# Patient Record
Sex: FEMALE | Race: WHITE | NOT HISPANIC OR LATINO | Employment: FULL TIME | URBAN - METROPOLITAN AREA
[De-identification: names, ages, dates, MRNs, and addresses within clinical notes are randomized per-mention and may not be internally consistent; named-entity substitution may affect disease eponyms.]

---

## 2017-01-25 ENCOUNTER — PATIENT MESSAGE (OUTPATIENT)
Dept: INTERNAL MEDICINE | Facility: CLINIC | Age: 47
End: 2017-01-25

## 2017-01-26 ENCOUNTER — PATIENT MESSAGE (OUTPATIENT)
Dept: INTERNAL MEDICINE | Facility: CLINIC | Age: 47
End: 2017-01-26

## 2017-02-01 ENCOUNTER — OFFICE VISIT (OUTPATIENT)
Dept: INTERNAL MEDICINE | Facility: CLINIC | Age: 47
End: 2017-02-01
Payer: COMMERCIAL

## 2017-02-01 VITALS
DIASTOLIC BLOOD PRESSURE: 70 MMHG | SYSTOLIC BLOOD PRESSURE: 128 MMHG | HEIGHT: 65 IN | WEIGHT: 139.31 LBS | HEART RATE: 72 BPM | BODY MASS INDEX: 23.21 KG/M2

## 2017-02-01 DIAGNOSIS — R22.32 MASS OF LEFT HAND: Primary | ICD-10-CM

## 2017-02-01 PROCEDURE — 99212 OFFICE O/P EST SF 10 MIN: CPT | Mod: S$GLB,,, | Performed by: INTERNAL MEDICINE

## 2017-02-01 PROCEDURE — 99999 PR PBB SHADOW E&M-EST. PATIENT-LVL III: CPT | Mod: PBBFAC,,, | Performed by: INTERNAL MEDICINE

## 2017-02-01 NOTE — PROGRESS NOTES
Subjective:       Patient ID: Brissa Mason is a 46 y.o. female.    Chief Complaint: Mass (left hand, tender to touch x 1 month)    HPI   Incidental note of mass on the left hand with some discomfort.    Review of Systems    No other symptoms  Objective:      Physical Exam    Mass on palm at middle finger of the left hand  Assessment:       1. Mass of left hand        Plan:       Brissa was seen today for mass.    Diagnoses and all orders for this visit:    Mass of left hand: hand specialist  -     Ambulatory consult to Orthopedics      Return if symptoms worsen or fail to improve.    New Prescriptions    No medications on file       Modified Medications    No medications on file       Orders Placed This Encounter   Procedures    Ambulatory consult to Orthopedics     Referral Priority:   Routine     Referral Type:   Consultation     Number of Visits Requested:   1       Labs, studies and consults associated with this visit were reviewed

## 2017-02-01 NOTE — MR AVS SNAPSHOT
[facility]  [address]  [facility] [address]  Phone: [phone]  Fax: [phone]                  Brissa Mason   2017 8:30 AM   Office Visit    Description:  Female : 1970   Provider:  Sandra Betts MD   Department:  [facility]           Reason for Visit     Mass           Diagnoses this Visit        Comments    Mass of left hand    -  Primary            To Do List           Goals (5 Years of Data)     None      Follow-Up and Disposition     Return if symptoms worsen or fail to improve.    Follow-up and Disposition History      [facility]     [facility] Nurse Care Line -  Assistance  Registered nurses in the [facility] provide clinical advisement, health education, appointment booking, and other advisory services.  Call for this free service at [phone].             Medications           Message regarding Medications     Verify the changes and/or additions to your medication regime listed below are the same as discussed with your clinician today.  If any of these changes or additions are incorrect, please notify your healthcare provider.             Verify that the below list of medications is an accurate representation of the medications you are currently taking.  If none reported, the list may be blank. If incorrect, please contact your healthcare provider. Carry this list with you in case of emergency.           Current Medications     alprazolam (XANAX) 0.5 MG tablet Take 1 tablet (0.5 mg total) by mouth nightly as needed for Insomnia or Anxiety.    desogestrel-ethinyl estradiol (VELIVET) 0.1/.125/.15-25 mg-mcg tablet Take 1 tablet by mouth once daily.    fexofenadine (ALLEGRA) 180 MG tablet Take 180 mg by mouth. 1 Tablet Oral Every day           Clinical Reference Information           Vital Signs - Last Recorded  Most recent update: 2017  8:33 AM by Jose Barragan MA    BP Pulse Ht Wt BMI     "128/70 72 5' 5" (1.651 m) 63.2 kg (139 lb 5.3 oz) 23.19 kg/m2      Blood Pressure          Most Recent Value    BP  128/70      Allergies as of 2/1/2017     Mobic [Meloxicam]    Erythromycin    Sulfa (Sulfonamide Antibiotics)      Immunizations Administered on Date of Encounter - 2/1/2017     None      Orders Placed During Today's Visit      Normal Orders This Visit    Ambulatory consult to Orthopedics       "

## 2017-02-16 ENCOUNTER — OFFICE VISIT (OUTPATIENT)
Dept: ORTHOPEDICS | Facility: CLINIC | Age: 47
End: 2017-02-16
Payer: COMMERCIAL

## 2017-02-16 VITALS — WEIGHT: 139 LBS | BODY MASS INDEX: 23.16 KG/M2 | HEIGHT: 65 IN

## 2017-02-16 DIAGNOSIS — M67.442 GANGLION CYST OF FLEXOR TENDON SHEATH OF FINGER OF LEFT HAND: ICD-10-CM

## 2017-02-16 DIAGNOSIS — R22.32 MASS OF LEFT HAND: ICD-10-CM

## 2017-02-16 PROCEDURE — 99999 PR PBB SHADOW E&M-EST. PATIENT-LVL II: CPT | Mod: PBBFAC,,, | Performed by: ORTHOPAEDIC SURGERY

## 2017-02-16 PROCEDURE — 20550 NJX 1 TENDON SHEATH/LIGAMENT: CPT | Mod: LT,S$GLB,, | Performed by: ORTHOPAEDIC SURGERY

## 2017-02-16 PROCEDURE — 99203 OFFICE O/P NEW LOW 30 MIN: CPT | Mod: 25,S$GLB,, | Performed by: ORTHOPAEDIC SURGERY

## 2017-02-16 RX ORDER — TRIAMCINOLONE ACETONIDE 40 MG/ML
40 INJECTION, SUSPENSION INTRA-ARTICULAR; INTRAMUSCULAR
Status: COMPLETED | OUTPATIENT
Start: 2017-02-16 | End: 2017-02-16

## 2017-02-16 RX ADMIN — TRIAMCINOLONE ACETONIDE 40 MG: 40 INJECTION, SUSPENSION INTRA-ARTICULAR; INTRAMUSCULAR at 08:02

## 2017-02-16 NOTE — Clinical Note
February 16, 2017      Sandra Betts MD  [address]  [facility] [address]           [facility]  [address] [place] [address] [address]  [facility] [address]  Phone: [phone]          Patient: Brissa Mason   MR Number: [id number]   YOB: 1970   Date of Visit: 2/16/2017       Dear Dr. Sandra Betts:    Thank you for referring Brissa Mason to me for evaluation. Attached you will find relevant portions of my assessment and plan of care.    If you have questions, please do not hesitate to call me. I look forward to following Brissa Mason along with you.    Sincerely,    Errol Dyson Jr., MD    Enclosure  CC:  No Recipients    If you would like to receive this communication electronically, please contact [email or web address] or [phone] to request more information on [employer] Link access.    For providers and/or their staff who would like to refer a patient to [facility], please contact us through our one-stop-shop provider referral line, [facility], at [phone].    If you feel you have received this communication in error or would no longer like to receive these types of communications, please e-mail [email or web address]

## 2017-02-16 NOTE — PROGRESS NOTES
INITIAL VISIT HISTORY:  A 46-year-old female presents for evaluation of a small   mass on her left hand.  She has noticed it for the past few months, seems to be   a little bit worse with use and she localizes it to the palm of the hand.  No   numbness or tingling is reported.    PAST MEDICAL HISTORY:  Significant for allergies and colitis.    PAST SURGICAL HISTORY:  None.    FAMILY HISTORY:  Positive for hyperlipidemia, hypertension, gout and breast   cancer.    SOCIAL HISTORY:  The patient does not smoke, drinks 1-2 drinks per week.    REVIEW OF SYSTEMS:  Negative fever, chills, rashes.    CURRENT MEDICATIONS:  Reviewed on chart.    ALLERGIES:  Mobic, erythromycin and sulfa.    PHYSICAL EXAMINATION:  GENERAL:  Well-developed, well-nourished female in no acute distress, alert and   oriented x3.  EXTREMITIES:  Examination of the upper extremities significant for the left   hand, demonstrating a small mass overlying the A1/A2 pulley of the left middle   finger, measuring about the size of a large pea, tender to touch, is not mobile.    There are no overlying skin changes.  No triggering noted.   strength   intact.  Sensation intact.  Tinel sign negative.  No x-rays.    IMPRESSION:  Volar retinacular cyst, left middle finger.    PLAN:  I explained the nature of the problem to the patient.  We did discuss   options for treatment including injection, aspiration versus surgical excision.    The patient would like to try an injection performed into the cyst directly and   the tendon sheath of the left middle finger with combination of Kenalog 10 mg,   0.5 mL Xylocaine, sterile technique.    I have also recommended anti-inflammatory medication by mouth, avoidance of   heavy lifting or gripping with the left hand and followup in one month if   symptoms persist.  If symptoms do persist, then I would recommend surgical   excision.      GEORGE  dd: 02/16/2017 08:36:14 (CST)  td: 02/16/2017 16:07:38 (CST)  Doc ID   #[id number]   Job ID #[id number]    CC:

## 2017-02-16 NOTE — LETTER
February 16, 2017        Sandra Betts MD  [address]  [facility] [address]             [facility]  [address] [place] [address] [address]  [facility] [address]  Phone: [phone]   Patient: Brissa Mason   MR Number: [id number]   YOB: 1970   Date of Visit: 2/16/2017       Dear Dr. Betts:    Thank you for referring Brissa Mason to me for evaluation. Below are the relevant portions of my assessment and plan of care.            If you have questions, please do not hesitate to call me. I look forward to following Brissa along with you.    Sincerely,      Errol Dyson Jr., MD           CC  No Recipients

## 2017-04-16 ENCOUNTER — PATIENT MESSAGE (OUTPATIENT)
Dept: INTERNAL MEDICINE | Facility: CLINIC | Age: 47
End: 2017-04-16

## 2017-04-16 DIAGNOSIS — M54.2 NECK PAIN: Primary | ICD-10-CM

## 2017-04-20 ENCOUNTER — TELEPHONE (OUTPATIENT)
Dept: SPINE | Facility: CLINIC | Age: 47
End: 2017-04-20

## 2017-04-20 DIAGNOSIS — M54.2 NECK PAIN: Primary | ICD-10-CM

## 2017-04-21 ENCOUNTER — OFFICE VISIT (OUTPATIENT)
Dept: SPINE | Facility: CLINIC | Age: 47
End: 2017-04-21
Payer: COMMERCIAL

## 2017-04-21 ENCOUNTER — HOSPITAL ENCOUNTER (OUTPATIENT)
Dept: RADIOLOGY | Facility: OTHER | Age: 47
Discharge: HOME | End: 2017-04-21
Attending: NEUROLOGICAL SURGERY
Payer: COMMERCIAL

## 2017-04-21 VITALS
HEART RATE: 71 BPM | DIASTOLIC BLOOD PRESSURE: 71 MMHG | SYSTOLIC BLOOD PRESSURE: 156 MMHG | BODY MASS INDEX: 23.16 KG/M2 | HEIGHT: 65 IN | WEIGHT: 139 LBS

## 2017-04-21 DIAGNOSIS — M79.18 MYOFASCIAL MUSCLE PAIN: Primary | ICD-10-CM

## 2017-04-21 DIAGNOSIS — M47.12 CERVICAL SPONDYLOSIS WITH MYELOPATHY: ICD-10-CM

## 2017-04-21 DIAGNOSIS — M54.2 NECK PAIN: ICD-10-CM

## 2017-04-21 PROCEDURE — 72050 X-RAY EXAM NECK SPINE 4/5VWS: CPT | Mod: 26,,, | Performed by: RADIOLOGY

## 2017-04-21 PROCEDURE — 1160F RVW MEDS BY RX/DR IN RCRD: CPT | Mod: S$GLB,,, | Performed by: PHYSICIAN ASSISTANT

## 2017-04-21 PROCEDURE — 99999 PR PBB SHADOW E&M-EST. PATIENT-LVL III: CPT | Mod: PBBFAC,,, | Performed by: PHYSICIAN ASSISTANT

## 2017-04-21 PROCEDURE — 99214 OFFICE O/P EST MOD 30 MIN: CPT | Mod: S$GLB,,, | Performed by: PHYSICIAN ASSISTANT

## 2017-04-21 PROCEDURE — 72050 X-RAY EXAM NECK SPINE 4/5VWS: CPT | Mod: TC

## 2017-04-21 NOTE — MR AVS SNAPSHOT
[facility]  [address], [address]  [facility] [address]  Phone: [phone]  Fax: [phone]                  Brissa Mason   2017 9:30 AM   Office Visit    Description:  Female : 1970   Provider:  Hailee Lambert PA-C   Department:  [facility]           Reason for Visit     Neck Pain           Diagnoses this Visit        Comments    Myofascial muscle pain    -  Primary     Cervical spondylosis with myelopathy         Neck pain                To Do List           Future Appointments        Provider Department Dept Phone    2017 9:30 AM Hailee Lambert PA-C [facility] [phone]      Goals (5 Years of Data)     None      Follow-Up and Disposition     Return if symptoms worsen or fail to improve.      [facility]     [facility] Nurse Care Line -  Assistance  Unless otherwise directed by your provider, please contact [facility], our nurse care line that is available for  assistance.     Registered nurses in the [facility] provide: appointment scheduling, clinical advisement, health education, and other advisory services.  Call: [phone] (toll free)               Medications           Message regarding Medications     Verify the changes and/or additions to your medication regime listed below are the same as discussed with your clinician today.  If any of these changes or additions are incorrect, please notify your healthcare provider.             Verify that the below list of medications is an accurate representation of the medications you are currently taking.  If none reported, the list may be blank. If incorrect, please contact your healthcare provider. Carry this list with you in case of emergency.           Current Medications     alprazolam (XANAX) 0.5 MG tablet Take 1 tablet (0.5 mg total) by mouth nightly as needed for Insomnia or Anxiety.    desogestrel-ethinyl estradiol (VELIVET)  "0.1/.125/.15-25 mg-mcg tablet Take 1 tablet by mouth once daily.    fexofenadine (ALLEGRA) 180 MG tablet Take 180 mg by mouth. 1 Tablet Oral Every day           Clinical Reference Information           Your Vitals Were     BP Pulse Height Weight BMI    156/71 71 5' 5" (1.651 m) 63 kg (139 lb) 23.13 kg/m2      Blood Pressure          Most Recent Value    BP  (!)  156/71      Allergies as of 4/21/2017     Mobic [Meloxicam]    Erythromycin    Sulfa (Sulfonamide Antibiotics)      Immunizations Administered on Date of Encounter - 4/21/2017     None      Orders Placed During Today's Visit      Normal Orders This Visit    Ambulatory referral to Physical Therapy - Cervical       Language Assistance Services     ATTENTION: Language assistance services are available, free of charge. Please call [phone].      ATENCIÓN: Si raf thomas, tiene a gilliam disposición servicios gratuitos de asistencia lingüística. Llame al [phone].     CHÚ Ý: N?u b?n nói Ti?ng Vi?t, có các d?ch v? h? tr? ngôn ng? mi?n phí dành cho b?n. G?i s? [phone].         [facility] complies with applicable Federal civil rights laws and does not discriminate on the basis of race, color, national origin, age, disability, or sex.        "

## 2017-04-21 NOTE — PROGRESS NOTES
Subjective:      Patient ID: Brissa Mason is a 47 y.o. female.    Chief Complaint: Neck Pain      HPI     Had a fall in shower about 18 months ago with initial neck pain that resolved, but has been intermittent since that time. She complains of worsening pain on Sunday and Monday that has since improved. Currently, she has intermittent right sided neck pain that radiates to her shoulder blade. No arm pain. No left sided neck or arm pain. No numbness, tingling, or weakness. She rates her pain as a 4 on a scale of 1-10. Pain is aching in nature. She was moving shelves in her closet this weekend and this aggravated her pain. No other known aggravating factors. Some relief with prn OTC aleve and laying flat.     She went to PT for her left shoulder in last year. No PT, injections, or surgery on neck. Improvement with OTC aleve. Allergy to mobic.     Patient denies fevers, chills, nausea, vomiting, and weight loss. She denies balance issues, changes in handwriting, problems with hand dexterity, and frequent dropping of items. She admits to night sweats.       Review of Systems   Constitution: Negative for fever, weakness, malaise/fatigue, night sweats, weight gain and weight loss.   HENT: Negative for headaches, hearing loss, nosebleeds and odynophagia.    Eyes: Negative for blurred vision and double vision.   Cardiovascular: Negative for chest pain, irregular heartbeat and palpitations.   Respiratory: Negative for cough, hemoptysis, shortness of breath and wheezing.    Endocrine: Negative for cold intolerance and polydipsia.   Hematologic/Lymphatic: Does not bruise/bleed easily.   Skin: Negative for dry skin, poor wound healing, rash and suspicious lesions.   Musculoskeletal: Positive for back pain and neck pain.        See HPI for pertinent positives.   Gastrointestinal: Negative for bloating, abdominal pain, constipation, diarrhea, hematochezia, melena, nausea and vomiting.   Genitourinary: Negative for  bladder incontinence, dysuria, hematuria, hesitancy and incomplete emptying.   Neurological: Negative for disturbances in coordination, dizziness, focal weakness, loss of balance, numbness, paresthesias and seizures.   Psychiatric/Behavioral: Negative for depression and hallucinations. The patient is not nervous/anxious.            Objective:        General: Brissa is well-developed, well-nourished, appears stated age, in no acute distress, alert and oriented to time, place and person.     General    Vitals reviewed.  Constitutional: She is oriented to person, place, and time. She appears well-developed and well-nourished.   Pulmonary/Chest: Effort normal.   Abdominal: She exhibits no distension.   Neurological: She is alert and oriented to person, place, and time.   Psychiatric: She has a normal mood and affect. Her behavior is normal. Judgment and thought content normal.           Gait: normal, Romberg is normal, tandem walking is normal and she is able to heel/toe stand.     On exam of the cervical spine, pt has no posterior cervical or bilateral trapezial tenderness.     Patient has mild pain with looking over right shoulder, otherwise with good ROM of cervical spine.     Skin in the cervical region is warm to the touch without visible rashes.     Strength Testing of Bilateral UEs shows  Right :  +5/5   Left :  +5/5  Right deltoid:  +5/5   Left deltoid:  +5/5  Right biceps:  +5/5   Left biceps:  +5/5  Right triceps:  +5/5   Left triceps:  +5/5  Right wrist extension:  +5/5  Left wrist extension:  +5/5  Right wrist flexion:  +5/5  Left wrist flexion:  +5/5  Right interosseus:  +5/5  Left interosseus:  +5/5    Sensation is grossly intact to light touch in C5, C6, C7, C8, T1 distribution.     Right shoulder has no pain with IR/ER. Good ROM of shoulder and no tenderness.   Left shoulder has no pain with IR/ER. Good ROM of shoulder and no tenderness.     negative clonus in bilateral LEs.    negative hoffmans  bilateral UEs.    DTRs:  Right biceps:  +2     Left biceps:  +2   Right brachioradialis:  +2  Left brachioradialis:  +2    On gross exam of bilateral LEs, patient has full painfree ROM with no signs of clubbing, laxity, cyanosis, edema, instability, and weakness.       XRAY INTERPRETATION:  X-rays of cervical spine (AP/lat/flex/ext) dated 4/21/17 are personally reviewed and show minimal spondylosis C5-C6.        Assessment:       1. Myofascial muscle pain    2. Cervical spondylosis with myelopathy    3. Neck pain           Plan:       Orders Placed This Encounter    Ambulatory referral to Physical Therapy - Cervical       Intermittent right sided neck pain that radiates to her shoulder blade that started after fall in shower 18 months ago. No arm pain. No left sided neck or arm pain. Known minimal spondylosis C5-C6. Pain likely due to flare of underlying degeneration with myofascial component. Treatment options reviewed with patient along with above cervical XRs. Following plan made:     - PT for cervical spine with good HEP. Internal orders sent to [facility].   - OTC aleve 1-2 bid with food x 2 weeks and then prn. Reviewed dosing and side effects. Allergy to mobic noted (rash). No problems with aleve.   - Expect improvement with above. If not, she will call for follow up.     Follow-up: Return if symptoms worsen or fail to improve. If there are any questions prior to this, the patient was instructed to contact the office.

## 2017-04-21 NOTE — LETTER
April 21, 2017      Sandra Betts MD  [address]  [facility] [address]           [facility]  [address], [address]  [facility] [address]  Phone: [phone]  Fax: [phone]          Patient: Brissa Mason   MR Number: [id number]   YOB: 1970   Date of Visit: 4/21/2017       Dear Dr. Sandra Betts:    Thank you for referring Brissa Mason to me for evaluation. Attached you will find relevant portions of my assessment and plan of care.    If you have questions, please do not hesitate to call me. I look forward to following Brissa Mason along with you.    Sincerely,    BOLA Calzadaosure  CC:  No Recipients    If you would like to receive this communication electronically, please contact [email or web address] or [phone] to request more information on [facility] Link access.    For providers and/or their staff who would like to refer a patient to [facility], please contact us through our one-stop-shop provider referral line, [facility], at [phone].    If you feel you have received this communication in error or would no longer like to receive these types of communications, please e-mail [email or web address]

## 2017-07-14 ENCOUNTER — PATIENT MESSAGE (OUTPATIENT)
Dept: ORTHOPEDICS | Facility: CLINIC | Age: 47
End: 2017-07-14

## 2017-07-31 DIAGNOSIS — Z30.9 UNSPECIFIED CONTRACEPTIVE MANAGEMENT: ICD-10-CM

## 2017-08-01 ENCOUNTER — PATIENT MESSAGE (OUTPATIENT)
Dept: OBSTETRICS AND GYNECOLOGY | Facility: CLINIC | Age: 47
End: 2017-08-01

## 2017-08-01 RX ORDER — DESOGESTREL AND ETHINYL ESTRADIOL 7 DAYS X 3
KIT ORAL
Qty: 84 TABLET | Refills: 0 | Status: SHIPPED | OUTPATIENT
Start: 2017-08-01 | End: 2017-08-03 | Stop reason: SDUPTHER

## 2017-08-03 ENCOUNTER — OFFICE VISIT (OUTPATIENT)
Dept: ORTHOPEDICS | Facility: CLINIC | Age: 47
End: 2017-08-03
Payer: COMMERCIAL

## 2017-08-03 ENCOUNTER — PATIENT MESSAGE (OUTPATIENT)
Dept: INTERNAL MEDICINE | Facility: CLINIC | Age: 47
End: 2017-08-03

## 2017-08-03 VITALS — WEIGHT: 139 LBS | HEIGHT: 65 IN | BODY MASS INDEX: 23.16 KG/M2

## 2017-08-03 DIAGNOSIS — M67.442 GANGLION CYST OF FLEXOR TENDON SHEATH OF FINGER OF LEFT HAND: Primary | ICD-10-CM

## 2017-08-03 DIAGNOSIS — Z30.9 UNSPECIFIED CONTRACEPTIVE MANAGEMENT: ICD-10-CM

## 2017-08-03 PROCEDURE — 99999 PR PBB SHADOW E&M-EST. PATIENT-LVL III: CPT | Mod: PBBFAC,,, | Performed by: ORTHOPAEDIC SURGERY

## 2017-08-03 PROCEDURE — 3008F BODY MASS INDEX DOCD: CPT | Mod: S$GLB,,, | Performed by: ORTHOPAEDIC SURGERY

## 2017-08-03 PROCEDURE — 99214 OFFICE O/P EST MOD 30 MIN: CPT | Mod: S$GLB,,, | Performed by: ORTHOPAEDIC SURGERY

## 2017-08-03 NOTE — PROGRESS NOTES
CHIEF COMPLAINT:  Cyst, left middle finger.    HISTORY OF PRESENT ILLNESS:  A 47-year-old female with ongoing symptoms in left   hand related to a small ganglion cyst in the left palm at the base of the middle   finger.  She has had at least one injection with only slight improvement, but   it has recurred causing pain and difficulty with gripping, seems to change size   from time to time and radiates pain into the left middle finger.  No triggering   is reported.  No numbness reported.    PAST MEDICAL HISTORY:  Significant for allergies and colitis.    PAST SURGICAL HISTORY:  None.    FAMILY HISTORY:  Positive for hyperlipidemia, hypertension and gout.    SOCIAL HISTORY:  The patient does not smoke.  Drinks 1-2 drinks per week.    REVIEW OF SYSTEMS:  Negative for fever, chills, rashes.    CURRENT MEDICATIONS:  Reviewed on chart.    ALLERGIES:  Mobic, erythromycin and sulfa.    PHYSICAL EXAMINATION:  GENERAL:  Well-developed, well-nourished female in no acute distress, alert and   oriented x3.  HEENT:  Unremarkable.  LUNGS:  Clear to auscultation.  HEART:  Regular rate and rhythm.  ABDOMEN:  Soft, nontender.  EXTREMITIES:  Significant for the left hand demonstrating tenderness at the base   of the left middle finger volarly where a small cyst is palpable adjacent to   the flexor tendon and the A1 pulley.  There is no triggering, but there is   tenderness and pain with flexion and extension of the finger.  Sensation intact.    Tinel sign negative.    IMPRESSION:  Volar retinacular cyst, left middle finger, symptomatic.    PLAN:  The patient would like to set up surgery for excisional biopsy of the   cyst.  The risks and benefits of surgery were explained today.  She understands.      GEORGE  dd: 08/03/2017 15:41:49 (CDT)  td: 08/04/2017 01:41:20 (CDT)  Doc ID   #[id number]  Job ID #[id number]    CC:

## 2017-08-28 ENCOUNTER — HOSPITAL ENCOUNTER (OUTPATIENT)
Dept: RADIOLOGY | Facility: HOSPITAL | Age: 47
Discharge: HOME | End: 2017-08-28
Attending: OBSTETRICS & GYNECOLOGY
Payer: COMMERCIAL

## 2017-08-28 VITALS — WEIGHT: 139 LBS | HEIGHT: 65 IN | BODY MASS INDEX: 23.16 KG/M2

## 2017-08-28 DIAGNOSIS — Z12.31 VISIT FOR SCREENING MAMMOGRAM: ICD-10-CM

## 2017-08-28 PROCEDURE — 77067 SCR MAMMO BI INCL CAD: CPT | Mod: TC

## 2017-08-28 PROCEDURE — 77063 BREAST TOMOSYNTHESIS BI: CPT | Mod: 26,,, | Performed by: INTERNAL MEDICINE

## 2017-08-28 PROCEDURE — 77067 SCR MAMMO BI INCL CAD: CPT | Mod: 26,,, | Performed by: INTERNAL MEDICINE

## 2017-09-01 ENCOUNTER — TELEPHONE (OUTPATIENT)
Dept: ORTHOPEDICS | Facility: CLINIC | Age: 47
End: 2017-09-01

## 2017-09-01 NOTE — TELEPHONE ENCOUNTER
----- Message from Sandra Zamora sent at 9/1/2017 11:28 AM CDT -----  Contact: [phone]/ self   Patient called in returning your call. Please advise.

## 2017-09-01 NOTE — TELEPHONE ENCOUNTER
----- Message from Tiffany Malloy sent at 9/1/2017 11:06 AM CDT -----  Contact: self, [phone]  Patient called in returning your call. Please advise.

## 2017-09-05 ENCOUNTER — OFFICE VISIT (OUTPATIENT)
Dept: OBSTETRICS AND GYNECOLOGY | Facility: CLINIC | Age: 47
End: 2017-09-05
Payer: COMMERCIAL

## 2017-09-05 VITALS
DIASTOLIC BLOOD PRESSURE: 62 MMHG | BODY MASS INDEX: 23.52 KG/M2 | WEIGHT: 141.13 LBS | SYSTOLIC BLOOD PRESSURE: 110 MMHG | HEIGHT: 65 IN

## 2017-09-05 DIAGNOSIS — Z30.9 UNSPECIFIED CONTRACEPTIVE MANAGEMENT: ICD-10-CM

## 2017-09-05 DIAGNOSIS — Z12.31 VISIT FOR SCREENING MAMMOGRAM: ICD-10-CM

## 2017-09-05 DIAGNOSIS — G43.821 MENSTRUAL MIGRAINE WITH STATUS MIGRAINOSUS, NOT INTRACTABLE: ICD-10-CM

## 2017-09-05 DIAGNOSIS — Z01.419 GYNECOLOGIC EXAM NORMAL: Primary | ICD-10-CM

## 2017-09-05 PROBLEM — G43.829 MENSTRUAL MIGRAINE WITHOUT STATUS MIGRAINOSUS, NOT INTRACTABLE: Status: ACTIVE | Noted: 2017-09-05

## 2017-09-05 PROCEDURE — 99999 PR PBB SHADOW E&M-EST. PATIENT-LVL III: CPT | Mod: PBBFAC,,, | Performed by: OBSTETRICS & GYNECOLOGY

## 2017-09-05 PROCEDURE — 99396 PREV VISIT EST AGE 40-64: CPT | Mod: S$GLB,,, | Performed by: OBSTETRICS & GYNECOLOGY

## 2017-09-05 RX ORDER — ESTRADIOL 0.1 MG/D
FILM, EXTENDED RELEASE TRANSDERMAL
Qty: 8 PATCH | Refills: 0 | Status: SHIPPED | OUTPATIENT
Start: 2017-09-05 | End: 2018-01-05 | Stop reason: SDUPTHER

## 2017-09-05 NOTE — PROGRESS NOTES
CC: Well woman exam    Brissa Mason is a 47 y.o. female  presents for well woman exam.  LMP: Patient's last menstrual period was 2017.  Patient reports mild headaches/migraines on off week - every month.  Patient switched to triphasic pill ~ 2 years ago by NP (MAGO Etienne) due to headaches on Kariva.  No other Gyn complaints today.    Past Medical History:   Diagnosis Date    Allergy     Colitis     resolved; rectum ; neg flex sig 2006     History reviewed. No pertinent surgical history.  Social History     Social History    Marital status:      Spouse name: N/A    Number of children: N/A    Years of education: N/A     Occupational History    Not on file.     Social History Main Topics    Smoking status: Never Smoker    Smokeless tobacco: Never Used    Alcohol use Yes      Comment: 1-2 glasses of wine a week     Drug use: No    Sexual activity: Yes     Partners: Male     Birth control/ protection: OCP      Comment: , menarche 14, no hx of abnormal      Other Topics Concern    Not on file     Social History Narrative         - [employer]    Parents - Mata and Robina Del Angel     Family History   Problem Relation Age of Onset    Colon polyps Mother      noncancerous colon tumor; polyps    Hyperlipidemia Mother     Osteoporosis Mother     Colon cancer Mother     Gout Father     Hypertension Father     Hypertension Sister     Glaucoma Sister     Hyperlipidemia Brother     Cancer Maternal Uncle      colon    Ovarian cancer Paternal Aunt 50    Hypertension Brother     Breast cancer Paternal Aunt 60    Miscarriages / Stillbirths Neg Hx     Amblyopia Neg Hx     Blindness Neg Hx     Cataracts Neg Hx     Diabetes Neg Hx     Macular degeneration Neg Hx     Retinal detachment Neg Hx     Strabismus Neg Hx     Stroke Neg Hx     Thyroid disease Neg Hx      OB History      Para Term  AB Living    0   0          SAB TAB  "Ectopic Multiple Live Births                       /62   Ht 5' 5" (1.651 m)   Wt 64 kg (141 lb 1.5 oz)   LMP 08/31/2017   BMI 23.48 kg/m²       ROS:  GENERAL: Denies weight gain or weight loss. Feeling well overall.   SKIN: Denies rash or lesions.   HEAD: Denies head injury or headache.   NODES: Denies enlarged lymph nodes.   CHEST: Denies chest pain or shortness of breath.   CARDIOVASCULAR: Denies palpitations or left sided chest pain.   ABDOMEN: No abdominal pain, constipation, diarrhea, nausea, vomiting or rectal bleeding.   URINARY: No frequency, dysuria, hematuria, or burning on urination.  REPRODUCTIVE: See HPI.   BREASTS: The patient performs breast self-examination and denies pain, lumps, or nipple discharge.   HEMATOLOGIC: No easy bruisability or excessive bleeding.   MUSCULOSKELETAL: Denies joint pain or swelling.   NEUROLOGIC: Denies syncope or weakness.   PSYCHIATRIC: Denies depression, anxiety or mood swings.    PHYSICAL EXAM:  APPEARANCE: Well nourished, well developed, in no acute distress.  AFFECT: WNL, alert and oriented x 3  SKIN: No acne or hirsutism  NECK: Neck symmetric without masses or thyromegaly  NODES: No inguinal, cervical, axillary, or femoral lymph node enlargement  CHEST: Good respiratory effect  ABDOMEN: Soft.  No tenderness or masses.  No hepatosplenomegaly.  No hernias.  BREASTS: Symmetrical, no skin changes or visible lesions.  No palpable masses, nipple discharge bilaterally.  PELVIC: Normal external genitalia without lesions.  Normal hair distribution.  Adequate perineal body, normal urethral meatus.  Vagina moist and well rugated without lesions or discharge.  Cervix pink, without lesions, discharge or tenderness.  No significant cystocele or rectocele.  Bimanual exam shows uterus to be normal size, regular, mobile and nontender.  Adnexa without masses or tenderness.    EXTREMITIES: No edema.    Gynecologic exam normal    Unspecified contraceptive management  -     " Discontinue: desogestrel-ethinyl estradiol (VELIVET TRIPHASIC REGIMEN, 28,) 0.1/.125/.15-25 mg-mcg tablet; Take 1 tablet by mouth once daily.  Dispense: 84 tablet; Refill: 3  -     desogestrel-ethinyl estradiol (VELIVET TRIPHASIC REGIMEN, 28,) 0.1/.125/.15-25 mg-mcg tablet; Take 1 tablet by mouth once daily.  Dispense: 84 tablet; Refill: 3    Menstrual migraine with status migrainosus, not intractable  -     estradiol (VIVELLE-DOT) 0.1 mg/24 hr PTSW; Last week of Velivet pack, place on Sunday then replace on Wednesday.  Dispense: 8 patch; Refill: 0    Visit for screening mammogram  -     Mammo Digital Screening Bilat with Tomosynthesis CAD; Future; Expected date: 09/05/2017      Age specific counseling    Orders as above    Patient instructed to apply HRT patch twice weekly on off weeks (Sunday/Wednesday) and monitor headaches.    Patient was counseled today on A.C.S. Pap guidelines and recommendations for yearly pelvic exams, mammograms and monthly self breast exams; to see her PCP for other health maintenance.     Return in about 1 year (around 9/5/2018) for Annual exam.    Bran Oden IV, MD

## 2017-09-12 ENCOUNTER — ANESTHESIA EVENT (OUTPATIENT)
Dept: SURGERY | Facility: HOSPITAL | Age: 47
End: 2017-09-12
Payer: COMMERCIAL

## 2017-09-13 ENCOUNTER — HOSPITAL ENCOUNTER (OUTPATIENT)
Dept: PREADMISSION TESTING | Facility: HOSPITAL | Age: 47
Discharge: HOME | End: 2017-09-13
Attending: ORTHOPAEDIC SURGERY
Payer: COMMERCIAL

## 2017-09-13 VITALS
DIASTOLIC BLOOD PRESSURE: 69 MMHG | RESPIRATION RATE: 16 BRPM | BODY MASS INDEX: 23.74 KG/M2 | HEIGHT: 65 IN | HEART RATE: 75 BPM | WEIGHT: 142.5 LBS | OXYGEN SATURATION: 99 % | SYSTOLIC BLOOD PRESSURE: 120 MMHG

## 2017-09-13 RX ORDER — SODIUM CHLORIDE, SODIUM LACTATE, POTASSIUM CHLORIDE, CALCIUM CHLORIDE 600; 310; 30; 20 MG/100ML; MG/100ML; MG/100ML; MG/100ML
INJECTION, SOLUTION INTRAVENOUS CONTINUOUS
Status: CANCELLED | OUTPATIENT
Start: 2017-09-13

## 2017-09-13 RX ORDER — LIDOCAINE HYDROCHLORIDE 10 MG/ML
1 INJECTION, SOLUTION EPIDURAL; INFILTRATION; INTRACAUDAL; PERINEURAL ONCE
Status: CANCELLED | OUTPATIENT
Start: 2017-09-13 | End: 2017-09-13

## 2017-09-13 NOTE — ANESTHESIA PREPROCEDURE EVALUATION
09/13/2017  Brissa Mason is a 47 y.o., female is scheduled for left middle finger ganglionectomy under MAC/gen on 9/15/2017.      Anesthesia Evaluation    I have reviewed the Patient Summary Reports.    I have reviewed the Nursing Notes.   I have reviewed the Medications.     Review of Systems  Anesthesia Hx:  No problems with previous Anesthesia  History of prior surgery of interest to airway management or planning: Previous anesthesia: MAC  colonoscopy with MAC.  Procedure performed at an [facility].  Denies Personal Hx of Anesthesia complications.   Social:  Non-Smoker, Social Alcohol Use    Hematology/Oncology:  Hematology Normal        EENT/Dental:EENT/Dental Normal   Cardiovascular:   Exercise tolerance: good Denies Hypertension.  Denies Dysrhythmias.   Denies Angina.        Pulmonary:   Denies Shortness of breath.    Renal/:  Renal/ Normal     Hepatic/GI:  Hepatic/GI Normal    Musculoskeletal:   Ganglion cyst left hand at base of middle finger; intermittent pain and swelling that is worse in morning; denies today   Neurological:  Neurology Normal    Endocrine:  Endocrine Normal    Psych:   anxiety          Physical Exam  General:  Well nourished    Airway/Jaw/Neck:  Airway Findings: Mouth Opening: Normal Tongue: Normal  General Airway Assessment: Adult  Mallampati: II  TM Distance: Normal, at least 6 cm        Eyes/Ears/Nose:  EYES/EARS/NOSE FINDINGS: Normal   Dental:  DENTAL FINDINGS: Normal   Chest/Lungs:  Chest/Lungs Clear    Heart/Vascular:  Heart Findings: Normal Heart murmur: negative    Abdomen:  Abdomen Findings: Normal    Musculoskeletal:  Musculoskeletal Findings: Tender Joint Ganglion cyst left hand, non-tender at base of left middle finger     Mental Status:  Mental Status Findings: Normal        Anesthesia Plan  Type of Anesthesia, risks & benefits  discussed:  Anesthesia Type:  general, MAC  Patient's Preference:   Intra-op Monitoring Plan:   Intra-op Monitoring Plan Comments:   Post Op Pain Control Plan:   Post Op Pain Control Plan Comments:   Induction:   IV  Beta Blocker:  Patient is not currently on a Beta-Blocker (No further documentation required).       Informed Consent: Patient understands risks and agrees with Anesthesia plan.  Questions answered.   ASA Score: 1     Day of Surgery Review of History & Physical:        Anesthesia Plan Notes: Anesthesia consent will be obtained prior to procedure on 9/15/2017.        Ready For Surgery From Anesthesia Perspective.

## 2017-09-13 NOTE — DISCHARGE INSTRUCTIONS
Your surgery is scheduled for 9/15/17.    Please report to Outpatient Surgery Intake Office on the 2nd FLOOR at 5:30a.m.          INSTRUCTIONS IMPORTANT!!!  ¨ Do not eat or drink after 12 midnight-including water. OK to brush teeth, no   gum, candy or mints!        ____  Do not wear makeup, including mascara.  ____  No powder, lotions or creams to surgical area.  ____  Please remove all jewelry, including piercings and leave at home.  ____  No money or valuables needed. Please leave at home.  ____  Please bring any documents given by your doctor.  ____  If going home the same day, arrange for a ride home. You will not be able to             drive if Anesthesia was used.  ____  Wear loose fitting clothing. Allow for dressings, bandages.  ____  Stop Aspirin, Ibuprofen, Motrin and Aleve at least 3-5 days before surgery, unless otherwise instructed by your doctor, or the nurse.   You MAY use Tylenol/acetaminophen until day of surgery.  ____  Wash the surgical area with Hibiclens the night before surgery, and again the             morning of surgery.  Be sure to rinse hibiclens off completely (if instructed by nurse).  ____  If you take diabetic medication, do not take am of surgery unless instructed by Doctor.  ____  Call MD for temperature above 101 degrees.  ____ Stop taking any Fish Oil supplement or any Vitamins that contain Vitamin E at least 5 days prior to surgery.  ____ Do Not wear your contact lenses the day of your procedure.  You may wear your glasses.        I have read or had read and explained to me, and understand the above information.  Additional comments or instructions:  For additional questions call [phone]     Take a Hibiclens shower twice a day for 3 days prior to surgery, including the morning of surgery.   Gargle with Listerine twice a day for 3 days prior to surgery, including the morning of surgery.      Pre-Op Bathing Instructions    Before surgery, you can play an important role in your  own health.    Because skin is not sterile, we need to be sure that your skin is as free of germs as possible. By following the instructions below, you can reduce the number of germs on your skin before surgery.    IMPORTANT: You will need to shower with a special soap called Hibiclens*, available at any pharmacy.  If you are allergic to Chlorhexidine (the antiseptic in Hibiclens), use an antibacterial soap such as Dial Soap for your preoperative shower.  You will shower with Hibiclens both the night before your surgery and the morning of your surgery.  Do not use Hibiclens on the head, face or genitals to avoid injury to those areas.    STEP #1: THE NIGHT BEFORE YOUR SURGERY     1. Do not shave the area of your body where your surgery will be performed.  2. Shower and wash your hair and body as usual with your normal soap and shampoo.  3. Rinse your hair and body thoroughly after you shower to remove all soap residue.  4. With your hand, apply one packet of Hibiclens soap to the surgical site.   5. Wash the site gently for five (5) minutes. Do not scrub your skin too hard.   6. Do not wash with your regular soap after Hibiclens is used.  7. Rinse your body thoroughly.  8. Pat yourself dry with a clean, soft towel.  9. Do not use lotion, cream, or powder.  10. Wear clean clothes.    STEP #2: THE MORNING OF YOUR SURGERY     1. Repeat Step #1.    * Not to be used by people allergic to Chlorhexidine.          Anesthesia: Monitored Anesthesia Care (MAC)    Youre due to have surgery. During surgery, youll be given medicine called anesthesia. This will keep you comfortable and pain-free. Your surgeon will use monitored anesthesia care (MAC). This sheet tells you more about this type of anesthesia.  What is monitored anesthesia care?  MAC keeps you very drowsy during surgery. You may be awake, but you will likely not remember much. And you wont feel pain. With MAC, medicines are given through an IV line into a vein in  your arm or hand. A local anesthetic will usually be injected into the skin and muscle around the surgical site to numb it. The anesthesia provider monitors you during the procedure. He or she checks your heart rate and rhythm, blood pressure, and blood oxygen level.  Anesthesia tools and medicines that may be near you during your procedure  You will likely have:  · A pulse oximeter on the end of your finger. This measures your blood oxygen level.  · Electrocardiography leads (electrodes) on your chest. These record your heart rate and rhythm.  · Medicines given through an IV. These relax you and prevent pain. You may be awake or sleep lightly. If you have local anesthetic, it is injected directly into your skin.  · A facemask to give you oxygen, if needed.  Risks and possible complications  MAC has some risks. These include:  · Breathing problems  · Nausea and vomiting  · Allergic reaction to the anesthetic    Anesthesia safety  Tips for anesthesia safety include the following:   · Follow all instructions you are given for how long not to eat or drink before your procedure.  · Be sure your healthcare provider knows what medicines you take, especially any anti-inflammatory medicine or blood thinners. This includes aspirin and any other over-the-counter medicines, herbs, and supplements.  · Have an adult family member or friend drive you home after the procedure.  · For the first 24 hours after your surgery:  ¨ Do not drive or use heavy equipment.  ¨ Do not make important decisions or sign documents.  ¨ Avoid alcohol.  ¨ Have someone stay with you, if possible. They can watch for problems and help keep you safe.  Date Last Reviewed: 12/1/2016  © 4172-4377 [employer]. [address] [place] [address], [place] [address]. All rights reserved. This information is not intended as a substitute for professional medical care. Always follow your healthcare professional's instructions.        Ganglion Cyst: Hand    A  ganglion cyst is a firm, fluid-filled lump that can suddenly appear on the front or back of the wrist or at the base of a finger. These cysts grow from normal tissue in the wrist and fingers, and range in size from a pea to a peach pit. Although ganglion cysts are common, they dont spread, and they dont become cancerous. They can occur after an injury, but many times it isnt known why they grow. Ganglion cysts can change in size, and may go away on their own.  Symptoms  A ganglion cyst is sometimes painful, especially when it first occurs. Constantly using your hand or wrist can make the cyst enlarge and hurt more. Some hand and wrist movements, such as grasping things, may also be difficult.  How a ganglion cyst develops  Your wrist and hand are made up of many small bones that meet at joints. Tendons attach muscles to the bones at the joints. The tendons allow the joints to bend and straighten. Both tendons and joints are lined with tissue called synovium. This tissue makes a thick fluid that keeps the joints and tendons moving easily. Sometimes the tissue balloons out from the joint or tendons and forms a cyst. As the cyst fills with fluid and grows, it appears as a lump you can feel.  Where ganglion cysts occur  A ganglion cyst can occur anywhere on the hand near a joint. Cysts most commonly appear on the back or palm side of the wrist, or on the palm at the base of a finger. Your doctor can usually diagnose a cyst by examining the lump. He or she may draw off a little fluid or order an X-ray to rule out other problems.  Treating a ganglion cyst  Your healthcare provider may just watch your ganglion cyst. Many shrink and become painless without treatment. Some disappear altogether. If the cyst is unsightly or painful, or makes it hard for you to use your hand, your healthcare provider can treat it or, if needed, remove it surgically.  Nonsurgical treatment  To shrink the cyst, your provider may remove  (aspirate) the fluid with a needle. If the cyst hurts, your provider may also give you an injection of an anti-inflammatory, such as cortisone, to relieve the irritation. Your hand may then be wrapped to help keep the cyst from recurring.  Surgery  If the cyst reappears after treatment, your healthcare provider may remove it surgically. A section of the tissue that lines the joint or tendon is removed along with the cyst. This helps prevent another cyst from forming, although recurrence of the cyst is still possible after surgery. Usually, only your hand or arm is numbed, and you can go home a few hours after surgery. Your hand may be in a splint for several days.  Date Last Reviewed: 9/10/2015  © 8984-8197 [employer]. [address] [place] [address], [place] [address]. All rights reserved. This information is not intended as a substitute for professional medical care. Always follow your healthcare professional's instructions.

## 2017-09-15 ENCOUNTER — ANESTHESIA (OUTPATIENT)
Dept: SURGERY | Facility: HOSPITAL | Age: 47
End: 2017-09-15
Payer: COMMERCIAL

## 2017-09-15 ENCOUNTER — HOSPITAL ENCOUNTER (OUTPATIENT)
Facility: HOSPITAL | Age: 47
Discharge: HOME | End: 2017-09-15
Attending: ORTHOPAEDIC SURGERY | Admitting: ORTHOPAEDIC SURGERY
Payer: COMMERCIAL

## 2017-09-15 ENCOUNTER — SURGERY (OUTPATIENT)
Age: 47
End: 2017-09-15

## 2017-09-15 VITALS
SYSTOLIC BLOOD PRESSURE: 140 MMHG | BODY MASS INDEX: 23.32 KG/M2 | HEART RATE: 60 BPM | HEIGHT: 65 IN | WEIGHT: 140 LBS | OXYGEN SATURATION: 98 % | TEMPERATURE: 98 F | DIASTOLIC BLOOD PRESSURE: 68 MMHG | RESPIRATION RATE: 16 BRPM

## 2017-09-15 DIAGNOSIS — M67.442 GANGLION CYST OF FLEXOR TENDON SHEATH OF FINGER OF LEFT HAND: ICD-10-CM

## 2017-09-15 LAB
B-HCG UR QL: NEGATIVE
CTP QC/QA: YES

## 2017-09-15 PROCEDURE — 26160 REMOVE TENDON SHEATH LESION: CPT | Mod: F2,,, | Performed by: ORTHOPAEDIC SURGERY

## 2017-09-15 PROCEDURE — 36000706: Performed by: ORTHOPAEDIC SURGERY

## 2017-09-15 PROCEDURE — 37000009 HC ANESTHESIA EA ADD 15 MINS: Performed by: ORTHOPAEDIC SURGERY

## 2017-09-15 PROCEDURE — S0020 INJECTION, BUPIVICAINE HYDRO: HCPCS | Performed by: ORTHOPAEDIC SURGERY

## 2017-09-15 PROCEDURE — 25000003 PHARM REV CODE 250: Performed by: ORTHOPAEDIC SURGERY

## 2017-09-15 PROCEDURE — 63600175 PHARM REV CODE 636 W HCPCS: Performed by: NURSE ANESTHETIST, CERTIFIED REGISTERED

## 2017-09-15 PROCEDURE — 81025 URINE PREGNANCY TEST: CPT | Performed by: ORTHOPAEDIC SURGERY

## 2017-09-15 PROCEDURE — 63600175 PHARM REV CODE 636 W HCPCS: Performed by: ORTHOPAEDIC SURGERY

## 2017-09-15 PROCEDURE — 25000003 PHARM REV CODE 250: Performed by: NURSE PRACTITIONER

## 2017-09-15 PROCEDURE — 37000008 HC ANESTHESIA 1ST 15 MINUTES: Performed by: ORTHOPAEDIC SURGERY

## 2017-09-15 PROCEDURE — 71000015 HC POSTOP RECOV 1ST HR: Performed by: ORTHOPAEDIC SURGERY

## 2017-09-15 PROCEDURE — 36000707: Performed by: ORTHOPAEDIC SURGERY

## 2017-09-15 RX ORDER — SODIUM CHLORIDE, SODIUM LACTATE, POTASSIUM CHLORIDE, CALCIUM CHLORIDE 600; 310; 30; 20 MG/100ML; MG/100ML; MG/100ML; MG/100ML
INJECTION, SOLUTION INTRAVENOUS CONTINUOUS
Status: DISCONTINUED | OUTPATIENT
Start: 2017-09-15 | End: 2017-09-15 | Stop reason: HOSPADM

## 2017-09-15 RX ORDER — BACITRACIN 50000 [IU]/1
INJECTION, POWDER, FOR SOLUTION INTRAMUSCULAR
Status: DISCONTINUED | OUTPATIENT
Start: 2017-09-15 | End: 2017-09-15 | Stop reason: HOSPADM

## 2017-09-15 RX ORDER — CEFAZOLIN SODIUM 2 G/50ML
2 SOLUTION INTRAVENOUS
Status: DISCONTINUED | OUTPATIENT
Start: 2017-09-15 | End: 2017-09-15 | Stop reason: HOSPADM

## 2017-09-15 RX ORDER — OXYCODONE HYDROCHLORIDE 5 MG/1
10 TABLET ORAL EVERY 4 HOURS PRN
Status: DISCONTINUED | OUTPATIENT
Start: 2017-09-15 | End: 2017-09-15 | Stop reason: HOSPADM

## 2017-09-15 RX ORDER — LIDOCAINE HYDROCHLORIDE 10 MG/ML
1 INJECTION, SOLUTION EPIDURAL; INFILTRATION; INTRACAUDAL; PERINEURAL ONCE
Status: DISCONTINUED | OUTPATIENT
Start: 2017-09-15 | End: 2017-09-15 | Stop reason: HOSPADM

## 2017-09-15 RX ORDER — HYDROCODONE BITARTRATE AND ACETAMINOPHEN 5; 325 MG/1; MG/1
1 TABLET ORAL EVERY 4 HOURS PRN
Status: DISCONTINUED | OUTPATIENT
Start: 2017-09-15 | End: 2017-09-15 | Stop reason: HOSPADM

## 2017-09-15 RX ORDER — HYDROCODONE BITARTRATE AND ACETAMINOPHEN 5; 325 MG/1; MG/1
1 TABLET ORAL EVERY 6 HOURS PRN
Qty: 12 TABLET | Refills: 0 | Status: SHIPPED | OUTPATIENT
Start: 2017-09-15 | End: 2017-09-28

## 2017-09-15 RX ORDER — BUPIVACAINE HYDROCHLORIDE 5 MG/ML
INJECTION, SOLUTION EPIDURAL; INTRACAUDAL
Status: DISCONTINUED | OUTPATIENT
Start: 2017-09-15 | End: 2017-09-15 | Stop reason: HOSPADM

## 2017-09-15 RX ORDER — LIDOCAINE HCL/PF 100 MG/5ML
SYRINGE (ML) INTRAVENOUS
Status: DISCONTINUED | OUTPATIENT
Start: 2017-09-15 | End: 2017-09-15

## 2017-09-15 RX ORDER — ONDANSETRON 8 MG/1
8 TABLET, ORALLY DISINTEGRATING ORAL EVERY 8 HOURS PRN
Status: DISCONTINUED | OUTPATIENT
Start: 2017-09-15 | End: 2017-09-15 | Stop reason: HOSPADM

## 2017-09-15 RX ORDER — FENTANYL CITRATE 50 UG/ML
INJECTION, SOLUTION INTRAMUSCULAR; INTRAVENOUS
Status: DISCONTINUED | OUTPATIENT
Start: 2017-09-15 | End: 2017-09-15

## 2017-09-15 RX ORDER — LIDOCAINE HYDROCHLORIDE 10 MG/ML
INJECTION, SOLUTION EPIDURAL; INFILTRATION; INTRACAUDAL; PERINEURAL
Status: DISCONTINUED | OUTPATIENT
Start: 2017-09-15 | End: 2017-09-15 | Stop reason: HOSPADM

## 2017-09-15 RX ORDER — MIDAZOLAM HYDROCHLORIDE 1 MG/ML
INJECTION INTRAMUSCULAR; INTRAVENOUS
Status: DISCONTINUED | OUTPATIENT
Start: 2017-09-15 | End: 2017-09-15

## 2017-09-15 RX ORDER — PROPOFOL 10 MG/ML
VIAL (ML) INTRAVENOUS CONTINUOUS PRN
Status: DISCONTINUED | OUTPATIENT
Start: 2017-09-15 | End: 2017-09-15

## 2017-09-15 RX ORDER — ACETAMINOPHEN 325 MG/1
650 TABLET ORAL EVERY 4 HOURS PRN
Status: DISCONTINUED | OUTPATIENT
Start: 2017-09-15 | End: 2017-09-15 | Stop reason: HOSPADM

## 2017-09-15 RX ORDER — PROPOFOL 10 MG/ML
VIAL (ML) INTRAVENOUS
Status: DISCONTINUED | OUTPATIENT
Start: 2017-09-15 | End: 2017-09-15

## 2017-09-15 RX ADMIN — FENTANYL CITRATE 50 MCG: 50 INJECTION, SOLUTION INTRAMUSCULAR; INTRAVENOUS at 07:09

## 2017-09-15 RX ADMIN — PROPOFOL 150 MCG/KG/MIN: 10 INJECTION, EMULSION INTRAVENOUS at 07:09

## 2017-09-15 RX ADMIN — BACITRACIN 50000 UNITS: 5000 INJECTION, POWDER, FOR SOLUTION INTRAMUSCULAR at 07:09

## 2017-09-15 RX ADMIN — CEFAZOLIN SODIUM 2 G: 2 SOLUTION INTRAVENOUS at 07:09

## 2017-09-15 RX ADMIN — MIDAZOLAM HYDROCHLORIDE 2 MG: 1 INJECTION, SOLUTION INTRAMUSCULAR; INTRAVENOUS at 07:09

## 2017-09-15 RX ADMIN — LIDOCAINE HYDROCHLORIDE 50 MG: 20 INJECTION, SOLUTION INTRAVENOUS at 07:09

## 2017-09-15 RX ADMIN — LIDOCAINE HYDROCHLORIDE 5 ML: 10 INJECTION, SOLUTION EPIDURAL; INFILTRATION; INTRACAUDAL; PERINEURAL at 07:09

## 2017-09-15 RX ADMIN — SODIUM CHLORIDE, SODIUM LACTATE, POTASSIUM CHLORIDE, AND CALCIUM CHLORIDE: .6; .31; .03; .02 INJECTION, SOLUTION INTRAVENOUS at 06:09

## 2017-09-15 RX ADMIN — BUPIVACAINE HYDROCHLORIDE 5 ML: 5 INJECTION, SOLUTION EPIDURAL; INTRACAUDAL; PERINEURAL at 07:09

## 2017-09-15 RX ADMIN — PROPOFOL 50 MG: 10 INJECTION, EMULSION INTRAVENOUS at 07:09

## 2017-09-15 NOTE — PLAN OF CARE
POC board updated and reviewed with pt and pt's family. Pt and pt's family verbalize understanding. Safety precautions maintained. Call bell in reach. Non skid socks on. Bed locked and in lowest position. Instructed pt to call for assistance. Pt verbalizes understanding.

## 2017-09-15 NOTE — DISCHARGE INSTRUCTIONS
Acetaminophen; Hydrocodone tablets or capsules  What is this medicine?  ACETAMINOPHEN; HYDROCODONE (a set a DESTINEE arnol fen; pam droe KOE done) is a pain reliever. It is used to treat moderate to severe pain.  How should I use this medicine?  Take this medicine by mouth with a glass of water. Follow the directions on the prescription label. You can take it with or without food. If it upsets your stomach, take it with food. Do not take your medicine more often than directed.  A special MedGuide will be given to you by the pharmacist with each prescription and refill. Be sure to read this information carefully each time.  Talk to your pediatrician regarding the use of this medicine in children. Special care may be needed.  What side effects may I notice from receiving this medicine?  Side effects that you should report to your doctor or health care professional as soon as possible:  · allergic reactions like skin rash, itching or hives, swelling of the face, lips, or tongue  · breathing problems  · confusion  · redness, blistering, peeling or loosening of the skin, including inside the mouth  · signs and symptoms of low blood pressure like dizziness; feeling faint or lightheaded, falls; unusually weak or tired  · trouble passing urine or change in the amount of urine  · yellowing of the eyes or skin  Side effects that usually do not require medical attention (report to your doctor or health care professional if they continue or are bothersome):  · constipation  · dry mouth  · nausea, vomiting  · tiredness  What may interact with this medicine?  This medicine may interact with the following medications:  · alcohol  · antiviral medicines for HIV or AIDS  · atropine  · antihistamines for allergy, cough and cold  · certain antibiotics like erythromycin, clarithromycin  · certain medicines for anxiety or sleep  · certain medicines for bladder problems like oxybutynin, tolterodine  · certain medicines for depression like  amitriptyline, fluoxetine, sertraline  · certain medicines for fungal infections like ketoconazole and itraconazole  · certain medicines for Parkinson's disease like benztropine, trihexyphenidyl  · certain medicines for seizures like carbamazepine, phenobarbital, phenytoin, primidone  · certain medicines for stomach problems like dicyclomine, hyoscyamine  · certain medicines for travel sickness like scopolamine  · general anesthetics like halothane, isoflurane, methoxyflurane, propofol  · ipratropium  · local anesthetics like lidocaine, pramoxine, tetracaine  · MAOIs like Carbex, Eldepryl, Marplan, Nardil, and Parnate  · medicines that relax muscles for surgery  · other medicines with acetaminophen  · other narcotic medicines for pain or cough  · phenothiazines like chlorpromazine, mesoridazine, prochlorperazine, thioridazine  · rifampin  What if I miss a dose?  If you miss a dose, take it as soon as you can. If it is almost time for your next dose, take only that dose. Do not take double or extra doses.  Where should I keep my medicine?  Keep out of the reach of children. This medicine can be abused. Keep your medicine in a safe place to protect it from theft. Do not share this medicine with anyone. Selling or giving away this medicine is dangerous and against the law.  This medicine may cause accidental overdose and death if it taken by other adults, children, or pets. Mix any unused medicine with a substance like cat litter or coffee grounds. Then throw the medicine away in a sealed container like a sealed bag or a coffee can with a lid. Do not use the medicine after the expiration date.  Store at room temperature between 15 and 30 degrees C (59 and 86 degrees F).  What should I tell my health care provider before I take this medicine?  They need to know if you have any of these conditions:  · brain tumor  · Crohn's disease, inflammatory bowel disease, or ulcerative colitis  · drug abuse or addiction  · head  injury  · heart or circulation problems  · if you often drink alcohol  · kidney disease or problems going to the bathroom  · liver disease  · lung disease, asthma, or breathing problems  · an unusual or allergic reaction to acetaminophen, hydrocodone, other opioid analgesics, other medicines, foods, dyes, or preservatives  · pregnant or trying to get pregnant  · breast-feeding  What should I watch for while using this medicine?  Tell your doctor or health care professional if your pain does not go away, if it gets worse, or if you have new or a different type of pain. You may develop tolerance to the medicine. Tolerance means that you will need a higher dose of the medicine for pain relief. Tolerance is normal and is expected if you take the medicine for a long time.  Do not suddenly stop taking your medicine because you may develop a severe reaction. Your body becomes used to the medicine. This does NOT mean you are addicted. Addiction is a behavior related to getting and using a drug for a non-medical reason. If you have pain, you have a medical reason to take pain medicine. Your doctor will tell you how much medicine to take. If your doctor wants you to stop the medicine, the dose will be slowly lowered over time to avoid any side effects.  There are different types of narcotic medicines (opiates). If you take more than one type at the same time or if you are taking another medicine that also causes drowsiness, you may have more side effects. Give your health care provider a list of all medicines you use. Your doctor will tell you how much medicine to take. Do not take more medicine than directed. Call emergency for help if you have problems breathing or unusual sleepiness.  Do not take other medicines that contain acetaminophen with this medicine. Always read labels carefully. If you have questions, ask your doctor or pharmacist.  If you take too much acetaminophen get medical help right away. Too much  acetaminophen can be very dangerous and cause liver damage. Even if you do not have symptoms, it is important to get help right away.  You may get drowsy or dizzy. Do not drive, use machinery, or do anything that needs mental alertness until you know how this medicine affects you. Do not stand or sit up quickly, especially if you are an older patient. This reduces the risk of dizzy or fainting spells. Alcohol may interfere with the effect of this medicine. Avoid alcoholic drinks.  The medicine will cause constipation. Try to have a bowel movement at least every 2 to 3 days. If you do not have a bowel movement for 3 days, call your doctor or health care professional.  Your mouth may get dry. Chewing sugarless gum or sucking hard candy, and drinking plenty of water may help. Contact your doctor if the problem does not go away or is severe.  NOTE:This sheet is a summary. It may not cover all possible information. If you have questions about this medicine, talk to your doctor, pharmacist, or health care provider. Copyright© 2017 [employer]

## 2017-09-15 NOTE — H&P
CHIEF COMPLAINT:  Cyst, left middle finger.     HISTORY OF PRESENT ILLNESS:  A 47-year-old female with ongoing symptoms in left   hand related to a small ganglion cyst in the left palm at the base of the middle   finger.  She has had at least one injection with only slight improvement, but   it has recurred causing pain and difficulty with gripping, seems to change size   from time to time and radiates pain into the left middle finger.  No triggering   is reported.  No numbness reported.     PAST MEDICAL HISTORY:  Significant for allergies and colitis.     PAST SURGICAL HISTORY:  None.     FAMILY HISTORY:  Positive for hyperlipidemia, hypertension and gout.     SOCIAL HISTORY:  The patient does not smoke.  Drinks 1-2 drinks per week.     REVIEW OF SYSTEMS:  Negative for fever, chills, rashes.     CURRENT MEDICATIONS:  Reviewed on chart.     ALLERGIES:  Mobic, erythromycin and sulfa.     PHYSICAL EXAMINATION:  GENERAL:  Well-developed, well-nourished female in no acute distress, alert and   oriented x3.  HEENT:  Unremarkable.  LUNGS:  Clear to auscultation.  HEART:  Regular rate and rhythm.  ABDOMEN:  Soft, nontender.  EXTREMITIES:  Significant for the left hand demonstrating tenderness at the base   of the left middle finger volarly where a small cyst is palpable adjacent to   the flexor tendon and the A1 pulley.  There is no triggering, but there is   tenderness and pain with flexion and extension of the finger.  Sensation intact.    Tinel sign negative.     IMPRESSION:  Volar retinacular cyst, left middle finger, symptomatic.     PLAN:  The patient would like to set up surgery for excisional biopsy of the   cyst.  The risks and benefits of surgery were explained today.  She understands.             CC:

## 2017-09-15 NOTE — ANESTHESIA POSTPROCEDURE EVALUATION
"Anesthesia Post Evaluation    Patient: Brissa Mason    Procedure(s) Performed: Procedure(s) (LRB):  GANGLIONECTOMY-HAND (Left)    Final Anesthesia Type: MAC  Patient location during evaluation: [facility]  Patient participation: Yes- Able to Participate  Level of consciousness: awake and alert  Post-procedure vital signs: reviewed and stable  Pain management: adequate  Airway patency: patent  PONV status at discharge: No PONV  Anesthetic complications: no      Cardiovascular status: hemodynamically stable  Respiratory status: unassisted, spontaneous ventilation and room air  Hydration status: euvolemic  Follow-up not needed.        Visit Vitals  BP (!) 141/76 (BP Location: Right arm, Patient Position: Lying)   Pulse 63   Temp 36.4 °C (97.5 °F) (Temporal)   Resp 16   Ht 5' 5" (1.651 m)   Wt 63.5 kg (140 lb)   LMP 08/31/2017   SpO2 98%   Breastfeeding? No   BMI 23.30 kg/m²       Pain/Naman Score: Pain Assessment Performed: Yes (9/15/2017  6:35 AM)  Presence of Pain: denies (9/15/2017  6:35 AM)      "

## 2017-09-15 NOTE — OP NOTE
DATE OF PROCEDURE:  09/15/2017    PREOPERATIVE DIAGNOSIS:  Volar retinacular cyst, left middle finger.    POSTOPERATIVE DIAGNOSIS:  Volar retinacular cyst, left middle finger.    OPERATIVE PROCEDURE:  Excisional biopsy cyst, left middle finger.    SURGEON:  Errol Dyson Jr., M.D.    ANESTHESIA:  MAC.    ESTIMATED BLOOD LOSS:  Minimal.    COMPLICATIONS:  None.    SPECIMENS:  None.    BRIEF INDICATIONS:  A 47-year-old female with persistent cyst in the left middle   finger at the base of the middle finger causing pain with motion.  Recently,   the cyst had gotten smaller, but it is still causing some pain.    PROCEDURE IN DETAIL:  After operative consent was obtained, the patient brought   to the Operating Room, placed supine on the operating room table.  Anesthesia by   IV sedation followed by injection of Xylocaine, Marcaine combination into the   base of the left middle finger with Marcaine and Xylocaine.  Tourniquet applied   to left arm.  Left upper extremity prepped and draped out in the normal sterile   fashion.  The Esmarch used to exsanguinate the limb and the tourniquet inflated   to 225 mmHg.    Following this, an oblique incision was made at the base of the left middle   finger with a #15 blade.  Careful dissection used to divide the subcutaneous   tissue.  Hemostasis achieved with Bovie.  Deep dissection showed some thickening   of the A1 and A2 pulley.  There was no discrete cyst present, but there   appeared to be a small root of the cyst arising from the A1 and A2 pulley,   therefore, this portion of the pulley was carefully excised with the [place]   blade and in doing so, I made a window in the tendon sheath.  The A2 pulley was   left intact.  The A1 was mostly excised.  Range of motion of the finger checked   and noted to be full without impingement or triggering.  No other cysts were   encountered.  No soft tissue masses, other than this.  The wound irrigated with   antibiotic saline solution and  the skin closed with a running and interrupted   5-0 nylon horizontal mattress suture.  Sterile dressing applied followed by   light wrap.  Tourniquet deflated.  The patient was brought to the Recovery Room   in stable condition.  All sponge and needle counts reported as correct.  No   complications.      DUSTIN/CHATO  dd: 09/15/2017 08:09:25 (CDT)  td: 09/15/2017 10:58:48 (CDT)  Doc ID   #[id number]  Job ID #[id number]    CC:

## 2017-09-15 NOTE — TRANSFER OF CARE
"Anesthesia Transfer of Care Note    Patient: Brissa Mason    Procedure(s) Performed: Procedure(s) (LRB):  GANGLIONECTOMY-HAND (Left)    Patient location: [facility]    Anesthesia Type: MAC    Transport from OR: Transported from OR on room air with adequate spontaneous ventilation    Post pain: adequate analgesia    Post assessment: no apparent anesthetic complications    Post vital signs: stable    Level of consciousness: awake and alert    Nausea/Vomiting: no nausea/vomiting    Complications: none    Transfer of care protocol was followed      Last vitals:   Visit Vitals  BP (!) 141/76 (BP Location: Right arm, Patient Position: Lying)   Pulse 63   Temp 36.4 °C (97.5 °F) (Temporal)   Resp 16   Ht 5' 5" (1.651 m)   Wt 63.5 kg (140 lb)   LMP 08/31/2017   SpO2 98%   Breastfeeding? No   BMI 23.30 kg/m²     "

## 2017-09-15 NOTE — BRIEF OP NOTE
[facility]  Brief Operative Note     SUMMARY     Surgery Date: 9/15/2017     Surgeon(s) and Role:     * Errol Dyson Jr., MD - Primary    Assisting Surgeon: None    Pre-op Diagnosis:  Ganglion cyst of flexor tendon sheath of finger of left hand [M67.442]    Post-op Diagnosis:  Post-Op Diagnosis Codes:     * Ganglion cyst of flexor tendon sheath of finger of left hand [M67.442]    Procedure(s) (LRB):  GANGLIONECTOMY-HAND (Left)    Anesthesia: Local MAC    Description of the findings of the procedure: cyst left hand    Findings/Key Components: exc cyst left hand    Estimated Blood Loss: * No values recorded between 9/15/2017  7:42 AM and 9/15/2017  8:03 AM *         Specimens:   Specimen (12h ago through future)    None          Discharge Note    SUMMARY     Admit Date: 9/15/2017    Discharge Date and Time:  09/15/2017 8:06 AM    Hospital Course (synopsis of major diagnoses, care, treatment, and services provided during the course of the hospital stay): see op note     Final Diagnosis: Post-Op Diagnosis Codes:     * Ganglion cyst of flexor tendon sheath of finger of left hand [M67.442]    Disposition: Home or Self Care    Follow Up/Patient Instructions:     Medications:  Reconciled Home Medications:   Current Discharge Medication List      START taking these medications    Details   hydrocodone-acetaminophen 5-325mg (NORCO) 5-325 mg per tablet Take 1 tablet by mouth every 6 (six) hours as needed for Pain.  Qty: 12 tablet, Refills: 0         CONTINUE these medications which have NOT CHANGED    Details   alprazolam (XANAX) 0.5 MG tablet Take 1 tablet (0.5 mg total) by mouth nightly as needed for Insomnia or Anxiety.  Qty: 30 tablet, Refills: 0    Associated Diagnoses: Sleep disorder      desogestrel-ethinyl estradiol (VELIVET TRIPHASIC REGIMEN, 28,) 0.1/.125/.15-25 mg-mcg tablet Take 1 tablet by mouth once daily.  Qty: 84 tablet, Refills: 3    Associated Diagnoses: Unspecified contraceptive  management      estradiol (VIVELLE-DOT) 0.1 mg/24 hr PTSW Last week of Velivet pack, place on Sunday then replace on Wednesday.  Qty: 8 patch, Refills: 0    Associated Diagnoses: Menstrual migraine with status migrainosus, not intractable      fexofenadine (ALLEGRA) 180 MG tablet Take 180 mg by mouth. 1 Tablet Oral Every day             Discharge Procedure Orders  Diet general     Shower on day dressing removed (No bath)     Call MD for:  temperature >100.4     Call MD for:  persistent nausea and vomiting     Call MD for:  severe uncontrolled pain     Keep surgical extremity elevated     Remove dressing in 48 hours       Follow-up Information     Errol Dyson Jr, MD. Schedule an appointment as soon as possible for a visit in 10 days.    Specialties:  Hand Surgery, Orthopedic Surgery  Why:  For suture removal  Contact information:  [address]  [address] [place] [id number] [phone]

## 2017-09-15 NOTE — INTERVAL H&P NOTE
The patient has been examined and the H&P has been reviewed:    I concur with the findings and no changes have occurred since H&P was written.    Anesthesia/Surgery risks, benefits and alternative options discussed and understood by patient/family.          Active Hospital Problems    Diagnosis  POA    Ganglion cyst of flexor tendon sheath of finger of left hand [M67.442]  Yes      Resolved Hospital Problems    Diagnosis Date Resolved POA   No resolved problems to display.

## 2017-09-18 ENCOUNTER — TELEPHONE (OUTPATIENT)
Dept: ORTHOPEDICS | Facility: CLINIC | Age: 47
End: 2017-09-18

## 2017-09-18 NOTE — TELEPHONE ENCOUNTER
----- Message from Yuko Nascimento sent at 9/18/2017  8:52 AM CDT -----  Contact: [phone]/self  Patient would like to speak with you about a return work letter. Patient would like to be seen soon for 10 day post op. Please advise.

## 2017-09-18 NOTE — TELEPHONE ENCOUNTER
I spoke with the patient and made her a post op appointment. She was made aware of date, time and location. I also faxed over a return to work note to [phone].

## 2017-09-18 NOTE — TELEPHONE ENCOUNTER
----- Message from Yuko Nascimento sent at 9/18/2017  9:24 AM CDT -----  Contact: [phone] or [phone] self  Patient called in returning your call. Please advise.

## 2017-09-22 ENCOUNTER — LAB VISIT (OUTPATIENT)
Dept: LAB | Facility: HOSPITAL | Age: 47
End: 2017-09-22
Attending: INTERNAL MEDICINE
Payer: COMMERCIAL

## 2017-09-22 DIAGNOSIS — Z13.9 ENCOUNTER FOR SCREENING, UNSPECIFIED: ICD-10-CM

## 2017-09-22 LAB
25(OH)D3+25(OH)D2 SERPL-MCNC: 49 NG/ML
ALBUMIN SERPL BCP-MCNC: 3.9 G/DL
ALP SERPL-CCNC: 37 U/L
ALT SERPL W/O P-5'-P-CCNC: 11 U/L
ANION GAP SERPL CALC-SCNC: 10 MMOL/L
AST SERPL-CCNC: 13 U/L
BASOPHILS # BLD AUTO: 0.03 K/UL
BASOPHILS NFR BLD: 0.5 %
BILIRUB SERPL-MCNC: 0.5 MG/DL
BUN SERPL-MCNC: 11 MG/DL
CALCIUM SERPL-MCNC: 9.2 MG/DL
CHLORIDE SERPL-SCNC: 108 MMOL/L
CHOLEST SERPL-MCNC: 220 MG/DL
CHOLEST/HDLC SERPL: 2.7 {RATIO}
CO2 SERPL-SCNC: 21 MMOL/L
CREAT SERPL-MCNC: 0.8 MG/DL
DIFFERENTIAL METHOD: ABNORMAL
EOSINOPHIL # BLD AUTO: 0.1 K/UL
EOSINOPHIL NFR BLD: 1.2 %
ERYTHROCYTE [DISTWIDTH] IN BLOOD BY AUTOMATED COUNT: 12.2 %
EST. GFR  (AFRICAN AMERICAN): >60 ML/MIN/1.73 M^2
EST. GFR  (NON AFRICAN AMERICAN): >60 ML/MIN/1.73 M^2
GLUCOSE SERPL-MCNC: 87 MG/DL
HCT VFR BLD AUTO: 35.4 %
HDLC SERPL-MCNC: 83 MG/DL
HDLC SERPL: 37.7 %
HGB BLD-MCNC: 12.1 G/DL
LDLC SERPL CALC-MCNC: 115.6 MG/DL
LYMPHOCYTES # BLD AUTO: 1.5 K/UL
LYMPHOCYTES NFR BLD: 23.8 %
MCH RBC QN AUTO: 30.7 PG
MCHC RBC AUTO-ENTMCNC: 34.2 G/DL
MCV RBC AUTO: 90 FL
MONOCYTES # BLD AUTO: 0.4 K/UL
MONOCYTES NFR BLD: 6.2 %
NEUTROPHILS # BLD AUTO: 4.4 K/UL
NEUTROPHILS NFR BLD: 68.1 %
NONHDLC SERPL-MCNC: 137 MG/DL
PLATELET # BLD AUTO: 303 K/UL
PMV BLD AUTO: 10.5 FL
POTASSIUM SERPL-SCNC: 4.1 MMOL/L
PROT SERPL-MCNC: 7.2 G/DL
RBC # BLD AUTO: 3.94 M/UL
SODIUM SERPL-SCNC: 139 MMOL/L
TRIGL SERPL-MCNC: 107 MG/DL
WBC # BLD AUTO: 6.47 K/UL

## 2017-09-22 PROCEDURE — 36415 COLL VENOUS BLD VENIPUNCTURE: CPT

## 2017-09-22 PROCEDURE — 80061 LIPID PANEL: CPT

## 2017-09-22 PROCEDURE — 80053 COMPREHEN METABOLIC PANEL: CPT

## 2017-09-22 PROCEDURE — 85025 COMPLETE CBC W/AUTO DIFF WBC: CPT

## 2017-09-22 PROCEDURE — 82306 VITAMIN D 25 HYDROXY: CPT

## 2017-09-25 ENCOUNTER — OFFICE VISIT (OUTPATIENT)
Dept: ORTHOPEDICS | Facility: CLINIC | Age: 47
End: 2017-09-25
Payer: COMMERCIAL

## 2017-09-25 VITALS — BODY MASS INDEX: 23.32 KG/M2 | WEIGHT: 140 LBS | HEIGHT: 65 IN

## 2017-09-25 DIAGNOSIS — M67.442 GANGLION CYST OF FLEXOR TENDON SHEATH OF FINGER OF LEFT HAND: Primary | ICD-10-CM

## 2017-09-25 PROCEDURE — 99024 POSTOP FOLLOW-UP VISIT: CPT | Mod: S$GLB,,, | Performed by: ORTHOPAEDIC SURGERY

## 2017-09-25 PROCEDURE — 99999 PR PBB SHADOW E&M-EST. PATIENT-LVL II: CPT | Mod: PBBFAC,,, | Performed by: ORTHOPAEDIC SURGERY

## 2017-09-25 NOTE — PROGRESS NOTES
HISTORY OF PRESENT ILLNESS:  Ms. Mason is about 11 days' postop excision cyst   from the palm of the left hand.  She is doing well.  No problems reported.  A   little bit of swelling noted.    PHYSICAL EXAMINATION:  LEFT HAND:  Slight swelling.  Slight bruising.  Sutures are in place.  Range of   motion of finger is good.  No triggering.  No evidence of infection.    PLAN:  Sutures removed.  Instructed in appropriate wound care.  Start gentle   range of motion.  No heavy lifting.  Follow up in three weeks.      GEORGE  dd: 09/25/2017 08:25:03 (CDT)  td: 09/25/2017 10:26:58 (CDT)  Doc ID   #[id number]  Job ID #[id number]    CC:

## 2017-09-28 ENCOUNTER — OFFICE VISIT (OUTPATIENT)
Dept: INTERNAL MEDICINE | Facility: CLINIC | Age: 47
End: 2017-09-28
Payer: COMMERCIAL

## 2017-09-28 VITALS
WEIGHT: 144.63 LBS | SYSTOLIC BLOOD PRESSURE: 124 MMHG | HEART RATE: 67 BPM | BODY MASS INDEX: 24.1 KG/M2 | DIASTOLIC BLOOD PRESSURE: 78 MMHG | HEIGHT: 65 IN | OXYGEN SATURATION: 98 %

## 2017-09-28 DIAGNOSIS — Z12.31 ENCOUNTER FOR SCREENING MAMMOGRAM FOR BREAST CANCER: ICD-10-CM

## 2017-09-28 DIAGNOSIS — Z01.00 ENCOUNTER FOR VISION SCREENING: ICD-10-CM

## 2017-09-28 DIAGNOSIS — Z00.00 WELLNESS EXAMINATION: ICD-10-CM

## 2017-09-28 DIAGNOSIS — G47.9 SLEEP DISORDER: ICD-10-CM

## 2017-09-28 DIAGNOSIS — E55.9 MILD VITAMIN D DEFICIENCY: ICD-10-CM

## 2017-09-28 DIAGNOSIS — Z00.00 PHYSICAL EXAM: Primary | ICD-10-CM

## 2017-09-28 PROBLEM — M67.442 GANGLION CYST OF FLEXOR TENDON SHEATH OF FINGER OF LEFT HAND: Status: RESOLVED | Noted: 2017-02-16 | Resolved: 2017-09-28

## 2017-09-28 PROCEDURE — 99999 PR PBB SHADOW E&M-EST. PATIENT-LVL III: CPT | Mod: PBBFAC,,, | Performed by: INTERNAL MEDICINE

## 2017-09-28 PROCEDURE — 99396 PREV VISIT EST AGE 40-64: CPT | Mod: S$GLB,,, | Performed by: INTERNAL MEDICINE

## 2017-09-28 RX ORDER — ALPRAZOLAM 0.5 MG/1
0.5 TABLET ORAL NIGHTLY PRN
Qty: 30 TABLET | Refills: 0 | Status: SHIPPED | OUTPATIENT
Start: 2017-09-28 | End: 2019-09-26

## 2017-09-28 NOTE — PROGRESS NOTES
Subjective:      Patient ID: Brissa Mason is a 47 y.o. female.    Chief Complaint: Annual Exam    HPI:  HPI   Annual exam: 9/29/2017  Colonoscopy: 7/9/2013 due at age 50  Mammogram: 8/28/2017  Gyn: Dr. Oden 2017  Optho : 2015 patient is due  Flu:will get at work  Tetanus: 9/21/2015  Tdap 2017  Shingles: not due  Pneumovax: not due     Labs reviewed:  Vit D 1000 a day  HGB 12.1: last blood donation 2 weeks before lab    HDL83  CMP normal  Patient Active Problem List   Diagnosis    Colitis: 2005 rectal area    Encounter for screening colonoscopy: 2013     Menstrual migraine without status migrainosus, not intractable    Mild vitamin D deficiency    Encounter for vision screening    Sleep disorder     Past Medical History:   Diagnosis Date    Allergy     Colitis     resolved; rectum 2005; neg flex sig 2006     No past surgical history on file.  Family History   Problem Relation Age of Onset    Colon polyps Mother      noncancerous colon tumor; polyps    Hyperlipidemia Mother     Osteoporosis Mother     Colon cancer Mother     Gout Father     Hypertension Father     Hypertension Sister     Glaucoma Sister     Hyperlipidemia Brother     Cancer Maternal Uncle      colon    Ovarian cancer Paternal Aunt 50    Hypertension Brother     Breast cancer Paternal Aunt 60    Miscarriages / Stillbirths Neg Hx     Amblyopia Neg Hx     Blindness Neg Hx     Cataracts Neg Hx     Diabetes Neg Hx     Macular degeneration Neg Hx     Retinal detachment Neg Hx     Strabismus Neg Hx     Stroke Neg Hx     Thyroid disease Neg Hx      Review of Systems   Constitutional: Negative for activity change and unexpected weight change.   HENT: Negative for hearing loss, rhinorrhea and trouble swallowing.    Eyes: Positive for visual disturbance. Negative for discharge.   Respiratory: Negative for chest tightness and wheezing.    Cardiovascular: Negative for chest pain and palpitations.  "  Gastrointestinal: Negative for blood in stool, constipation, diarrhea and vomiting.   Endocrine: Negative for polydipsia and polyuria.   Genitourinary: Negative for difficulty urinating, dysuria, hematuria and menstrual problem.   Musculoskeletal: Positive for neck pain. Negative for arthralgias and joint swelling.   Neurological: Negative for weakness and headaches.   Psychiatric/Behavioral: Negative for confusion and dysphoric mood.     Objective:     Vitals:    09/28/17 0919   BP: 124/78   Pulse: 67   SpO2: 98%   Weight: 65.6 kg (144 lb 10 oz)   Height: 5' 5" (1.651 m)   PainSc: 0-No pain     Body mass index is 24.07 kg/m².  Physical Exam   Constitutional: She is oriented to person, place, and time. She appears well-developed and well-nourished. No distress.   HENT:   Right Ear: Tympanic membrane and ear canal normal.   Left Ear: Tympanic membrane and ear canal normal.   Nose: No sinus tenderness or nasal deformity.   Mouth/Throat: No oropharyngeal exudate or posterior oropharyngeal erythema.   Eyes: Conjunctivae, EOM and lids are normal. Pupils are equal, round, and reactive to light.   Neck: Carotid bruit is not present. No thyromegaly present.   Cardiovascular: Normal rate, regular rhythm and intact distal pulses.    Pulmonary/Chest: Effort normal and breath sounds normal. No respiratory distress.   Abdominal: Soft. Bowel sounds are normal. There is no tenderness.   Musculoskeletal: She exhibits no edema or tenderness.   Lymphadenopathy:        Head (right side): No submandibular adenopathy present.        Head (left side): No submandibular adenopathy present.     She has no cervical adenopathy.     She has no axillary adenopathy.        Right: No inguinal adenopathy present.        Left: No inguinal adenopathy present.   Neurological: She is alert and oriented to person, place, and time. She has normal strength.   Skin: Skin is intact. No lesion noted.   Psychiatric: She has a normal mood and affect. Her " speech is normal and behavior is normal.     Assessment:     1. Physical exam    2. Wellness examination    3. Encounter for screening mammogram for breast cancer    4. Encounter for vision screening    5. Sleep disorder    6. Mild vitamin D deficiency      Plan:   Brissa was seen today for annual exam.    Diagnoses and all orders for this visit:    Physical exam    Wellness examination  -     CBC auto differential; Future  -     Comprehensive metabolic panel; Future  -     Lipid panel; Future  -     Vitamin D; Future    Encounter for screening mammogram for breast cancer    Encounter for vision screening  -     Ambulatory referral to Optometry    Sleep disorder  -     alprazolam (XANAX) 0.5 MG tablet; Take 1 tablet (0.5 mg total) by mouth nightly as needed for Insomnia or Anxiety.    Mild vitamin D deficiency      Return in about 1 year (around 9/28/2018) for Follow up with cbc, cmp, and lipid vit D.     Medication List          Accurate as of 9/28/17 10:13 AM. If you have any questions, ask your nurse or doctor.               CONTINUE taking these medications    ALLEGRA 180 MG tablet  Generic drug:  fexofenadine     alprazolam 0.5 MG tablet  Commonly known as:  XANAX  Take 1 tablet (0.5 mg total) by mouth nightly as needed for Insomnia or Anxiety.     desogestrel-ethinyl estradiol 0.1/.125/.15-25 mg-mcg tablet  Commonly known as:  VELIVET TRIPHASIC REGIMEN (28)  Take 1 tablet by mouth once daily.     estradiol 0.1 mg/24 hr Ptsw  Commonly known as:  VIVELLE-DOT  Last week of Velivet pack, place on Sunday then replace on Wednesday.        STOP taking these medications    hydrocodone-acetaminophen 5-325mg 5-325 mg per tablet  Commonly known as:  NORCO  Stopped by:  Sandra Betts MD           Where to Get Your Medications      These medications were sent to [id number] - [place] - [address]  [address] [id number]    Phone:  [phone]   · alprazolam 0.5 MG  tablet

## 2017-10-17 ENCOUNTER — OFFICE VISIT (OUTPATIENT)
Dept: ORTHOPEDICS | Facility: CLINIC | Age: 47
End: 2017-10-17
Payer: COMMERCIAL

## 2017-10-17 VITALS — HEIGHT: 65 IN | WEIGHT: 144 LBS | BODY MASS INDEX: 23.99 KG/M2

## 2017-10-17 DIAGNOSIS — M67.40 GANGLION: ICD-10-CM

## 2017-10-17 DIAGNOSIS — M67.40 GANGLION CYST: Primary | ICD-10-CM

## 2017-10-17 PROCEDURE — 99999 PR PBB SHADOW E&M-EST. PATIENT-LVL II: CPT | Mod: PBBFAC,,, | Performed by: ORTHOPAEDIC SURGERY

## 2017-10-17 PROCEDURE — 99024 POSTOP FOLLOW-UP VISIT: CPT | Mod: S$GLB,,, | Performed by: ORTHOPAEDIC SURGERY

## 2017-10-17 NOTE — PROGRESS NOTES
HISTORY OF PRESENT ILLNESS:  Ms. Mason is about a month out from excision of   ganglion from the left hand, at the base of the middle finger.  She is doing   well in terms of pain, but she is still having some swelling and thickening of   the scar at the base of the middle finger.    PHYSICAL EXAMINATION:  The scar is thickened, little bit raised, tender, no   evidence of infection, however.  Range of motion slightly decreased.    PLAN:  I think she would benefit from a little OT.  I have ordered some OT, left   hand, for range of motion, stretching and strengthening and scar massage.  We   will also get her started on Mederma which should help, Advil or Motrin by   mouth.  Follow up in one month.      GEORGE  dd: 10/17/2017 08:52:15 (CDT)  td: 10/17/2017 22:10:21 (CDT)  Doc ID   #[id number]  Job ID #[id number]    CC:

## 2017-10-23 DIAGNOSIS — Z30.9 CONTRACEPTIVE MANAGEMENT: ICD-10-CM

## 2017-10-26 RX ORDER — DESOGESTREL AND ETHINYL ESTRADIOL 7 DAYS X 3
KIT ORAL
Qty: 84 TABLET | Refills: 0 | Status: SHIPPED | OUTPATIENT
Start: 2017-10-26 | End: 2018-02-16 | Stop reason: SDUPTHER

## 2017-11-02 ENCOUNTER — CLINICAL SUPPORT (OUTPATIENT)
Dept: REHABILITATION | Facility: HOSPITAL | Age: 47
End: 2017-11-02
Attending: ORTHOPAEDIC SURGERY
Payer: COMMERCIAL

## 2017-11-02 DIAGNOSIS — M79.645 PAIN OF LEFT MIDDLE FINGER: Primary | ICD-10-CM

## 2017-11-02 PROCEDURE — 97110 THERAPEUTIC EXERCISES: CPT

## 2017-11-02 PROCEDURE — 97165 OT EVAL LOW COMPLEX 30 MIN: CPT

## 2017-11-02 PROCEDURE — 97022 WHIRLPOOL THERAPY: CPT

## 2017-11-02 PROCEDURE — G8988 SELF CARE GOAL STATUS: HCPCS | Mod: CJ

## 2017-11-02 PROCEDURE — G8987 SELF CARE CURRENT STATUS: HCPCS | Mod: CK

## 2017-11-02 NOTE — PROGRESS NOTES
OCCUPATIONAL THERAPY INITIAL EVALUATION       DATE : 11/02/2017    Name: Brissa Mason  Referring Physician: Errol Dyson Jr., *   Allergies:   Review of patient's allergies indicates:   Allergen Reactions    Mobic [meloxicam] Rash    Erythromycin Other (See Comments)    Sulfa (sulfonamide antibiotics)      Other reaction(s): Rash     Medical history:   Past Medical History:   Diagnosis Date    Allergy     Colitis     resolved; rectum 2005; neg flex sig 2006     Medication:    Current Outpatient Prescriptions on File Prior to Visit   Medication Sig Dispense Refill    alprazolam (XANAX) 0.5 MG tablet Take 1 tablet (0.5 mg total) by mouth nightly as needed for Insomnia or Anxiety. 30 tablet 0    estradiol (VIVELLE-DOT) 0.1 mg/24 hr PTSW Last week of Velivet pack, place on Sunday then replace on Wednesday. 8 patch 0    fexofenadine (ALLEGRA) 180 MG tablet Take 180 mg by mouth. 1 Tablet Oral Every day      VELIVET TRIPHASIC REGIMEN, 28, 0.1/.125/.15-25 mg-mcg tablet TAKE 1 TABLET DAILY 84 tablet 0     No current facility-administered medications on file prior to visit.      Diagnosis:  Encounter Diagnosis   Name Primary?    Pain of left middle finger Yes     Occupational Profile level: low     Orders: Occupational Therapy evaluate and treat    Precautions stated on order: none      Evaluation Date: 11/2/17   Visit #: 1     Plan of care Expiration:  12/30/17     SUBJECTIVE   Patient states:  Pt reports that she feels that finger is tight , she has been putting mederma on it .   Pts goals for therapy:  Increased range and strength   Pain Scale: Brissa rates pain on a scale of 0-10 to be 3 at worst; 3 currently; 3 at best .  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   Patients  structured exercise routine: none   Exercise routine prior to onset: none   ADLS: Pt has a decrease ability to perform ADL's such as trouble gripping grocery bags or  Trash bags       Imaging:   MRI: none          Xray: normal         Hand Dominance: right                                   OBJECTIVE     Cognitive status : : no deficits     Posture Alignment :slouched posture  Joint integrity: Soft end feel  Skin integrity: Dry scar, healed "rope feel "  Edema: slight     Upper Extremity Range of Motion   Joint Evaluation AROM PROM    Left / Right  Left  / Right    Shoulder flex 0-180     Shoulder Abd 0-180     Shoulder ER 0-90     Shoulder IR 0-90     Shoulder Extension 0-80     Shoulder Horizontal adduction 0-90     Elbow flex/ext 0-150 WNL WNL   Wrist flex 0-80 WNL WNL   Wrist ext 0-70 WNL WNL   Supination 0-80 WNL WNL   Pronation 0-80 WNL WNL   UD WNL WNl   RD WNL WNL          left   AROM  Index  Middle  Ring  Small   MP  0/85     PIp  0/90     DIP   0/40            SHIPLEY              SCAR: dry , hypersensitive to touch       Upper Extremity Strength                                                    right    left    Wrist flexion: 5/5 Wrist flexion: 5/5   Wrist extension: 5/5 Wrist extension: 5/5    Strength - second setting 60#,60#,65# : 45#,30,30#                               Treatment    Treatment time:  60   OT Evaluation Completed? Yes  Discussed Plan of Care with patient: Yes    Brissa received  30  minutes of therapeutic exercises.  Brissa received 15  minutes of fluido .    Brissa received 1 eval    Written Home Exercises Provided:   Brissa william good understanding of " the education provided. Patient demo good return demo of skill of exercises.     Treatment Received :        1. Pt performed and was provided with a written copy of exercises to perform as tolerated, including: joint blocking middle finger Pip and DIP , tGE and scar massage , ultrasound .7w/cm2 X 6 minutes over scar .             FOTO - Outcomes  And G Codes     FOTO survey     FOTO:   Self Care DATE SCORE GCODE MODIFIER   INITIAL 11/2/17 CK  /100  CK = at least 40% but < 60% impaired, limited or restricted   5TH /100 10TH /100     Discharge           FOTO Goal : CJ modifier 8994    Interpretation of FOTO Modifiers    TEST SCORE  Modifier  Impairment Limitation Restriction    0%  CH  0 % impaired, limited or restricted    1-19%  CI  @ least 1% but less than 20% impaired, limited or restricted    20-39%  CJ  @ least 20%<40% impaired, limited or restricted    40-59%  CK  @ least 40%<60% impaired, limited or restricted    60-79%  CL  @ least 60% <80% impaired, limited or restricted    80-99%  CM  @ least 80%<100% impaired limited or restricted    100%  CN  100% impaired, limited or restricted       ASSESSMENT    Pt present to occupational therapy with an OT diagnosis of left middle finger ganglion cyst removal      Encounter Diagnosis   Name Primary?    Pain of left middle finger Yes       CLINICAL DECISION MAKING:    Analysis of data from eval:      Problem focused assessment, 1-3 performance deficits noted  trouble making a full fist , hypersensitive over scar         History Examination Decision Making Complexity Score   Occupational Profile: 47  year old , lives at home , pt has family that can assist pt was most task                             Medical and Therapy History:   Expanded                  Performance Deficits     Physical:  Decreased ability to  full fist         Cognitive:   WNL        Psychosocial:    WNl        Modification of task during  evaluation      *Modification/task , no assistance required        Level of complexity is low ,     based on PMHX, co morbidities , data from assessments and functional level of assistance required with task and clinical presentation directly impacting  His/ her plan of care                Pt presents with the impairments as stated below in the impairments/problems list. Pt prognosis is Good..     Brissa will benefit from skilled outpatient occupational therapy to address the above stated deficits, provide pt/family education and to maximize pt's level of independence in the home and community environment.     Discussed Plan of Care with patient: Yes    Medical necessity is demonstrated by the following IMPAIRMENTS/PROBLEMS:  1. Poor posture  2. pain in  joint / limb  Right / left   3. Decreased flexibility  4. Decreased ROM  5. Decreased muscle strength  6. Inability to work       Pt's spiritual, cultural and educational needs considered and pt agreeable to plan of care and goals as stated below:     Anticipated Barriers for Occupational  therapy: anticipated none    GOALS: 6 weeks. Pt agrees with goals set.  1. Independent with HEP.  2. Report decreased to pain  with adls such as making a full fist  Pull garbage back out of container .   3. Decreased pain to  0/10  With gripping toothbrush or water bottle   4. Increased MMT / flexibility for  Left  increase to 40# to assist with bilateral task   And 3pt pinch increase to 10#   5. Increased arom  for  Left middle finger pip ext/ flex 0/100      PLAN   Outpatient occupational therapy 1-  2 times weekly to include:   pt ed, hep, therapeutic exercises, neuromuscular re-education, joint mobilizations,  Iontophoresis , ultrasound and other modalities prn  Treatment Certification Period:   11/2/17     To 12/30/17               .   Cont OT for  6 weeks.     I certify the need for these services furnished under this plan of treatment and while under my Care.     _________________________________,          _________________________  Physician        Date

## 2017-11-16 ENCOUNTER — OFFICE VISIT (OUTPATIENT)
Dept: ORTHOPEDICS | Facility: CLINIC | Age: 47
End: 2017-11-16
Payer: COMMERCIAL

## 2017-11-16 DIAGNOSIS — M67.40 GANGLION: ICD-10-CM

## 2017-11-16 DIAGNOSIS — M79.645 PAIN OF LEFT MIDDLE FINGER: Primary | ICD-10-CM

## 2017-11-16 PROCEDURE — 99024 POSTOP FOLLOW-UP VISIT: CPT | Mod: S$GLB,,, | Performed by: ORTHOPAEDIC SURGERY

## 2017-11-16 PROCEDURE — 99999 PR PBB SHADOW E&M-EST. PATIENT-LVL II: CPT | Mod: PBBFAC,,, | Performed by: ORTHOPAEDIC SURGERY

## 2017-11-16 NOTE — PROGRESS NOTES
HISTORY OF PRESENT ILLNESS:  Ms. Mason in followup of excision of retinacular   cyst from the left middle finger.  She is about two months' out, still having a   little bit of thickening of the scar, but no pain.  She had been to one visit   for therapy.    PHYSICAL EXAMINATION:  LEFT HAND:  The incision is well healed, but there is some thickening of the   scar in the palm area along the tendon sheath.  Range of motion of finger full.    No triggering.    PLAN:  I think we should try her on some anti-inflammatory cream.  I am worried   that she is getting a little bit of an inflammatory flare.  I would like her to   continue therapy as well, range of motion exercises.  Follow up in one month.      GEORGE  dd: 11/16/2017 08:46:56 (CST)  td: 11/17/2017 07:37:08 (CST)  Doc ID   #[id number]  Job ID #[id number]    CC:

## 2017-11-28 ENCOUNTER — CLINICAL SUPPORT (OUTPATIENT)
Dept: REHABILITATION | Facility: HOSPITAL | Age: 47
End: 2017-11-28
Attending: ORTHOPAEDIC SURGERY
Payer: COMMERCIAL

## 2017-11-28 DIAGNOSIS — M79.645 PAIN OF LEFT MIDDLE FINGER: Primary | ICD-10-CM

## 2017-11-28 PROCEDURE — 97035 APP MDLTY 1+ULTRASOUND EA 15: CPT

## 2017-11-28 PROCEDURE — 97022 WHIRLPOOL THERAPY: CPT

## 2017-11-28 PROCEDURE — 97110 THERAPEUTIC EXERCISES: CPT

## 2017-11-29 NOTE — PROGRESS NOTES
OCCUPATIONAL THERAPY  Progress note        DATE : 11/28/2017    Name: Brissa Mason  Referring Physician: Errol Dyson Jr., *   Allergies:   Review of patient's allergies indicates:   Allergen Reactions    Mobic [meloxicam] Rash    Erythromycin Other (See Comments)    Sulfa (sulfonamide antibiotics)      Other reaction(s): Rash     Medical history:   Past Medical History:   Diagnosis Date    Allergy     Colitis     resolved; rectum 2005; neg flex sig 2006     Medication:    Current Outpatient Prescriptions on File Prior to Visit   Medication Sig Dispense Refill    alprazolam (XANAX) 0.5 MG tablet Take 1 tablet (0.5 mg total) by mouth nightly as needed for Insomnia or Anxiety. 30 tablet 0    estradiol (VIVELLE-DOT) 0.1 mg/24 hr PTSW Last week of Velivet pack, place on Sunday then replace on Wednesday. 8 patch 0    fexofenadine (ALLEGRA) 180 MG tablet Take 180 mg by mouth. 1 Tablet Oral Every day      VELIVET TRIPHASIC REGIMEN, 28, 0.1/.125/.15-25 mg-mcg tablet TAKE 1 TABLET DAILY 84 tablet 0     No current facility-administered medications on file prior to visit.      Diagnosis:  No diagnosis found.  Occupational Profile level: low     Orders: Occupational Therapy evaluate and treat    Precautions stated on order: none      Evaluation Date: 11/2/17   Visit #: 2    Plan of care Expiration:  12/30/17     SUBJECTIVE   Patient states:  Pt reports that she  Saw Dr dyson last week and that he gave her an anti infl cream and to massage to work on internal scarring.      Pts goals for therapy:  Increased range and strength   Pain Scale: Brissa rates pain on a scale of 0-10 to be 3 at worst; 3 currently; 3 at best .  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   Patients  structured exercise routine: none   Exercise routine prior to onset: none   ADLS: Pt has a decrease ability to perform ADL's such as trouble gripping grocery bags or  Trash bags       Imaging:   MRI: none          Xray: normal         Hand Dominance: right                                   OBJECTIVE     Cognitive status : : no deficits     Posture Alignment :slouched posture  Joint integrity: Soft end feel  Skin integrity: Dry scar, healed "rope feel "  Edema: slight     Upper Extremity Range of Motion   Joint Evaluation AROM PROM    Left / Right  Left  / Right    Shoulder flex 0-180     Shoulder Abd 0-180     Shoulder ER 0-90     Shoulder IR 0-90     Shoulder Extension 0-80     Shoulder Horizontal adduction 0-90     Elbow flex/ext 0-150 WNL WNL   Wrist flex 0-80 WNL WNL   Wrist ext 0-70 WNL WNL   Supination 0-80 WNL WNL   Pronation 0-80 WNL WNL   UD WNL WNl   RD WNL WNL          left   AROM  Index  Middle  Ring  Small   MP  0/85     PIp  0/90     DIP   0/40            SHIPLEY              SCAR: dry , hypersensitive to touch       Upper Extremity Strength                                                    right    left    Wrist flexion: 5/5 Wrist flexion: 5/5   Wrist extension: 5/5 Wrist extension: 5/5    Strength - second setting 60#,60#,65# : 45#,30,30#                               Treatment    Treatment time:  60   Treatment Received :        1.  fluido X 10minutes. Joint blocking pip and dip , TGE ,  scar massage with vibration X 5 minutes , ultrasound .7w/cm2 X 6 minutes over scar . ,  Orange putty gross   2 minutes and and pinching 2 minutes.             FOTO - Outcomes  And G " Codes     FOTO survey     FOTO:   Self Care DATE SCORE GCODE MODIFIER   INITIAL 11/2/17 CK  /100  CK = at least 40% but < 60% impaired, limited or restricted   5TH /100 10TH /100     Discharge           FOTO Goal : CJ modifier 8994    Interpretation of FOTO Modifiers    TEST SCORE  Modifier  Impairment Limitation Restriction    0%  CH  0 % impaired, limited or restricted    1-19%  CI  @ least 1% but less than 20% impaired, limited or restricted    20-39%  CJ  @ least 20%<40% impaired, limited or restricted    40-59%  CK  @ least 40%<60% impaired, limited or restricted    60-79%  CL  @ least 60% <80% impaired, limited or restricted    80-99%  CM  @ least 80%<100% impaired limited or restricted    100%  CN  100% impaired, limited or restricted       ASSESSMENT    Pt present to occupational therapy with an OT diagnosis of left middle finger ganglion cyst removal      No diagnosis found.    CLINICAL DECISION MAKING:    Analysis of data from eval:      Problem focused assessment, 1-3 performance deficits noted  trouble making a full fist , hypersensitive over scar         History Examination Decision Making Complexity Score   Occupational Profile: 47  year old , lives at home , pt has family that can assist pt was most task                             Medical and Therapy History:   Expanded                  Performance Deficits     Physical:  Decreased ability to  full fist         Cognitive:   WNL        Psychosocial:    WNl        Modification of task during  evaluation     *Modification/task , no assistance required        Level of complexity is low ,     based on PMHX, co morbidities , data from assessments and functional level of assistance required with task and clinical presentation directly impacting  His/ her plan of care                Pt presents with the impairments as stated below in the impairments/problems list. Pt prognosis is Good..     Brissa will benefit from  skilled outpatient occupational therapy to address the above stated deficits, provide pt/family education and to maximize pt's level of independence in the home and community environment.     Discussed Plan of Care with patient: Yes    Medical necessity is demonstrated by the following IMPAIRMENTS/PROBLEMS:  1. Poor posture  2. pain in  joint / limb  Right / left   3. Decreased flexibility  4. Decreased ROM  5. Decreased muscle strength  6. Inability to work       Pt's spiritual, cultural and educational needs considered and pt agreeable to plan of care and goals as stated below:     Anticipated Barriers for Occupational  therapy: anticipated none    GOALS: 6 weeks. Pt agrees with goals set.  1. Independent with HEP.  2. Report decreased to pain  with adls such as making a full fist  Pull garbage back out of container .   3. Decreased pain to  0/10  With gripping toothbrush or water bottle   4. Increased MMT / flexibility for  Left  increase to 40# to assist with bilateral task   And 3pt pinch increase to 10#   5. Increased arom  for  Left middle finger pip ext/ flex 0/100      PLAN   Outpatient occupational therapy 1-  2 times weekly to include:   pt ed, hep, therapeutic exercises, neuromuscular re-education, joint mobilizations,  Iontophoresis , ultrasound and other modalities prn  Treatment Certification Period:   11/2/17     To 12/30/17               .   Cont OT for  6 weeks.     I certify the need for these services furnished under this plan of treatment and while under my Care.    _________________________________,          _________________________  Physician        Date

## 2017-12-05 ENCOUNTER — CLINICAL SUPPORT (OUTPATIENT)
Dept: REHABILITATION | Facility: HOSPITAL | Age: 47
End: 2017-12-05
Attending: ORTHOPAEDIC SURGERY
Payer: COMMERCIAL

## 2017-12-05 DIAGNOSIS — M79.645 PAIN OF LEFT MIDDLE FINGER: Primary | ICD-10-CM

## 2017-12-05 PROCEDURE — 97110 THERAPEUTIC EXERCISES: CPT

## 2017-12-05 PROCEDURE — 97035 APP MDLTY 1+ULTRASOUND EA 15: CPT

## 2017-12-05 PROCEDURE — 97022 WHIRLPOOL THERAPY: CPT

## 2017-12-06 NOTE — PROGRESS NOTES
OCCUPATIONAL THERAPY  Progress note        DATE : 12/05/2017    Name: Brissa Mason  Referring Physician: Errol Dyson Jr., *   Allergies:   Review of patient's allergies indicates:   Allergen Reactions    Mobic [meloxicam] Rash    Erythromycin Other (See Comments)    Sulfa (sulfonamide antibiotics)      Other reaction(s): Rash     Medical history:   Past Medical History:   Diagnosis Date    Allergy     Colitis     resolved; rectum 2005; neg flex sig 2006     Medication:    Current Outpatient Prescriptions on File Prior to Visit   Medication Sig Dispense Refill    alprazolam (XANAX) 0.5 MG tablet Take 1 tablet (0.5 mg total) by mouth nightly as needed for Insomnia or Anxiety. 30 tablet 0    estradiol (VIVELLE-DOT) 0.1 mg/24 hr PTSW Last week of Velivet pack, place on Sunday then replace on Wednesday. 8 patch 0    fexofenadine (ALLEGRA) 180 MG tablet Take 180 mg by mouth. 1 Tablet Oral Every day      VELIVET TRIPHASIC REGIMEN, 28, 0.1/.125/.15-25 mg-mcg tablet TAKE 1 TABLET DAILY 84 tablet 0     No current facility-administered medications on file prior to visit.      Diagnosis:  No diagnosis found.  Occupational Profile level: low     Orders: Occupational Therapy evaluate and treat    Precautions stated on order: none      Evaluation Date: 11/2/17   Visit #: 2    Plan of care Expiration:  12/30/17     SUBJECTIVE   Patient states:  Pt reports that she  Saw Dr dyson last week and that he gave her an anti infl cream and to massage to work on internal scarring.      Pts goals for therapy:  Increased range and strength   Pain Scale: Brissa rates pain on a scale of 0-10 to be 3 at worst; 3 currently; 3 at best .  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   Patients  structured exercise routine: none   Exercise routine prior to onset: none   ADLS: Pt has a decrease ability to perform ADL's such as trouble gripping grocery bags or  Trash bags       Imaging:   MRI: none          Xray: normal         Hand Dominance: right                                   OBJECTIVE     Cognitive status : : no deficits     Posture Alignment :slouched posture  Joint integrity: Soft end feel  Skin integrity: Dry scar, healed "rope feel "  Edema: slight        left   AROM  Index  Middle  Ring  Small   MP  0/85     PIp  0/90     DIP   0/40            SHIPLEY              SCAR: dry , hypersensitive to touch       Upper Extremity Strength                                                    right    left    Wrist flexion: 5/5 Wrist flexion: 5/5   Wrist extension: 5/5 Wrist extension: 5/5    Strength - second setting 60#,60#,65# : 45#,30,30#                               Treatment    Treatment time:  60   Treatment Received :        1.  fluido X 10minutes. Joint blocking pip and dip , TGE ,  scar massage with vibration X 5 minutes , ultrasound .7w/cm2 X 6 minutes over scar . ,  Orange putty gross   2 minutes and and pinching 2 minutes.             FOTO - Outcomes  And G Codes     FOTO survey     FOTO:   Self Care DATE SCORE GCODE MODIFIER   INITIAL 11/2/17       CK  /100  CK = at least 40% but < 60% impaired, limited or restricted   5TH /100     10TH         /100     Discharge           FOTO Goal : CJ modifier 8994    Interpretation of FOTO Modifiers    TEST SCORE  Modifier  Impairment Limitation Restriction    0%  CH  0 % impaired, limited or restricted    1-19% "  CI  @ least 1% but less than 20% impaired, limited or restricted    20-39%  CJ  @ least 20%<40% impaired, limited or restricted    40-59%  CK  @ least 40%<60% impaired, limited or restricted    60-79%  CL  @ least 60% <80% impaired, limited or restricted    80-99%  CM  @ least 80%<100% impaired limited or restricted    100%  CN  100% impaired, limited or restricted       ASSESSMENT    Pt present to occupational therapy with an OT diagnosis of left middle finger ganglion cyst removal , scar is more supple and soft on this visit.      No diagnosis found.    CLINICAL DECISION MAKING:    Analysis of data from eval:      Problem focused assessment, 1-3 performance deficits noted  trouble making a full fist , hypersensitive over scar                  Pt presents with the impairments as stated below in the impairments/problems list. Pt prognosis is Good..     Brissa will benefit from skilled outpatient occupational therapy to address the above stated deficits, provide pt/family education and to maximize pt's level of independence in the home and community environment.     Discussed Plan of Care with patient: Yes    Medical necessity is demonstrated by the following IMPAIRMENTS/PROBLEMS:  1. Poor posture  2. pain in  joint / limb  Right / left   3. Decreased flexibility  4. Decreased ROM  5. Decreased muscle strength  6. Inability to work       Pt's spiritual, cultural and educational needs considered and pt agreeable to plan of care and goals as stated below:     Anticipated Barriers for Occupational  therapy: anticipated none    GOALS: 6 weeks. Pt agrees with goals set.  1. Independent with HEP.  2. Report decreased to pain  with adls such as making a full fist  Pull garbage back out of container .   3. Decreased pain to  0/10  With gripping toothbrush or water bottle   4. Increased MMT / flexibility for  Left  increase to 40# to assist with bilateral task   And 3pt pinch increase to 10#   5. Increased arom  for   Left middle finger pip ext/ flex 0/100      PLAN   Outpatient occupational therapy 1-  2 times weekly to include:   pt ed, hep, therapeutic exercises, neuromuscular re-education, joint mobilizations,  Iontophoresis , ultrasound and other modalities prn  Treatment Certification Period:   11/2/17     To 12/30/17               .   Cont OT for  6 weeks.     I certify the need for these services furnished under this plan of treatment and while under my Care.    _________________________________,          _________________________  Physician        Date

## 2017-12-12 ENCOUNTER — CLINICAL SUPPORT (OUTPATIENT)
Dept: REHABILITATION | Facility: HOSPITAL | Age: 47
End: 2017-12-12
Attending: ORTHOPAEDIC SURGERY
Payer: COMMERCIAL

## 2017-12-12 DIAGNOSIS — M79.645 PAIN OF LEFT MIDDLE FINGER: Primary | ICD-10-CM

## 2017-12-12 PROCEDURE — 97035 APP MDLTY 1+ULTRASOUND EA 15: CPT

## 2017-12-12 PROCEDURE — 97022 WHIRLPOOL THERAPY: CPT

## 2017-12-12 PROCEDURE — 97110 THERAPEUTIC EXERCISES: CPT

## 2017-12-12 NOTE — PROGRESS NOTES
OCCUPATIONAL THERAPY  Progress note        DATE : 12/12/2017    Name: Brissa Mason  Referring Physician: Errol Dyson Jr., *   Allergies:   Review of patient's allergies indicates:   Allergen Reactions    Mobic [meloxicam] Rash    Erythromycin Other (See Comments)    Sulfa (sulfonamide antibiotics)      Other reaction(s): Rash     Medical history:   Past Medical History:   Diagnosis Date    Allergy     Colitis     resolved; rectum 2005; neg flex sig 2006     Medication:    Current Outpatient Prescriptions on File Prior to Visit   Medication Sig Dispense Refill    alprazolam (XANAX) 0.5 MG tablet Take 1 tablet (0.5 mg total) by mouth nightly as needed for Insomnia or Anxiety. 30 tablet 0    estradiol (VIVELLE-DOT) 0.1 mg/24 hr PTSW Last week of Velivet pack, place on Sunday then replace on Wednesday. 8 patch 0    fexofenadine (ALLEGRA) 180 MG tablet Take 180 mg by mouth. 1 Tablet Oral Every day      VELIVET TRIPHASIC REGIMEN, 28, 0.1/.125/.15-25 mg-mcg tablet TAKE 1 TABLET DAILY 84 tablet 0     No current facility-administered medications on file prior to visit.      Diagnosis:  No diagnosis found.  Occupational Profile level: low     Orders: Occupational Therapy evaluate and treat    Precautions stated on order: none      Evaluation Date: 11/2/17   Visit #: 2    Plan of care Expiration:  12/30/17     SUBJECTIVE   Patient states:  Pt reports that she  Saw Dr dyson last week and that he gave her an anti infl cream and to massage to work on internal scarring.      Pts goals for therapy:  Increased range and strength   Pain Scale: Brissa rates pain on a scale of 0-10 to be 3 at worst; 3 currently; 3 at best .  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   Patients  structured exercise routine: none   Exercise routine prior to onset: none   ADLS: Pt has a decrease ability to perform ADL's such as trouble gripping grocery bags or  Trash bags       Imaging:   MRI: none          Xray: normal         Hand Dominance: right                                   OBJECTIVE     Cognitive status : : no deficits     Posture Alignment :slouched posture  Joint integrity: Soft end feel  Skin integrity: Dry scar, healed "rope feel "  Edema: slight        left  eval     AROM  Index  Middle  Ring  Small   MP  0/85     PIp  0/90     DIP   0/40            SHIPLEY          12/12/17    Middle    Mp  0/90 +5    Pip 0/ 100 +10  DIP  0/60 +20   Upper Extremity Strength                                                    right    left    Wrist flexion: 5/5 Wrist flexion: 5/5   Wrist extension: 5/5 Wrist extension: 5/5    Strength - second setting 60#,60#,65# : 45#,30,30#                               Treatment    Treatment time:  60   Treatment Received :        1.  fluido X 10minutes. Joint blocking pip and dip , TGE ,  scar massage with vibration X 5 minutes , ultrasound .7w/cm2 X 6 minutes over scar . ,  Orange putty gross   2 minutes and and pinching 2 minutes.             FOTO - Outcomes  And G Codes     FOTO survey     FOTO:   Self Care DATE SCORE GCODE MODIFIER   INITIAL 11/2/17       CK  /100  CK = at least 40% but < 60% impaired, limited or restricted   5TH         /100     10TH         /100     Discharge           FOTO Goal : CJ modifier 8994    Interpretation of FOTO Modifiers    TEST SCORE  Modifier  Impairment Limitation Restriction    0%  CH  0 % impaired, " limited or restricted    1-19%  CI  @ least 1% but less than 20% impaired, limited or restricted    20-39%  CJ  @ least 20%<40% impaired, limited or restricted    40-59%  CK  @ least 40%<60% impaired, limited or restricted    60-79%  CL  @ least 60% <80% impaired, limited or restricted    80-99%  CM  @ least 80%<100% impaired limited or restricted    100%  CN  100% impaired, limited or restricted       ASSESSMENT    Pt present to occupational therapy with an OT diagnosis of left middle finger ganglion cyst removal , scar is significantly more supple and soft on this visit , she reports that pain is none except in the morning she has some pain til she gets her finger moving. She plans to see dr gutierrez on 12/14/17 and see what he says as to whether she needs to come back .        CLINICAL DECISION MAKING:    Analysis of data from eval:      Problem focused assessment, 1-3 performance deficits noted  trouble making a full fist , hypersensitive over scar      Pt presents with the impairments as stated below in the impairments/problems list. Pt prognosis is Good..     Brissa will benefit from skilled outpatient occupational therapy to address the above stated deficits, provide pt/family education and to maximize pt's level of independence in the home and community environment.     Discussed Plan of Care with patient: Yes    Medical necessity is demonstrated by the following IMPAIRMENTS/PROBLEMS:  1. Poor posture  2. pain in  joint / limb  Right / left   3. Decreased flexibility  4. Decreased ROM  5. Decreased muscle strength  6. Inability to work       Pt's spiritual, cultural and educational needs considered and pt agreeable to plan of care and goals as stated below:     Anticipated Barriers for Occupational  therapy: anticipated none    GOALS: 6 weeks. Pt agrees with goals set.  1. Independent with HEP.  2. Report decreased to pain  with adls such as making a full fist  Pull garbage back out of container .  Goal met    3. Decreased pain to  0/10  With gripping toothbrush or water bottle   4. Increased MMT / flexibility for  Left  increase to 40# to assist with bilateral task   And 3pt pinch increase to 10# , met   5. Increased arom  for  Left middle finger pip ext/ flex 0/100  Goal met     PLAN   Pt plans to see Dr gutierrez later this week for follow up visit, she has one more therapy appt scheduled .     Treatment Certification Period:   11/2/17     To 12/30/17               .   Cont OT for  6 weeks.     I certify the need for these services furnished under this plan of treatment and while under my Care.    _________________________________,          _________________________  Physician        Date

## 2017-12-14 ENCOUNTER — OFFICE VISIT (OUTPATIENT)
Dept: ORTHOPEDICS | Facility: CLINIC | Age: 47
End: 2017-12-14
Payer: COMMERCIAL

## 2017-12-14 VITALS — BODY MASS INDEX: 23.99 KG/M2 | HEIGHT: 65 IN | WEIGHT: 144 LBS

## 2017-12-14 DIAGNOSIS — M79.645 PAIN OF LEFT MIDDLE FINGER: Primary | ICD-10-CM

## 2017-12-14 PROCEDURE — 20550 NJX 1 TENDON SHEATH/LIGAMENT: CPT | Mod: 58,LT,S$GLB, | Performed by: ORTHOPAEDIC SURGERY

## 2017-12-14 PROCEDURE — 99024 POSTOP FOLLOW-UP VISIT: CPT | Mod: S$GLB,,, | Performed by: ORTHOPAEDIC SURGERY

## 2017-12-14 PROCEDURE — 99999 PR PBB SHADOW E&M-EST. PATIENT-LVL II: CPT | Mod: PBBFAC,,, | Performed by: ORTHOPAEDIC SURGERY

## 2017-12-14 RX ORDER — TRIAMCINOLONE ACETONIDE 40 MG/ML
40 INJECTION, SUSPENSION INTRA-ARTICULAR; INTRAMUSCULAR
Status: COMPLETED | OUTPATIENT
Start: 2017-12-14 | End: 2017-12-14

## 2017-12-14 RX ADMIN — TRIAMCINOLONE ACETONIDE 40 MG: 40 INJECTION, SUSPENSION INTRA-ARTICULAR; INTRAMUSCULAR at 08:12

## 2017-12-14 NOTE — PROGRESS NOTES
Ms. Mason in followup of excision ganglion cyst from the left hand.  She is   about three months' postop.  She has had a little bit of hypertrophic scarring   in the palm.  She has been doing therapy and scar massage for this with some   slight improvement, but still causing some stiffness and thickening in the palm.    PHYSICAL EXAMINATION:  LEFT HAND:  The surgical incision is well healed, but there is some hypertrophic   scarring both proximally and distally to this area.  There may be even a little   small Dupuytren's nodule that is developing around it.    PLAN:  It might be worth trying a cortisone shot.  She is in agreement today.    After pause for time out, she identified the palm of the left hand, and I   injected 3 different areas with combination of Kenalog 20 mg, 0.5 mL Xylocaine,    sterile technique.  She tolerated the procedure well.  I have recommended ice   today and continue with scar massage and Pennsaid for topical use.  Follow up in   1-2 months.      GEORGE  dd: 12/14/2017 08:36:31 (CST)  td: 12/14/2017 21:04:26 (CST)  Doc ID   #[id number]  Job ID #[id number]    CC:

## 2018-01-04 ENCOUNTER — PATIENT MESSAGE (OUTPATIENT)
Dept: OBSTETRICS AND GYNECOLOGY | Facility: CLINIC | Age: 48
End: 2018-01-04

## 2018-01-04 DIAGNOSIS — G43.821 MENSTRUAL MIGRAINE WITH STATUS MIGRAINOSUS, NOT INTRACTABLE: ICD-10-CM

## 2018-01-09 RX ORDER — ESTRADIOL 0.1 MG/D
FILM, EXTENDED RELEASE TRANSDERMAL
Qty: 24 PATCH | Refills: 2 | Status: SHIPPED | OUTPATIENT
Start: 2018-01-09 | End: 2018-01-11 | Stop reason: SDUPTHER

## 2018-01-10 ENCOUNTER — PATIENT MESSAGE (OUTPATIENT)
Dept: OBSTETRICS AND GYNECOLOGY | Facility: CLINIC | Age: 48
End: 2018-01-10

## 2018-01-11 DIAGNOSIS — G43.821 MENSTRUAL MIGRAINE WITH STATUS MIGRAINOSUS, NOT INTRACTABLE: ICD-10-CM

## 2018-01-11 RX ORDER — ESTRADIOL 0.1 MG/D
FILM, EXTENDED RELEASE TRANSDERMAL
Qty: 24 PATCH | Refills: 0 | Status: SHIPPED | OUTPATIENT
Start: 2018-01-11 | End: 2018-07-10

## 2018-01-24 ENCOUNTER — PATIENT MESSAGE (OUTPATIENT)
Dept: OPTOMETRY | Facility: CLINIC | Age: 48
End: 2018-01-24

## 2018-02-08 ENCOUNTER — OFFICE VISIT (OUTPATIENT)
Dept: ORTHOPEDICS | Facility: CLINIC | Age: 48
End: 2018-02-08
Payer: COMMERCIAL

## 2018-02-08 DIAGNOSIS — M67.40 GANGLION: Primary | ICD-10-CM

## 2018-02-08 DIAGNOSIS — M79.645 PAIN OF LEFT MIDDLE FINGER: ICD-10-CM

## 2018-02-08 PROCEDURE — 99499 UNLISTED E&M SERVICE: CPT | Mod: S$GLB,,, | Performed by: ORTHOPAEDIC SURGERY

## 2018-02-08 PROCEDURE — 99999 PR PBB SHADOW E&M-EST. PATIENT-LVL II: CPT | Mod: PBBFAC,,, | Performed by: ORTHOPAEDIC SURGERY

## 2018-02-08 NOTE — PROGRESS NOTES
HISTORY OF PRESENT ILLNESS:  Ms. Mason in followup excision mass in the left   hand with some scarring.  She is doing better.  The last injection helped out   quite a bit.  Also the scar massage and pins that her helping.    PHYSICAL EXAMINATION:  LEFT HAND:  The incision is well healed.  The scarring is less.  Range of motion   full.   strength improved.    PLAN:  I would like her to continue scar massage for another month or so.  I   have given her squeeze ball for strengthening.  Follow up as needed only.      DUSTIN/IN  dd: 02/08/2018 08:26:20 (CST)  td: 02/09/2018 03:12:44 (CST)  Doc ID   #[id number]  Job ID #[id number]    CC:

## 2018-02-16 ENCOUNTER — PATIENT MESSAGE (OUTPATIENT)
Dept: OBSTETRICS AND GYNECOLOGY | Facility: CLINIC | Age: 48
End: 2018-02-16

## 2018-02-16 DIAGNOSIS — Z30.9 CONTRACEPTIVE MANAGEMENT: ICD-10-CM

## 2018-02-16 DIAGNOSIS — Z30.41 ENCOUNTER FOR SURVEILLANCE OF CONTRACEPTIVE PILLS: ICD-10-CM

## 2018-02-16 RX ORDER — DESOGESTREL AND ETHINYL ESTRADIOL 7 DAYS X 3
KIT ORAL
Qty: 84 TABLET | Refills: 0 | OUTPATIENT
Start: 2018-02-16

## 2018-02-16 NOTE — TELEPHONE ENCOUNTER
[address] [facility], [facility]     [phone]

## 2018-02-27 ENCOUNTER — DOCUMENTATION ONLY (OUTPATIENT)
Dept: REHABILITATION | Facility: HOSPITAL | Age: 48
End: 2018-02-27

## 2018-02-27 NOTE — PROGRESS NOTES
OCCUPATIONAL THERAPY  Progress note        DATE : 02/27/2018    Name: Brissa Mason  Referring Physician: No ref. provider found   Allergies:   Review of patient's allergies indicates:   Allergen Reactions    Mobic [meloxicam] Rash    Erythromycin Other (See Comments)    Sulfa (sulfonamide antibiotics)      Other reaction(s): Rash     Medical history:   Past Medical History:   Diagnosis Date    Allergy     Colitis     resolved; rectum 2005; neg flex sig 2006     Medication:    Current Outpatient Prescriptions on File Prior to Visit   Medication Sig Dispense Refill    alprazolam (XANAX) 0.5 MG tablet Take 1 tablet (0.5 mg total) by mouth nightly as needed for Insomnia or Anxiety. 30 tablet 0    desogestrel-ethinyl estradiol (VELIVET TRIPHASIC REGIMEN, 28,) 0.1/.125/.15-25 mg-mcg tablet Take 1 tablet by mouth once daily. 84 tablet 0    estradiol (VIVELLE-DOT) 0.1 mg/24 hr PTSW Last week of Velivet pack, place on Sunday then replace on Wednesday. 24 patch 0    fexofenadine (ALLEGRA) 180 MG tablet Take 180 mg by mouth. 1 Tablet Oral Every day       No current facility-administered medications on file prior to visit.      Diagnosis:  No diagnosis found.  Occupational Profile level: low     Orders: Occupational Therapy evaluate and treat    Precautions stated on order: none      Evaluation Date: 11/2/17    total visit  5    Plan of care Expiration:  12/30/17     SUBJECTIVE   Patient states:  Pt reports that she  Saw Dr gutierrez last week and that he gave her an anti infl cream and to massage to work on internal scarring.      Pts goals for therapy:  Increased range and strength   Pain Scale: Brissa rates pain on a scale of 0-10 to be 3 at worst; 3 currently; 3 at best .  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   Patients  structured exercise routine: none   Exercise routine prior to onset: none   ADLS: Pt has a decrease ability to perform ADL's such as trouble gripping grocery bags or  Trash bags       Imaging:   MRI: none          Xray: normal         Hand Dominance: right                                   OBJECTIVE     Cognitive status : : no deficits     Posture Alignment :slouched posture  Joint integrity: Soft end feel  Skin integrity: Dry scar, healed "rope feel "  Edema: slight        left  eval     AROM  Index  Middle  Ring  Small   MP  0/85     PIp  0/90     DIP   0/40            SHIPLEY          12/12/17    Middle    Mp  0/90 +5    Pip 0/ 100 +10  DIP  0/60 +20   Upper Extremity Strength                                                    right    left    Wrist flexion: 5/5 Wrist flexion: 5/5   Wrist extension: 5/5 Wrist extension: 5/5    Strength - second setting 60#,60#,65# : 45#,30,30#                               Treatment    Treatment time:  60   Treatment Received :        1.  fluido X 10minutes. Joint blocking pip and dip , TGE ,  scar massage with vibration X 5 minutes , ultrasound .7w/cm2 X 6 minutes over scar . ,  Orange putty gross   2 minutes and and pinching 2 minutes.             FOTO - Outcomes  And G Codes     FOTO survey     FOTO:   Self Care DATE SCORE GCODE MODIFIER   INITIAL 11/2/17       CK  /100  CK = at least 40% but < 60% impaired, limited or restricted   5TH /100 10TH /100     Discharge      CK       FOTO Goal : CJ modifier 8994    Interpretation of FOTO Modifiers    TEST SCORE  Modifier  " Impairment Limitation Restriction    0%  CH  0 % impaired, limited or restricted    1-19%  CI  @ least 1% but less than 20% impaired, limited or restricted    20-39%  CJ  @ least 20%<40% impaired, limited or restricted    40-59%  CK  @ least 40%<60% impaired, limited or restricted    60-79%  CL  @ least 60% <80% impaired, limited or restricted    80-99%  CM  @ least 80%<100% impaired limited or restricted    100%  CN  100% impaired, limited or restricted       ASSESSMENT    Pt present to occupational therapy with an OT diagnosis of left middle finger ganglion cyst removal , scar is significantly more supple and soft on this visit , she reports that pain is none except in the morning she has some pain til she gets her finger moving. She plans to see dr dyson on 12/14/17 and see what he says as to whether she needs to come back .   Pt called to say that she saw Dr Dyson and that he told her that she does not need to come back to therapy .        CLINICAL DECISION MAKING:    Analysis of data from eval:      Problem focused assessment, 1-3 performance deficits noted  trouble making a full fist , hypersensitive over scar      Pt presents with the impairments as stated below in the impairments/problems list. Pt prognosis is Good..     Brissa will benefit from skilled outpatient occupational therapy to address the above stated deficits, provide pt/family education and to maximize pt's level of independence in the home and community environment.     Discussed Plan of Care with patient: Yes    Medical necessity is demonstrated by the following IMPAIRMENTS/PROBLEMS:  1. Poor posture  2. pain in  joint / limb  Right / left   3. Decreased flexibility  4. Decreased ROM  5. Decreased muscle strength  6. Inability to work       Pt's spiritual, cultural and educational needs considered and pt agreeable to plan of care and goals as stated below:     Anticipated Barriers for Occupational  therapy: anticipated none    GOALS: 6  weeks. Pt agrees with goals set.  1. Independent with HEP.  2. Report decreased to pain  with adls such as making a full fist  Pull garbage back out of container .  Goal met   3. Decreased pain to  0/10  With gripping toothbrush or water bottle   4. Increased MMT / flexibility for  Left  increase to 40# to assist with bilateral task   And 3pt pinch increase to 10# , met   5. Increased arom  for  Left middle finger pip ext/ flex 0/100  Goal met     PLAN   Pt to  Be discharged from OT services .  .       Treatment Certification Period:   11/2/17   To 12/30/17               .   Cont OT for  6 weeks.

## 2018-02-28 ENCOUNTER — PATIENT MESSAGE (OUTPATIENT)
Dept: OBSTETRICS AND GYNECOLOGY | Facility: CLINIC | Age: 48
End: 2018-02-28

## 2018-02-28 DIAGNOSIS — G43.821 MENSTRUAL MIGRAINE WITH STATUS MIGRAINOSUS, NOT INTRACTABLE: ICD-10-CM

## 2018-02-28 DIAGNOSIS — Z30.41 ENCOUNTER FOR SURVEILLANCE OF CONTRACEPTIVE PILLS: ICD-10-CM

## 2018-03-02 ENCOUNTER — OFFICE VISIT (OUTPATIENT)
Dept: OPTOMETRY | Facility: CLINIC | Age: 48
End: 2018-03-02
Payer: COMMERCIAL

## 2018-03-02 DIAGNOSIS — H52.4 PRESBYOPIA: ICD-10-CM

## 2018-03-02 DIAGNOSIS — Z04.9 DISEASE RULED OUT AFTER EXAMINATION: Primary | ICD-10-CM

## 2018-03-02 DIAGNOSIS — H43.392 VITREOUS SYNERESIS, LEFT: ICD-10-CM

## 2018-03-02 PROCEDURE — 99999 PR PBB SHADOW E&M-EST. PATIENT-LVL II: CPT | Mod: PBBFAC,,, | Performed by: OPTOMETRIST

## 2018-03-02 PROCEDURE — 92014 COMPRE OPH EXAM EST PT 1/>: CPT | Mod: S$GLB,,, | Performed by: OPTOMETRIST

## 2018-03-02 RX ORDER — DICLOFENAC SODIUM 20 MG/G
SOLUTION TOPICAL
Refills: 3 | COMMUNITY
Start: 2017-11-24 | End: 2019-09-26

## 2018-03-02 NOTE — PROGRESS NOTES
HPI     Patient's age: 47 y.o.    Approximate date of last eye examination:  9/16/15  Name of last eye doctor seen: Dr. Dueñas    Pt states that she is here for a 2 year exam, not having any trouble with   eyes.    Wears glasses? Readers +1.00     Wears CLs?:  no            Headaches?  no  Eye pain/discomfort?  no                                                                                     Flashes?  no  Floaters?  no  Diplopia/Double vision?  no    Patient's Ocular History:         Any eye surgeries? no         Any eye injury?  no         Any treatment for eye disease?  no  Family history of eye disease?  Macula degeneration - Aunt and Sister    Significant patient medical history:         1. Diabetes?  no       If yes, IDDM or NIDDM? no   2. HBP?  no                 ! OTC eyedrops currently using:  none   ! Prescription eye meds currently using:  none           Last edited by Emy Wilks MA on 3/2/2018  8:00 AM. (History)            Assessment /Plan     For exam results, see Encounter Report.    Disease ruled out after examination    Vitreous syneresis, left    Presbyopia      Good overall ocular health, monitor 2 years, sooner PRN    OK to continue with readers   Discussed CL trial, pt will condsider

## 2018-05-07 ENCOUNTER — PATIENT MESSAGE (OUTPATIENT)
Dept: INTERNAL MEDICINE | Facility: CLINIC | Age: 48
End: 2018-05-07

## 2018-05-22 ENCOUNTER — PATIENT MESSAGE (OUTPATIENT)
Dept: OBSTETRICS AND GYNECOLOGY | Facility: CLINIC | Age: 48
End: 2018-05-22

## 2018-05-29 ENCOUNTER — OFFICE VISIT (OUTPATIENT)
Dept: DERMATOLOGY | Facility: CLINIC | Age: 48
End: 2018-05-29
Payer: COMMERCIAL

## 2018-05-29 DIAGNOSIS — L24.9 IRRITANT CONTACT DERMATITIS, UNSPECIFIED TRIGGER: ICD-10-CM

## 2018-05-29 DIAGNOSIS — D23.9 DERMATOFIBROMA: ICD-10-CM

## 2018-05-29 DIAGNOSIS — D22.9 MULTIPLE BENIGN NEVI: Primary | ICD-10-CM

## 2018-05-29 PROCEDURE — 99203 OFFICE O/P NEW LOW 30 MIN: CPT | Mod: S$GLB,,, | Performed by: DERMATOLOGY

## 2018-05-29 PROCEDURE — 99999 PR PBB SHADOW E&M-EST. PATIENT-LVL II: CPT | Mod: PBBFAC,,, | Performed by: DERMATOLOGY

## 2018-05-29 RX ORDER — ALCLOMETASONE DIPROPIONATE 0.5 MG/G
OINTMENT TOPICAL
Qty: 30 G | Refills: 3 | Status: SHIPPED | OUTPATIENT
Start: 2018-05-29 | End: 2021-10-25 | Stop reason: SDUPTHER

## 2018-05-29 NOTE — PROGRESS NOTES
"  Subjective:       Patient ID:  Brissa Mason is a 48 y.o. female who presents for   Chief Complaint   Patient presents with    Skin Check     TBSE    Rash     upper lip     History of Present Illness: The patient presents with chief complaint of "rash on lips"  Location: upper lip  Duration: Occurs intermittently in winter; lasts for 2-3 weeks  Signs/Symptoms: burning    Prior treatments: Aquaphor helps    Also would like TBSE. No personal hx of NMSC or MM.      Rash         Review of Systems   Skin: Positive for rash and activity-related sunscreen use. Negative for daily sunscreen use.   Hematologic/Lymphatic: Does not bruise/bleed easily.        Objective:    Physical Exam   Constitutional: She appears well-developed and well-nourished. No distress.   Neurological: She is alert and oriented to person, place, and time. She is not disoriented.   Psychiatric: She has a normal mood and affect.   Skin:   Areas Examined (abnormalities noted in diagram):   Scalp / Hair Palpated and Inspected  Head / Face Inspection Performed  Neck Inspection Performed  Chest / Axilla Inspection Performed  Abdomen Inspection Performed  Genitals / Buttocks / Groin Inspection Performed  Back Inspection Performed  RUE Inspected  LUE Inspection Performed  RLE Inspected  LLE Inspection Performed  Nails and Digits Inspection Performed                   Diagram Legend     Erythematous scaling macule/papule c/w actinic keratosis       Vascular papule c/w angioma      Pigmented verrucoid papule/plaque c/w seborrheic keratosis      Yellow umbilicated papule c/w sebaceous hyperplasia      Irregularly shaped tan macule c/w lentigo     1-2 mm smooth white papules consistent with Milia      Movable subcutaneous cyst with punctum c/w epidermal inclusion cyst      Subcutaneous movable cyst c/w pilar cyst      Firm pink to brown papule c/w dermatofibroma      Pedunculated fleshy papule(s) c/w skin tag(s)      Evenly pigmented macule c/w " junctional nevus     Mildly variegated pigmented, slightly irregular-bordered macule c/w mildly atypical nevus      Flesh colored to evenly pigmented papule c/w intradermal nevus       Pink pearly papule/plaque c/w basal cell carcinoma      Erythematous hyperkeratotic cursted plaque c/w SCC      Surgical scar with no sign of skin cancer recurrence      Open and closed comedones      Inflammatory papules and pustules      Verrucoid papule consistent consistent with wart     Erythematous eczematous patches and plaques     Dystrophic onycholytic nail with subungual debris c/w onychomycosis     Umbilicated papule    Erythematous-base heme-crusted tan verrucoid plaque consistent with inflamed seborrheic keratosis     Erythematous Silvery Scaling Plaque c/w Psoriasis     See annotation      Assessment / Plan:        Multiple benign nevi  total body skin examination performed today including at least 12 points as noted in physical examination. No lesions suspicious for malignancy noted.  Reassurance provided.  Instructed patient to observe lesion(s) for changes and follow up in clinic if changes are noted. Discussed ABCDE's of moles and brochure provided.      Dermatofibroma  This is a benign scar-like lesion secondary to minor trauma. No treatment required.       Irritant contact dermatitis, unspecified trigger - lip  -     alclomethasone (ACLOVATE) 0.05 % ointment; AAA lips bid  Dispense: 30 g; Refill: 3             Follow-up if symptoms worsen or fail to improve.

## 2018-06-04 ENCOUNTER — PATIENT MESSAGE (OUTPATIENT)
Dept: ORTHOPEDICS | Facility: CLINIC | Age: 48
End: 2018-06-04

## 2018-06-25 ENCOUNTER — PATIENT MESSAGE (OUTPATIENT)
Dept: ORTHOPEDICS | Facility: CLINIC | Age: 48
End: 2018-06-25

## 2018-07-10 ENCOUNTER — TELEPHONE (OUTPATIENT)
Dept: OBSTETRICS AND GYNECOLOGY | Facility: CLINIC | Age: 48
End: 2018-07-10

## 2018-07-10 ENCOUNTER — PATIENT MESSAGE (OUTPATIENT)
Dept: OBSTETRICS AND GYNECOLOGY | Facility: CLINIC | Age: 48
End: 2018-07-10

## 2018-07-10 DIAGNOSIS — G43.821 MENSTRUAL MIGRAINE WITH STATUS MIGRAINOSUS, NOT INTRACTABLE: ICD-10-CM

## 2018-07-10 RX ORDER — ESTRADIOL AND NORETHINDRONE ACETATE 1; .5 MG/1; MG/1
1 TABLET ORAL DAILY
Qty: 30 TABLET | Refills: 0 | Status: SHIPPED | OUTPATIENT
Start: 2018-07-10 | End: 2018-08-10

## 2018-07-10 RX ORDER — ESTRADIOL AND NORETHINDRONE ACETATE 1; .5 MG/1; MG/1
1 TABLET ORAL DAILY
Qty: 90 TABLET | Refills: 1 | Status: SHIPPED | OUTPATIENT
Start: 2018-07-10 | End: 2018-08-10

## 2018-07-10 NOTE — TELEPHONE ENCOUNTER
Patient reports headaches on Tuesday of last week, Tiffanie dot .1mg Saturday patch and Wednesday patch. Tylenol does not help with discomfort. Patient denies menses or bleeding this year. Patient reports last menses 8/31/2017. Will check with Dr. Oden and will call back with recommendations

## 2018-07-11 NOTE — TELEPHONE ENCOUNTER
Per Dr. Akshat smallwood Activella Sunday, monitor for bleeding and headaches. Patient to contact office either occur, patient verbalized understanding

## 2018-07-12 ENCOUNTER — OFFICE VISIT (OUTPATIENT)
Dept: ORTHOPEDICS | Facility: CLINIC | Age: 48
End: 2018-07-12
Payer: COMMERCIAL

## 2018-07-12 VITALS — HEIGHT: 65 IN | BODY MASS INDEX: 21.99 KG/M2 | WEIGHT: 132 LBS

## 2018-07-12 DIAGNOSIS — M79.645 PAIN OF LEFT MIDDLE FINGER: Primary | ICD-10-CM

## 2018-07-12 PROCEDURE — 3008F BODY MASS INDEX DOCD: CPT | Mod: CPTII,S$GLB,, | Performed by: ORTHOPAEDIC SURGERY

## 2018-07-12 PROCEDURE — 99999 PR PBB SHADOW E&M-EST. PATIENT-LVL II: CPT | Mod: PBBFAC,,, | Performed by: ORTHOPAEDIC SURGERY

## 2018-07-12 PROCEDURE — 99213 OFFICE O/P EST LOW 20 MIN: CPT | Mod: 25,S$GLB,, | Performed by: ORTHOPAEDIC SURGERY

## 2018-07-12 PROCEDURE — 20550 NJX 1 TENDON SHEATH/LIGAMENT: CPT | Mod: F2,S$GLB,, | Performed by: ORTHOPAEDIC SURGERY

## 2018-07-12 RX ORDER — TRIAMCINOLONE ACETONIDE 40 MG/ML
40 INJECTION, SUSPENSION INTRA-ARTICULAR; INTRAMUSCULAR
Status: COMPLETED | OUTPATIENT
Start: 2018-07-12 | End: 2018-07-12

## 2018-07-12 RX ADMIN — TRIAMCINOLONE ACETONIDE 40 MG: 40 INJECTION, SUSPENSION INTRA-ARTICULAR; INTRAMUSCULAR at 04:07

## 2018-07-12 NOTE — PROGRESS NOTES
HISTORY OF PRESENT ILLNESS:  Ms. Mason in followup of left hand symptoms.  She   had surgery on hand about nine months ago and has had a little bit of scarring   from time to time.  We tried an injection last fall with good results and she is   wondering if we could repeat that today.    PHYSICAL EXAMINATION:  LEFT HAND:  There is just a little bit of thickening of the palmar fascia in the   palm of the left hand at the base of the middle finger.  There is no   triggering, but when I flex and extend the middle finger, I can feel just a   little bit of adhesions on the tendon.    IMPRESSION:  Scarring, adhesions, left hand middle finger.    PLAN:  The patient would like to try another injection.  After pause for   timeout, she identified the left palm injected at the base of the left middle   finger with combination of Kenalog 20 mg, 0.5 mL Xylocaine, sterile technique.    She tolerated the procedure well without complication.    I have also recommended some scar massage for the next few days to help   distribute the steroid and maybe some anti-inflammatory cream as well.  Follow   up in one month or as needed.      GEORGE  dd: 07/12/2018 16:57:20 (CDT)  td: 07/13/2018 12:32:50 (CDT)  Doc ID   #[id number]  Job ID #[id number]    CC:

## 2018-07-30 ENCOUNTER — PATIENT MESSAGE (OUTPATIENT)
Dept: INTERNAL MEDICINE | Facility: CLINIC | Age: 48
End: 2018-07-30

## 2018-07-30 ENCOUNTER — PATIENT MESSAGE (OUTPATIENT)
Dept: OBSTETRICS AND GYNECOLOGY | Facility: CLINIC | Age: 48
End: 2018-07-30

## 2018-08-10 ENCOUNTER — TELEPHONE (OUTPATIENT)
Dept: OBSTETRICS AND GYNECOLOGY | Facility: CLINIC | Age: 48
End: 2018-08-10

## 2018-08-10 DIAGNOSIS — N94.9 MENSTRUAL SYMPTOM OR SIGN: Primary | ICD-10-CM

## 2018-08-10 RX ORDER — DESOGESTREL AND ETHINYL ESTRADIOL 21-5 (28)
1 KIT ORAL DAILY
Qty: 30 TABLET | Refills: 0 | Status: SHIPPED | OUTPATIENT
Start: 2018-08-10 | End: 2018-09-03 | Stop reason: SDUPTHER

## 2018-08-27 ENCOUNTER — OFFICE VISIT (OUTPATIENT)
Dept: URGENT CARE | Facility: CLINIC | Age: 48
End: 2018-08-27
Payer: COMMERCIAL

## 2018-08-27 VITALS
WEIGHT: 128 LBS | HEIGHT: 65 IN | HEART RATE: 84 BPM | BODY MASS INDEX: 21.33 KG/M2 | SYSTOLIC BLOOD PRESSURE: 134 MMHG | TEMPERATURE: 98 F | RESPIRATION RATE: 16 BRPM | DIASTOLIC BLOOD PRESSURE: 77 MMHG | OXYGEN SATURATION: 97 %

## 2018-08-27 DIAGNOSIS — J32.9 SINUSITIS, UNSPECIFIED CHRONICITY, UNSPECIFIED LOCATION: Primary | ICD-10-CM

## 2018-08-27 PROCEDURE — 96372 THER/PROPH/DIAG INJ SC/IM: CPT | Mod: S$GLB,,, | Performed by: FAMILY MEDICINE

## 2018-08-27 PROCEDURE — 99214 OFFICE O/P EST MOD 30 MIN: CPT | Mod: 25,S$GLB,, | Performed by: FAMILY MEDICINE

## 2018-08-27 RX ORDER — BETAMETHASONE SODIUM PHOSPHATE AND BETAMETHASONE ACETATE 3; 3 MG/ML; MG/ML
6 INJECTION, SUSPENSION INTRA-ARTICULAR; INTRALESIONAL; INTRAMUSCULAR; SOFT TISSUE
Status: COMPLETED | OUTPATIENT
Start: 2018-08-27 | End: 2018-08-27

## 2018-08-27 RX ORDER — AZITHROMYCIN 250 MG/1
TABLET, FILM COATED ORAL
Qty: 6 TABLET | Refills: 0 | Status: SHIPPED | OUTPATIENT
Start: 2018-08-27 | End: 2018-09-11

## 2018-08-27 RX ORDER — BENZONATATE 100 MG/1
200 CAPSULE ORAL 3 TIMES DAILY PRN
Qty: 20 CAPSULE | Refills: 0 | Status: SHIPPED | OUTPATIENT
Start: 2018-08-27 | End: 2018-09-11

## 2018-08-27 RX ADMIN — BETAMETHASONE SODIUM PHOSPHATE AND BETAMETHASONE ACETATE 6 MG: 3; 3 INJECTION, SUSPENSION INTRA-ARTICULAR; INTRALESIONAL; INTRAMUSCULAR; SOFT TISSUE at 07:08

## 2018-08-27 NOTE — PROGRESS NOTES
"Subjective:       Patient ID: Brissa Mason is a 48 y.o. female.    Vitals:  height is 5' 5" (1.651 m) and weight is 58.1 kg (128 lb). Her oral temperature is 98.4 °F (36.9 °C). Her blood pressure is 134/77 and her pulse is 84. Her respiration is 16 and oxygen saturation is 97%.     Chief Complaint: Sinus Problem    Patient is going to be driving to [place] in the near future.  She is having mild symptoms now but tends to turn into full blown sinus infections if she lets the symptoms linger.       Sinus Problem   This is a new problem. Episode onset: 4 days. The problem has been gradually worsening since onset. There has been no fever. She is experiencing no pain. Associated symptoms include congestion, coughing, ear pain, headaches, sinus pressure and sneezing. Pertinent negatives include no chills, hoarse voice, shortness of breath or sore throat. Treatments tried: tylenol, allegra. The treatment provided no relief.     Review of Systems   Constitution: Negative for chills, fever and malaise/fatigue.   HENT: Positive for congestion, ear pain, sinus pressure and sneezing. Negative for hoarse voice and sore throat.    Eyes: Negative for discharge and redness.   Cardiovascular: Negative for chest pain, dyspnea on exertion and leg swelling.   Respiratory: Positive for cough. Negative for shortness of breath, sputum production and wheezing.    Musculoskeletal: Negative for myalgias.   Gastrointestinal: Negative for abdominal pain and nausea.   Neurological: Positive for headaches.       Objective:      Physical Exam   Constitutional: She is oriented to person, place, and time. She appears well-developed and well-nourished.   HENT:   Head: Normocephalic and atraumatic. Head is without abrasion, without contusion and without laceration.   Right Ear: External ear normal.   Left Ear: External ear normal.   Nose: Right sinus exhibits maxillary sinus tenderness and frontal sinus tenderness. Left sinus exhibits " maxillary sinus tenderness and frontal sinus tenderness.   Mouth/Throat: Posterior oropharyngeal erythema present.   Eyes: Conjunctivae and lids are normal. Pupils are equal, round, and reactive to light.   Neck: Trachea normal, full passive range of motion without pain and phonation normal. Neck supple.   Cardiovascular: Normal rate, regular rhythm and normal heart sounds.   Pulmonary/Chest: Effort normal and breath sounds normal. No stridor. No respiratory distress.   Musculoskeletal: Normal range of motion.   Lymphadenopathy:        Head (right side): No submental and no submandibular adenopathy present.        Head (left side): No submental and no submandibular adenopathy present.     She has cervical adenopathy.   Neurological: She is alert and oriented to person, place, and time.   Skin: Skin is warm, dry and intact. Capillary refill takes less than 2 seconds. No abrasion, no bruising, no burn, no ecchymosis, no laceration, no lesion and no rash noted. No erythema.   Psychiatric: She has a normal mood and affect. Her speech is normal and behavior is normal. Judgment and thought content normal. Cognition and memory are normal.   Nursing note and vitals reviewed.      Assessment:       1. Sinusitis, unspecified chronicity, unspecified location        Plan:         Sinusitis, unspecified chronicity, unspecified location  -     betamethasone acetate-betamethasone sodium phosphate injection 6 mg; Inject 1 mL (6 mg total) into the muscle one time.  -     azithromycin (ZITHROMAX Z-TIRSO) 250 MG tablet; Take 2 tablets (500 mg) on  Day 1,  followed by 1 tablet (250 mg) once daily on Days 2 through 5.  Dispense: 6 tablet; Refill: 0  -     benzonatate (TESSALON PERLES) 100 MG capsule; Take 2 capsules (200 mg total) by mouth 3 (three) times daily as needed for Cough.  Dispense: 20 capsule; Refill: 0

## 2018-08-28 NOTE — PATIENT INSTRUCTIONS
Sinusitis (Antibiotic Treatment)    The sinuses are air-filled spaces within the bones of the face. They connect to the inside of the nose. Sinusitis is an inflammation of the tissue lining the sinus cavity. Sinus inflammation can occur during a cold. It can also be due to allergies to pollens and other particles in the air. Sinusitis can cause symptoms of sinus congestion and fullness. A sinus infection causes fever, headache and facial pain. There is often green or yellow drainage from the nose or into the back of the throat (post-nasal drip). You have been given antibiotics to treat this condition.  Home care:  · Take the full course of antibiotics as instructed. Do not stop taking them, even if you feel better.  · Drink plenty of water, hot tea, and other liquids. This may help thin mucus. It also may promote sinus drainage.  · Heat may help soothe painful areas of the face. Use a towel soaked in hot water. Or,  the shower and direct the hot spray onto your face. Using a vaporizer along with a menthol rub at night may also help.   · An expectorant containing guaifenesin may help thin the mucus and promote drainage from the sinuses.  · Over-the-counter decongestants may be used unless a similar medicine was prescribed. Nasal sprays work the fastest. Use one that contains phenylephrine or oxymetazoline. First blow the nose gently. Then use the spray. Do not use these medicines more often than directed on the label or symptoms may get worse. You may also use tablets containing pseudoephedrine. Avoid products that combine ingredients, because side effects may be increased. Read labels. You can also ask the pharmacist for help. (NOTE: Persons with high blood pressure should not use decongestants. They can raise blood pressure.)  · Over-the-counter antihistamines may help if allergies contributed to your sinusitis.    · Do not use nasal rinses or irrigation during an acute sinus infection, unless told to by  your health care provider. Rinsing may spread the infection to other sinuses.  · Use acetaminophen or ibuprofen to control pain, unless another pain medicine was prescribed. (If you have chronic liver or kidney disease or ever had a stomach ulcer, talk with your doctor before using these medicines. Aspirin should never be used in anyone under 18 years of age who is ill with a fever. It may cause severe liver damage.)  · Don't smoke. This can worsen symptoms.  Follow-up care  Follow up with your healthcare provider or our staff if you are not improving within the next week.  When to seek medical advice  Call your healthcare provider if any of these occur:  · Facial pain or headache becoming more severe  · Stiff neck  · Unusual drowsiness or confusion  · Swelling of the forehead or eyelids  · Vision problems, including blurred or double vision  · Fever of 100.4ºF (38ºC) or higher, or as directed by your healthcare provider  · Seizure  · Breathing problems  · Symptoms not resolving within 10 days  Date Last Reviewed: 4/13/2015  © 0162-9748 [employer]. [address] [place] [address]. All rights reserved. This information is not intended as a substitute for professional medical care. Always follow your healthcare professional's instructions.                                                                    Sinusitis   If your condition worsens or fails to improve we recommend that you receive another evaluation at the ER immediately or contact your PCP to discuss your concerns or return here. You must understand that you've received an urgent care treatment only and that you may be released before all your medical problems are known or treated. You the patient will arrange for followup care as instructed.   If we discussed that I think your illness is viral it will not respond to antibiotics and it will last 10-14 days. However, if over the next few days the symptoms worsen start the  "antibiotics I have given you.   If we discussed that you require antibiotics start them now and take them to completion.   If you are female and on BCP and do take the antibiotics, use additional methods to prevent pregnancy while on the antibiotics and for one cycle after.   Flonase (fluticasone) is a nasal spray which is available over the counter and may help with your symptoms   Zyrtec D, Claritin D or allegra D can also help with symptoms of congestion and drainage.   If you have hypertension avoid using the "D" which is the decongestant   If you just have drainage you can take plain zyrtec, claritin or allegra   If you just have a congested feeling you can take pseudoephedrine (unless you have high blood pressure) which you have to sign for behind the counter. Do not buy the phenylephrine which is on the shelf as it is not effective   Rest and fluids are also important.   Tylenol or ibuprofen can also be used as directed for pain unless you have an allergy to them or medical condition such as stomach ulcers, kidney or liver disease or blood thinners etc for which you should not be taking these type of medications.   If you are flying in the next few days Afrin nose drops for the airplane flight upon take off and landing may help. Other than at those times refrain from using afrin.   If you were prescribed a narcotic do not drive or operate heavy machinery while taking these medications.       "

## 2018-08-29 ENCOUNTER — HOSPITAL ENCOUNTER (OUTPATIENT)
Dept: RADIOLOGY | Facility: HOSPITAL | Age: 48
Discharge: HOME | End: 2018-08-29
Attending: OBSTETRICS & GYNECOLOGY
Payer: COMMERCIAL

## 2018-08-29 DIAGNOSIS — Z12.31 VISIT FOR SCREENING MAMMOGRAM: ICD-10-CM

## 2018-08-29 PROCEDURE — 77067 SCR MAMMO BI INCL CAD: CPT | Mod: 26,,, | Performed by: RADIOLOGY

## 2018-08-29 PROCEDURE — 77067 SCR MAMMO BI INCL CAD: CPT | Mod: TC

## 2018-08-29 PROCEDURE — 77063 BREAST TOMOSYNTHESIS BI: CPT | Mod: 26,,, | Performed by: RADIOLOGY

## 2018-08-30 ENCOUNTER — TELEPHONE (OUTPATIENT)
Dept: RADIOLOGY | Facility: HOSPITAL | Age: 48
End: 2018-08-30

## 2018-08-30 ENCOUNTER — TELEPHONE (OUTPATIENT)
Dept: URGENT CARE | Facility: CLINIC | Age: 48
End: 2018-08-30

## 2018-08-30 NOTE — TELEPHONE ENCOUNTER
Spoke with patient and explained mammogram findings.Patient expressed understanding of results. Patient is scheduled for a abnormal mammogram follow up appointment at [facility] on 9/7/18

## 2018-09-03 ENCOUNTER — PATIENT MESSAGE (OUTPATIENT)
Dept: OBSTETRICS AND GYNECOLOGY | Facility: CLINIC | Age: 48
End: 2018-09-03

## 2018-09-03 DIAGNOSIS — N94.9 MENSTRUAL SYMPTOM OR SIGN: ICD-10-CM

## 2018-09-04 RX ORDER — DESOGESTREL AND ETHINYL ESTRADIOL AND ETHINYL ESTRADIOL 21-5 (28)
KIT ORAL
Qty: 28 TABLET | Refills: 1 | Status: SHIPPED | OUTPATIENT
Start: 2018-09-04 | End: 2018-09-11 | Stop reason: SDUPTHER

## 2018-09-07 ENCOUNTER — HOSPITAL ENCOUNTER (OUTPATIENT)
Dept: RADIOLOGY | Facility: HOSPITAL | Age: 48
Discharge: HOME | End: 2018-09-07
Attending: OBSTETRICS & GYNECOLOGY
Payer: COMMERCIAL

## 2018-09-07 DIAGNOSIS — R92.8 ABNORMAL MAMMOGRAM: ICD-10-CM

## 2018-09-07 PROCEDURE — 76642 ULTRASOUND BREAST LIMITED: CPT | Mod: TC,PO,RT

## 2018-09-07 PROCEDURE — 77065 DX MAMMO INCL CAD UNI: CPT | Mod: TC,PO

## 2018-09-07 PROCEDURE — 76642 ULTRASOUND BREAST LIMITED: CPT | Mod: 26,RT,, | Performed by: RADIOLOGY

## 2018-09-07 PROCEDURE — 77061 BREAST TOMOSYNTHESIS UNI: CPT | Mod: 26,,, | Performed by: RADIOLOGY

## 2018-09-07 PROCEDURE — 77065 DX MAMMO INCL CAD UNI: CPT | Mod: 26,,, | Performed by: RADIOLOGY

## 2018-09-07 PROCEDURE — 77061 BREAST TOMOSYNTHESIS UNI: CPT | Mod: TC,PO

## 2018-09-11 ENCOUNTER — LAB VISIT (OUTPATIENT)
Dept: LAB | Facility: OTHER | Age: 48
End: 2018-09-11
Payer: COMMERCIAL

## 2018-09-11 ENCOUNTER — TELEPHONE (OUTPATIENT)
Dept: INTERNAL MEDICINE | Facility: CLINIC | Age: 48
End: 2018-09-11

## 2018-09-11 ENCOUNTER — OFFICE VISIT (OUTPATIENT)
Dept: OBSTETRICS AND GYNECOLOGY | Facility: CLINIC | Age: 48
End: 2018-09-11
Payer: COMMERCIAL

## 2018-09-11 VITALS
DIASTOLIC BLOOD PRESSURE: 78 MMHG | WEIGHT: 130.75 LBS | BODY MASS INDEX: 21.79 KG/M2 | HEIGHT: 65 IN | SYSTOLIC BLOOD PRESSURE: 138 MMHG

## 2018-09-11 DIAGNOSIS — Z01.419 ENCOUNTER FOR GYNECOLOGICAL EXAMINATION WITHOUT ABNORMAL FINDING: Primary | ICD-10-CM

## 2018-09-11 DIAGNOSIS — Z12.4 CERVICAL CANCER SCREENING: ICD-10-CM

## 2018-09-11 DIAGNOSIS — Z00.00 WELLNESS EXAMINATION: ICD-10-CM

## 2018-09-11 DIAGNOSIS — N94.9 MENSTRUAL SYMPTOM OR SIGN: ICD-10-CM

## 2018-09-11 DIAGNOSIS — Z12.31 VISIT FOR SCREENING MAMMOGRAM: ICD-10-CM

## 2018-09-11 LAB
25(OH)D3+25(OH)D2 SERPL-MCNC: 44 NG/ML
ALBUMIN SERPL BCP-MCNC: 4 G/DL
ALP SERPL-CCNC: 42 U/L
ALT SERPL W/O P-5'-P-CCNC: 17 U/L
ANION GAP SERPL CALC-SCNC: 9 MMOL/L
AST SERPL-CCNC: 15 U/L
BASOPHILS # BLD AUTO: 0.02 K/UL
BASOPHILS NFR BLD: 0.3 %
BILIRUB SERPL-MCNC: 0.7 MG/DL
BUN SERPL-MCNC: 11 MG/DL
CALCIUM SERPL-MCNC: 9.3 MG/DL
CHLORIDE SERPL-SCNC: 107 MMOL/L
CHOLEST SERPL-MCNC: 224 MG/DL
CHOLEST/HDLC SERPL: 2.5 {RATIO}
CO2 SERPL-SCNC: 25 MMOL/L
CREAT SERPL-MCNC: 0.9 MG/DL
DIFFERENTIAL METHOD: NORMAL
EOSINOPHIL # BLD AUTO: 0.1 K/UL
EOSINOPHIL NFR BLD: 0.8 %
ERYTHROCYTE [DISTWIDTH] IN BLOOD BY AUTOMATED COUNT: 13.8 %
EST. GFR  (AFRICAN AMERICAN): >60 ML/MIN/1.73 M^2
EST. GFR  (NON AFRICAN AMERICAN): >60 ML/MIN/1.73 M^2
GLUCOSE SERPL-MCNC: 86 MG/DL
HCT VFR BLD AUTO: 39.4 %
HDLC SERPL-MCNC: 88 MG/DL
HDLC SERPL: 39.3 %
HGB BLD-MCNC: 12.7 G/DL
LDLC SERPL CALC-MCNC: 112 MG/DL
LYMPHOCYTES # BLD AUTO: 1.8 K/UL
LYMPHOCYTES NFR BLD: 28.9 %
MCH RBC QN AUTO: 28.8 PG
MCHC RBC AUTO-ENTMCNC: 32.2 G/DL
MCV RBC AUTO: 89 FL
MONOCYTES # BLD AUTO: 0.3 K/UL
MONOCYTES NFR BLD: 4.8 %
NEUTROPHILS # BLD AUTO: 3.9 K/UL
NEUTROPHILS NFR BLD: 65 %
NONHDLC SERPL-MCNC: 136 MG/DL
PLATELET # BLD AUTO: 271 K/UL
PMV BLD AUTO: 10.7 FL
POTASSIUM SERPL-SCNC: 4.1 MMOL/L
PROT SERPL-MCNC: 7.4 G/DL
RBC # BLD AUTO: 4.41 M/UL
SODIUM SERPL-SCNC: 141 MMOL/L
TRIGL SERPL-MCNC: 120 MG/DL
WBC # BLD AUTO: 6.05 K/UL

## 2018-09-11 PROCEDURE — 80061 LIPID PANEL: CPT

## 2018-09-11 PROCEDURE — 87624 HPV HI-RISK TYP POOLED RSLT: CPT

## 2018-09-11 PROCEDURE — 80053 COMPREHEN METABOLIC PANEL: CPT

## 2018-09-11 PROCEDURE — 36415 COLL VENOUS BLD VENIPUNCTURE: CPT

## 2018-09-11 PROCEDURE — 82306 VITAMIN D 25 HYDROXY: CPT

## 2018-09-11 PROCEDURE — 85025 COMPLETE CBC W/AUTO DIFF WBC: CPT

## 2018-09-11 PROCEDURE — 99396 PREV VISIT EST AGE 40-64: CPT | Mod: S$GLB,,, | Performed by: OBSTETRICS & GYNECOLOGY

## 2018-09-11 PROCEDURE — 88175 CYTOPATH C/V AUTO FLUID REDO: CPT

## 2018-09-11 PROCEDURE — 99999 PR PBB SHADOW E&M-EST. PATIENT-LVL III: CPT | Mod: PBBFAC,,, | Performed by: OBSTETRICS & GYNECOLOGY

## 2018-09-11 RX ORDER — DESOGESTREL AND ETHINYL ESTRADIOL 21-5 (28)
1 KIT ORAL DAILY
Qty: 90 TABLET | Refills: 3 | Status: SHIPPED | OUTPATIENT
Start: 2018-09-11 | End: 2019-09-11

## 2018-09-11 NOTE — PROGRESS NOTES
CC: Well woman exam    Brissa Mason is a 48 y.o. female  presents for well woman exam.  LMP: Patient's last menstrual period was 2018..  No GYN issues, problems, or complaints.  Patient is doing well on Viorelle ocps.  No headaches reported.    Past Medical History:   Diagnosis Date    Allergy     Colitis     resolved; rectum ; neg flex sig      Past Surgical History:   Procedure Laterality Date    COLONOSCOPY N/A 2013    Performed by Vern Harkins MD at [facility] (4TH F[facility])    GANGLION CYST EXCISION      GANGLIONECTOMY-HAND Left 9/15/2017    Performed by Errol Dyson Jr., MD at [facility]     Social History     Socioeconomic History    Marital status:      Spouse name: Not on file    Number of children: Not on file    Years of education: Not on file    Highest education level: Not on file   Social Needs    Financial resource strain: Not on file    Food insecurity - worry: Not on file    Food insecurity - inability: Not on file    Transportation needs - medical: Not on file    Transportation needs - non-medical: Not on file   Occupational History    Not on file   Tobacco Use    Smoking status: Never Smoker    Smokeless tobacco: Never Used   Substance and Sexual Activity    Alcohol use: Yes     Comment: 1-2 glasses of wine a week     Drug use: No    Sexual activity: Yes     Partners: Male     Birth control/protection: OCP     Comment: , menarche 14, no hx of abnormal    Other Topics Concern    Are you pregnant or think you may be? Not Asked    Breast-feeding Not Asked   Social History Narrative         - [employer]    Parents - Mata and Robina Del Angel     Family History   Problem Relation Age of Onset    Colon polyps Mother         noncancerous colon tumor; polyps    Hyperlipidemia Mother     Osteoporosis Mother     Colon cancer Mother         pre cacer    Gout Father     Hypertension Father     Hypertension  "Sister     Glaucoma Sister     Hyperlipidemia Brother     Cancer Maternal Uncle         colon    Colon cancer Maternal Uncle     Hypertension Brother     Breast cancer Paternal Aunt 60    Breast cancer Paternal Aunt     Miscarriages / Stillbirths Neg Hx     Amblyopia Neg Hx     Blindness Neg Hx     Cataracts Neg Hx     Diabetes Neg Hx     Macular degeneration Neg Hx     Retinal detachment Neg Hx     Strabismus Neg Hx     Stroke Neg Hx     Thyroid disease Neg Hx     Melanoma Neg Hx      OB History      Para Term  AB Living    0   0          SAB TAB Ectopic Multiple Live Births                       /78   Ht 5' 5" (1.651 m)   Wt 59.3 kg (130 lb 11.7 oz)   LMP 2018   BMI 21.76 kg/m²       ROS:  GENERAL: Denies weight gain or weight loss. Feeling well overall.   SKIN: Denies rash or lesions.   HEAD: Denies head injury or headache.   NODES: Denies enlarged lymph nodes.   CHEST: Denies chest pain or shortness of breath.   CARDIOVASCULAR: Denies palpitations or left sided chest pain.   ABDOMEN: No abdominal pain, constipation, diarrhea, nausea, vomiting or rectal bleeding.   URINARY: No frequency, dysuria, hematuria, or burning on urination.  REPRODUCTIVE: See HPI.   BREASTS: The patient performs breast self-examination and denies pain, lumps, or nipple discharge.   HEMATOLOGIC: No easy bruisability or excessive bleeding.   MUSCULOSKELETAL: Denies joint pain or swelling.   NEUROLOGIC: Denies syncope or weakness.   PSYCHIATRIC: Denies depression, anxiety or mood swings.    PHYSICAL EXAM:  APPEARANCE: Well nourished, well developed, in no acute distress.  AFFECT: WNL, alert and oriented x 3  SKIN: No acne or hirsutism  NECK: Neck symmetric without masses or thyromegaly  NODES: No inguinal, cervical, axillary, or femoral lymph node enlargement  CHEST: Good respiratory effect  ABDOMEN: Soft.  No tenderness or masses.  No hepatosplenomegaly.  No hernias.  BREASTS: Symmetrical, no " skin changes or visible lesions.  No palpable masses, nipple discharge bilaterally.  PELVIC: Normal external genitalia without lesions.  Normal hair distribution.  Adequate perineal body, normal urethral meatus.  Vagina moist and well rugated without lesions or discharge.  Cervix pink, without lesions, discharge or tenderness.  No significant cystocele or rectocele.  Bimanual exam shows uterus to be normal size, regular, mobile and nontender.  Adnexa without masses or tenderness.    EXTREMITIES: No edema.    Encounter for gynecological examination without abnormal finding    Cervical cancer screening  -     Liquid-based pap smear, screening  -     HPV High Risk Genotypes, PCR    Visit for screening mammogram  -     Mammo Digital Screening Bilat with Tomosynthesis CAD; Future; Expected date: 09/11/2018    Menstrual symptom or sign  -     desog-e.estradiol/e.estradiol (VIORELE, 28,) 0.15-0.02 mgx21 /0.01 mg x 5 per tablet; Take 1 tablet by mouth once daily.  Dispense: 90 tablet; Refill: 3         Age specific counseling     Orders as above    Patient instructed to keep a menstrual/headache calendar.  Patient to continue current ocp regimen.    Patient was counseled today on A.C.S. Pap guidelines and recommendations for yearly pelvic exams, mammograms and monthly self breast exams; to see her PCP for other health maintenance.     Follow-up in about 1 year (around 9/11/2019) for Annual exam.    Bran Oden IV, MD

## 2018-09-14 LAB
HPV HR 12 DNA CVX QL NAA+PROBE: NEGATIVE
HPV16 AG SPEC QL: NEGATIVE
HPV18 DNA SPEC QL NAA+PROBE: NEGATIVE

## 2018-09-24 NOTE — PROGRESS NOTES
Subjective:      Patient ID: Brissa Mason is a 48 y.o. female.    Chief Complaint: Annual Exam    HPI:  HPI  New Problem: none  Chronic problem or continued work up:  patient purposely lost to 21 BMI    Annual exam: 9/25/2018  Colonoscopy: 7/9/2013 due at age 50  Mammogram: 8/2018  Gyn: Dr. Oden 2018  Optho : 2018  Flu:will get at work  Tetanus: 9/21/2015  Tdap 2017  Shingles: not due  Pneumovax: not due     Labs reviewed:  Vit D good   HGB donates twice a year    HDL83   HDL 88  CMP normal    Patient Active Problem List   Diagnosis    Colitis: 2005 rectal area    Encounter for screening colonoscopy: 2013     Menstrual migraine without status migrainosus, not intractable    Encounter for vision screening    Ganglion    Pain of left middle finger     Past Medical History:   Diagnosis Date    Allergy     Colitis     resolved; rectum 2005; neg flex sig 2006     Past Surgical History:   Procedure Laterality Date    COLONOSCOPY N/A 7/9/2013    Performed [facility] ([facility])    GANGLION CYST EXCISION  2017    GANGLIONECTOMY-HAND Left 9/15/2017    Per[facility]rol [facility] Jr[facility] at [facility]     Family History   Problem Relation Age of Onset    Colon polyps Mother         noncancerous colon tumor; polyps    Hyperlipidemia Mother     Osteoporosis Mother     Colon cancer Mother         pre cacer    Gout Father     Hypertension Father     Hypertension Sister     Glaucoma Sister     Hyperlipidemia Brother     Cancer Maternal Uncle         colon    Colon cancer Maternal Uncle     Hypertension Brother     Breast cancer Paternal Aunt 60    Breast cancer Paternal Aunt     Miscarriages / Stillbirths Neg Hx     Amblyopia Neg Hx     Blindness Neg Hx     Cataracts Neg Hx     Diabetes Neg Hx     Macular degeneration Neg Hx     Retinal detachment Neg Hx     Strabismus Neg Hx     Stroke Neg Hx     Thyroid disease Neg Hx     Melanoma Neg Hx   "    Review of Systems   Constitutional: Negative for chills, fever and unexpected weight change.   HENT: Negative for ear pain, nosebleeds, sore throat, trouble swallowing and voice change.    Eyes: Negative for photophobia and visual disturbance.   Respiratory: Negative for cough, shortness of breath and wheezing.    Cardiovascular: Negative for chest pain, palpitations and leg swelling.   Gastrointestinal: Negative for abdominal distention, abdominal pain and blood in stool.   Genitourinary: Negative for difficulty urinating, dysuria, flank pain and hematuria.   Musculoskeletal: Negative for back pain, gait problem and joint swelling.   Skin: Negative for color change and rash.   Neurological: Negative for seizures and headaches.   Hematological: Negative for adenopathy. Does not bruise/bleed easily.   Psychiatric/Behavioral: Negative for dysphoric mood and suicidal ideas.     Objective:     Vitals:    09/25/18 0658   BP: 118/74   Pulse: 79   SpO2: 99%   Weight: 59 kg (130 lb 1.1 oz)   Height: 5' 5" (1.651 m)   PainSc: 0-No pain     Body mass index is 21.64 kg/m².  Physical Exam   Constitutional: She is oriented to person, place, and time. She appears well-developed and well-nourished. No distress.   HENT:   Right Ear: Tympanic membrane and ear canal normal.   Left Ear: Tympanic membrane and ear canal normal.   Nose: No sinus tenderness or nasal deformity.   Mouth/Throat: No oropharyngeal exudate or posterior oropharyngeal erythema.   Eyes: Conjunctivae, EOM and lids are normal. Pupils are equal, round, and reactive to light.   Neck: Carotid bruit is not present. No thyromegaly present.   Cardiovascular: Normal rate, regular rhythm and intact distal pulses.   Pulmonary/Chest: Effort normal and breath sounds normal. No respiratory distress.   Abdominal: Soft. Bowel sounds are normal. There is no tenderness.   Musculoskeletal: She exhibits no edema or tenderness.   Lymphadenopathy:        Head (right side): No " submandibular adenopathy present.        Head (left side): No submandibular adenopathy present.     She has no cervical adenopathy.     She has no axillary adenopathy.        Right: No inguinal adenopathy present.        Left: No inguinal adenopathy present.   Neurological: She is alert and oriented to person, place, and time. She has normal strength.   Skin: Skin is intact. No lesion noted.   Psychiatric: She has a normal mood and affect. Her speech is normal and behavior is normal.     Assessment:     1. Physical exam    2. Wellness examination      Plan:     Problem List Items Addressed This Visit     None      Visit Diagnoses     Physical exam    -  Primary    Wellness examination        Relevant Orders    CBC auto differential    Comprehensive metabolic panel    Lipid panel    Vitamin D    Iron and TIBC        Follow-up in about 1 year (around 9/25/2019) for Annual exam: cbc, cmp, lipid , vit D.     Medication List           Accurate as of 9/25/18  7:21 AM. If you have any questions, ask your nurse or doctor.               CONTINUE taking these medications    alclomethasone 0.05 % ointment  Commonly known as:  ACLOVATE  AAA lips bid     ALLEGRA 180 MG tablet  Generic drug:  fexofenadine     ALPRAZolam 0.5 MG tablet  Commonly known as:  XANAX  Take 1 tablet (0.5 mg total) by mouth nightly as needed for Insomnia or Anxiety.     desog-e.estradiol/e.estradiol 0.15-0.02 mgx21 /0.01 mg x 5 per tablet  Commonly known as:  VIORELE (28)  Take 1 tablet by mouth once daily.     PENNSAID 20 mg/gram /actuation(2 %) Sopm  Generic drug:  diclofenac sodium

## 2018-09-25 ENCOUNTER — OFFICE VISIT (OUTPATIENT)
Dept: INTERNAL MEDICINE | Facility: CLINIC | Age: 48
End: 2018-09-25
Payer: COMMERCIAL

## 2018-09-25 VITALS
BODY MASS INDEX: 21.67 KG/M2 | WEIGHT: 130.06 LBS | SYSTOLIC BLOOD PRESSURE: 118 MMHG | DIASTOLIC BLOOD PRESSURE: 74 MMHG | OXYGEN SATURATION: 99 % | HEART RATE: 79 BPM | HEIGHT: 65 IN

## 2018-09-25 DIAGNOSIS — Z00.00 PHYSICAL EXAM: Primary | ICD-10-CM

## 2018-09-25 DIAGNOSIS — Z00.00 WELLNESS EXAMINATION: ICD-10-CM

## 2018-09-25 PROBLEM — E55.9 MILD VITAMIN D DEFICIENCY: Status: RESOLVED | Noted: 2017-09-28 | Resolved: 2018-09-25

## 2018-09-25 PROBLEM — G47.9 SLEEP DISORDER: Status: RESOLVED | Noted: 2017-09-28 | Resolved: 2018-09-25

## 2018-09-25 PROBLEM — J32.9 SINUSITIS: Status: RESOLVED | Noted: 2018-08-27 | Resolved: 2018-09-25

## 2018-09-25 PROCEDURE — 99999 PR PBB SHADOW E&M-EST. PATIENT-LVL III: CPT | Mod: PBBFAC,,, | Performed by: INTERNAL MEDICINE

## 2018-09-25 PROCEDURE — 99396 PREV VISIT EST AGE 40-64: CPT | Mod: S$GLB,,, | Performed by: INTERNAL MEDICINE

## 2018-10-19 ENCOUNTER — PATIENT MESSAGE (OUTPATIENT)
Dept: OBSTETRICS AND GYNECOLOGY | Facility: CLINIC | Age: 48
End: 2018-10-19

## 2019-03-27 ENCOUNTER — PATIENT MESSAGE (OUTPATIENT)
Dept: OBSTETRICS AND GYNECOLOGY | Facility: CLINIC | Age: 49
End: 2019-03-27

## 2019-03-28 ENCOUNTER — PATIENT MESSAGE (OUTPATIENT)
Dept: OBSTETRICS AND GYNECOLOGY | Facility: CLINIC | Age: 49
End: 2019-03-28

## 2019-04-17 ENCOUNTER — OFFICE VISIT (OUTPATIENT)
Dept: URGENT CARE | Facility: CLINIC | Age: 49
End: 2019-04-17
Payer: COMMERCIAL

## 2019-04-17 VITALS
HEART RATE: 84 BPM | BODY MASS INDEX: 21.66 KG/M2 | RESPIRATION RATE: 18 BRPM | HEIGHT: 65 IN | WEIGHT: 130 LBS | SYSTOLIC BLOOD PRESSURE: 134 MMHG | OXYGEN SATURATION: 98 % | DIASTOLIC BLOOD PRESSURE: 76 MMHG | TEMPERATURE: 98 F

## 2019-04-17 DIAGNOSIS — B96.89 ACUTE BACTERIAL SINUSITIS: Primary | ICD-10-CM

## 2019-04-17 DIAGNOSIS — J01.90 ACUTE BACTERIAL SINUSITIS: Primary | ICD-10-CM

## 2019-04-17 PROCEDURE — 99214 PR OFFICE/OUTPT VISIT, EST, LEVL IV, 30-39 MIN: ICD-10-PCS | Mod: S$GLB,,, | Performed by: PHYSICIAN ASSISTANT

## 2019-04-17 PROCEDURE — 99214 OFFICE O/P EST MOD 30 MIN: CPT | Mod: S$GLB,,, | Performed by: PHYSICIAN ASSISTANT

## 2019-04-17 RX ORDER — AMOXICILLIN AND CLAVULANATE POTASSIUM 875; 125 MG/1; MG/1
1 TABLET, FILM COATED ORAL 2 TIMES DAILY
Qty: 20 TABLET | Refills: 0 | Status: SHIPPED | OUTPATIENT
Start: 2019-04-17 | End: 2019-04-27

## 2019-04-17 RX ORDER — PREDNISONE 10 MG/1
20 TABLET ORAL DAILY
Qty: 6 TABLET | Refills: 0 | Status: SHIPPED | OUTPATIENT
Start: 2019-04-17 | End: 2019-04-20

## 2019-04-17 NOTE — PATIENT INSTRUCTIONS
If you were prescribed a narcotic or controlled medication, do not drive or operate heavy equipment or machinery while taking these medications.  You must understand that you've received an Urgent Care treatment only and that you may be released before all your medical problems are known or treated. You, the patient, will arrange for follow up care as instructed.  Follow up with your PCP or specialty clinic as directed if not improved or as needed. You can call [phone] to schedule an appointment with the appropriate provider.  If your condition worsens we recommend that you receive another evaluation at the Emergency Department for any concerns or worsening of condition.  Patient aware and verbalized understanding.    Increase fluids and rest is important.  Avoid contact with sick individuals.  Humidifier use at home.  Augmentin RX as prescribed for sinusitis.  Prednisone RX as prescribed to help reduce inflammation.  OTC Claritin or Zyrtec daily as directed.  Flonase Nasal [place] as directed for nasal congestion.  OTC Tylenol or Motrin every 4 - 6 hours as needed for fever or pain.  Follow-up with your PCP in the next 48hrs or sooner for re-evaluation especially if no improvement in symptoms.  Follow-up in the ER for any worsening of symptoms such as new fever, increasing ear pain, neck stiffness, shortness of breath, etc.  ER precautions given to patient.  Patient aware and verbalized understanding.    Acute Bacterial Rhinosinusitis (ABRS)    Acute bacterial rhinosinusitis (ABRS) is an infection of your nasal cavity and sinuses. Its caused by bacteria. Acute means that youve had symptoms for less than 12 weeks.  Understanding your sinuses  The nasal cavity is the large air-filled space behind your nose. The sinuses are a group of spaces formed by the bones of your face. They connect with your nasal cavity. ABRS causes the tissue lining these spaces to become inflamed. Mucus may not drain normally. This  leads to facial pain and other symptoms.  What causes ABRS?  ABRS most often follows an upper respiratory infection caused by a virus. Bacteria then infect the lining of your nasal cavity and sinuses. But you can also get ABRS if you have:  · Nasal allergies  · Long-term nasal swelling and congestion not caused by allergies  · Blockage in the nose  Symptoms of ABRS  The symptoms of ABRS may be different for each person, and can include:  · Nasal congestion  · Runny nose  · Fluid draining from the nose down the throat (postnasal drip)  · Headache  · Cough  · Pain in the sinuses  · Thick, colored fluid from the nose (mucus)  · Fever  Diagnosing ABRS  ABRS may be diagnosed if youve had an upper respiratory infection like a cold and cough for longer than 10 to 14 days. Your health care provider will ask about your symptoms and your medical history. The provider will check your vital signs, including your temperature. Youll have a physical exam. The health care provider will check your ears, nose, and throat. You likely wont need any tests. If ABRS comes back, you may have a culture or other tests.  Treatment for ABRS  Treatment may include:  · Antibiotic medicine. This is for symptoms that last for at least 10 to 14 days.  · Nasal corticosteroid medicine. Drops or spray used in the nose can lessen swelling and congestion.  · Over-the-counter pain medicine. This is to lessen sinus pain and pressure.  · Nasal decongestant medicine. Spray or drops may help to lessen congestion. Do not use them for more than a few days.  · Salt wash (saline irrigation). This can help to loosen mucus.  Possible complications of ABRS  ABRS may come back or become long-term (chronic).  In rare cases, ABRS may cause complications such as:   · Inflamed tissue around the brain and spinal cord (meningitis)  · Inflamed tissue around the eyes (orbital cellulitis)  · Inflamed bones around the sinuses (osteitis)  These problems may need to be  treated in a hospital with intravenous (IV) antibiotic medicine or surgery.  When to call the health care provider  Call your health care provider if you have any of the following:  · Symptoms that dont get better, or get worse  · Symptoms that dont get better after 3 to 5 days on antibiotics  · Trouble seeing  · Swelling around your eyes  · Confusion or trouble staying awake   Date Last Reviewed: 3/3/2015  © 2747-7648 [employer]. [address] [place] [address], [place] [address]. All rights reserved. This information is not intended as a substitute for professional medical care. Always follow your healthcare professional's instructions.

## 2019-04-17 NOTE — PROGRESS NOTES
"Subjective:       Patient ID: Brissa Mason is a 49 y.o. female.    Vitals:  height is 5' 5" (1.651 m) and weight is 59 kg (130 lb). Her temperature is 98 °F (36.7 °C). Her blood pressure is 134/76 and her pulse is 84. Her respiration is 18 and oxygen saturation is 98%.     Chief Complaint: Sinus Problem    Patient presents to urgent care for sinus congestion/pressure x 1-2 weeks. Patient reports that she has been trying OTC without relief. Patient currently denies fever, chills, body aches, CP, SOB, wheezing, abdominal pain, N/V/D/C, blurry vision, dizziness or syncope.    Sinus Problem   This is a new problem. The current episode started 1 to 4 weeks ago. The problem has been gradually worsening since onset. There has been no fever. Her pain is at a severity of 2/10. The pain is mild. Associated symptoms include congestion, ear pain, headaches and sinus pressure. Pertinent negatives include no chills, coughing, diaphoresis, neck pain, shortness of breath or sore throat. Past treatments include oral decongestants. The treatment provided no relief.       Constitution: Negative for chills, sweating, fatigue and fever.   HENT: Positive for ear pain, congestion, postnasal drip, sinus pain and sinus pressure. Negative for ear discharge, foreign body in ear, tinnitus, hearing loss, nosebleeds, foreign body in nose, sore throat, trouble swallowing and voice change.    Neck: Negative for neck pain, neck stiffness, painful lymph nodes and neck swelling.   Cardiovascular: Negative for chest pain, leg swelling, palpitations, sob on exertion and passing out.   Eyes: Negative for foreign body in eye, eye discharge, eye itching, eye pain, eye redness, photophobia, vision loss, double vision, blurred vision and eyelid swelling.   Respiratory: Negative for chest tightness, cough, sputum production, bloody sputum, COPD, shortness of breath, stridor, wheezing and asthma.    Gastrointestinal: Negative for abdominal pain, " abdominal bloating, nausea, vomiting, constipation, diarrhea and heartburn.   Genitourinary: Negative for dysuria, frequency, urgency and hematuria.   Musculoskeletal: Negative for joint pain, joint swelling, muscle cramps and muscle ache.   Skin: Negative for rash.   Allergic/Immunologic: Negative for seasonal allergies and asthma.   Neurological: Positive for headaches. Negative for dizziness, history of vertigo, light-headedness, passing out, facial drooping, speech difficulty, coordination disturbances, loss of balance, history of migraines, disorientation, altered mental status, loss of consciousness, numbness, tingling, seizures and tremors.   Hematologic/Lymphatic: Negative for swollen lymph nodes.   Psychiatric/Behavioral: Negative for altered mental status, disorientation and nervous/anxious. The patient is not nervous/anxious.        Objective:      Physical Exam   Constitutional: She is oriented to person, place, and time. She appears well-developed and well-nourished. She is cooperative.  Non-toxic appearance. She does not appear ill. No distress.   HENT:   Head: Normocephalic and atraumatic.   Right Ear: Hearing, tympanic membrane, external ear and ear canal normal.   Left Ear: Hearing, tympanic membrane, external ear and ear canal normal.   Nose: Mucosal edema and rhinorrhea present. No nasal deformity. No epistaxis. Right sinus exhibits maxillary sinus tenderness and frontal sinus tenderness. Left sinus exhibits maxillary sinus tenderness and frontal sinus tenderness.   Mouth/Throat: Uvula is midline and mucous membranes are normal. No trismus in the jaw. Normal dentition. No uvula swelling. Posterior oropharyngeal erythema present. No oropharyngeal exudate or posterior oropharyngeal edema.   Eyes: Pupils are equal, round, and reactive to light. Conjunctivae, EOM and lids are normal. No scleral icterus.   Sclera clear bilat   Neck: Trachea normal, normal range of motion, full passive range of motion  without pain and phonation normal. Neck supple.   Cardiovascular: Normal rate, regular rhythm, normal heart sounds, intact distal pulses and normal pulses.   Pulmonary/Chest: Effort normal and breath sounds normal. No accessory muscle usage or stridor. No respiratory distress. She has no decreased breath sounds. She has no wheezes. She has no rhonchi. She has no rales.   Abdominal: Soft. Normal appearance and bowel sounds are normal. She exhibits no distension. There is no tenderness. There is no rigidity, no rebound, no guarding, no CVA tenderness, no tenderness at McBurney's point and negative Boyle's sign. No hernia.   Musculoskeletal: Normal range of motion. She exhibits no edema or deformity.   Lymphadenopathy:     She has cervical adenopathy.        Right cervical: Superficial cervical adenopathy present.        Left cervical: Superficial cervical adenopathy present.   Neurological: She is alert and oriented to person, place, and time. She has normal strength and normal reflexes. No cranial nerve deficit or sensory deficit. She exhibits normal muscle tone. She displays a negative Romberg sign. Coordination normal.   Skin: Skin is warm, dry and intact. Capillary refill takes less than 2 seconds. No rash noted. She is not diaphoretic. No pallor.   Psychiatric: She has a normal mood and affect. Her speech is normal and behavior is normal. Judgment and thought content normal. Cognition and memory are normal.   Nursing note and vitals reviewed.      Assessment:       1. Acute bacterial sinusitis        Plan:         Acute bacterial sinusitis  -     amoxicillin-clavulanate 875-125mg (AUGMENTIN) 875-125 mg per tablet; Take 1 tablet by mouth 2 (two) times daily. for 10 days  Dispense: 20 tablet; Refill: 0  -     predniSONE (DELTASONE) 10 MG tablet; Take 2 tablets (20 mg total) by mouth once daily. for 3 days  Dispense: 6 tablet; Refill: 0      Patient Instructions     If you were prescribed a narcotic or controlled  medication, do not drive or operate heavy equipment or machinery while taking these medications.  You must understand that you've received an Urgent Care treatment only and that you may be released before all your medical problems are known or treated. You, the patient, will arrange for follow up care as instructed.  Follow up with your PCP or specialty clinic as directed if not improved or as needed. You can call [phone] to schedule an appointment with the appropriate provider.  If your condition worsens we recommend that you receive another evaluation at the Emergency Department for any concerns or worsening of condition.  Patient aware and verbalized understanding.    Increase fluids and rest is important.  Avoid contact with sick individuals.  Humidifier use at home.  Augmentin RX as prescribed for sinusitis.  Prednisone RX as prescribed to help reduce inflammation.  OTC Claritin or Zyrtec daily as directed.  Flonase Nasal [place] as directed for nasal congestion.  OTC Tylenol or Motrin every 4 - 6 hours as needed for fever or pain.  Follow-up with your PCP in the next 48hrs or sooner for re-evaluation especially if no improvement in symptoms.  Follow-up in the ER for any worsening of symptoms such as new fever, increasing ear pain, neck stiffness, shortness of breath, etc.  ER precautions given to patient.  Patient aware and verbalized understanding.    Acute Bacterial Rhinosinusitis (ABRS)    Acute bacterial rhinosinusitis (ABRS) is an infection of your nasal cavity and sinuses. Its caused by bacteria. Acute means that youve had symptoms for less than 12 weeks.  Understanding your sinuses  The nasal cavity is the large air-filled space behind your nose. The sinuses are a group of spaces formed by the bones of your face. They connect with your nasal cavity. ABRS causes the tissue lining these spaces to become inflamed. Mucus may not drain normally. This leads to facial pain and other symptoms.  What  causes ABRS?  ABRS most often follows an upper respiratory infection caused by a virus. Bacteria then infect the lining of your nasal cavity and sinuses. But you can also get ABRS if you have:  · Nasal allergies  · Long-term nasal swelling and congestion not caused by allergies  · Blockage in the nose  Symptoms of ABRS  The symptoms of ABRS may be different for each person, and can include:  · Nasal congestion  · Runny nose  · Fluid draining from the nose down the throat (postnasal drip)  · Headache  · Cough  · Pain in the sinuses  · Thick, colored fluid from the nose (mucus)  · Fever  Diagnosing ABRS  ABRS may be diagnosed if youve had an upper respiratory infection like a cold and cough for longer than 10 to 14 days. Your health care provider will ask about your symptoms and your medical history. The provider will check your vital signs, including your temperature. Youll have a physical exam. The health care provider will check your ears, nose, and throat. You likely wont need any tests. If ABRS comes back, you may have a culture or other tests.  Treatment for ABRS  Treatment may include:  · Antibiotic medicine. This is for symptoms that last for at least 10 to 14 days.  · Nasal corticosteroid medicine. Drops or spray used in the nose can lessen swelling and congestion.  · Over-the-counter pain medicine. This is to lessen sinus pain and pressure.  · Nasal decongestant medicine. Spray or drops may help to lessen congestion. Do not use them for more than a few days.  · Salt wash (saline irrigation). This can help to loosen mucus.  Possible complications of ABRS  ABRS may come back or become long-term (chronic).  In rare cases, ABRS may cause complications such as:   · Inflamed tissue around the brain and spinal cord (meningitis)  · Inflamed tissue around the eyes (orbital cellulitis)  · Inflamed bones around the sinuses (osteitis)  These problems may need to be treated in a hospital with intravenous (IV)  antibiotic medicine or surgery.  When to call the health care provider  Call your health care provider if you have any of the following:  · Symptoms that dont get better, or get worse  · Symptoms that dont get better after 3 to 5 days on antibiotics  · Trouble seeing  · Swelling around your eyes  · Confusion or trouble staying awake   Date Last Reviewed: 3/3/2015  © 6675-8602 [employer]. [address] [place] [address], [place] [address]. All rights reserved. This information is not intended as a substitute for professional medical care. Always follow your healthcare professional's instructions.

## 2019-04-23 ENCOUNTER — HOSPITAL ENCOUNTER (OUTPATIENT)
Dept: RADIOLOGY | Facility: HOSPITAL | Age: 49
Discharge: HOME | End: 2019-04-23
Attending: NURSE PRACTITIONER
Payer: COMMERCIAL

## 2019-04-23 ENCOUNTER — TELEPHONE (OUTPATIENT)
Dept: INTERNAL MEDICINE | Facility: CLINIC | Age: 49
End: 2019-04-23

## 2019-04-23 ENCOUNTER — OFFICE VISIT (OUTPATIENT)
Dept: INTERNAL MEDICINE | Facility: CLINIC | Age: 49
End: 2019-04-23
Payer: COMMERCIAL

## 2019-04-23 VITALS
RESPIRATION RATE: 18 BRPM | HEART RATE: 78 BPM | DIASTOLIC BLOOD PRESSURE: 78 MMHG | TEMPERATURE: 99 F | SYSTOLIC BLOOD PRESSURE: 120 MMHG

## 2019-04-23 DIAGNOSIS — R06.2 EXPIRATORY WHEEZING: ICD-10-CM

## 2019-04-23 DIAGNOSIS — R06.2 EXPIRATORY WHEEZING: Primary | ICD-10-CM

## 2019-04-23 DIAGNOSIS — J32.9 SINUSITIS, UNSPECIFIED CHRONICITY, UNSPECIFIED LOCATION: ICD-10-CM

## 2019-04-23 DIAGNOSIS — Z09 FOLLOW UP: ICD-10-CM

## 2019-04-23 DIAGNOSIS — R05.9 COUGH: ICD-10-CM

## 2019-04-23 PROBLEM — M79.645 PAIN OF LEFT MIDDLE FINGER: Status: RESOLVED | Noted: 2017-11-02 | Resolved: 2019-04-23

## 2019-04-23 PROBLEM — G43.829 MENSTRUAL MIGRAINE WITHOUT STATUS MIGRAINOSUS, NOT INTRACTABLE: Status: RESOLVED | Noted: 2017-09-05 | Resolved: 2019-04-23

## 2019-04-23 PROBLEM — M67.40 GANGLION: Status: RESOLVED | Noted: 2017-10-17 | Resolved: 2019-04-23

## 2019-04-23 PROBLEM — Z01.00 ENCOUNTER FOR VISION SCREENING: Status: RESOLVED | Noted: 2017-09-28 | Resolved: 2019-04-23

## 2019-04-23 PROCEDURE — 71046 XR CHEST PA AND LATERAL: ICD-10-PCS | Mod: 26,,, | Performed by: RADIOLOGY

## 2019-04-23 PROCEDURE — 71046 X-RAY EXAM CHEST 2 VIEWS: CPT | Mod: 26,,, | Performed by: RADIOLOGY

## 2019-04-23 PROCEDURE — 99214 OFFICE O/P EST MOD 30 MIN: CPT | Mod: S$GLB,,, | Performed by: NURSE PRACTITIONER

## 2019-04-23 PROCEDURE — 99999 PR PBB SHADOW E&M-EST. PATIENT-LVL III: ICD-10-PCS | Mod: PBBFAC,,, | Performed by: NURSE PRACTITIONER

## 2019-04-23 PROCEDURE — 99999 PR PBB SHADOW E&M-EST. PATIENT-LVL III: CPT | Mod: PBBFAC,,, | Performed by: NURSE PRACTITIONER

## 2019-04-23 PROCEDURE — 71046 X-RAY EXAM CHEST 2 VIEWS: CPT | Mod: TC

## 2019-04-23 PROCEDURE — 99214 PR OFFICE/OUTPT VISIT, EST, LEVL IV, 30-39 MIN: ICD-10-PCS | Mod: S$GLB,,, | Performed by: NURSE PRACTITIONER

## 2019-04-23 RX ORDER — DEXAMETHASONE 4 MG/1
4 TABLET ORAL EVERY 12 HOURS
Qty: 10 TABLET | Refills: 0 | Status: SHIPPED | OUTPATIENT
Start: 2019-04-23 | End: 2019-05-03

## 2019-04-23 RX ORDER — CODEINE PHOSPHATE AND GUAIFENESIN 10; 100 MG/5ML; MG/5ML
10 SOLUTION ORAL EVERY 8 HOURS PRN
Qty: 210 ML | Refills: 0 | Status: SHIPPED | OUTPATIENT
Start: 2019-04-23 | End: 2019-04-30

## 2019-04-23 NOTE — TELEPHONE ENCOUNTER
----- Message from Dom Alanis sent at 4/23/2019  8:58 AM CDT -----  Contact: [facility] [place] [phone]  Pharmacy is calling to clarify an RX.  RX name:  dexamethasone (DECADRON) 4 MG Ta  What do they need to clarify:  Is needing correct direction and the quantity amount confirmed, the directions does not match the quantity.    Please call an advise  Thank you

## 2019-04-23 NOTE — PROGRESS NOTES
"INTERNAL MEDICINE CLINIC - SAME DAY APPOINTMENT  Progress Note    PRESENTING HISTORY     PCP: Sandra Betts MD  Chief Complaint/Reason for Visit:   No chief complaint on file.      History of Present Illness & ROS : Ms. Brissa Mason is a 49 y.o. female.    Here for an UC follow up visit.   Seen in UC in [place] 5 days, diagnosed with Bacterial Sinusitis and prescribed 3 days of Pred and Augmentin x 10 days,   Presents today with, persistence of cough, no production, no documented fevers; reports "better today", but still with some coughing. No headaches; the cough seems to be "most bothersome".   Traveled to [facility] and returned on 4/16/2019. Reportedly "got sick the Thursday before returning to the Hospitals in [place]".   Has had the flu vaccine this year.       Review of Systems:  Eyes: denies visual changes at this time denies floaters   Cardiovascular: no chest pain or palpitations  Gastrointestinal: no nausea or vomiting, no abdominal pain or change in bowel habits  Genitourinary: no hematuria or dysuria; denies frequency  Hematologic/Lymphatic: no easy bruising or lymphadenopathy  Musculoskeletal: no arthralgias or myalgias  Neurological: no seizures or tremors  Endocrine: no heat or cold intolerance      PAST HISTORY:     Past Medical History:   Diagnosis Date    Allergy     Colitis     resolved; rectum 2005; neg flex sig 2006       Past Surgical History:   Procedure Laterality Date    COLONOSCOPY N/A 7/9/2013    Performed by Vern [facility]ins MD at [facility] ([facility])    GANGLION CYST EXCISION  2017    GANGLIONECTOMY-HAND Left 9/15/2017    Performed by Errol Dyson Jr., MD at [facility] OR       Family History   Problem Relation Age of Onset    Colon polyps Mother         noncancerous colon tumor; polyps    Hyperlipidemia Mother     Osteoporosis Mother     Colon cancer Mother         pre cacer    Gout Father     Hypertension Father     Hypertension Sister     Glaucoma Sister     Hyperlipidemia " Brother     Cancer Maternal Uncle         colon    Colon cancer Maternal Uncle     Hypertension Brother     Breast cancer Paternal Aunt 60    Breast cancer Paternal Aunt     Miscarriages / Stillbirths Neg Hx     Amblyopia Neg Hx     Blindness Neg Hx     Cataracts Neg Hx     Diabetes Neg Hx     Macular degeneration Neg Hx     Retinal detachment Neg Hx     Strabismus Neg Hx     Stroke Neg Hx     Thyroid disease Neg Hx     Melanoma Neg Hx        Social History     Socioeconomic History    Marital status:      Spouse name: Not on file    Number of children: Not on file    Years of education: Not on file    Highest education level: Not on file   Occupational History    Not on file   Social Needs    Financial resource strain: Not on file    Food insecurity:     Worry: Not on file     Inability: Not on file    Transportation needs:     Medical: Not on file     Non-medical: Not on file   Tobacco Use    Smoking status: Never Smoker    Smokeless tobacco: Never Used   Substance and Sexual Activity    Alcohol use: Yes     Comment: 1-2 glasses of wine a week     Drug use: No    Sexual activity: Yes     Partners: Male     Birth control/protection: OCP     Comment: , menarche 14, no hx of abnormal    Lifestyle    Physical activity:     Days per week: Not on file     Minutes per session: Not on file    Stress: Not on file   Relationships    Social connections:     Talks on phone: Not on file     Gets together: Not on file     Attends Druze service: Not on file     Active member of club or organization: Not on file     Attends meetings of clubs or organizations: Not on file     Relationship status: Not on file   Other Topics Concern    Are you pregnant or think you may be? Not Asked    Breast-feeding Not Asked   Social History Narrative         - Baptism    Parents - Mata and Robina Del Angel       MEDICATIONS & ALLERGIES:     Current Outpatient Medications  "on File Prior to Visit   Medication Sig Dispense Refill    alclomethasone (ACLOVATE) 0.05 % ointment AAA lips bid 30 g 3    alprazolam (XANAX) 0.5 MG tablet Take 1 tablet (0.5 mg total) by mouth nightly as needed for Insomnia or Anxiety. 30 tablet 0    amoxicillin-clavulanate 875-125mg (AUGMENTIN) 875-125 mg per tablet Take 1 tablet by mouth 2 (two) times daily. for 10 days 20 tablet 0    desog-e.estradiol/e.estradiol (VIORELE, 28,) 0.15-0.02 mgx21 /0.01 mg x 5 per tablet Take 1 tablet by mouth once daily. 90 tablet 3    fexofenadine (ALLEGRA) 180 MG tablet Take 180 mg by mouth. 1 Tablet Oral Every day      PENNSAID 20 mg/gram /actuation(2 %) sopm APPLY TWO PUMPS TO AFFECTED AREA TWICE DAILY  3     No current facility-administered medications on file prior to visit.         Review of patient's allergies indicates:   Allergen Reactions    Mobic [meloxicam] Rash    Erythromycin Other (See Comments)    Sulfa (sulfonamide antibiotics)      Other reaction(s): Rash       Medications Reconciliation:   I have reconciled the patient's home medications with the patient/family. I have updated all changes.  Refer to After-Visit Medication List.    OBJECTIVE:     Vital Signs:  There were no vitals filed for this visit.  Wt Readings from Last 1 Encounters:   04/17/19 0934 59 kg (130 lb)     There is no height or weight on file to calculate BMI.     Wt Readings from Last 3 Encounters:   04/17/19 59 kg (130 lb)   09/25/18 59 kg (130 lb 1.1 oz)   09/11/18 59.3 kg (130 lb 11.7 oz)     Temp Readings from Last 3 Encounters:   04/17/19 98 °F (36.7 °C)   08/27/18 98.4 °F (36.9 °C) (Oral)   09/15/17 98.2 °F (36.8 °C)     BP Readings from Last 3 Encounters:   04/17/19 134/76   09/25/18 118/74   09/11/18 138/78     Pulse Readings from Last 3 Encounters:   04/17/19 84   09/25/18 79   08/27/18 84      61.7kg  5'5"    98.7  120/78  18    Physical Exam:  General: Well developed, well nourished. No distress.  HEENT: Head is " normocephalic, atraumatic;   Right ear with + fluid bulge and retraction   Eyes: Clear conjunctiva.  Neck: Supple, symmetrical neck; trachea midline.  Lungs:  Mild distant expiratory wheezing to LLL,otherwise CTA  with normal respiratory effort.  Cardiovascular: Heart with regular rate and rhythm. No murmurs, gallops or rubs  Extremities: No LE edema. Pulses 2+ and symmetric.   Abdomen: Abdomen is soft, non-tender non-distended with normal bowel sounds.  Skin: Skin color, texture, turgor normal. No rashes.  Musculoskeletal: Normal gait.   Lymph Nodes: No cervical or supraclavicular adenopathy.  Neurologic: Normal strength and tone. No focal numbness or weakness.   Psychiatric: Not depressed.        Laboratory  Lab Results   Component Value Date    WBC 6.05 09/11/2018    HGB 12.7 09/11/2018    HCT 39.4 09/11/2018     09/11/2018    CHOL 224 (H) 09/11/2018    TRIG 120 09/11/2018    HDL 88 (H) 09/11/2018    ALT 17 09/11/2018    AST 15 09/11/2018     09/11/2018    K 4.1 09/11/2018     09/11/2018    CREATININE 0.9 09/11/2018    BUN 11 09/11/2018    CO2 25 09/11/2018    TSH 1.974 09/12/2016       ASSESSMENT & PLAN:     Here for UC visit follow up.     Suspect Bacterial Sinusitis, with possible Mild onset of Bronchitis in the setting of chronic cough and Mild expiratory wheezing:     Follow up  -     X-Ray Chest PA And Lateral; Future; Expected date: 04/23/2019    Sinusitis, unspecified chronicity, unspecified location  -     X-Ray Chest PA And Lateral; Future; Expected date: 04/23/2019    Expiratory wheezing  -     X-Ray Chest PA And Lateral; Future; Expected date: 04/23/2019  *Ascertain no underlying interval development of infectious process within lung, based on clinic exam findings and symptomatology.   Low suspicion, but remains likely given her recent travel out of the country.       Other orders  -     guaifenesin-codeine 100-10 mg/5 ml (TUSSI-ORGANIDIN NR)  mg/5 mL syrup; Take 10 mLs by  mouth every 8 (eight) hours as needed for Cough.  Dispense: 210 mL; Refill: 0  -     dexamethasone (DECADRON) 4 MG Tab; Take 1 tablet (4 mg total) by mouth every 12 (twelve) hours. for 10 days  Dispense: 10 tablet; Refill: 0    ` Instructed to continue current abx therapy (5 days remaining).     *Advised to follow up with PCP in 2-3 weeks, sooner if indicated.       Future Appointments   Date Time Provider Department Center   4/23/2019  8:00 AM [facility] [id number] 485 LB LIMIT NOM XRAY IM Ricardo y PCW        Medication List           Accurate as of 4/23/19  7:24 AM. If you have any questions, ask your nurse or doctor.               START taking these medications    dexamethasone 4 MG Tab  Commonly known as:  DECADRON  Take 1 tablet (4 mg total) by mouth every 12 (twelve) hours. for 10 days  Started by:  YOLANDA Lozano     guaifenesin-codeine 100-10 mg/5 ml  mg/5 mL syrup  Commonly known as:  TUSSI-ORGANIDIN NR  Take 10 mLs by mouth every 8 (eight) hours as needed for Cough.  Started by:  YOLANDA Lozano        CONTINUE taking these medications    alclomethasone 0.05 % ointment  Commonly known as:  ACLOVATE  AAA lips bid     ALLEGRA 180 MG tablet  Generic drug:  fexofenadine     ALPRAZolam 0.5 MG tablet  Commonly known as:  XANAX  Take 1 tablet (0.5 mg total) by mouth nightly as needed for Insomnia or Anxiety.     amoxicillin-clavulanate 875-125mg 875-125 mg per tablet  Commonly known as:  AUGMENTIN  Take 1 tablet by mouth 2 (two) times daily. for 10 days     desog-e.estradiol/e.estradiol 0.15-0.02 mgx21 /0.01 mg x 5 per tablet  Commonly known as:  VIORELE (28)  Take 1 tablet by mouth once daily.     PENNSAID 20 mg/gram /actuation(2 %) Sopm  Generic drug:  diclofenac sodium           Where to Get Your Medications      These medications were sent to [facility]/pharmacy #[id number] - [place] - [address]  [address] [address]    Phone:  [phone]   · dexamethasone 4 MG  Tab  · guaifenesin-codeine 100-10 mg/5 ml  mg/5 mL syrup         Signing Physician:  YOLANDA Lozano

## 2019-05-29 ENCOUNTER — PATIENT MESSAGE (OUTPATIENT)
Dept: OBSTETRICS AND GYNECOLOGY | Facility: CLINIC | Age: 49
End: 2019-05-29

## 2019-06-03 ENCOUNTER — PATIENT MESSAGE (OUTPATIENT)
Dept: OBSTETRICS AND GYNECOLOGY | Facility: CLINIC | Age: 49
End: 2019-06-03

## 2019-06-13 ENCOUNTER — PATIENT MESSAGE (OUTPATIENT)
Dept: INTERNAL MEDICINE | Facility: CLINIC | Age: 49
End: 2019-06-13

## 2019-08-30 ENCOUNTER — HOSPITAL ENCOUNTER (OUTPATIENT)
Dept: RADIOLOGY | Facility: HOSPITAL | Age: 49
Discharge: HOME | End: 2019-08-30
Attending: OBSTETRICS & GYNECOLOGY
Payer: COMMERCIAL

## 2019-08-30 VITALS — WEIGHT: 130 LBS | HEIGHT: 65 IN | BODY MASS INDEX: 21.66 KG/M2

## 2019-08-30 DIAGNOSIS — Z12.31 VISIT FOR SCREENING MAMMOGRAM: ICD-10-CM

## 2019-08-30 PROCEDURE — 77067 MAMMO DIGITAL SCREENING BILAT WITH TOMOSYNTHESIS_CAD: ICD-10-PCS | Mod: 26,,, | Performed by: RADIOLOGY

## 2019-08-30 PROCEDURE — 77067 SCR MAMMO BI INCL CAD: CPT | Mod: 26,,, | Performed by: RADIOLOGY

## 2019-08-30 PROCEDURE — 77067 SCR MAMMO BI INCL CAD: CPT | Mod: TC

## 2019-08-30 PROCEDURE — 77063 BREAST TOMOSYNTHESIS BI: CPT | Mod: 26,,, | Performed by: RADIOLOGY

## 2019-08-30 PROCEDURE — 77063 MAMMO DIGITAL SCREENING BILAT WITH TOMOSYNTHESIS_CAD: ICD-10-PCS | Mod: 26,,, | Performed by: RADIOLOGY

## 2019-09-04 ENCOUNTER — PATIENT MESSAGE (OUTPATIENT)
Dept: OBSTETRICS AND GYNECOLOGY | Facility: CLINIC | Age: 49
End: 2019-09-04

## 2019-09-04 DIAGNOSIS — N94.9 MENSTRUAL SYMPTOM OR SIGN: ICD-10-CM

## 2019-09-04 RX ORDER — DESOGESTREL AND ETHINYL ESTRADIOL 21-5 (28)
1 KIT ORAL DAILY
Qty: 84 TABLET | Refills: 0 | Status: SHIPPED | OUTPATIENT
Start: 2019-09-04 | End: 2019-11-07 | Stop reason: SDUPTHER

## 2019-09-04 RX ORDER — DESOGESTREL AND ETHINYL ESTRADIOL 21-5 (28)
1 KIT ORAL DAILY
Qty: 84 TABLET | Refills: 0 | OUTPATIENT
Start: 2019-09-04

## 2019-09-15 ENCOUNTER — PATIENT MESSAGE (OUTPATIENT)
Dept: INTERNAL MEDICINE | Facility: CLINIC | Age: 49
End: 2019-09-15

## 2019-09-17 ENCOUNTER — PATIENT OUTREACH (OUTPATIENT)
Dept: ADMINISTRATIVE | Facility: OTHER | Age: 49
End: 2019-09-17

## 2019-09-18 ENCOUNTER — TELEPHONE (OUTPATIENT)
Dept: OBSTETRICS AND GYNECOLOGY | Facility: CLINIC | Age: 49
End: 2019-09-18

## 2019-09-18 DIAGNOSIS — Z91.89 AT RISK FOR BREAST CANCER: Primary | ICD-10-CM

## 2019-09-18 NOTE — TELEPHONE ENCOUNTER
----- Message from Bran Oden IV, MD sent at 9/6/2019 10:28 AM CDT -----  Benign mammogram results reviewed.    Tyrer-Cuzick - 7 risk assessment model is: Traceyer-Katelyn: 21.05 %.    Patient has increased lifetime breast cancer risk.  Contact patient and offer breast center evaluation for possible supplemental screening if patient desires.    Bran Oden IV, MD

## 2019-09-18 NOTE — TELEPHONE ENCOUNTER
Spoke with pt:    Per Dr bAebe:  Benign mammogram results.     Tyrer-Cuzick - 7 risk assessment model is: Tyrer-Cuzick: 21.05 %.     Patient has increased lifetime breast cancer risk.  Contact patient and offer breast center evaluation for possible supplemental screening if patient desires.     Pt desires follow up with breast center, referral placed for follow up    Pt verbalized understanding.

## 2019-09-19 ENCOUNTER — PATIENT MESSAGE (OUTPATIENT)
Dept: OBSTETRICS AND GYNECOLOGY | Facility: CLINIC | Age: 49
End: 2019-09-19

## 2019-09-19 ENCOUNTER — LAB VISIT (OUTPATIENT)
Dept: LAB | Facility: HOSPITAL | Age: 49
End: 2019-09-19
Attending: INTERNAL MEDICINE
Payer: COMMERCIAL

## 2019-09-19 DIAGNOSIS — Z00.00 WELLNESS EXAMINATION: ICD-10-CM

## 2019-09-19 LAB
25(OH)D3+25(OH)D2 SERPL-MCNC: 37 NG/ML (ref 30–96)
ALBUMIN SERPL BCP-MCNC: 3.9 G/DL (ref 3.5–5.2)
ALP SERPL-CCNC: 37 U/L (ref 55–135)
ALT SERPL W/O P-5'-P-CCNC: 8 U/L (ref 10–44)
ANION GAP SERPL CALC-SCNC: 8 MMOL/L (ref 8–16)
AST SERPL-CCNC: 14 U/L (ref 10–40)
BASOPHILS # BLD AUTO: 0.02 K/UL (ref 0–0.2)
BASOPHILS NFR BLD: 0.3 % (ref 0–1.9)
BILIRUB SERPL-MCNC: 0.4 MG/DL (ref 0.1–1)
BUN SERPL-MCNC: 11 MG/DL (ref 6–20)
CALCIUM SERPL-MCNC: 9 MG/DL (ref 8.7–10.5)
CHLORIDE SERPL-SCNC: 109 MMOL/L (ref 95–110)
CHOLEST SERPL-MCNC: 205 MG/DL (ref 120–199)
CHOLEST/HDLC SERPL: 2.6 {RATIO} (ref 2–5)
CO2 SERPL-SCNC: 22 MMOL/L (ref 23–29)
CREAT SERPL-MCNC: 0.9 MG/DL (ref 0.5–1.4)
DIFFERENTIAL METHOD: ABNORMAL
EOSINOPHIL # BLD AUTO: 0.1 K/UL (ref 0–0.5)
EOSINOPHIL NFR BLD: 1.7 % (ref 0–8)
ERYTHROCYTE [DISTWIDTH] IN BLOOD BY AUTOMATED COUNT: 12.6 % (ref 11.5–14.5)
EST. GFR  (AFRICAN AMERICAN): >60 ML/MIN/1.73 M^2
EST. GFR  (NON AFRICAN AMERICAN): >60 ML/MIN/1.73 M^2
GLUCOSE SERPL-MCNC: 89 MG/DL (ref 70–110)
HCT VFR BLD AUTO: 34 % (ref 37–48.5)
HDLC SERPL-MCNC: 78 MG/DL (ref 40–75)
HDLC SERPL: 38 % (ref 20–50)
HGB BLD-MCNC: 11.4 G/DL (ref 12–16)
IRON SERPL-MCNC: 47 UG/DL (ref 30–160)
LDLC SERPL CALC-MCNC: 106.2 MG/DL (ref 63–159)
LYMPHOCYTES # BLD AUTO: 1.8 K/UL (ref 1–4.8)
LYMPHOCYTES NFR BLD: 30.6 % (ref 18–48)
MCH RBC QN AUTO: 30.2 PG (ref 27–31)
MCHC RBC AUTO-ENTMCNC: 33.5 G/DL (ref 32–36)
MCV RBC AUTO: 90 FL (ref 82–98)
MONOCYTES # BLD AUTO: 0.5 K/UL (ref 0.3–1)
MONOCYTES NFR BLD: 8.5 % (ref 4–15)
NEUTROPHILS # BLD AUTO: 3.5 K/UL (ref 1.8–7.7)
NEUTROPHILS NFR BLD: 58.9 % (ref 38–73)
NONHDLC SERPL-MCNC: 127 MG/DL
PLATELET # BLD AUTO: 281 K/UL (ref 150–350)
PMV BLD AUTO: 10.6 FL (ref 9.2–12.9)
POTASSIUM SERPL-SCNC: 4.6 MMOL/L (ref 3.5–5.1)
PROT SERPL-MCNC: 7 G/DL (ref 6–8.4)
RBC # BLD AUTO: 3.78 M/UL (ref 4–5.4)
SATURATED IRON: 10 % (ref 20–50)
SODIUM SERPL-SCNC: 139 MMOL/L (ref 136–145)
TOTAL IRON BINDING CAPACITY: 475 UG/DL (ref 250–450)
TRANSFERRIN SERPL-MCNC: 321 MG/DL (ref 200–375)
TRIGL SERPL-MCNC: 104 MG/DL (ref 30–150)
WBC # BLD AUTO: 6.02 K/UL (ref 3.9–12.7)

## 2019-09-19 PROCEDURE — 85025 COMPLETE CBC W/AUTO DIFF WBC: CPT

## 2019-09-19 PROCEDURE — 82306 VITAMIN D 25 HYDROXY: CPT

## 2019-09-19 PROCEDURE — 80053 COMPREHEN METABOLIC PANEL: CPT

## 2019-09-19 PROCEDURE — 80061 LIPID PANEL: CPT

## 2019-09-19 PROCEDURE — 83540 ASSAY OF IRON: CPT

## 2019-09-19 PROCEDURE — 36415 COLL VENOUS BLD VENIPUNCTURE: CPT

## 2019-09-20 ENCOUNTER — TELEPHONE (OUTPATIENT)
Dept: OBSTETRICS AND GYNECOLOGY | Facility: CLINIC | Age: 49
End: 2019-09-20

## 2019-09-20 NOTE — TELEPHONE ENCOUNTER
----- Message from Leola Fenton sent at 9/20/2019  8:38 AM CDT -----  Contact: CANDY HUERTA [[id number]]   Name of Who is Calling: CANDY HUERTA [[id number]]    What is the request in detail:patient request call back in reference to appointment  Please contact to further discuss and advise      Can the clinic reply by [facility]: yes     What [facility]  [phone]

## 2019-09-23 ENCOUNTER — PATIENT MESSAGE (OUTPATIENT)
Dept: OBSTETRICS AND GYNECOLOGY | Facility: CLINIC | Age: 49
End: 2019-09-23

## 2019-09-23 ENCOUNTER — TELEPHONE (OUTPATIENT)
Dept: OBSTETRICS AND GYNECOLOGY | Facility: CLINIC | Age: 49
End: 2019-09-23

## 2019-09-23 NOTE — TELEPHONE ENCOUNTER
Informed patient patient mmg normal dx mmg not recommenced. Consult with [facility] for elevated TC to discuss treatment recommendations. Patient verbalized understanding. Patient given [facility]

## 2019-09-26 ENCOUNTER — LAB VISIT (OUTPATIENT)
Dept: LAB | Facility: HOSPITAL | Age: 49
End: 2019-09-26
Attending: INTERNAL MEDICINE
Payer: COMMERCIAL

## 2019-09-26 ENCOUNTER — OFFICE VISIT (OUTPATIENT)
Dept: INTERNAL MEDICINE | Facility: CLINIC | Age: 49
End: 2019-09-26
Payer: COMMERCIAL

## 2019-09-26 VITALS
DIASTOLIC BLOOD PRESSURE: 70 MMHG | BODY MASS INDEX: 22.64 KG/M2 | SYSTOLIC BLOOD PRESSURE: 134 MMHG | HEART RATE: 68 BPM | WEIGHT: 136 LBS

## 2019-09-26 DIAGNOSIS — Z00.00 WELLNESS EXAMINATION: ICD-10-CM

## 2019-09-26 DIAGNOSIS — Z00.00 PHYSICAL EXAM: Primary | ICD-10-CM

## 2019-09-26 DIAGNOSIS — E55.9 VITAMIN D DEFICIENCY: ICD-10-CM

## 2019-09-26 LAB — VIT B12 SERPL-MCNC: 263 PG/ML (ref 210–950)

## 2019-09-26 PROCEDURE — 84207 ASSAY OF VITAMIN B-6: CPT

## 2019-09-26 PROCEDURE — 99999 PR PBB SHADOW E&M-EST. PATIENT-LVL III: ICD-10-PCS | Mod: PBBFAC,,, | Performed by: INTERNAL MEDICINE

## 2019-09-26 PROCEDURE — 99396 PR PREVENTIVE VISIT,EST,40-64: ICD-10-PCS | Mod: S$GLB,,, | Performed by: INTERNAL MEDICINE

## 2019-09-26 PROCEDURE — 99396 PREV VISIT EST AGE 40-64: CPT | Mod: S$GLB,,, | Performed by: INTERNAL MEDICINE

## 2019-09-26 PROCEDURE — 82607 VITAMIN B-12: CPT

## 2019-09-26 PROCEDURE — 99999 PR PBB SHADOW E&M-EST. PATIENT-LVL III: CPT | Mod: PBBFAC,,, | Performed by: INTERNAL MEDICINE

## 2019-09-26 PROCEDURE — 36415 COLL VENOUS BLD VENIPUNCTURE: CPT

## 2019-09-26 NOTE — PROGRESS NOTES
Subjective:      Patient ID: Brissa Mason is a 49 y.o. female.    Chief Complaint: Annual Exam    HPI:  HPI   Patient is here for her annual exam    New Problem: none     Annual exam: 9/26/2019  Colonoscopy: 7/9/2013 due at age 50  Maternal uncle : colon cancer at advanced age Patient will call me.  Mammogram: 8/30/2019  Paternal Aunt breast cancer and Paternal great aunt  Breast Surgery Consult:made  Gyn: Rescheduled in November  Optho : due 3/2020  Flu:will get at work  Tetanus: 9/21/2015  Tdap 2017  Shingles: not due  Pneumovax: not due  Dermatology: made an appt     Labs reviewed:  Vit D 37  HGB donates twice a year: decreased hgb    HDL78    CMP normal  Iron levels low result of blood donation`     Patient Active Problem List   Diagnosis    Vitamin D deficiency     Past Medical History:   Diagnosis Date    Allergy     Colitis     resolved; rectum 2005; neg flex sig 2006    Colitis: 2005 rectal area     resolved; rectum 2005; neg flex sig 2006     Menstrual migraine without status migrainosus, not intractable 9/5/2017     Past Surgical History:   Procedure Laterality Date    GANGLION CYST EXCISION  2017     Family History   Problem Relation Age of Onset    Colon polyps Mother         noncancerous colon tumor; polyps    Hyperlipidemia Mother     Osteoporosis Mother     Colon cancer Mother         pre cacer    Gout Father     Hypertension Father     Hypertension Sister     Glaucoma Sister     Hyperlipidemia Brother     Cancer Maternal Uncle         colon    Colon cancer Maternal Uncle     Hypertension Brother     Breast cancer Paternal Aunt 60    Breast cancer Paternal Aunt     Miscarriages / Stillbirths Neg Hx     Amblyopia Neg Hx     Blindness Neg Hx     Cataracts Neg Hx     Diabetes Neg Hx     Macular degeneration Neg Hx     Retinal detachment Neg Hx     Strabismus Neg Hx     Stroke Neg Hx     Thyroid disease Neg Hx     Melanoma Neg Hx      Review of Systems    Constitutional: Negative for activity change and unexpected weight change.   HENT: Negative for hearing loss, rhinorrhea and trouble swallowing.    Eyes: Negative for discharge and visual disturbance.   Respiratory: Negative for chest tightness and wheezing.    Cardiovascular: Negative for chest pain and palpitations.   Gastrointestinal: Negative for blood in stool, constipation, diarrhea and vomiting.   Endocrine: Negative for polydipsia and polyuria.   Genitourinary: Negative for difficulty urinating, dysuria, hematuria and menstrual problem.   Musculoskeletal: Negative for arthralgias, joint swelling and neck pain.   Neurological: Negative for weakness and headaches.   Psychiatric/Behavioral: Negative for confusion and dysphoric mood.     Objective:     Vitals:    09/26/19 0705   BP: 134/70   Pulse: 68   Weight: 61.7 kg (136 lb 0.4 oz)     Body mass index is 22.64 kg/m².  Physical Exam   Constitutional: She is oriented to person, place, and time. She appears well-developed and well-nourished. No distress.   Neck: Carotid bruit is not present. No thyromegaly present.   Cardiovascular: Normal rate, regular rhythm and normal heart sounds. PMI is not displaced.   Pulmonary/Chest: Effort normal and breath sounds normal. No respiratory distress.   Abdominal: Soft. Bowel sounds are normal. She exhibits no distension. There is no tenderness.   Musculoskeletal: She exhibits no edema.   Neurological: She is alert and oriented to person, place, and time.     Assessment:     1. Physical exam    2. Wellness examination    3. Vitamin D deficiency      Plan:   Brissa was seen today for annual exam.    Diagnoses and all orders for this visit:    Physical exam  Comments:  no new finding    Wellness examination  Comments:  Discussed  Orders:  -     Vitamin B12; Future  -     Vitamin B6; Future    Vitamin D deficiency  Comments:  Continue supplement        Problem List Items Addressed This Visit     Vitamin D deficiency       Other Visit Diagnoses     Physical exam    -  Primary    no new finding    Wellness examination        Discussed    Relevant Orders    Vitamin B12    Vitamin B6        Orders Placed This Encounter   Procedures    Vitamin B12     Standing Status:   Future     Standing Expiration Date:   11/24/2020    Vitamin B6     Standing Status:   Future     Standing Expiration Date:   11/24/2020     Follow up in about 1 year (around 9/26/2020) for Annual exam.     Medication List           Accurate as of September 26, 2019  7:33 AM. If you have any questions, ask your nurse or doctor.               CONTINUE taking these medications    alclomethasone 0.05 % ointment  Commonly known as:  ACLOVATE  AAA lips bid     ALLEGRA 180 MG tablet  Generic drug:  fexofenadine     desog-e.estradiol/e.estradiol 0.15-0.02 mgx21 /0.01 mg x 5 per tablet  Commonly known as:  KARIVA  Take 1 tablet by mouth once daily.

## 2019-10-01 LAB — PYRIDOXAL SERPL-MCNC: 9 UG/L (ref 5–50)

## 2019-10-03 ENCOUNTER — TELEPHONE (OUTPATIENT)
Dept: SURGERY | Facility: CLINIC | Age: 49
End: 2019-10-03

## 2019-10-03 NOTE — TELEPHONE ENCOUNTER
Returned the patient call regarding the message below.  The patient is scheduled to see Kate Larson PA-C on Tuesday 10/15/19 at 3:30 pm.  The patient voiced understanding of appointment date, time, and location.  Reminder letter mailed to the patient.

## 2019-10-03 NOTE — TELEPHONE ENCOUNTER
----- Message from Joe Meadows sent at 10/3/2019 12:45 PM CDT -----  Contact: Pt  Type:  Needs Medical Advice    Who Called: The Pt states that she scored higher then a 20% on the Tyrer Cuzick score and would like a call back please.    Best Call Back Number:[phone]

## 2019-10-14 ENCOUNTER — PATIENT OUTREACH (OUTPATIENT)
Dept: ADMINISTRATIVE | Facility: OTHER | Age: 49
End: 2019-10-14

## 2019-10-15 ENCOUNTER — OFFICE VISIT (OUTPATIENT)
Dept: SURGERY | Facility: CLINIC | Age: 49
End: 2019-10-15
Payer: COMMERCIAL

## 2019-10-15 VITALS
SYSTOLIC BLOOD PRESSURE: 135 MMHG | TEMPERATURE: 98 F | DIASTOLIC BLOOD PRESSURE: 63 MMHG | WEIGHT: 136.69 LBS | HEART RATE: 73 BPM | HEIGHT: 65 IN | BODY MASS INDEX: 22.77 KG/M2

## 2019-10-15 DIAGNOSIS — Z12.39 BREAST CANCER SCREENING: Primary | ICD-10-CM

## 2019-10-15 PROCEDURE — 99999 PR PBB SHADOW E&M-EST. PATIENT-LVL III: CPT | Mod: PBBFAC,,, | Performed by: PHYSICIAN ASSISTANT

## 2019-10-15 PROCEDURE — 99202 OFFICE O/P NEW SF 15 MIN: CPT | Mod: S$GLB,,, | Performed by: PHYSICIAN ASSISTANT

## 2019-10-15 PROCEDURE — 99999 PR PBB SHADOW E&M-EST. PATIENT-LVL III: ICD-10-PCS | Mod: PBBFAC,,, | Performed by: PHYSICIAN ASSISTANT

## 2019-10-15 PROCEDURE — 99202 PR OFFICE/OUTPT VISIT, NEW, LEVL II, 15-29 MIN: ICD-10-PCS | Mod: S$GLB,,, | Performed by: PHYSICIAN ASSISTANT

## 2019-10-15 NOTE — PROGRESS NOTES
[facility]  [facility]  10/15/2019      REFERRING PROVIDER: Bran Oden IV, MD  [address] [address]    SUBJECTIVE:   Ms. Brissa Mason is a 49 y.o. female who presents today for breast cancer screening and high risk assessment.      History of Present Illness:  She denies any history of breast biopsies.  She denies any nipple discharge or nipple inversion.  She denies palpating any breast masses bilaterally.     Past Medical History:   Diagnosis Date    Allergy     Colitis     resolved; rectum 2005; neg flex sig 2006    Colitis: 2005 rectal area     resolved; rectum 2005; neg flex sig 2006     Menstrual migraine without status migrainosus, not intractable 9/5/2017     Past Surgical History:   Procedure Laterality Date    GANGLION CYST EXCISION  2017     Social History     Socioeconomic History    Marital status:      Spouse name: Not on file    Number of children: Not on file    Years of education: Not on file    Highest education level: Not on file   Occupational History    Not on file   Social Needs    Financial resource strain: Not hard at all    Food insecurity:     Worry: Never true     Inability: Never true    Transportation needs:     Medical: No     Non-medical: No   Tobacco Use    Smoking status: Never Smoker    Smokeless tobacco: Never Used   Substance and Sexual Activity    Alcohol use: Yes     Frequency: 2-4 times a month     Drinks per session: 1 or 2     Binge frequency: Never     Comment: 1-2 glasses of wine a week     Drug use: No    Sexual activity: Yes     Partners: Male     Birth control/protection: OCP     Comment: , menarche 14, no hx of abnormal    Lifestyle    Physical activity:     Days per week: 3 days     Minutes per session: 60 min    Stress: Only a little   Relationships    Social connections:     Talks on phone: More than three times a week     Gets together: Three times a week      Attends Hindu service: Not on file     Active member of club or organization: Yes     Attends meetings of clubs or organizations: More than 4 times per year     Relationship status:    Other Topics Concern    Are you pregnant or think you may be? Not Asked    Breast-feeding Not Asked   Social History Narrative         - Yazidism    Parents - Mata and Robina Del Angel     Review of patient's allergies indicates:   Allergen Reactions    Mobic [meloxicam] Rash    Erythromycin Other (See Comments)    Sulfa (sulfonamide antibiotics)      Other reaction(s): Rash      Family History: Paternal aunt was diagnosed with breast cancer at age 60 and is now 93.  Paternal great aunt had breast cancer.  Maternal uncle had colon cancer.   Genetic testing: none  Ashkenazi inheritance: none  OB/Gyn history:    Number of pregnancies & age at first gestation:    Age of menarche & menopause:14; 48   Last menstrual period:unknown (on birth control and only has 2 cycles per year).   Body mass index is 22.75 kg/m².   Contraceptive Use/ HRT: Denies HRT.   Breastfeeding:N/A   Number of previous biopsies:0    Tyrer-Cuzick Score: 15.7% (re-calculated in clinic today).      Review of Systems: Denies any chest pain or shortness of breath.  Denies any fever or chills.  See HPI/ Interval History for other systems reviewed.    OBJECTIVE:   Vitals:    10/15/19 1536   BP: 135/63   Pulse: 73   Temp: 98.1 °F (36.7 °C)      Physical Exam:  HEENT: Normocephalic, atraumatic.    General: alert and oriented; no acute distress.  Breast: No masses present bilaterally.  Normal color and contour of right and left breast.  No nipple inversion or discharge bilaterally.    Lymph: No palpable adjacent axillary lymph nodes.  No cervical or supraclavicular lymphadenopathy.      19 Bilateral Screening Mammogram:  The breasts are heterogeneously dense, which may obscure small masses. There is no evidence of suspicious  masses, microcalcifications or architectural distortion.  Impression:  No mammographic evidence of malignancy.  BI-RADS Category 1: Negative    ASSESSMENT:  Ms. Brissa Mason is a 49 y.o. year old female who presents today for breast cancer screening and high risk assessment.    PLAN:   We discussed that she is actually not considered high risk since her Tyrer-Cuzick score is <20%.  I advised her to continue doing monthly self-breast exams and getting annual mammograms.  She does not require further screening at this point.  We dicussed that her score can change based on HRT, breast biopsies, weight changes, and family history.  She can follow up with me as needed.    ~Kate Larson PA-C      Surgical Oncology            10/15/2019

## 2019-10-15 NOTE — LETTER
October 15, 2019      Bran Oden IV, MD  [address]  [address]  [place] [address]           [facility]  [address]  [facility] [address]  Phone: [phone]  Fax: [phone]          Patient: Brissa Mason   MR Number: [id number]   YOB: 1970   Date of Visit: 10/15/2019       Dear Dr. Bran Oden IV:    Thank you for referring Brissa Mason to me for evaluation. Attached you will find relevant portions of my assessment and plan of care.    If you have questions, please do not hesitate to call me. I look forward to following Brissa Mason along with you.    Sincerely,    ANDREW Coyne    Enclosure  CC:  No Recipients    If you would like to receive this communication electronically, please contact [email or web address] or [phone] to request more information on [employer] access.    For providers and/or their staff who would like to refer a patient to [facility], please contact us through our one-stop-shop provider referral line, [facility], at [phone].    If you feel you have received this communication in error or would no longer like to receive these types of communications, please e-mail [email or web address]

## 2019-10-17 ENCOUNTER — PATIENT MESSAGE (OUTPATIENT)
Dept: INTERNAL MEDICINE | Facility: CLINIC | Age: 49
End: 2019-10-17

## 2019-10-17 DIAGNOSIS — Z12.11 COLON CANCER SCREENING: Primary | ICD-10-CM

## 2019-10-22 ENCOUNTER — PATIENT MESSAGE (OUTPATIENT)
Dept: INTERNAL MEDICINE | Facility: CLINIC | Age: 49
End: 2019-10-22

## 2019-11-07 ENCOUNTER — OFFICE VISIT (OUTPATIENT)
Dept: OBSTETRICS AND GYNECOLOGY | Facility: CLINIC | Age: 49
End: 2019-11-07
Payer: COMMERCIAL

## 2019-11-07 VITALS
BODY MASS INDEX: 22.34 KG/M2 | SYSTOLIC BLOOD PRESSURE: 124 MMHG | HEIGHT: 65 IN | DIASTOLIC BLOOD PRESSURE: 74 MMHG | WEIGHT: 134.06 LBS

## 2019-11-07 DIAGNOSIS — N95.1 PERIMENOPAUSAL SYMPTOMS: ICD-10-CM

## 2019-11-07 DIAGNOSIS — Z12.39 BREAST CANCER SCREENING: ICD-10-CM

## 2019-11-07 DIAGNOSIS — Z01.419 ENCOUNTER FOR GYNECOLOGICAL EXAMINATION WITHOUT ABNORMAL FINDING: Primary | ICD-10-CM

## 2019-11-07 PROCEDURE — 99396 PREV VISIT EST AGE 40-64: CPT | Mod: S$GLB,,, | Performed by: OBSTETRICS & GYNECOLOGY

## 2019-11-07 PROCEDURE — 99999 PR PBB SHADOW E&M-EST. PATIENT-LVL III: ICD-10-PCS | Mod: PBBFAC,,, | Performed by: OBSTETRICS & GYNECOLOGY

## 2019-11-07 PROCEDURE — 99396 PR PREVENTIVE VISIT,EST,40-64: ICD-10-PCS | Mod: S$GLB,,, | Performed by: OBSTETRICS & GYNECOLOGY

## 2019-11-07 PROCEDURE — 99999 PR PBB SHADOW E&M-EST. PATIENT-LVL III: CPT | Mod: PBBFAC,,, | Performed by: OBSTETRICS & GYNECOLOGY

## 2019-11-07 RX ORDER — DESOGESTREL AND ETHINYL ESTRADIOL 21-5 (28)
1 KIT ORAL DAILY
Qty: 84 TABLET | Refills: 3 | Status: SHIPPED | OUTPATIENT
Start: 2019-11-07 | End: 2019-11-26 | Stop reason: SDUPTHER

## 2019-11-07 NOTE — PROGRESS NOTES
CC: Well woman exam    Brissa Mason is a 49 y.o. female  presents for well woman exam.  LMP: Patient's last menstrual period was 2019.  Patient reports regular, cyclic menses on OCPs.  Patient is considering discontinuation of OCPs.  No gynecologic issues, problems, or complaints.      Past Medical History:   Diagnosis Date    Allergy     Colitis     resolved; rectum 2005; neg flex sig 2006    Colitis: 2005 rectal area     resolved; rectum 2005; neg flex sig 2006     Menstrual migraine without status migrainosus, not intractable 2017     Past Surgical History:   Procedure Laterality Date    GANGLION CYST EXCISION       Social History     Socioeconomic History    Marital status:      Spouse name: Not on file    Number of children: Not on file    Years of education: Not on file    Highest education level: Not on file   Occupational History    Not on file   Social Needs    Financial resource strain: Not hard at all    Food insecurity:     Worry: Never true     Inability: Never true    Transportation needs:     Medical: No     Non-medical: No   Tobacco Use    Smoking status: Never Smoker    Smokeless tobacco: Never Used   Substance and Sexual Activity    Alcohol use: Yes     Frequency: 2-4 times a month     Drinks per session: 1 or 2     Binge frequency: Never     Comment: 1-2 glasses of wine a week     Drug use: No    Sexual activity: Yes     Partners: Male     Birth control/protection: OCP     Comment: , menarche 14, no hx of abnormal    Lifestyle    Physical activity:     Days per week: 3 days     Minutes per session: 60 min    Stress: Only a little   Relationships    Social connections:     Talks on phone: More than three times a week     Gets together: Three times a week     Attends [employer] service: Not on file     Active member of club or organization: Yes     Attends meetings of clubs or organizations: More than 4 times per year     Relationship  "status:    Other Topics Concern    Are you pregnant or think you may be? Not Asked    Breast-feeding Not Asked   Social History Narrative         - Gnosticist    Parents - Mata and Robina Del Angel     Family History   Problem Relation Age of Onset    Colon polyps Mother         noncancerous colon tumor; polyps    Hyperlipidemia Mother     Osteoporosis Mother     Colon cancer Mother         pre cacer    Gout Father     Hypertension Father     Hypertension Sister     Glaucoma Sister     Hyperlipidemia Brother     Cancer Maternal Uncle         colon    Colon cancer Maternal Uncle     Hypertension Brother     Breast cancer Paternal Aunt 60    Breast cancer Paternal Aunt     Miscarriages / Stillbirths Neg Hx     Amblyopia Neg Hx     Blindness Neg Hx     Cataracts Neg Hx     Diabetes Neg Hx     Macular degeneration Neg Hx     Retinal detachment Neg Hx     Strabismus Neg Hx     Stroke Neg Hx     Thyroid disease Neg Hx     Melanoma Neg Hx      OB History        0    Para        Term   0            AB        Living           SAB        TAB        Ectopic        Multiple        Live Births                     /74 (BP Location: Left arm, Patient Position: Sitting)   Ht 5' 5" (1.651 m)   Wt 60.8 kg (134 lb 0.6 oz)   LMP 2019   BMI 22.31 kg/m²       ROS:  GENERAL: Denies weight gain or weight loss. Feeling well overall.   SKIN: Denies rash or lesions.   HEAD: Denies head injury or headache.   NODES: Denies enlarged lymph nodes.   CHEST: Denies chest pain or shortness of breath.   CARDIOVASCULAR: Denies palpitations or left sided chest pain.   ABDOMEN: No abdominal pain, constipation, diarrhea, nausea, vomiting or rectal bleeding.   URINARY: No frequency, dysuria, hematuria, or burning on urination.  REPRODUCTIVE: See HPI.   BREASTS: The patient performs breast self-examination and denies pain, lumps, or nipple discharge.   HEMATOLOGIC: No easy " bruisability or excessive bleeding.   MUSCULOSKELETAL: Denies joint pain or swelling.   NEUROLOGIC: Denies syncope or weakness.   PSYCHIATRIC: Denies depression, anxiety or mood swings.    PHYSICAL EXAM:  APPEARANCE: Well nourished, well developed, in no acute distress.  AFFECT: WNL, alert and oriented x 3  SKIN: No acne or hirsutism  NECK: Neck symmetric without masses or thyromegaly  NODES: No inguinal, cervical, axillary, or femoral lymph node enlargement  CHEST: Good respiratory effect  ABDOMEN: Soft.  No tenderness or masses.  No hepatosplenomegaly.  No hernias.  BREASTS: Symmetrical, no skin changes or visible lesions.  No palpable masses, nipple discharge bilaterally.  PELVIC: Normal external genitalia without lesions.  Normal hair distribution.  Adequate perineal body, normal urethral meatus.  Vagina moist and well rugated without lesions or discharge.  Cervix pink, without lesions, discharge or tenderness.  No significant cystocele or rectocele.  Bimanual exam shows uterus to be normal size, regular, mobile and nontender.  Adnexa without masses or tenderness.    EXTREMITIES: No edema.    Encounter for gynecological examination without abnormal finding    Perimenopausal symptoms  -     desog-e.estradiol/e.estradiol (KARIVA) 0.15-0.02 mgx21 /0.01 mg x 5 per tablet; Take 1 tablet by mouth once daily.  Dispense: 84 tablet; Refill: 3    Breast cancer screening  -     Mammo Digital Screening Bilat w/ Aj; Future; Expected date: 11/07/2019        Age specific counseling    Pap smear not done/not indicated    Mammogram up-to-date.    Patient to continue OCPs for now.  Patient is considering discontinuation.  Risk and benefits of OCPs reviewed today.  Patient will contact office if OCPs discontinued.  Patient instructed to keep a menstrual calendar if OCPs discontinued.    Patient was counseled today on A.C.S. Pap guidelines and recommendations for yearly pelvic exams, mammograms and monthly self breast exams; to  see her PCP for other health maintenance.     Follow up in about 1 year (around 11/7/2020) for Annual exam.    Bran Oden IV, MD

## 2019-11-12 ENCOUNTER — OFFICE VISIT (OUTPATIENT)
Dept: DERMATOLOGY | Facility: CLINIC | Age: 49
End: 2019-11-12
Payer: COMMERCIAL

## 2019-11-12 DIAGNOSIS — D22.9 MULTIPLE BENIGN NEVI: Primary | ICD-10-CM

## 2019-11-12 DIAGNOSIS — L82.1 SK (SEBORRHEIC KERATOSIS): ICD-10-CM

## 2019-11-12 DIAGNOSIS — Z12.83 SCREENING EXAM FOR SKIN CANCER: ICD-10-CM

## 2019-11-12 PROCEDURE — 99213 PR OFFICE/OUTPT VISIT, EST, LEVL III, 20-29 MIN: ICD-10-PCS | Mod: S$GLB,,, | Performed by: DERMATOLOGY

## 2019-11-12 PROCEDURE — 99213 OFFICE O/P EST LOW 20 MIN: CPT | Mod: S$GLB,,, | Performed by: DERMATOLOGY

## 2019-11-12 PROCEDURE — 99999 PR PBB SHADOW E&M-EST. PATIENT-LVL II: ICD-10-PCS | Mod: PBBFAC,,, | Performed by: DERMATOLOGY

## 2019-11-12 PROCEDURE — 99999 PR PBB SHADOW E&M-EST. PATIENT-LVL II: CPT | Mod: PBBFAC,,, | Performed by: DERMATOLOGY

## 2019-11-12 NOTE — PROGRESS NOTES
Subjective:       Patient ID:  Brissa Mason is a 49 y.o. female who presents for   Chief Complaint   Patient presents with    Skin Check     UBSE      Here for UBSE    Last seen by me 5/18 for skin check    Patient complains of lesion(s)  Location: right arm  Duration: 3 - 6 months  Symptoms: none  Relieving factors/Previous treatments: none        Review of Systems   Skin: Positive for daily sunscreen use (most) and activity-related sunscreen use. Negative for rash and recent sunburn.   Hematologic/Lymphatic: Does not bruise/bleed easily.        Objective:    Physical Exam   Constitutional: She appears well-developed and well-nourished. No distress.   Neurological: She is alert and oriented to person, place, and time. She is not disoriented.   Psychiatric: She has a normal mood and affect.   Skin:   Areas Examined (abnormalities noted in diagram):   Head / Face Inspection Performed  Neck Inspection Performed  Chest / Axilla Inspection Performed  Back Inspection Performed  RUE Inspected  LUE Inspection Performed                   Diagram Legend     Erythematous scaling macule/papule c/w actinic keratosis       Vascular papule c/w angioma      Pigmented verrucoid papule/plaque c/w seborrheic keratosis      Yellow umbilicated papule c/w sebaceous hyperplasia      Irregularly shaped tan macule c/w lentigo     1-2 mm smooth white papules consistent with Milia      Movable subcutaneous cyst with punctum c/w epidermal inclusion cyst      Subcutaneous movable cyst c/w pilar cyst      Firm pink to brown papule c/w dermatofibroma      Pedunculated fleshy papule(s) c/w skin tag(s)      Evenly pigmented macule c/w junctional nevus     Mildly variegated pigmented, slightly irregular-bordered macule c/w mildly atypical nevus      Flesh colored to evenly pigmented papule c/w intradermal nevus       Pink pearly papule/plaque c/w basal cell carcinoma      Erythematous hyperkeratotic cursted plaque c/w SCC      Surgical  scar with no sign of skin cancer recurrence      Open and closed comedones      Inflammatory papules and pustules      Verrucoid papule consistent consistent with wart     Erythematous eczematous patches and plaques     Dystrophic onycholytic nail with subungual debris c/w onychomycosis     Umbilicated papule    Erythematous-base heme-crusted tan verrucoid plaque consistent with inflamed seborrheic keratosis     Erythematous Silvery Scaling Plaque c/w Psoriasis     See annotation      Assessment / Plan:        Multiple benign nevi   - stable and chronic      SK (seborrheic keratosis) - macular - right forearm  These are benign inherited growths without a malignant potential. Reassurance given to patient. No treatment is necessary.       Screening exam for skin cancer  Upper body skin examination performed today including at least 6 points as noted in physical examination. No lesions suspicious for malignancy noted.    Melasma - left arm  Discussed sun protection/avoidance           Follow up if symptoms worsen or fail to improve.

## 2019-11-26 DIAGNOSIS — N95.1 PERIMENOPAUSAL SYMPTOMS: ICD-10-CM

## 2019-11-26 RX ORDER — DESOGESTREL AND ETHINYL ESTRADIOL AND ETHINYL ESTRADIOL 21-5 (28)
KIT ORAL
Qty: 84 TABLET | Refills: 0 | Status: SHIPPED | OUTPATIENT
Start: 2019-11-26 | End: 2020-08-19

## 2019-12-02 NOTE — TELEPHONE ENCOUNTER
Please send erx, patient will be contacted for November WWE    US Breast and MAMMO ready for reviw  Please refer to fax/correspondence folder [facility]-Dr. Milagros Hansen

## 2020-01-27 ENCOUNTER — PATIENT MESSAGE (OUTPATIENT)
Dept: INTERNAL MEDICINE | Facility: CLINIC | Age: 50
End: 2020-01-27

## 2020-01-29 ENCOUNTER — HOSPITAL ENCOUNTER (OUTPATIENT)
Dept: RADIOLOGY | Facility: HOSPITAL | Age: 50
Discharge: HOME | End: 2020-01-29
Attending: INTERNAL MEDICINE
Payer: COMMERCIAL

## 2020-01-29 ENCOUNTER — OFFICE VISIT (OUTPATIENT)
Dept: INTERNAL MEDICINE | Facility: CLINIC | Age: 50
End: 2020-01-29
Payer: COMMERCIAL

## 2020-01-29 VITALS
BODY MASS INDEX: 23.21 KG/M2 | DIASTOLIC BLOOD PRESSURE: 80 MMHG | WEIGHT: 139.31 LBS | HEIGHT: 65 IN | OXYGEN SATURATION: 99 % | HEART RATE: 70 BPM | SYSTOLIC BLOOD PRESSURE: 130 MMHG

## 2020-01-29 DIAGNOSIS — M54.50 LEFT-SIDED LOW BACK PAIN WITHOUT SCIATICA, UNSPECIFIED CHRONICITY: Primary | ICD-10-CM

## 2020-01-29 DIAGNOSIS — G89.29 CHRONIC LEFT SHOULDER PAIN: ICD-10-CM

## 2020-01-29 DIAGNOSIS — M54.50 LEFT-SIDED LOW BACK PAIN WITHOUT SCIATICA, UNSPECIFIED CHRONICITY: ICD-10-CM

## 2020-01-29 DIAGNOSIS — M25.512 CHRONIC LEFT SHOULDER PAIN: ICD-10-CM

## 2020-01-29 PROCEDURE — 99999 PR PBB SHADOW E&M-EST. PATIENT-LVL IV: CPT | Mod: PBBFAC,,, | Performed by: INTERNAL MEDICINE

## 2020-01-29 PROCEDURE — 99999 PR PBB SHADOW E&M-EST. PATIENT-LVL IV: ICD-10-PCS | Mod: PBBFAC,,, | Performed by: INTERNAL MEDICINE

## 2020-01-29 PROCEDURE — 72100 X-RAY EXAM L-S SPINE 2/3 VWS: CPT | Mod: TC

## 2020-01-29 PROCEDURE — 99214 PR OFFICE/OUTPT VISIT, EST, LEVL IV, 30-39 MIN: ICD-10-PCS | Mod: S$GLB,,, | Performed by: INTERNAL MEDICINE

## 2020-01-29 PROCEDURE — 99214 OFFICE O/P EST MOD 30 MIN: CPT | Mod: S$GLB,,, | Performed by: INTERNAL MEDICINE

## 2020-01-29 PROCEDURE — 72100 X-RAY EXAM L-S SPINE 2/3 VWS: CPT | Mod: 26,,, | Performed by: RADIOLOGY

## 2020-01-29 PROCEDURE — 72100 XR LUMBAR SPINE AP AND LATERAL: ICD-10-PCS | Mod: 26,,, | Performed by: RADIOLOGY

## 2020-01-29 NOTE — PROGRESS NOTES
Subjective:      Patient ID: Brissa Mason is a 49 y.o. female.    Chief Complaint: Follow-up    HPI:  Follow-up   This is a new problem. Pertinent negatives include no abdominal pain, chest pain, fever, headaches, numbness or weakness.   Back Pain   This is a new problem. The current episode started more than 1 month ago. The problem occurs every several days. The problem has been waxing and waning since onset. The pain is present in the sacro-iliac. The quality of the pain is described as aching. The pain does not radiate. The pain is at a severity of 3/10. The pain is moderate. The pain is worse during the day. The symptoms are aggravated by lying down and sitting. Stiffness is present all day. Associated symptoms include paresthesias. Pertinent negatives include no abdominal pain, bladder incontinence, bowel incontinence, chest pain, dysuria, fever, headaches, leg pain, numbness, paresis, pelvic pain, perianal numbness, tingling, weakness or weight loss. She has tried NSAIDs and home exercises for the symptoms. The treatment provided no relief.     Patient Active Problem List   Diagnosis    Vitamin D deficiency     Past Medical History:   Diagnosis Date    Allergy     Anxiety disorder     Colitis     resolved; rectum 2005; neg flex sig 2006    Colitis: 2005 rectal area     resolved; rectum 2005; neg flex sig 2006     Menstrual migraine without status migrainosus, not intractable 9/5/2017     Past Surgical History:   Procedure Laterality Date    GANGLION CYST EXCISION  2017     Family History   Problem Relation Age of Onset    Colon polyps Mother         noncancerous colon tumor; polyps    Hyperlipidemia Mother     Osteoporosis Mother     Colon cancer Mother         pre cacer    Depression Mother         One time    Melanoma Mother         Squamous cell carcinoma    Gout Father     Hypertension Father     Hypertension Sister     Glaucoma Sister     Hyperlipidemia Brother     Cancer  "Maternal Uncle         colon    Colon cancer Maternal Uncle     Hypertension Brother     Psoriasis Brother     Breast cancer Paternal Aunt 60    Breast cancer Paternal Aunt     Depression Paternal Grandmother         One time    Miscarriages / Stillbirths Neg Hx     Amblyopia Neg Hx     Blindness Neg Hx     Cataracts Neg Hx     Diabetes Neg Hx     Macular degeneration Neg Hx     Retinal detachment Neg Hx     Strabismus Neg Hx     Stroke Neg Hx     Thyroid disease Neg Hx     Melanoma Neg Hx      Review of Systems   Constitutional: Negative for fever and weight loss.   Cardiovascular: Negative for chest pain.   Gastrointestinal: Negative for abdominal pain and bowel incontinence.   Genitourinary: Negative for bladder incontinence, dysuria and pelvic pain.   Musculoskeletal: Positive for back pain.   Neurological: Positive for paresthesias. Negative for tingling, weakness, numbness and headaches.     Objective:     Vitals:    01/29/20 1504   BP: 130/80   Pulse: 70   SpO2: 99%   Weight: 63.2 kg (139 lb 5.3 oz)   Height: 5' 5" (1.651 m)   PainSc: 0-No pain     Body mass index is 23.19 kg/m².  Physical Exam   Constitutional: She appears well-developed and well-nourished.   Neck: No JVD present. No thyromegaly present.   Cardiovascular: Normal rate, normal heart sounds and intact distal pulses.   Pulmonary/Chest: Effort normal and breath sounds normal. No respiratory distress.     Assessment:     1. Left-sided low back pain without sciatica, unspecified chronicity    2. Chronic left shoulder pain      Plan:   Brissa was seen today for follow-up.    Diagnoses and all orders for this visit:    Left-sided low back pain without sciatica, unspecified chronicity  Comments:  Aleve 2 tablets twice a day  Orders:  -     X-Ray Lumbar Spine AP And Lateral; Future  -     Ambulatory consult to Physical Therapy    Chronic left shoulder pain  -     Ambulatory referral/consult to Orthopedics; Future        Problem List " Items Addressed This Visit     None      Visit Diagnoses     Left-sided low back pain without sciatica, unspecified chronicity    -  Primary    Aleve 2 tablets twice a day    Relevant Orders    X-Ray Lumbar Spine AP And Lateral    Ambulatory consult to Physical Therapy    Chronic left shoulder pain        Relevant Orders    Ambulatory referral/consult to Orthopedics        Orders Placed This Encounter   Procedures    X-Ray Lumbar Spine AP And Lateral     Standing Status:   Future     Standing Expiration Date:   1/29/2021     Order Specific Question:   Is the patient pregnant?     Answer:   No     Order Specific Question:   May the Radiologist modify the order per protocol to meet the clinical needs of the patient?     Answer:   Yes     Order Specific Question:   Does the patient have a neck collar or brace on?     Answer:   No    Ambulatory consult to Physical Therapy     Referral Priority:   Routine     Referral Type:   Physical Medicine     Referral Reason:   Specialty Services Required     Requested Specialty:   Physical Therapy     Number of Visits Requested:   1    Ambulatory referral/consult to Orthopedics     Standing Status:   Future     Standing Expiration Date:   2/28/2021     Referral Priority:   Routine     Referral Type:   Consultation     Requested Specialty:   Orthopedic Surgery     Number of Visits Requested:   1     No follow-ups on file.     Medication List           Accurate as of January 29, 2020  3:32 PM. If you have any questions, ask your nurse or doctor.               CONTINUE taking these medications    alclomethasone 0.05 % ointment  Commonly known as:  ACLOVATE  AAA lips bid     Allegra 180 MG tablet  Generic drug:  fexofenadine     Viorele (28) 0.15-0.02 mgx21 /0.01 mg x 5 per tablet  Generic drug:  desog-e.estradiol/e.estradiol  TAKE 1 TABLET BY MOUTH EVERY DAY            Answers for HPI/ROS submitted by the patient on 1/28/2020   Back pain  genital pain: No

## 2020-01-31 ENCOUNTER — TELEPHONE (OUTPATIENT)
Dept: INTERNAL MEDICINE | Facility: CLINIC | Age: 50
End: 2020-01-31

## 2020-01-31 DIAGNOSIS — R93.7 ABNORMAL X-RAY OF LUMBAR SPINE: Primary | ICD-10-CM

## 2020-01-31 NOTE — TELEPHONE ENCOUNTER
Patient aware you will be calling: use cell phone    Please call and tell her how to schedule a MRI of the Lumbar Spine: orders in.

## 2020-02-03 ENCOUNTER — TELEPHONE (OUTPATIENT)
Dept: SPORTS MEDICINE | Facility: CLINIC | Age: 50
End: 2020-02-03

## 2020-02-04 ENCOUNTER — HOSPITAL ENCOUNTER (OUTPATIENT)
Dept: RADIOLOGY | Facility: HOSPITAL | Age: 50
Discharge: HOME | End: 2020-02-04
Attending: ORTHOPAEDIC SURGERY
Payer: COMMERCIAL

## 2020-02-04 ENCOUNTER — PATIENT MESSAGE (OUTPATIENT)
Dept: INTERNAL MEDICINE | Facility: CLINIC | Age: 50
End: 2020-02-04

## 2020-02-04 ENCOUNTER — OFFICE VISIT (OUTPATIENT)
Dept: SPORTS MEDICINE | Facility: CLINIC | Age: 50
End: 2020-02-04
Payer: COMMERCIAL

## 2020-02-04 VITALS
HEART RATE: 76 BPM | HEIGHT: 65 IN | SYSTOLIC BLOOD PRESSURE: 123 MMHG | WEIGHT: 139 LBS | BODY MASS INDEX: 23.16 KG/M2 | DIASTOLIC BLOOD PRESSURE: 70 MMHG

## 2020-02-04 DIAGNOSIS — M25.512 CHRONIC LEFT SHOULDER PAIN: ICD-10-CM

## 2020-02-04 DIAGNOSIS — M25.512 LEFT SHOULDER PAIN, UNSPECIFIED CHRONICITY: ICD-10-CM

## 2020-02-04 DIAGNOSIS — M25.512 LEFT SHOULDER PAIN, UNSPECIFIED CHRONICITY: Primary | ICD-10-CM

## 2020-02-04 DIAGNOSIS — G89.29 CHRONIC LEFT SHOULDER PAIN: ICD-10-CM

## 2020-02-04 PROCEDURE — 73030 XR SHOULDER COMPLETE 2 OR MORE VIEWS LEFT: ICD-10-PCS | Mod: 26,LT,, | Performed by: RADIOLOGY

## 2020-02-04 PROCEDURE — 99999 PR PBB SHADOW E&M-EST. PATIENT-LVL III: CPT | Mod: PBBFAC,,, | Performed by: ORTHOPAEDIC SURGERY

## 2020-02-04 PROCEDURE — 99203 OFFICE O/P NEW LOW 30 MIN: CPT | Mod: S$GLB,,, | Performed by: ORTHOPAEDIC SURGERY

## 2020-02-04 PROCEDURE — 99203 PR OFFICE/OUTPT VISIT, NEW, LEVL III, 30-44 MIN: ICD-10-PCS | Mod: S$GLB,,, | Performed by: ORTHOPAEDIC SURGERY

## 2020-02-04 PROCEDURE — 99999 PR PBB SHADOW E&M-EST. PATIENT-LVL III: ICD-10-PCS | Mod: PBBFAC,,, | Performed by: ORTHOPAEDIC SURGERY

## 2020-02-04 PROCEDURE — 73030 X-RAY EXAM OF SHOULDER: CPT | Mod: TC,LT

## 2020-02-04 PROCEDURE — 73030 X-RAY EXAM OF SHOULDER: CPT | Mod: 26,LT,, | Performed by: RADIOLOGY

## 2020-02-04 NOTE — PROGRESS NOTES
CC: Left shoulder pain    Interval history 2/4/2020:  Patient presents for recurrent L shoulder pain today, Her pain has been present for a few weeks. No recent PT or CSI. Had a similar issue 5 years ago that resolved with PT or CSI. No new traumas or falls. Pain is worse with exercising and overhead activities. It is not waking her up at night.     Previous history: Brissa Mason is a pleasant 45 y.o. y/o patient who reports to clinic with left shoulder pain.  Since last visit she has been going to PT which has helped her significantly.  Currently, she has minimal pain and only with end ROM.    Last visit we reviewed her MRI.  Based on the results, a cortisone injection was given.    She has had pain off and on over the past couple years. She had severe pain this past January the she noticed the day after lifting a mattress. The pain had somewhat resolved but has never been completely better. The pain bothers her at night. Pain can be a 7-8/10. At her last visit with us she described having increasing anteriorly and radiates down the biceps. She feels like she wants to hold her arm up to relieve the pain. She occasionally takes aleve, advil, or tylenol. Pain is worse with reaching overhead or behind her back.    She is a  and does a lot of computer work.    Past Medical History    Diagnosis  Date      Colitis       resolved; rectum 2005; neg flex sig 2006      Annual physical exam  7/21/2011      History of mammogram  7/1/2011      Allergy       History of colonoscopy  due 2013        History reviewed. No pertinent past surgical history.  Family History    Problem  Relation  Age of Onset      Colon polyps  Mother       noncancerous colon tumor; polyps      Hyperlipidemia  Mother       Osteoporosis  Mother       Gout  Father       Hypertension  Father       Hypertension  Sister       Glaucoma  Sister       Hyperlipidemia  Brother       Cancer  Maternal Uncle       colon       Cancer  Paternal Aunt       Breast      Hypertension  Brother       Breast cancer  Paternal Aunt       Ovarian cancer  Paternal Aunt       Colon cancer  Paternal Uncle       Miscarriages / Stillbirths  Neg Hx       Amblyopia  Neg Hx       Blindness  Neg Hx       Cataracts  Neg Hx       Diabetes  Neg Hx       Macular degeneration  Neg Hx       Retinal detachment  Neg Hx       Strabismus  Neg Hx       Stroke  Neg Hx       Thyroid disease  Neg Hx         Current outpatient prescriptions:alprazolam (XANAX) 0.5 MG tablet, Take 1 tablet (0.5 mg total) by mouth nightly as needed for Insomnia or Anxiety., Disp: 30 tablet, Rfl: 0; desogestrel-ethinyl estradiol (VELIVET) 0.1/.125/.15-25 mg-mcg tablet, Take 1 tablet by mouth once daily., Disp: 90 tablet, Rfl: 3; fexofenadine (ALLEGRA) 180 MG tablet, Take 180 mg by mouth. 1 Tablet Oral Every day, Disp: , Rfl:   omeprazole (PRILOSEC OTC) 20 MG tablet, Take 1 tablet (20 mg total) by mouth once daily., Disp: 60 tablet, Rfl: 1  Allergies    Allergen  Reactions      Mobic [Meloxicam]  Rash      Erythromycin  Other (See Comments)      Sulfa (Sulfonamide Antibiotics)       Other reaction(s): Rash        Review of Systems   Constitution: Negative. Negative for chills, fever and night sweats.   HENT: Negative for congestion and headaches.   Eyes: Negative for blurred vision, left vision loss and right vision loss.   Cardiovascular: Negative for chest pain and syncope.   Respiratory: Negative for cough and shortness of breath.   Endocrine: Negative for polydipsia, polyphagia and polyuria.   Hematologic/Lymphatic: Negative for bleeding problem. Does not bruise/bleed easily.   Skin: Negative for dry skin, itching and rash.   Musculoskeletal: Negative for falls. Positive for left shoulder pain and muscle weakness.   Gastrointestinal: Negative for abdominal pain and bowel incontinence.   Genitourinary: Negative for bladder incontinence and nocturia.   Neurological: Negative  "for disturbances in coordination, loss of balance and seizures.   Psychiatric/Behavioral: Negative for depression. The patient does not have insomnia.   Allergic/Immunologic: Negative for hives and persistent infections.      PHYSICAL EXAMINATION:  Vitals:  /70   Pulse 76   Ht 5' 5" (1.651 m)   Wt 63 kg (139 lb)   BMI 23.13 kg/m²    General: The patient is alert and oriented x 3. Mood is pleasant. Observation of ears, eyes and nose reveal no gross abnormalities. No labored breathing observed.  Gait is coordinated. Patient can toe walk and heel walk without difficulty.      LEFT SHOULDER / UPPER EXTREMITY EXAM    OBSERVATION:   Swelling none Deformity none  Discoloration none Scapular winging none  Scars none Atrophy none    TENDERNESS / CREPITUS (T/C):   T/C T/C  Clavicle -/- SUPRAspinatus +/-   AC Jt. -/- INFRAspinatus +/-   SC Jt. -/- Deltoid -/-   G. Tuberosity -/- LH BICEP groove -/-  Acromion: -/- Midline Neck -/-   Scapular Spine -/- Trapezium -/-  SMA Scapula -/- GH jt. line - post -/-   Scapulothoracic -/-   ROM: (* = with pain) Right shoulder Left shoulder   AROM (PROM) AROM (PROM)  FE  170° (175°) 170° (175°)   ER at 0°  60° (65°) 60° (65°)  ER at 90° ABD 90° (90°) 90° (90°)  IR at 90° ABD NA (40°) NA (40°)   IR (spine level) T6  T10    STRENGTH: (* = with pain) RIGHT SHOULDER LEFT SHOULDER  SCAPTION 5/5 5-/5   IR 5/5 5/5  ER 5/5 5-/5  BICEPS 5/5 5/5  Deltoid 5/5 5/5  SIGNS: Painful side    NEER + ONELIAS +   DIXON - SPEEDS neg   DROP ARM - BELLY PRESS neg  Superior escape none LIFT-OFF neg  X-Body ADD neg MOVING VALGUS neg     STABILITY TESTING RIGHT SHOULDER LEFT SHOULDER    Translation   Anterior  up face   up face  Posterior up face up face  Sulcus < 10mm < 10 mm    Signs  Apprehension neg neg   Relocation no change no change   Jerk test neg neg    EXTREMITY NEURO-VASCULAR EXAM   Sensation grossly intact to light touch all dermatomal regions.   DTR 2+ Biceps, Triceps, BR and Negative " Roneys sign  Grossly intact motor function at Elbow, Wrist and Hand  Distal pulses radial and ulnar 2+, brisk cap refill, symmetric.   NECK: Painless FROM and spinous processes non-tender. Negative Spurlings sign.     IMAGINV L shoulder 2020: minimal GH and AC DJD, calcific tendonitis noted     MRI Left shoulder, impression (6/25/15): Unremarkable MRI examination of left shoulder, specifically without evidence of rotator cuff tear.     ASSESSMENT: LEFT shoulder calcific tendonitis       PLAN:   - Physical therapy referral placed  - Consider CSI in the future  - RTC 2 months to reassess     All questions were answered, pt will contact us for questions or concerns in the interim.

## 2020-02-05 ENCOUNTER — PATIENT MESSAGE (OUTPATIENT)
Dept: INTERNAL MEDICINE | Facility: CLINIC | Age: 50
End: 2020-02-05

## 2020-02-06 ENCOUNTER — HOSPITAL ENCOUNTER (OUTPATIENT)
Dept: RADIOLOGY | Facility: HOSPITAL | Age: 50
Discharge: HOME | End: 2020-02-06
Attending: INTERNAL MEDICINE
Payer: COMMERCIAL

## 2020-02-06 DIAGNOSIS — R93.7 ABNORMAL X-RAY OF LUMBAR SPINE: ICD-10-CM

## 2020-02-06 PROCEDURE — 72148 MRI LUMBAR SPINE W/O DYE: CPT | Mod: 26,,, | Performed by: RADIOLOGY

## 2020-02-06 PROCEDURE — 72148 MRI LUMBAR SPINE WITHOUT CONTRAST: ICD-10-PCS | Mod: 26,,, | Performed by: RADIOLOGY

## 2020-02-06 PROCEDURE — 72148 MRI LUMBAR SPINE W/O DYE: CPT | Mod: TC

## 2020-02-07 ENCOUNTER — PATIENT MESSAGE (OUTPATIENT)
Dept: INTERNAL MEDICINE | Facility: CLINIC | Age: 50
End: 2020-02-07

## 2020-02-10 ENCOUNTER — CLINICAL SUPPORT (OUTPATIENT)
Dept: REHABILITATION | Facility: HOSPITAL | Age: 50
End: 2020-02-10
Payer: COMMERCIAL

## 2020-02-10 DIAGNOSIS — M54.50 ACUTE LEFT-SIDED LOW BACK PAIN WITHOUT SCIATICA: ICD-10-CM

## 2020-02-10 PROCEDURE — 97110 THERAPEUTIC EXERCISES: CPT | Mod: PN

## 2020-02-10 PROCEDURE — 97161 PT EVAL LOW COMPLEX 20 MIN: CPT | Mod: PN

## 2020-02-10 NOTE — PLAN OF CARE
[facility]  Physical Therapy Initial Evaluation    Name: Brissa Mason  Clinic Number: [id number]    Therapy Diagnosis:   Encounter Diagnosis   Name Primary?    Acute left-sided low back pain without sciatica      Physician: Sandra Betts MD    Physician Orders: PT Eval and Treat   Medical Diagnosis: M54.5 (ICD-10-CM) - Left-sided low back pain without sciatica, unspecified chronicity  Evaluation Date: 2/10/2020  Authorization Period Expiration: 12/31/2020  Plan of Care Certification Period: 2/10/2020 to 04/03/2020  Visit # / Visits authorized: 1/ 60 (no co-pay)    Time In: 1110  Time Out: 1155  Total Billable Time: 40 minutes (1 LCE, 1 TE)  Precautions: Standard    Subjective   Brissa presents to PT today with L-sided low back pain.  Denies radicular pain.  DOI: November 2019.  JOSE: unsure but she does correlate pain starting around the same time she started back at the gym as well as began having to lift/transfer her mother 2-3 days/week.  Pain exacerbated by kneeling at [employer], lying on short sofa, or just generally being more 'still'.  Alleviated by movement.  Occasional morning stiffness.      Past Medical History:   Diagnosis Date    Allergy     Anxiety disorder     Colitis     resolved; rectum 2005; neg flex sig 2006    Colitis: 2005 rectal area     resolved; rectum 2005; neg flex sig 2006     Menstrual migraine without status migrainosus, not intractable 9/5/2017     Brissa Mason  has a past surgical history that includes Ganglion cyst excision (2017).  Brissa has a current medication list which includes the following prescription(s): alclomethasone, fexofenadine, and viorele (28).    Review of patient's allergies indicates:   Allergen Reactions    Mobic [meloxicam] Rash    Erythromycin Other (See Comments)    Sulfa (sulfonamide antibiotics)      Other reaction(s): Rash      Imaging: Lumbar Xray; see report in EMR  Prior Therapy: no  Social History:  Lives at home with family. Works full-time.  Started going back to the gym 3-4/week.  Does report taking care of her mother who has dementia.  This is a split duty with other members of her family.  Does report having to lift her mother and transfer her from surface to surface.    Occupation: Sitting job; clerical work mostly.   Prior Level of Function: no prior history of back pain.   Current Level of Function: see above.     Pain:  Current 2/10, worst 5/10, best 0/10   Location: left back   Description: Deep and Sharp    Pts goals: to be pain-free    Objective     Palpation: TTP L PSIS and L posterior iliac crest  Posture:  Loss of lumbar lordosis, hinging at L5/S1 segment.     Gross movement analysis:  -Gait: non-antalgic   -Forward bending: normal depth with mild pain L lumbar area on return motion.   -Squat: able to achieve moderate-to-full depth.     Lumbar Range of Motion:    Degrees   Flexion WNL; pulling pain L-side lumbar   Extension 50%; pain L PSIS area   Left Side Bending WNL   Right Side Bending 50%; pain L   Left rotation   WNL   Right Rotation   WNL      Range of Motion:   LE Right Left   Hip flexion WNL WNL   Hip abduction WNL WNL   Hip ER WNL WNL   Hip IR  WNL WNL   Hip extension WNL WNL   Knee WNL WNL   Ankle DF WNL WNL     Lower Extremity Strength   Right Left   Hip flexion: 5/5 5/5   Hip extension: 5/5 5/5   Hip abduction: 5/5 5/5   Hip ER:  5/5 5/5   Knee extension: 5/5 5/5   Knee flexion: 5/5 5/5   Ankle dorsiflexion: 5/5 5/5   Great toe extension: 5/5 5/5       Special Tests:  -Repeated Flexion: no change  -Repeated Ext: no change  -Piriformis Test: negative  -SLR neural tension test: (+) L PSIS area pain  -CORRINE test: (-) B  - FADIR test: (-) B  - Gaelslen's test: (+) L  - Lumbar extensor MMT: 3/5    Joint Mobility: hypomobility lumbar segments throughout.   Sensation: Intact light touch sensation to BLE through L2-S2 dermatomal distribution    Flexibility:   Ely's test: mild stiffness  B   Popliteal Angle: normal B   Ricardo test: mild stiffness B   P/SLR test: normal B      CMS Impairment/Limitation/Restriction for FOTO Lumbar Survey    Therapist reviewed FOTO scores for Brissa Mason on 2/10/2020.   FOTO documents entered into [employer] - see Media section.    Limitation Score: 28%  Predicted Limitation Score: 17%       TREATMENT   Treatment Time In: 1145  Treatment Time Out: 1155  Total Treatment time separate from Evaluation time:10 minutes    Brissa received therapeutic exercises to develop strength, endurance, ROM, flexibility and posture for 10 minutes including:  - prone glut sets, prone hip extension, prone quad stretch with strap       Home Exercises and Patient Education Provided  Education provided re:   - PT diagnosis and recommended treatment course.     Written Home Exercises Provided: not today; next visit.     Assessment   Brissa is a 49 y.o. female referred to outpatient Physical Therapy with a medical diagnosis of Left-sided low back pain without sciatica. Pt presents with L-sided lumbosacral pain, loss of lumbar motion, core weakness, and mild LE tightness B.  PT diagnosis: lumbar extension-rotation MIS.  Overall, pain is limiting her ability to assist her WC bound mother with level surface transfers and tolerate recreational activities such as running on the treadmill.    Pt prognosis is Excellent.   Pt will benefit from skilled outpatient Physical Therapy to address the deficits stated above and in the chart below, provide pt/family education, and to maximize pt's level of independence.     Plan of care discussed with patient: Yes  Pt's spiritual, cultural and educational needs considered and patient is agreeable to the plan of care and goals as stated below:     Anticipated Barriers for therapy: none    Medical Necessity is demonstrated by the following  History  Co-morbidities and personal factors that may impact the plan of care Co-morbidities:   Back pain    Personal  Factors:   no deficits     low   Examination  Body Structures and Functions, activity limitations and participation restrictions that may impact the plan of care Body Regions:   back  lower extremities    Body Systems:    gross symmetry  ROM  strength  transfers  motor control    Participation Restrictions:   none    Activity limitations:   Learning and applying knowledge  no deficits    General Tasks and Commands  no deficits    Communication  no deficits    Mobility  lifting and carrying objects    Self care  no deficits    Domestic Life  doing house work (cleaning house, washing dishes, laundry)    Interactions/Relationships  family relationships    Life Areas  employment    Community and Social Life  community life  recreation and leisure         moderate   Clinical Presentation stable and uncomplicated low   Decision Making/ Complexity Score: low     Goals:  Long-Term Goals: 6 weeks:  1.  Patient will demonstrate normal lumbar quadrant testing without pain for improved positional tolerance.   2.  Patient will demonstrate 5/5 lumbar extensor MMT for improved tolerance to lifting/transferring mother.   3.  Patient will report no pain after running at the gym.   4. Patient will report ability to manage lumbar pain at home/work with HEP.   2. Patient will report <15% limitation on Lumbar FOTO survey.     Plan   Certification Period/Plan of care expiration: 2/10/2020 to 04/03/2020.    Outpatient Physical Therapy 2 times weekly for 7 weeks to include the following interventions: Cervical/Lumbar Traction, Electrical Stimulation IFC, Gait Training, Manual Therapy, Moist Heat/ Ice, Neuromuscular Re-ed, Patient Education, Self Care, Therapeutic Activites and Therapeutic Exercise, Dry Needling, IASTM.     Trip Gamino, PT

## 2020-02-11 PROBLEM — M54.50 ACUTE LEFT-SIDED LOW BACK PAIN WITHOUT SCIATICA: Status: ACTIVE | Noted: 2020-02-11

## 2020-02-14 ENCOUNTER — CLINICAL SUPPORT (OUTPATIENT)
Dept: REHABILITATION | Facility: HOSPITAL | Age: 50
End: 2020-02-14
Payer: COMMERCIAL

## 2020-02-14 DIAGNOSIS — M54.50 ACUTE LEFT-SIDED LOW BACK PAIN WITHOUT SCIATICA: ICD-10-CM

## 2020-02-14 PROCEDURE — 97140 MANUAL THERAPY 1/> REGIONS: CPT | Mod: PN

## 2020-02-14 PROCEDURE — 97110 THERAPEUTIC EXERCISES: CPT | Mod: PN

## 2020-02-14 NOTE — PROGRESS NOTES
Physical Therapy Daily Treatment Note     [facility]  [facility] [id number]    Therapy Diagnosis:   Encounter Diagnosis   Name Primary?    Acute left-sided low back pain without sciatica      Physician: Sandra Betts MD    Visit Date: 2/14/2020    Physician Orders: PT Eval and Treat   Medical Diagnosis: M54.5 (ICD-10-CM) - Left-sided low back pain without sciatica, unspecified chronicity  Evaluation Date: 2/10/2020  Authorization Period Expiration: 12/31/2020  Plan of Care Certification Period: 2/10/2020 to 04/03/2020  Visit # / Visits authorized: 2/ 60   FOTO: 2/5     Time In: 0705  Time Out: 0800  Total Billable Time: 55 minutes (3 TE, 1 MT)  Precautions: Standard    Subjective     Pt reports: no pain this morning; slept well last night without waking up with back pain.  She was compliant with home exercise program.  Response to previous treatment: eval   Functional change: none voiced; has not been to the gym in one week.     Pain: 0/10  Location: left back      Objective     Brissa received the following manual therapy techniques: total time 15 minutes  - IASTM to L QL and lumbar paraspinal group.   - L long axis hip distraction      Brissa received therapeutic exercises to develop strength, endurance, ROM, flexibility, posture and core stabilization for 40 minutes including:  - hip flexor stretching   Strap; 5x30 seconds B  - prone TAs    20 reps  - hooklying deadbugs   Phase I/II; 2x10   - DKTC    20 reps  - mini-squats    3x10 ballet bar        Home Exercises Provided and Patient Education Provided   Education provided:   - continue HEP    Written Home Exercises Provided: Patient instructed to cont prior HEP.  Exercises were reviewed and Brissa was able to demonstrate them prior to the end of the session.  Brissa demonstrated good  understanding of the education provided.     See EMR under Media for exercises provided initial evaluation.    Assessment   A: L5 limbus vertebra per MRI;  unsure if this is her .  Pain with lumbar extension based movements.     Brissa is progressing well towards her goals.   Pt prognosis is Excellent.     Pt will continue to benefit from skilled outpatient physical therapy to address the deficits listed in the problem list box on initial evaluation, provide pt/family education and to maximize pt's level of independence in the home and community environment.   Pt's spiritual, cultural and educational needs considered and pt agreeable to plan of care and goals.     Anticipated barriers to physical therapy: none    Goals:   Long-Term Goals: 6 weeks:  1.  Patient will demonstrate normal lumbar quadrant testing without pain for improved positional tolerance. progressing towards; not met  2.  Patient will demonstrate 5/5 lumbar extensor MMT for improved tolerance to lifting/transferring mother.  progressing towards; not met  3.  Patient will report no pain after running at the gym.  progressing towards; not met  4. Patient will report ability to manage lumbar pain at home/work with HEP.  progressing towards; not met  2. Patient will report <15% limitation on Lumbar FOTO survey.  progressing towards; not met    Plan   Continue plan of care. Monitor response to interventions.     Trip Gamino, PT

## 2020-02-18 ENCOUNTER — CLINICAL SUPPORT (OUTPATIENT)
Dept: REHABILITATION | Facility: HOSPITAL | Age: 50
End: 2020-02-18
Payer: COMMERCIAL

## 2020-02-18 DIAGNOSIS — M54.50 ACUTE LEFT-SIDED LOW BACK PAIN WITHOUT SCIATICA: ICD-10-CM

## 2020-02-18 PROCEDURE — 97140 MANUAL THERAPY 1/> REGIONS: CPT | Mod: PN

## 2020-02-18 PROCEDURE — 97110 THERAPEUTIC EXERCISES: CPT | Mod: PN

## 2020-02-18 NOTE — PROGRESS NOTES
Physical Therapy Daily Treatment Note     [facility]  [facility] [id number]    Therapy Diagnosis:   Encounter Diagnosis   Name Primary?    Acute left-sided low back pain without sciatica      Physician: Sandra Betts MD    Visit Date: 2/18/2020    Physician Orders: PT Eval and Treat   Medical Diagnosis: M54.5 (ICD-10-CM) - Left-sided low back pain without sciatica, unspecified chronicity  Evaluation Date: 2/10/2020  Authorization Period Expiration: 12/31/2020  Plan of Care Certification Period: 2/10/2020 to 04/03/2020  Visit # / Visits authorized: 3/ 60   FOTO: 3/5     Time In: 0705  Time Out: 0800  Total Billable Time: 55 minutes (3 TE, 1 MT)  Precautions: Standard    Subjective     Pt reports: mostly experiencing pain with prolonged sitting/standing.   Feels better with movement.   She was compliant with home exercise program.  Response to previous treatment: felt good the rest of the day.   Functional change: none voiced; has not been to the gym in one week.   Pain: 2/10  Location: left back      Objective     Brissa received the following manual therapy techniques: total time 15 minutes  - L and central PA mobs L4/5 level   - L long axis hip distraction    Brissa received therapeutic exercises to develop strength, endurance, ROM, flexibility, posture and core stabilization for 40 minutes including:  - LTRs     20 reps  - DKTC    20 reps with Swiss ball  - supine bridges   3x10 with peanut ball  - prone TAs    20 reps  - hooklying deadbugs   Phase I/II; 2x10   - leg press    6 plates DL  - shoulder complex squats  2x10        Home Exercises Provided and Patient Education Provided   Education provided:   - continue HEP    Written Home Exercises Provided: Patient instructed to cont prior HEP.  Exercises were reviewed and Brissa was able to demonstrate them prior to the end of the session.  Brissa demonstrated good  understanding of the education provided.     See EMR under Media for  exercises provided initial evaluation.    Assessment   A: L5 limbus vertebra per MRI.  Pain L lumbosacral area.  Good abdominal strength and activation.  Decreased lumbar extensor strength.  Flattening of lumbar curve.     Brissa is progressing well towards her goals.   Pt prognosis is Excellent.     Pt will continue to benefit from skilled outpatient physical therapy to address the deficits listed in the problem list box on initial evaluation, provide pt/family education and to maximize pt's level of independence in the home and community environment.   Pt's spiritual, cultural and educational needs considered and pt agreeable to plan of care and goals.     Anticipated barriers to physical therapy: none    Goals:   Long-Term Goals: 6 weeks:  1.  Patient will demonstrate normal lumbar quadrant testing without pain for improved positional tolerance. progressing towards; not met  2.  Patient will demonstrate 5/5 lumbar extensor MMT for improved tolerance to lifting/transferring mother.  progressing towards; not met  3.  Patient will report no pain after running at the gym.  progressing towards; not met  4. Patient will report ability to manage lumbar pain at home/work with HEP.  progressing towards; not met  2. Patient will report <15% limitation on Lumbar FOTO survey.  progressing towards; not met    Plan   Continue plan of care. Monitor response to interventions.     Trip Gamino, PT

## 2020-02-23 ENCOUNTER — PATIENT MESSAGE (OUTPATIENT)
Dept: INTERNAL MEDICINE | Facility: CLINIC | Age: 50
End: 2020-02-23

## 2020-02-28 ENCOUNTER — CLINICAL SUPPORT (OUTPATIENT)
Dept: REHABILITATION | Facility: HOSPITAL | Age: 50
End: 2020-02-28
Payer: COMMERCIAL

## 2020-02-28 DIAGNOSIS — M54.50 ACUTE LEFT-SIDED LOW BACK PAIN WITHOUT SCIATICA: ICD-10-CM

## 2020-02-28 PROCEDURE — 97110 THERAPEUTIC EXERCISES: CPT | Mod: PN

## 2020-02-28 PROCEDURE — 97140 MANUAL THERAPY 1/> REGIONS: CPT | Mod: PN

## 2020-02-28 NOTE — PROGRESS NOTES
Physical Therapy Daily Treatment Note     [facility]  [id number]    Therapy Diagnosis:   Encounter Diagnosis   Name Primary?    Acute left-sided low back pain without sciatica      Physician: Sandra Betts MD    Visit Date: 2/28/2020    Physician Orders: PT Eval and Treat   Medical Diagnosis: M54.5 (ICD-10-CM) - Left-sided low back pain without sciatica, unspecified chronicity  Evaluation Date: 2/10/2020  Authorization Period Expiration: 12/31/2020  Plan of Care Certification Period: 2/10/2020 to 04/03/2020  Visit # / Visits authorized: 4/ 60   FOTO: 4/5     Time In: 0700  Time Out: 0757   Total Billable Time: 57 minutes (3 TE, 1 MT)  Precautions: Standard    Subjective     Pt reports: she was on vacation in [place]. Had no pain with a 2.6 mile hike. She also worked out and did have pain until being on the elliptical for a while.   She was compliant with home exercise program.  Response to previous treatment: decreased pain   Functional change: Able to complete a work out with minimal increased pain  Pain: 0/10  Location: left back      Objective     Brissa received the following manual therapy techniques: total time 15 minutes  - L and central PA mobs L4/5 level   - Central PA mobs mid thoracic to lumbar segments   - L long axis hip distraction    Brissa received therapeutic exercises to develop strength, endurance, ROM, flexibility, posture and core stabilization for 42 minutes including:  - LTRs     20 reps  - DKTC    20 reps with Swiss ball  - supine bridges   3x10 with peanut ball  - prone TAs    20 reps  - hooklying deadbugs   Phase I/II; 3x10   - leg press    6 plates DL  - shoulder complex squats  3x10    -Tall bike      5'   -Hamstring stretch w/strap    30''x3   -Hip flexor stretch w/ strap    30''x3  -Quad rocking posterior    x10   -Quad rocking w/thread the needle x5 B    Home Exercises Provided and Patient Education Provided   Education provided:   - continue  HEP  - Home stretches   - avoid thoracolumbar extension and prone lying     Written Home Exercises Provided: Patient instructed to cont prior HEP.  Exercises were reviewed and Brissa was able to demonstrate them prior to the end of the session.  Brissa demonstrated good  understanding of the education provided.     See EMR under Media for exercises provided 02/28/2020    Assessment   A: L5 limbus vertebra per MRI.  Pain L lumbosacral area.  Good abdominal strength and activation.  Decreased lumbar extensor strength.  Flattening of lumbar curve. Pt has preference for flexion based exercises over extension. Will benefit from continued core strengthening.     Brissa is progressing well towards her goals.   Pt prognosis is Excellent.     Pt will continue to benefit from skilled outpatient physical therapy to address the deficits listed in the problem list box on initial evaluation, provide pt/family education and to maximize pt's level of independence in the home and community environment.   Pt's spiritual, cultural and educational needs considered and pt agreeable to plan of care and goals.     Anticipated barriers to physical therapy: none    Goals:   Long-Term Goals: 6 weeks:  1.  Patient will demonstrate normal lumbar quadrant testing without pain for improved positional tolerance. progressing towards; not met  2.  Patient will demonstrate 5/5 lumbar extensor MMT for improved tolerance to lifting/transferring mother.  progressing towards; not met  3.  Patient will report no pain after running at the gym.  progressing towards; not met  4. Patient will report ability to manage lumbar pain at home/work with HEP.  progressing towards; not met  2. Patient will report <15% limitation on Lumbar FOTO survey.  progressing towards; not met    Plan   Continue plan of care. Flexion based exercises.     Trip Gamino, PT

## 2020-03-02 ENCOUNTER — CLINICAL SUPPORT (OUTPATIENT)
Dept: REHABILITATION | Facility: HOSPITAL | Age: 50
End: 2020-03-02
Payer: COMMERCIAL

## 2020-03-02 DIAGNOSIS — M54.50 ACUTE LEFT-SIDED LOW BACK PAIN WITHOUT SCIATICA: ICD-10-CM

## 2020-03-02 PROCEDURE — 97110 THERAPEUTIC EXERCISES: CPT | Mod: PN

## 2020-03-03 NOTE — PROGRESS NOTES
Physical Therapy Daily Treatment Note     [facility]  [facility] [id number]    Therapy Diagnosis:   Encounter Diagnosis   Name Primary?    Acute left-sided low back pain without sciatica      Physician: Sandra Betts MD    Visit Date: 3/2/2020    Physician Orders: PT Eval and Treat   Medical Diagnosis: M54.5 (ICD-10-CM) - Left-sided low back pain without sciatica, unspecified chronicity  Evaluation Date: 2/10/2020  Authorization Period Expiration: 12/31/2020  Plan of Care Certification Period: 2/10/2020 to 04/03/2020  Visit # / Visits authorized: 5/ 60   FOTO: 5/5  NEXT     Time In: 1800  Time Out: 1855  Total Billable Time: 45 minutes (2 TE, 1 MT)  Precautions: Standard    Subjective     Pt reports: that the only had lumbar pain with kneeling at a [employer] service.  Kneeling for a total of 7 minutes consecutively twice.   She was compliant with home exercise program.  Response to previous treatment: decreased pain   Functional change: Able to complete a work out with minimal increased pain  Pain: 0/10  Location: left back      Objective     Brissa received the following manual therapy techniques: total time 5 minutes  - mid-to-lower thoracic grade V cross-hand manipulation    Brissa received therapeutic exercises to develop strength, endurance, ROM, flexibility, posture and core stabilization for 40 minutes with PT 1:1 including assessment and 10 minutes under supervision:  - tall bike    5'   - LTRs     20 reps  - DKTC    20 reps with Swiss ball  - hooklying deadbugs   Phase I/II; 3x10  - scissor kicks    3x6  - Ricardo stretch   3' each hip  -Quad rocking posterior   2x10   -Quad rocking w/thread the needle 2x5 B  - tall kneel hip thrusts   2x10  - leg press    7.5 plates DL 3x6  - posterior sling pulls   3x10 B GTBx2      Home Exercises Provided and Patient Education Provided   Education provided:   - continue HEP  - Home stretches   - avoid thoracolumbar extension and prone lying      Written Home Exercises Provided: Patient instructed to cont prior HEP.  Exercises were reviewed and Brissa was able to demonstrate them prior to the end of the session.  Brissa demonstrated good  understanding of the education provided.     See EMR under Media for exercises provided 02/28/2020    Assessment   A: L5 limbus vertebra per MRI.  Pain L lumbosacral area.  Good abdominal strength and activation.  L hip flexor tightness.  Lumbar flexion-based exercises over extension; good response.  Will benefit from continued core strengthening.     Brissa is progressing well towards her goals.   Pt prognosis is Excellent.     Pt will continue to benefit from skilled outpatient physical therapy to address the deficits listed in the problem list box on initial evaluation, provide pt/family education and to maximize pt's level of independence in the home and community environment.   Pt's spiritual, cultural and educational needs considered and pt agreeable to plan of care and goals.     Anticipated barriers to physical therapy: none    Goals:   Long-Term Goals: 6 weeks:  1.  Patient will demonstrate normal lumbar quadrant testing without pain for improved positional tolerance. progressing towards; not met  2.  Patient will demonstrate 5/5 lumbar extensor MMT for improved tolerance to lifting/transferring mother.  progressing towards; not met  3.  Patient will report no pain after running at the gym.  progressing towards; not met  4. Patient will report ability to manage lumbar pain at home/work with HEP.  progressing towards; not met  2. Patient will report <15% limitation on Lumbar FOTO survey.  progressing towards; not met    Plan   Continue plan of care. Flexion based exercises.  Increase tolerance to tall kneeling.     Trip Gamino, PT

## 2020-03-05 NOTE — PROGRESS NOTES
Physical Therapy Daily Treatment Note     [facility]  [facility] Number[facility] [id number]    Therapy Diagnosis:   Encounter Diagnosis   Name Primary?    Acute left-sided low back pain without sciatica      Physician: Sandra Betts MD    Visit Date: 3/6/2020    Physician Orders: PT Eval and Treat   Medical Diagnosis: M54.5 (ICD-10-CM) - Left-sided low back pain without sciatica, unspecified chronicity  Evaluation Date: 2/10/2020  Authorization Period Expiration: 12/31/2020  Plan of Care Certification Period: 2/10/2020 to 04/03/2020  Visit # / Visits authorized: 6/ 60   FOTO: done today     Time In: 0705  Time Out: 0800  Total Billable Time: 55 minutes (4 TE)  Precautions: Standard    Subjective     Pt reports: no complaints of back pain today.   She was compliant with home exercise program.  Response to previous treatment: decreased pain   Functional change: Able to complete a work out with minimal increased pain  Pain: 0/10  Location: left back      Objective     Brissa received the following manual therapy techniques: total time 5 minutes  - mid-to-lower thoracic grade V cross-hand manipulation    Brissa received therapeutic exercises to develop strength, endurance, ROM, flexibility, posture and core stabilization for 45 minutes with PT 1:1 including assessment:   - tall bike    5'   - LTRs     20 reps  - DKTC    20 reps with Swiss ball  - hooklying deadbugs   Phase I/II; 3x10  - scissor kicks    3x6  - Ricardo stretch   3' each hip  - Quad rocking posterior   2x10   - Quad rocking w/thread the needle 2x5 B  - tall kneel hip thrusts   2x10  - leg press    7.5 plates DL 3x6  - posterior sling pulls   3x10 B BTBx2  - shoulder complex squats  3x6      Home Exercises Provided and Patient Education Provided   Education provided:   - continue HEP  - Home stretches   - avoid thoracolumbar extension and prone lying     Written Home Exercises Provided: Patient instructed to cont prior HEP.  Exercises were reviewed  and Brissa was able to demonstrate them prior to the end of the session.  Brissa demonstrated good  understanding of the education provided.     See EMR under Media for exercises provided 02/28/2020    Assessment   A: L5 limbus vertebra per MRI.  Lumbar pain extension-based ROM and positioning.  Met FOTO goal today.       Brissa is progressing well towards her goals.   Pt prognosis is Excellent.     Pt will continue to benefit from skilled outpatient physical therapy to address the deficits listed in the problem list box on initial evaluation, provide pt/family education and to maximize pt's level of independence in the home and community environment.   Pt's spiritual, cultural and educational needs considered and pt agreeable to plan of care and goals.     Anticipated barriers to physical therapy: none    Goals:   Long-Term Goals: 6 weeks:  1.  Patient will demonstrate normal lumbar quadrant testing without pain for improved positional tolerance. progressing towards; not met  2.  Patient will demonstrate 5/5 lumbar extensor MMT for improved tolerance to lifting/transferring mother.  progressing towards; not met  3.  Patient will report no pain after running at the gym.  progressing towards; not met  4. Patient will report ability to manage lumbar pain at home/work with HEP.  progressing towards; not met  2. Patient will report <15% limitation on Lumbar FOTO survey.  progressing towards; not met    Plan   Continue plan of care. Flexion-based exercises.  Increase tolerance to tall kneeling.     Trip Gamino, PT

## 2020-03-06 ENCOUNTER — CLINICAL SUPPORT (OUTPATIENT)
Dept: REHABILITATION | Facility: HOSPITAL | Age: 50
End: 2020-03-06
Payer: COMMERCIAL

## 2020-03-06 DIAGNOSIS — M54.50 ACUTE LEFT-SIDED LOW BACK PAIN WITHOUT SCIATICA: ICD-10-CM

## 2020-03-06 PROCEDURE — 97110 THERAPEUTIC EXERCISES: CPT | Mod: PN

## 2020-03-09 ENCOUNTER — CLINICAL SUPPORT (OUTPATIENT)
Dept: REHABILITATION | Facility: HOSPITAL | Age: 50
End: 2020-03-09
Payer: COMMERCIAL

## 2020-03-09 DIAGNOSIS — M54.50 ACUTE LEFT-SIDED LOW BACK PAIN WITHOUT SCIATICA: ICD-10-CM

## 2020-03-09 PROCEDURE — 97110 THERAPEUTIC EXERCISES: CPT | Mod: PN

## 2020-03-09 NOTE — PROGRESS NOTES
"  Physical Therapy Daily Treatment Note     [facility]  [id number]    Therapy Diagnosis:   Encounter Diagnosis   Name Primary?    Acute left-sided low back pain without sciatica      Physician: Sandra Betts MD    Visit Date: 3/9/2020    Physician Orders: PT Eval and Treat   Medical Diagnosis: M54.5 (ICD-10-CM) - Left-sided low back pain without sciatica, unspecified chronicity  Evaluation Date: 2/10/2020  Authorization Period Expiration: 12/31/2020  Plan of Care Certification Period: 2/10/2020 to 04/03/2020  Visit # / Visits authorized: 6/ 60   FOTO: at dc     Time In: 1800  Time Out: 1855  Total Billable Time: 45 minutes (3 TE)  Precautions: Standard    Subjective     Pt reports: no complaints of back pain today but states that she was sore and achy in the L side of her back for the remainder of her day following her last PT session.   She was compliant with home exercise program.  Response to previous treatment: decreased pain   Functional change: Able to complete a work out with minimal increased pain  Pain: 2/10  Location: left back      Objective     Brissa received the following manual therapy techniques: total time 5 minutes  - mid-to-lower thoracic grade V cross-hand manipulation    Brissa received therapeutic exercises to develop strength, endurance, ROM, flexibility, posture and core stabilization for 40 minutes with PT 1:1 including assessment:   - tall bike    5'   - LTRs     20 reps  - DKTC    20 reps with Swiss ball  - hooklying deadbugs   Phase I/II; 3x10  - scissor kicks    3x6 (modified)  - Ricardo stretch   3' each hip  - Quad rocking posterior   2x10   - Quad rocking w/thread the needle 2x5 B  - tall kneel hip thrusts   2x10 holding blue medicine ball  - leg press    Not today  - posterior sling pulls   3x10 B BTBx2 stepping up to Bosu ball  - shoulder complex squats  3x6 (pain following)  - modified planks of peanut ball 2x10 x 3" hold      Home Exercises " Provided and Patient Education Provided   Education provided:   - continue HEP  - Home stretches   - avoid thoracolumbar extension and prone lying     Written Home Exercises Provided: Patient instructed to cont prior HEP.  Exercises were reviewed and Brissa was able to demonstrate them prior to the end of the session.  Brissa demonstrated good  understanding of the education provided.     See EMR under Media for exercises provided 02/28/2020    Assessment   A: L5 limbus vertebra per MRI.  Lumbar pain extension-based ROM and positioning. No lumbar pain following session today.        Brissa is progressing well towards her goals.   Pt prognosis is Excellent.     Pt will continue to benefit from skilled outpatient physical therapy to address the deficits listed in the problem list box on initial evaluation, provide pt/family education and to maximize pt's level of independence in the home and community environment.   Pt's spiritual, cultural and educational needs considered and pt agreeable to plan of care and goals.     Anticipated barriers to physical therapy: none    Goals:   Long-Term Goals: 6 weeks:  1.  Patient will demonstrate normal lumbar quadrant testing without pain for improved positional tolerance. progressing towards; not met  2.  Patient will demonstrate 5/5 lumbar extensor MMT for improved tolerance to lifting/transferring mother.  progressing towards; not met  3.  Patient will report no pain after running at the gym.  progressing towards; not met  4. Patient will report ability to manage lumbar pain at home/work with HEP.  progressing towards; not met  5. Patient will report <15% limitation on Lumbar FOTO survey.  progressing towards; not met    Plan   Continue plan of care. Flexion-based exercises.  Increase tolerance to tall kneeling.     Trip Gamino, PT

## 2020-03-13 ENCOUNTER — CLINICAL SUPPORT (OUTPATIENT)
Dept: REHABILITATION | Facility: HOSPITAL | Age: 50
End: 2020-03-13
Payer: COMMERCIAL

## 2020-03-13 DIAGNOSIS — M54.50 ACUTE LEFT-SIDED LOW BACK PAIN WITHOUT SCIATICA: ICD-10-CM

## 2020-03-13 PROCEDURE — 97110 THERAPEUTIC EXERCISES: CPT | Mod: PN

## 2020-03-13 NOTE — PROGRESS NOTES
"  Physical Therapy Daily Treatment Note     [facility]  [facility] [id number]    Therapy Diagnosis:   Encounter Diagnosis   Name Primary?    Acute left-sided low back pain without sciatica      Physician: Sandra Betts MD    Visit Date: 3/13/2020    Physician Orders: PT Eval and Treat   Medical Diagnosis: M54.5 (ICD-10-CM) - Left-sided low back pain without sciatica, unspecified chronicity  Evaluation Date: 2/10/2020  Authorization Period Expiration: 12/31/2020  Plan of Care Certification Period: 2/10/2020 to 04/03/2020  Visit # / Visits authorized: 7/ 60   FOTO: at dc     Time In: 0700  Time Out: 075o  Total Billable Time: 45 minutes (3 TE)  Precautions: Standard    Subjective     Pt reports: states that she woke up with 'a little back soreness' this morning but it has since resolved.  States that she is feeling good.   She was compliant with home exercise program.  Response to previous treatment: decreased pain   Functional change: Able to complete a work out with minimal increased pain  Pain: 2/10  Location: left back      Objective     Brissa received the following manual therapy techniques: total time 5 minutes  - mid-to-lower thoracic grade V cross-hand manipulation    Brissa received therapeutic exercises to develop strength, endurance, ROM, flexibility, posture and core stabilization for 40 minutes with PT 1:1 including assessment:   - tall bike    5'   - LTRs     20 reps  - DKTC    20 reps with Swiss ball  - hooklying deadbugs   Phase I/II/III; 2x10  - Ricardo stretch   3' each hip (not today)  - Quad rocking posterior   2x10   - Quad rocking w/thread the needle 2x5 B  - tall kneel hip thrusts   2x10 holding blue medicine ball  - leg press    Not today  - posterior sling pulls   3x10 B BTBx2 stepping up to Bosu ball  - shoulder complex squats  3x6 (pain following)  - modified planks of peanut ball 2x10 x 3" hold      Home Exercises Provided and Patient Education Provided   Education " provided:   - continue HEP  - Home stretches   - avoid thoracolumbar extension and prone lying     Written Home Exercises Provided: Patient instructed to cont prior HEP.  Exercises were reviewed and Brissa was able to demonstrate them prior to the end of the session.  Brissa demonstrated good  understanding of the education provided.     See EMR under Media for exercises provided 02/28/2020    Assessment   A: L5 limbus vertebra per MRI.  Lumbar pain extension-based ROM and positioning. No lumbar pain following session today.        Brissa is progressing well towards her goals.   Pt prognosis is Excellent.     Pt will continue to benefit from skilled outpatient physical therapy to address the deficits listed in the problem list box on initial evaluation, provide pt/family education and to maximize pt's level of independence in the home and community environment.   Pt's spiritual, cultural and educational needs considered and pt agreeable to plan of care and goals.     Anticipated barriers to physical therapy: none    Goals:   Long-Term Goals: 6 weeks:  1.  Patient will demonstrate normal lumbar quadrant testing without pain for improved positional tolerance. progressing towards; not met  2.  Patient will demonstrate 5/5 lumbar extensor MMT for improved tolerance to lifting/transferring mother.  progressing towards; not met  3.  Patient will report no pain after running at the gym.  progressing towards; not met  4. Patient will report ability to manage lumbar pain at home/work with HEP.  progressing towards; not met  5. Patient will report <15% limitation on Lumbar FOTO survey.  progressing towards; not met    Plan   Continue plan of care. Flexion-based lumbar flexibility.  Increase tolerance to tall kneeling; add TB resistance.    Trip Gamino, PT

## 2020-03-16 ENCOUNTER — PATIENT MESSAGE (OUTPATIENT)
Dept: INTERNAL MEDICINE | Facility: CLINIC | Age: 50
End: 2020-03-16

## 2020-03-17 ENCOUNTER — CLINICAL SUPPORT (OUTPATIENT)
Dept: REHABILITATION | Facility: HOSPITAL | Age: 50
End: 2020-03-17
Payer: COMMERCIAL

## 2020-03-17 ENCOUNTER — PATIENT MESSAGE (OUTPATIENT)
Dept: INTERNAL MEDICINE | Facility: CLINIC | Age: 50
End: 2020-03-17

## 2020-03-17 DIAGNOSIS — M54.50 ACUTE LEFT-SIDED LOW BACK PAIN WITHOUT SCIATICA: ICD-10-CM

## 2020-03-17 PROCEDURE — 97110 THERAPEUTIC EXERCISES: CPT | Mod: PN

## 2020-03-17 NOTE — PROGRESS NOTES
"  Physical Therapy Daily Treatment Note     [facility]  [id number]    Therapy Diagnosis:   Encounter Diagnosis   Name Primary?    Acute left-sided low back pain without sciatica      Physician: Sandra Betts MD    Visit Date: 3/17/2020    Physician Orders: PT Eval and Treat   Medical Diagnosis: M54.5 (ICD-10-CM) - Left-sided low back pain without sciatica, unspecified chronicity  Evaluation Date: 2/10/2020  Authorization Period Expiration: 12/31/2020  Plan of Care Certification Period: 2/10/2020 to 04/03/2020  Visit # / Visits authorized: 8/ 60   FOTO: at dc     Time In: 0700  Time Out: 0755  Total Billable Time: 40 minutes (3 TE)  Precautions: Standard    Subjective     Pt reports: only back soreness when she initially wakes up but this quickly resolves when she stands and walks.   She was compliant with home exercise program.  Response to previous treatment: decreased pain   Functional change: Able to complete a work out with minimal increased pain  Pain: 2/10  Location: left back      Objective       Brissa received therapeutic exercises to develop strength, endurance, ROM, flexibility, posture and core stabilization for 40 minutes with PT 1:1 including assessment and 15 minutes under supervision:   - tall bike    5'   - LTRs     20 reps  - DKTC    20 reps with Swiss ball  - hooklying deadbugs   Phase I/II/III; 2x10  - Ricardo stretch   3' each hip (not today)  - Quad rocking posterior   2x10   - Quad rocking w/thread the needle 2x5 B  - tall kneel hip thrusts   2x10 holding blue medicine ball  - leg press    2x15 at 5 plates  - posterior sling pulls   3x10 B BTBx2 stepping up to Bosu ball  - modified planks of peanut ball 2x10 x 3" hold      Home Exercises Provided and Patient Education Provided   Education provided:   - continue HEP  - Home stretches   - avoid thoracolumbar extension and prone lying     Written Home Exercises Provided: Patient instructed to cont prior " HEP.  Exercises were reviewed and Brissa was able to demonstrate them prior to the end of the session.  Brissa demonstrated good  understanding of the education provided.     See EMR under Media for exercises provided 02/28/2020    Assessment   A: L5 limbus vertebra per MRI.  Mild lumbar pain with certain positioning during session; quickly resolved.       Brissa is progressing well towards her goals.   Pt prognosis is Excellent.     Pt will continue to benefit from skilled outpatient physical therapy to address the deficits listed in the problem list box on initial evaluation, provide pt/family education and to maximize pt's level of independence in the home and community environment.   Pt's spiritual, cultural and educational needs considered and pt agreeable to plan of care and goals.     Anticipated barriers to physical therapy: none    Goals:   Long-Term Goals: 6 weeks:  1.  Patient will demonstrate normal lumbar quadrant testing without pain for improved positional tolerance. progressing towards; not met  2.  Patient will demonstrate 5/5 lumbar extensor MMT for improved tolerance to lifting/transferring mother.  progressing towards; not met  3.  Patient will report no pain after running at the gym.  progressing towards; not met  4. Patient will report ability to manage lumbar pain at home/work with HEP.  progressing towards; not met  5. Patient will report <15% limitation on Lumbar FOTO survey.  progressing towards; not met    Plan   Continue plan of care. Flexion-based lumbar flexibility.  Increase tolerance to tall kneeling; add TB resistance.    Trip Gamino, PT

## 2020-03-22 ENCOUNTER — PATIENT MESSAGE (OUTPATIENT)
Dept: OBSTETRICS AND GYNECOLOGY | Facility: CLINIC | Age: 50
End: 2020-03-22

## 2020-03-29 ENCOUNTER — PATIENT MESSAGE (OUTPATIENT)
Dept: INTERNAL MEDICINE | Facility: CLINIC | Age: 50
End: 2020-03-29

## 2020-04-14 ENCOUNTER — CLINICAL SUPPORT (OUTPATIENT)
Dept: REHABILITATION | Facility: HOSPITAL | Age: 50
End: 2020-04-14
Payer: COMMERCIAL

## 2020-04-14 DIAGNOSIS — M54.50 ACUTE LEFT-SIDED LOW BACK PAIN WITHOUT SCIATICA: ICD-10-CM

## 2020-04-14 PROCEDURE — 97110 THERAPEUTIC EXERCISES: CPT | Mod: PN

## 2020-04-14 NOTE — PROGRESS NOTES
Physical Therapy Daily Treatment Note     Name: Brissa [facility]  Clinic Number: [id number]    Patient unsafe for in-person office visit due to high risk co-morbidities, probability of infection, to minimize exposure, due to pt expressing symptoms, and/or due to government mandated quarantine.  This patient: (1) was informed that telehealth visits are now available congruent with guidelines set by state practice acts & CMS; (2) initiated scheduling of this type of visit; and (3) gave verbal consent to participate in such.  The patient & provider used Epic Ivet using both video & audio synchronous services to complete the visit.      Therapy Diagnosis:   Encounter Diagnosis   Name Primary?    Acute left-sided low back pain without sciatica      Physician: Sandra Betts MD    Visit Date: 4/14/2020  Patient Location:  Visit type: Virtual visit with synchronous audio and video through [employer]    Physician Orders: PT Eval and Treat   Medical Diagnosis: M54.5 (ICD-10-CM) - Left-sided low back pain without sciatica, unspecified chronicity  Evaluation Date: 2/10/2020  Authorization Period Expiration: 12/31/2020  Plan of Care Certification Period: 2/10/2020 to 04/03/2020  Visit # / Visits authorized: 9/ 60   FOTO: at dc     Time In: 0900  Time Out: 0930  Total Billable Time: 30 minutes (2 TE)  Precautions: Standard    Subjective     Pt reports: that she was doing well with no/minimal back pain for several weeks after her last session but now is experiencing more lumbar pain with sitting on firm surface, lying in her bed too long in the morning, and with lifting/transferring her mother from the couch to the wheelchair.  She was not compliant with home exercise program.  Response to previous treatment: see above  Functional change: see above    Pain: 3/10  Location: left lumbar spine     Objective     Brissa received therapeutic exercises to develop strength, endurance, ROM, flexibility, posture and core  stabilization for 30 minutes including:  - discussed issued HEP with patient and her performance of these exercises.  - discussed updated HEP with addition of wall squats to provided lumbar support and to build LE strength for transferring her dependent mother.  Patient demonstrated understanding.           Home Exercises Provided and Patient Education Provided   Education provided:   - updated HEP    Written Home Exercises Provided: Patient instructed to cont prior HEP.  Exercises were reviewed and Brissa was able to demonstrate them prior to the end of the session.  Brissa demonstrated good  understanding of the education provided.     See EMR under Media for exercises provided 04/14/2020.    Assessment   A: Continues to voice lumbar pain (L-side) with lifting activities such as transferring her elderly/dependent mother from the couch to her wheelchair and prolonged sitting in an unsupported/dining room chair.  L5 limbus vertebrae    Brissa is progressing well towards her goals.   Pt prognosis is Excellent.     Pt will continue to benefit from skilled outpatient physical therapy to address the deficits listed in the problem list box on initial evaluation, provide pt/family education and to maximize pt's level of independence in the home and community environment.     Pt's spiritual, cultural and educational needs considered and pt agreeable to plan of care and goals.     Anticipated barriers to physical therapy: none    Goals:   Long-Term Goals: 6 weeks:  1.  Patient will demonstrate normal lumbar quadrant testing without pain for improved positional tolerance. progressing towards; not met  2.  Patient will demonstrate 5/5 lumbar extensor MMT for improved tolerance to lifting/transferring mother.  progressing towards; not met  3.  Patient will report no pain after running at the gym.  progressing towards; not met  4. Patient will report ability to manage lumbar pain at home/work with HEP.  progressing towards; not  met  5. Patient will report <15% limitation on Lumbar FOTO survey.  progressing towards; not met      Plan   Schedule for follow-up telehealth visit in 1-2 weeks.      Trip Gamino, PT

## 2020-04-16 ENCOUNTER — PATIENT MESSAGE (OUTPATIENT)
Dept: INTERNAL MEDICINE | Facility: CLINIC | Age: 50
End: 2020-04-16

## 2020-04-20 ENCOUNTER — OFFICE VISIT (OUTPATIENT)
Dept: INTERNAL MEDICINE | Facility: CLINIC | Age: 50
End: 2020-04-20
Payer: COMMERCIAL

## 2020-04-20 DIAGNOSIS — E53.8 B12 NUTRITIONAL DEFICIENCY: Primary | ICD-10-CM

## 2020-04-20 DIAGNOSIS — E55.9 MILD VITAMIN D DEFICIENCY: ICD-10-CM

## 2020-04-20 DIAGNOSIS — D64.9 ANEMIA, UNSPECIFIED TYPE: ICD-10-CM

## 2020-04-20 DIAGNOSIS — E61.1 IRON DEFICIENCY: ICD-10-CM

## 2020-04-20 DIAGNOSIS — N95.1 MENOPAUSAL SYMPTOMS: ICD-10-CM

## 2020-04-20 DIAGNOSIS — M54.50 ACUTE LEFT-SIDED LOW BACK PAIN WITHOUT SCIATICA: ICD-10-CM

## 2020-04-20 PROCEDURE — 99442 PR PHYSICIAN TELEPHONE EVALUATION 11-20 MIN: ICD-10-PCS | Mod: 95,,, | Performed by: INTERNAL MEDICINE

## 2020-04-20 PROCEDURE — 99442 PR PHYSICIAN TELEPHONE EVALUATION 11-20 MIN: CPT | Mod: 95,,, | Performed by: INTERNAL MEDICINE

## 2020-04-20 NOTE — PROGRESS NOTES
Subjective:      Patient ID: Brissa Mason is a 50 y.o. female.    Chief Complaint: No chief complaint on file.    HPI:  HPI   The patient location is: home  The chief complaint leading to consultation is: Follow up back pain, abnormal lab  Visit type: audiovisual failed and changed to audio only  Total time spent with patient: 20 minutes  Each patient to whom he or she provides medical services by telemedicine is:  (1) informed of the relationship between the physician and patient and the respective role of any other health care provider with respect to management of the patient; and (2) notified that he or she may decline to receive medical services by telemedicine and may withdraw from such care at any time.    Notes:     Reviewed history of back discomfort, reviewed PT reports. Discussed previous anemia, vitamin levels and supplements. Also discussed potential menopausal symptoms.    Patient Active Problem List   Diagnosis    Vitamin D deficiency    Acute left-sided low back pain without sciatica    Anemia    Iron deficiency    B12 nutritional deficiency     Past Medical History:   Diagnosis Date    Allergy     Anemia 4/20/2020    Anxiety disorder     Colitis     resolved; rectum 2005; neg flex sig 2006    Colitis: 2005 rectal area     resolved; rectum 2005; neg flex sig 2006     Menstrual migraine without status migrainosus, not intractable 9/5/2017     Past Surgical History:   Procedure Laterality Date    GANGLION CYST EXCISION  2017     Family History   Problem Relation Age of Onset    Colon polyps Mother         noncancerous colon tumor; polyps    Hyperlipidemia Mother     Osteoporosis Mother     Colon cancer Mother         pre cacer    Depression Mother         One time    Melanoma Mother         Squamous cell carcinoma    Gout Father     Hypertension Father     Hypertension Sister     Glaucoma Sister     Hyperlipidemia Brother     Cancer Maternal Uncle         colon     Colon cancer Maternal Uncle     Hypertension Brother     Psoriasis Brother     Breast cancer Paternal Aunt 60    Breast cancer Paternal Aunt     Depression Paternal Grandmother         One time    Miscarriages / Stillbirths Neg Hx     Amblyopia Neg Hx     Blindness Neg Hx     Cataracts Neg Hx     Diabetes Neg Hx     Macular degeneration Neg Hx     Retinal detachment Neg Hx     Strabismus Neg Hx     Stroke Neg Hx     Thyroid disease Neg Hx     Melanoma Neg Hx      Review of Systems   Constitutional: Negative for fever.   Cardiovascular: Negative for chest pain.   Gastrointestinal: Negative for abdominal pain.   Genitourinary: Negative for dysuria, hematuria and pelvic pain.   Musculoskeletal: Positive for back pain.   Neurological: Negative for weakness, numbness and headaches.   Also arthralgia  Objective:   There were no vitals filed for this visit.  There is no height or weight on file to calculate BMI.  Physical Exam   Sounds well  Assessment:     1. B12 nutritional deficiency    2. Mild vitamin D deficiency    3. Iron deficiency    4. Anemia, unspecified type    5. Acute left-sided low back pain without sciatica    6. Menopausal symptoms      Plan:   Diagnoses and all orders for this visit:    B12 nutritional deficiency  Comments:  check lab  Orders:  -     Vitamin B12; Future    Mild vitamin D deficiency  Comments:  check lab  Orders:  -     Vitamin D; Future    Iron deficiency  Comments:  Check lab  Orders:  -     Iron and TIBC; Future  -     Ferritin; Future    Anemia, unspecified type  Comments:  recheck lab with iron  Orders:  -     CBC auto differential; Future    Acute left-sided low back pain without sciatica  Comments:  Continue with PT and will need to cross train and suggested that she speak with her therapist also    Menopausal symptoms  Comments:  Trial of glucosamine chondroitin        Problem List Items Addressed This Visit        left-sided back pain    Acute left-sided low back  pain without sciatica       Other    Anemia    Relevant Orders    CBC auto differential    Iron deficiency    Relevant Orders    Iron and TIBC    Ferritin    B12 nutritional deficiency - Primary    Relevant Orders    Vitamin B12      Other Visit Diagnoses     Mild vitamin D deficiency        check lab    Relevant Orders    Vitamin D    Menopausal symptoms        Trial of glucosamine chondroitin        Orders Placed This Encounter   Procedures    CBC auto differential     Standing Status:   Future     Standing Expiration Date:   12/20/2020    Vitamin B12     Standing Status:   Future     Standing Expiration Date:   12/20/2020    Vitamin D     Standing Status:   Future     Standing Expiration Date:   12/20/2020    Iron and TIBC     Standing Status:   Future     Standing Expiration Date:   12/20/2020    Ferritin     Standing Status:   Future     Standing Expiration Date:   12/20/2020     Follow up in about 23 weeks (around 9/28/2020) for Annual exam.     Medication List           Accurate as of April 20, 2020  8:33 AM. If you have any questions, ask your nurse or doctor.               CONTINUE taking these medications    alclomethasone 0.05 % ointment  Commonly known as:  ACLOVATE  AAA lips bid     ALLEGRA 180 MG tablet  Generic drug:  fexofenadine     VIORELE (28) 0.15-0.02 mgx21 /0.01 mg x 5 per tablet  Generic drug:  desog-e.estradioL/e.estradioL  TAKE 1 TABLET BY MOUTH EVERY DAY            Answers for HPI/ROS submitted by the patient on 4/18/2020   Back pain  genital pain: No

## 2020-04-29 ENCOUNTER — TELEPHONE (OUTPATIENT)
Dept: SPORTS MEDICINE | Facility: CLINIC | Age: 50
End: 2020-04-29

## 2020-04-29 DIAGNOSIS — M25.512 LEFT SHOULDER PAIN, UNSPECIFIED CHRONICITY: Primary | ICD-10-CM

## 2020-04-29 NOTE — TELEPHONE ENCOUNTER
----- Message from Magali Walden MA sent at 4/29/2020  2:45 PM CDT -----  Patient cancelled her f/u [facility] but she would for a PT order to be placed for her left shoulder at the [place] location.

## 2020-04-30 ENCOUNTER — CLINICAL SUPPORT (OUTPATIENT)
Dept: REHABILITATION | Facility: HOSPITAL | Age: 50
End: 2020-04-30
Payer: COMMERCIAL

## 2020-04-30 DIAGNOSIS — M54.50 ACUTE LEFT-SIDED LOW BACK PAIN WITHOUT SCIATICA: ICD-10-CM

## 2020-04-30 PROCEDURE — 97110 THERAPEUTIC EXERCISES: CPT | Mod: PN

## 2020-05-04 NOTE — PROGRESS NOTES
Physical Therapy Daily Treatment Note     Name: Brissa Del Angel [facility]  Clinic Number: [id number]    Patient unsafe for in-person office visit due to high risk co-morbidities, probability of infection, to minimize exposure, due to pt expressing symptoms, and/or due to government mandated quarantine.  This patient: (1) was informed that telehealth visits are now available congruent with guidelines set by state practice acts & CMS; (2) initiated scheduling of this type of visit; and (3) gave verbal consent to participate in such.  The patient & provider used Epic Ivet using both video & audio synchronous services to complete the visit.      Therapy Diagnosis:   Encounter Diagnosis   Name Primary?    Acute left-sided low back pain without sciatica      Physician: Sandra Betts MD    Visit Date: 4/30/2020  Patient Location:  Visit type: Virtual visit with synchronous audio and video through [employer]    Physician Orders: PT Eval and Treat   Medical Diagnosis: M54.5 (ICD-10-CM) - Left-sided low back pain without sciatica, unspecified chronicity  Evaluation Date: 2/10/2020  Authorization Period Expiration: 12/31/2020  Plan of Care Certification Period: 2/10/2020 to 04/03/2020  Visit # / Visits authorized: 10/ 60   FOTO: at dc     Time In: 0835  Time Out: 0905  Total Billable Time: 25 minutes (2 TE)  Precautions: Standard    Subjective     Pt reports: that her low back is doing much better after re-adjusting her sitting position at her work station at home and re-initiating her lumbar HEP.   She was not compliant with home exercise program.  Response to previous treatment: see above  Functional change: see above    Pain: 0/10, 5/10   Location: left lumbar spine, right shoulder     Objective     Brissa received therapeutic exercises to develop strength, endurance, ROM, flexibility, posture and core stabilization for 25 minutes including:  - discussed issued HEP with patient and her performance of these exercises.    -  visualized work station posture in sitting.  - educated in R shoulder therex including:   sidelying shoulder ER   Standing shoulder IR/ER with theraband    Standing no moneys   Supine shoulder flexion          Home Exercises Provided and Patient Education Provided   Education provided:   - updated HEP    Written Home Exercises Provided: Patient instructed to cont prior HEP.  Exercises were reviewed and Brissa was able to demonstrate them prior to the end of the session.  Brissa demonstrated good  understanding of the education provided.     See EMR under Media for exercises provided 04/14/2020 and 04/30/2020 (shoulder)    Assessment   A: Lumbar pain doing better after re-initiating HEP and adjusting workstation sitting posture at home.  Issued HEP for chronic R shoulder pain today.     Brissa is progressing well towards her goals.   Pt prognosis is Excellent.     Pt will continue to benefit from skilled outpatient physical therapy to address the deficits listed in the problem list box on initial evaluation, provide pt/family education and to maximize pt's level of independence in the home and community environment.     Pt's spiritual, cultural and educational needs considered and pt agreeable to plan of care and goals.     Anticipated barriers to physical therapy: none    Goals:   Long-Term Goals: 6 weeks:  1.  Patient will demonstrate normal lumbar quadrant testing without pain for improved positional tolerance. progressing towards; not met  2.  Patient will demonstrate 5/5 lumbar extensor MMT for improved tolerance to lifting/transferring mother.  progressing towards; not met  3.  Patient will report no pain after running at the gym.  progressing towards; not met  4. Patient will report ability to manage lumbar pain at home/work with HEP.  progressing towards; not met  5. Patient will report <15% limitation on Lumbar FOTO survey.  progressing towards; not met      Plan   Schedule for follow-up telehealth visit in  1-2 weeks.      Trip Gamino, PT

## 2020-05-14 ENCOUNTER — CLINICAL SUPPORT (OUTPATIENT)
Dept: REHABILITATION | Facility: HOSPITAL | Age: 50
End: 2020-05-14
Payer: COMMERCIAL

## 2020-05-14 ENCOUNTER — PATIENT MESSAGE (OUTPATIENT)
Dept: INTERNAL MEDICINE | Facility: CLINIC | Age: 50
End: 2020-05-14

## 2020-05-14 DIAGNOSIS — M25.612 DECREASED RANGE OF MOTION OF LEFT SHOULDER: ICD-10-CM

## 2020-05-14 DIAGNOSIS — Z12.11 COLON CANCER SCREENING: Primary | ICD-10-CM

## 2020-05-14 DIAGNOSIS — M25.512 LEFT SHOULDER PAIN, UNSPECIFIED CHRONICITY: ICD-10-CM

## 2020-05-14 PROCEDURE — 97161 PT EVAL LOW COMPLEX 20 MIN: CPT | Mod: PN

## 2020-05-14 NOTE — PLAN OF CARE
[facility]  Physical Therapy Initial Evaluation    Name: Brissa Mason  Clinic Number: [id number]    Therapy Diagnosis:   Encounter Diagnosis   Name Primary?    Left shoulder pain, unspecified chronicity      Physician: Joselito Garcia MD    Physician Orders: PT Eval and Treat  Medical Diagnosis: M25.512 (ICD-10-CM) - Left shoulder pain, unspecified chronicity  Evaluation Date: 2020   Authorization Period:  2020  Plan of Care Certification Period: 2020 to  07/10/2020  Visit # / Visits authorized:     Time In: 0815  Time Out: 0855  Total Billable Time: 40 minutes (1 LCE)  Precautions: Standard    Subjective   Brissa presents to PT today with primary complaint of L shoulder pain. Acute-on-chronic L shoulder pain.  Onset the 3 months ago.  Denies night pain at this time. Describes her pain as being more movement-specific pain. Exacerbated by: reaching overhead, reaching the backseat of the car.  Pain location seems to be more in the front of her shoulder that then radiates into her biceps area.       Past Medical History:   Diagnosis Date    Allergy     Anemia 2020    Anxiety disorder     Colitis     resolved; rectum ; neg flex sig 2006    Colitis: 2005 rectal area     resolved; rectum 2005; neg flex sig 2006     Menstrual migraine without status migrainosus, not intractable 2017     Brissa Mason  has a past surgical history that includes Ganglion cyst excision ().    Brissa has a current medication list which includes the following prescription(s): alclomethasone, fexofenadine, and viorele (28).    Review of patient's allergies indicates:   Allergen Reactions    Mobic [meloxicam] Rash    Erythromycin Other (See Comments)    Sulfa (sulfonamide antibiotics)      Other reaction(s): Rash        Imagin2020 L shoulder   Prior Therapy: yes for L shoulder with good benefits; 5 years ago.   Social History: Lives at home  with family.    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   Prior Level of Function: no L shoulder pain but still with limited motion.   Current Level of Function: see above    Pain:  Current 4/10, worst 6/10, best 0/10   Location: left shoulder   Description: Aching with occasional sharp, pinching pain    Pts goals: to be rid of her shoulder pain.     Objective     Palpation: TTP L bicipital   Posture:  L thoracic scoliosis, L scapular elevation as compared to R, L lateral pelvic tilt.     Gross Movement Analysis:  -Shoulder flexion:  L shoulder hiking at end-range with lack of scapular depression, ER, posterior tilting at end range      Range of Motion:    Left Right   Shoulder Flexion 140 (A=P) WNL   Shoulder abduction 130 (A=P) WNL   Shoulder ER 75 95   Shoulder IR WNL T6 level WNL T4 level   Horizontal abduction 40; pain WNL   Elbow WNL WNL   Wrist WNL WNL      Upper Extremity Strength  UE Right Left   Shoulder flexion: 5/5 4/5 pain    Shoulder Abduction: 5/5 5/5   Shoulder ER 5/5 4/5 pain **   Shoulder IR 5/5 5/5   Scapular adductors 5/5 3/5   Serratus Anterior 5/5 5/5   Elbow flexion 5/5 5/5   Elbow extension 5/5 5/5   Wrist extension 5/5 5/5   Thumb extension 5/5 5/5   PADs/DABs 5/5 5/5     Special Tests:   Right Left   Dilip's test - -   Drop Arm test - -   Neer's impingement test - +   Lopez Jalen - -   Speed's test - -   Biceps Load test - -   Shrug sign - -   ER lag sign - -          Joint Mobility: clicking with anterior joint mobs L shoulder  Sensation: normal light touch sensation to BUE in C4-T2 dermatomal pattern    CMS Impairment/Limitation/Restriction for FOTO Shoulder Survey    Therapist reviewed FOTO scores for Brissa Mason on 5/14/2020.   FOTO documents entered into Snapsheet - see Media section.    Limitation Score: 34%  Predicted Limitation score: 28%         TREATMENT   No treatment today.    Next visit:  Shoulder joint mobs (posterior), rhythmic stabilization RC, shoulder ER  strengthening,            Scapular strengthening (prone ext with ER, no moneys)      Home Exercises and Patient Education Provided  Education provided re:   - progress towards goals   - role of therapy in multi - disciplinary team, goals for therapy  - No spiritual or educational barriers to learning provided    Written Home Exercises Provided: not today; next visit.      Assessment   Brissa is a 50 y.o. female referred to outpatient Physical Therapy with a medical diagnosis of left shoulder pain, unspecified chronicity. Pt presents with L shoulder pain, loss of L shoulder ROM, decreased shoulder ER strength, impaired scapular mechanics with shoulder elevation, and thoracic L scoliosis pattern.   This musculoskeletal and neuromuscular problems are resulting in impaired ADL tolerance requiring modified shoulder positioning and lifting/reaching strategies.      Pt prognosis is Excellent.   Pt will benefit from skilled outpatient Physical Therapy to address the deficits stated above and in the chart below, provide pt/family education, and to maximize pt's level of independence.     Plan of care discussed with patient: Yes  Pt's spiritual, cultural and educational needs considered and patient is agreeable to the plan of care and goals as stated below:     Anticipated Barriers for therapy: none    Medical Necessity is demonstrated by the following  History  Co-morbidities and personal factors that may impact the plan of care Co-morbidities:   back    Personal Factors:   no deficits     low   Examination  Body Structures and Functions, activity limitations and participation restrictions that may impact the plan of care Body Regions:   upper extremities    Body Systems:    gross symmetry  ROM  strength  transitions  motor control    Participation Restrictions:   None      Activity limitations:   Learning and applying knowledge  no deficits    General Tasks and Commands  no deficits    Communication  no  deficits    Mobility  driving (bike, car, motorcycle)    Self care  washing oneself (bathing, drying, washing hands)  dressing    Domestic Life  doing house work (cleaning house, washing dishes, laundry)  reaching behind the car seat    Interactions/Relationships  no deficits    Life Areas  no deficits    Community and Social Life  recreation         low   Clinical Presentation stable and uncomplicated low   Decision Making/ Complexity Score: low     Goals:  Short Term Goals: 3-4 weeks   1.  Patient will normal horizontal abduction and shoulder IR as compared to non-painful R shoulder for improved reaching behind shoulder movement.   2.  Patient will demonstrate 3+/5 L shoulder ER for improved tolerance to household chores.   3.  Patient will demonstrate 165 degrees for L shoulder flexion for improved overhead reaching ability.     Long Term Goals: 8 weeks   1. Patient will report < 3/10 L shoulder pain at worst throughout her day.   2. Patient will report ability to reach in the backseat of the car without onset L shoulder pain or limitation.   3. Patient will report <24% limitation on Shoulder FOTO survey.     Plan   Certification Period/Plan of care expiration: 5/14/2020 to 07/10/2020.    Outpatient Physical Therapy 2 times weekly for 8 weeks to include the following interventions: Cervical/Lumbar Traction, Electrical Stimulation IFC, Gait Training, Manual Therapy, Moist Heat/ Ice, Neuromuscular Re-ed, Orthotic Management and Training, Patient Education, Self Care, Therapeutic Activites and Therapeutic Exercise, Dry Needling, IASTM.     Trip Gamino, PT

## 2020-05-15 ENCOUNTER — TELEPHONE (OUTPATIENT)
Dept: ENDOSCOPY | Facility: HOSPITAL | Age: 50
End: 2020-05-15

## 2020-05-15 DIAGNOSIS — Z80.0 FH: COLON CANCER: Primary | ICD-10-CM

## 2020-05-15 DIAGNOSIS — Z12.11 SPECIAL SCREENING FOR MALIGNANT NEOPLASMS, COLON: Primary | ICD-10-CM

## 2020-05-15 RX ORDER — SODIUM, POTASSIUM,MAG SULFATES 17.5-3.13G
1 SOLUTION, RECONSTITUTED, ORAL ORAL ONCE
Qty: 1 BOTTLE | Refills: 0 | Status: SHIPPED | OUTPATIENT
Start: 2020-05-15 | End: 2020-05-15

## 2020-05-18 PROBLEM — M25.612 DECREASED RANGE OF MOTION OF LEFT SHOULDER: Status: ACTIVE | Noted: 2020-05-18

## 2020-05-21 ENCOUNTER — CLINICAL SUPPORT (OUTPATIENT)
Dept: REHABILITATION | Facility: HOSPITAL | Age: 50
End: 2020-05-21
Payer: COMMERCIAL

## 2020-05-21 DIAGNOSIS — M25.612 DECREASED RANGE OF MOTION OF LEFT SHOULDER: ICD-10-CM

## 2020-05-21 DIAGNOSIS — M25.512 ACUTE PAIN OF LEFT SHOULDER: Primary | ICD-10-CM

## 2020-05-21 PROCEDURE — 97140 MANUAL THERAPY 1/> REGIONS: CPT | Mod: PN

## 2020-05-21 PROCEDURE — 97110 THERAPEUTIC EXERCISES: CPT | Mod: PN

## 2020-05-22 ENCOUNTER — OFFICE VISIT (OUTPATIENT)
Dept: OPTOMETRY | Facility: CLINIC | Age: 50
End: 2020-05-22
Payer: COMMERCIAL

## 2020-05-22 ENCOUNTER — PATIENT MESSAGE (OUTPATIENT)
Dept: OPTOMETRY | Facility: CLINIC | Age: 50
End: 2020-05-22

## 2020-05-22 DIAGNOSIS — H25.13 NS (NUCLEAR SCLEROSIS), BILATERAL: Primary | ICD-10-CM

## 2020-05-22 DIAGNOSIS — Z04.9 DISEASE RULED OUT AFTER EXAMINATION: ICD-10-CM

## 2020-05-22 DIAGNOSIS — H52.4 PRESBYOPIA: ICD-10-CM

## 2020-05-22 DIAGNOSIS — H52.03 HYPEROPIA, BILATERAL: ICD-10-CM

## 2020-05-22 PROCEDURE — 92014 PR EYE EXAM, EST PATIENT,COMPREHESV: ICD-10-PCS | Mod: S$GLB,,, | Performed by: OPTOMETRIST

## 2020-05-22 PROCEDURE — 92014 COMPRE OPH EXAM EST PT 1/>: CPT | Mod: S$GLB,,, | Performed by: OPTOMETRIST

## 2020-05-22 PROCEDURE — 99999 PR PBB SHADOW E&M-EST. PATIENT-LVL II: CPT | Mod: PBBFAC,,, | Performed by: OPTOMETRIST

## 2020-05-22 PROCEDURE — 99999 PR PBB SHADOW E&M-EST. PATIENT-LVL II: ICD-10-PCS | Mod: PBBFAC,,, | Performed by: OPTOMETRIST

## 2020-05-22 NOTE — PROGRESS NOTES
HPI     Last eye exam was 3/2/18 with     Pt here for routine eye exam.  No Va changes.  Patient denies diplopia, headaches, flashes/floaters, and pain.  No eye drops, and no eye sx.       Last edited by Iliana Luke on 5/22/2020  3:33 PM. (History)            Assessment /Plan     For exam results, see Encounter Report.    NS (nuclear sclerosis), bilateral    Disease ruled out after examination    Presbyopia    Hyperopia, bilateral      Good overall ocular health, monitor yearly  Ok to continue with OTC readers at this time    RTC 1 year, sooner PRN

## 2020-05-26 ENCOUNTER — PATIENT MESSAGE (OUTPATIENT)
Dept: OPTOMETRY | Facility: CLINIC | Age: 50
End: 2020-05-26

## 2020-05-27 ENCOUNTER — CLINICAL SUPPORT (OUTPATIENT)
Dept: REHABILITATION | Facility: HOSPITAL | Age: 50
End: 2020-05-27
Payer: COMMERCIAL

## 2020-05-27 DIAGNOSIS — M25.612 DECREASED RANGE OF MOTION OF LEFT SHOULDER: ICD-10-CM

## 2020-05-27 PROCEDURE — 97110 THERAPEUTIC EXERCISES: CPT | Mod: PN

## 2020-05-27 PROCEDURE — 97140 MANUAL THERAPY 1/> REGIONS: CPT | Mod: PN

## 2020-05-27 NOTE — PROGRESS NOTES
Physical Therapy Treatment Note     [facility]  [id number]    Therapy Diagnosis:   Encounter Diagnosis   Name Primary?    Decreased range of motion of left shoulder      Physician: Joselito Garcia MD    Visit Date: 5/27/2020    Physician Orders: PT Eval and Treat  Medical Diagnosis: M25.512 (ICD-10-CM) - Left shoulder pain, unspecified chronicity  Evaluation Date: 5/14/2020   Authorization Period:  12/31/2020  Plan of Care Certification Period: 5/14/2020 to  07/10/2020  Visit # / Visits authorized: 3/ 20  FOTO: 3/4     Time In: 1445  Time Out: 1525  Total Billable Time: 40 minutes (2 TE, 1 MT)  Precautions: Standard    Subjective     Pt reports: L shoulder pain and clicking/catching sensation in her shoulder when she moves her shoulder in certain positions.   She was compliant with home exercise program.  Response to previous treatment: eval   Functional change: none voiced at this time.     Pain: 4/10  Location: left shoulder anterior pain     Objective   O:  L shoulder flexion limited in AG position >120 degrees        TTP anteriorly along anterior greater tuberosity of humerus    Brissa received therapeutic exercises to develop strength, endurance, ROM, flexibility and posture for 30 minutes including:  - sidelying shoulder ER   2x15 2#  - supine RC stabilization flexion  7 rounds  - supine serratus punches   5#; 3x10  - prone shoulder extension with ER  2x10 2#  - prone horizontal abd    2x10 A/AAROM  - standing shoulder ER   RTB; 2x20  - no moneys     RTB; 2x15    Brissa received the following manual therapy techniques: total time 15 minutes  - grade II/III/IV left shoulder passive physiological flexion, IR, ER  - scapular mobs with emphasis on posterior tilting and ER    Home Exercises Provided and Patient Education Provided   Education provided:   - updated HEP    Written Home Exercises Provided: Patient instructed to cont prior HEP.  Exercises were reviewed and Brissa  was able to demonstrate them prior to the end of the session.  Brissa demonstrated good  understanding of the education provided.     See EMR under Patient Instructions for exercises provided 5/21/2020.    Assessment   A: L shoulder pain anteriorly with mobility deficit into terminal flexion.  Noted calcific tendinitis on Xray.     Brissa is progressing well towards her goals.   Pt prognosis is Excellent.     Pt will continue to benefit from skilled outpatient physical therapy to address the deficits listed in the problem list box on initial evaluation, provide pt/family education and to maximize pt's level of independence in the home and community environment.     Pt's spiritual, cultural and educational needs considered and pt agreeable to plan of care and goals.     Anticipated barriers to physical therapy: none    Goals:   Short Term Goals: 3-4 weeks   1.  Patient will normal horizontal abduction and shoulder IR as compared to non-painful R shoulder for improved reaching behind shoulder movement. progressing towards; not met.  2.  Patient will demonstrate 3+/5 L shoulder ER for improved tolerance to household chores. progressing towards; not met.  3.  Patient will demonstrate 165 degrees for L shoulder flexion for improved overhead reaching ability. progressing towards; not met.    Long Term Goals: 8 weeks   1. Patient will report < 3/10 L shoulder pain at worst throughout her day.  progressing towards; not met.  2. Patient will report ability to reach in the backseat of the car without onset L shoulder pain or limitation.  progressing towards; not met.  3. Patient will report <24% limitation on Shoulder FOTO survey.  progressing towards; not met.    Plan     Continue plan of care.  Monitor response to interventions.  Adjust intensity accordingly.       Trip Gamino, PT

## 2020-05-28 ENCOUNTER — LAB VISIT (OUTPATIENT)
Dept: LAB | Facility: HOSPITAL | Age: 50
End: 2020-05-28
Attending: INTERNAL MEDICINE
Payer: COMMERCIAL

## 2020-05-28 DIAGNOSIS — D64.9 ANEMIA, UNSPECIFIED TYPE: ICD-10-CM

## 2020-05-28 DIAGNOSIS — E61.1 IRON DEFICIENCY: ICD-10-CM

## 2020-05-28 DIAGNOSIS — E55.9 MILD VITAMIN D DEFICIENCY: ICD-10-CM

## 2020-05-28 DIAGNOSIS — E53.8 B12 NUTRITIONAL DEFICIENCY: ICD-10-CM

## 2020-05-28 LAB
25(OH)D3+25(OH)D2 SERPL-MCNC: 40 NG/ML (ref 30–96)
BASOPHILS # BLD AUTO: 0.05 K/UL (ref 0–0.2)
BASOPHILS NFR BLD: 0.9 % (ref 0–1.9)
DIFFERENTIAL METHOD: ABNORMAL
EOSINOPHIL # BLD AUTO: 0.2 K/UL (ref 0–0.5)
EOSINOPHIL NFR BLD: 2.8 % (ref 0–8)
ERYTHROCYTE [DISTWIDTH] IN BLOOD BY AUTOMATED COUNT: 12.7 % (ref 11.5–14.5)
FERRITIN SERPL-MCNC: 9 NG/ML (ref 20–300)
HCT VFR BLD AUTO: 40.4 % (ref 37–48.5)
HGB BLD-MCNC: 12.8 G/DL (ref 12–16)
IMM GRANULOCYTES # BLD AUTO: 0.01 K/UL (ref 0–0.04)
IMM GRANULOCYTES NFR BLD AUTO: 0.2 % (ref 0–0.5)
IRON SERPL-MCNC: 118 UG/DL (ref 30–160)
LYMPHOCYTES # BLD AUTO: 1.9 K/UL (ref 1–4.8)
LYMPHOCYTES NFR BLD: 34.8 % (ref 18–48)
MCH RBC QN AUTO: 29.4 PG (ref 27–31)
MCHC RBC AUTO-ENTMCNC: 31.7 G/DL (ref 32–36)
MCV RBC AUTO: 93 FL (ref 82–98)
MONOCYTES # BLD AUTO: 0.4 K/UL (ref 0.3–1)
MONOCYTES NFR BLD: 8.1 % (ref 4–15)
NEUTROPHILS # BLD AUTO: 2.9 K/UL (ref 1.8–7.7)
NEUTROPHILS NFR BLD: 53.2 % (ref 38–73)
NRBC BLD-RTO: 0 /100 WBC
PLATELET # BLD AUTO: 272 K/UL (ref 150–350)
PMV BLD AUTO: 11 FL (ref 9.2–12.9)
RBC # BLD AUTO: 4.35 M/UL (ref 4–5.4)
SATURATED IRON: 28 % (ref 20–50)
TOTAL IRON BINDING CAPACITY: 422 UG/DL (ref 250–450)
TRANSFERRIN SERPL-MCNC: 285 MG/DL (ref 200–375)
VIT B12 SERPL-MCNC: 885 PG/ML (ref 210–950)
WBC # BLD AUTO: 5.4 K/UL (ref 3.9–12.7)

## 2020-05-28 PROCEDURE — 83540 ASSAY OF IRON: CPT

## 2020-05-28 PROCEDURE — 82306 VITAMIN D 25 HYDROXY: CPT

## 2020-05-28 PROCEDURE — 85025 COMPLETE CBC W/AUTO DIFF WBC: CPT

## 2020-05-28 PROCEDURE — 82607 VITAMIN B-12: CPT

## 2020-05-28 PROCEDURE — 36415 COLL VENOUS BLD VENIPUNCTURE: CPT

## 2020-05-28 PROCEDURE — 82728 ASSAY OF FERRITIN: CPT

## 2020-05-29 ENCOUNTER — CLINICAL SUPPORT (OUTPATIENT)
Dept: REHABILITATION | Facility: HOSPITAL | Age: 50
End: 2020-05-29
Payer: COMMERCIAL

## 2020-05-29 ENCOUNTER — PATIENT MESSAGE (OUTPATIENT)
Dept: INTERNAL MEDICINE | Facility: CLINIC | Age: 50
End: 2020-05-29

## 2020-05-29 DIAGNOSIS — M25.612 DECREASED RANGE OF MOTION OF LEFT SHOULDER: ICD-10-CM

## 2020-05-29 DIAGNOSIS — M25.512 ACUTE PAIN OF LEFT SHOULDER: ICD-10-CM

## 2020-05-29 PROCEDURE — 97140 MANUAL THERAPY 1/> REGIONS: CPT | Mod: PN

## 2020-05-29 PROCEDURE — 97110 THERAPEUTIC EXERCISES: CPT | Mod: PN

## 2020-05-29 NOTE — PROGRESS NOTES
Physical Therapy Treatment Note     [facility]  [facility] [id number]    Therapy Diagnosis:   Encounter Diagnosis   Name Primary?    Decreased range of motion of left shoulder      Physician: Joselito Garcia MD    Visit Date: 5/29/2020    Physician Orders: PT Eval and Treat  Medical Diagnosis: M25.512 (ICD-10-CM) - Left shoulder pain, unspecified chronicity  Evaluation Date: 5/14/2020   Authorization Period:  12/31/2020  Plan of Care Certification Period: 5/14/2020 to  07/10/2020  Visit # / Visits authorized: 4/ 20  FOTO: 4/5     Time In: 1005  Time Out: 1045  Total Billable Time: 40 minutes (2 TE, 1 MT)  Precautions: Standard    Subjective     Pt reports: that her R shoulder is more painful today and over the last week.  She correlate this with having to work from home as opposed to her desk at work.  At work she has a chair with armrests.  At home she is sitting in a dining chair without arms rest.  She mainly uses her right arm to work her computer mouse and telephone.   She was compliant with home exercise program.  Response to previous treatment: muscle soreness L shoulder area.   Functional change: none voiced at this time.     Pain: 4/10  Location: left shoulder anterior pain     Objective   O:  L shoulder flexion limited in AG position >140 degrees with superior pinching sensation        Good capsular joint play t/o L shoulder         TTP anteriorly along anterior greater tuberosity of humerus          R shoulder flexion pain at 140 degrees superiorly        TTP along superoanterior shoulder lateral to bicipital grove.         Mild pain with MMT testing supraspinatus, infraspinatus, and subscapularis        (-) Hawkin's Jalen, (-) Dilip's, (+) painful arc.     Brissa received therapeutic exercises to develop strength, endurance, ROM, flexibility and posture for 30 minutes including:  - sidelying shoulder ER   2x15 2#  - supine RC stabilization flexion  5 rounds  - supine serratus  "punches   5#; 3x10  - prone shoulder extension with ER  2x10 2#  - prone horizontal abd    2x10 AROM  - prone scap setting    5x15" holds  - standing shoulder ER   RTB; 2x10 walk-outs  - no moneys     RTB; 2x15  - standing scaption     1#; 2x10    Brissa received the following manual therapy techniques: total time 15 minutes  - grade II/III/IV left shoulder passive physiological flexion, IR, ER  - scapular mobs with emphasis on posterior tilting and ER    Home Exercises Provided and Patient Education Provided   Education provided:   - updated HEP    Written Home Exercises Provided: Patient instructed to cont prior HEP.  Exercises were reviewed and Brissa was able to demonstrate them prior to the end of the session.  Brissa demonstrated good  understanding of the education provided.     See EMR under Patient Instructions for exercises provided 5/21/2020.    Assessment   A: L shoulder pain with flexion > 140 degrees, supraspinatus muscle pain, and superior/posterior impingement with flexion/ER. Calcific tendinitis on Xray.  R shoulder pain more acute and sharp but similar in presentation to L.  Instructed in R shoulder ice for pain control and inflammation control as well as sub-max to max isometrics RC at home over the next several days.     Brissa is progressing well towards her goals.   Pt prognosis is Excellent.     Pt will continue to benefit from skilled outpatient physical therapy to address the deficits listed in the problem list box on initial evaluation, provide pt/family education and to maximize pt's level of independence in the home and community environment.     Pt's spiritual, cultural and educational needs considered and pt agreeable to plan of care and goals.     Anticipated barriers to physical therapy: none    Goals:   Short Term Goals: 3-4 weeks   1.  Patient will normal horizontal abduction and shoulder IR as compared to non-painful R shoulder for improved reaching behind shoulder movement. " progressing towards; not met.  2.  Patient will demonstrate 3+/5 L shoulder ER for improved tolerance to household chores. progressing towards; not met.  3.  Patient will demonstrate 165 degrees for L shoulder flexion for improved overhead reaching ability. progressing towards; not met.    Long Term Goals: 8 weeks   1. Patient will report < 3/10 L shoulder pain at worst throughout her day.  progressing towards; not met.  2. Patient will report ability to reach in the backseat of the car without onset L shoulder pain or limitation.  progressing towards; not met.  3. Patient will report <24% limitation on Shoulder FOTO survey.  progressing towards; not met.    Plan     Continue plan of care.  Monitor response to interventions.  Adjust intensity accordingly.       Trip Gamino, PT

## 2020-06-01 ENCOUNTER — CLINICAL SUPPORT (OUTPATIENT)
Dept: REHABILITATION | Facility: HOSPITAL | Age: 50
End: 2020-06-01
Payer: COMMERCIAL

## 2020-06-01 DIAGNOSIS — M25.612 DECREASED RANGE OF MOTION OF LEFT SHOULDER: ICD-10-CM

## 2020-06-01 PROCEDURE — 97110 THERAPEUTIC EXERCISES: CPT | Mod: PN

## 2020-06-01 PROCEDURE — 97140 MANUAL THERAPY 1/> REGIONS: CPT | Mod: PN

## 2020-06-01 NOTE — PROGRESS NOTES
"  Physical Therapy Treatment Note     [facility]  [id number]    Therapy Diagnosis:   Encounter Diagnosis   Name Primary?    Decreased range of motion of left shoulder      Physician: Joselito Garcia MD    Visit Date: 6/1/2020    Physician Orders: PT Eval and Treat  Medical Diagnosis: M25.512 (ICD-10-CM) - Left shoulder pain, unspecified chronicity  Evaluation Date: 5/14/2020   Authorization Period:  12/31/2020  Plan of Care Certification Period: 5/14/2020 to  07/10/2020  Visit # / Visits authorized: 5/ 20  FOTO: 5/5 next     Time In: 1015  Time Out: 1100  Total Billable Time: 45 minutes (2 TE, 1 MT)  Precautions: Standard    Subjective     Pt reports: much improved R shoulder pain.  She did not work at her desk this weekend and feels likes her home work station is a key factor in her R shoulder pain at least.     She was compliant with home exercise program.  Response to previous treatment: muscle soreness L shoulder area.   Functional change: none voiced at this time.     Pain: 5/10, 2/10  Location: left shoulder anterior pain, right shoulder    Objective   O:  L shoulder flexion limited in AG position >140 degrees with superoanterior pinching sensation; improved with scapular retraction/ER assist  Good capsular joint play t/o L shoulder except anterior band IGHL   TTP anteriorly along anterior greater tuberosity of humerus      Brissa received therapeutic exercises to develop strength, endurance, ROM, flexibility and posture for 30 minutes including:  - sidelying shoulder ER   Held today; pain  - supine RC stabilization flexion  5 rounds at 100 degrees  - supine serratus punches   5#; 3x10 (out of time)  - prone shoulder extension with ER  2x10 2#  - prone horizontal abd    2x10 AROM  - prone scap setting    5x15" holds  - standing shoulder ER   RTB; 2x10 walk-outs  - standing shoulder ER TB   RTB; 2x15  - no moneys     RTB; 2x15 (out of time)  - mat shoulder taps    2x20 B  - " standing scaption     1#; 2x10 (not today)    Brissa received the following manual therapy techniques: total time 15 minutes  - grade II/III left shoulder passive physiological flexion, IR, ER pain-free  - scapular mobs with emphasis on posterior tilting and ER    Home Exercises Provided and Patient Education Provided   Education provided:   - updated HEP    Written Home Exercises Provided: Patient instructed to cont prior HEP.  Exercises were reviewed and Brissa was able to demonstrate them prior to the end of the session.  Brissa demonstrated good  understanding of the education provided.     See EMR under Patient Instructions for exercises provided 5/21/2020.    Assessment   A: L shoulder pain with flexion > 140 degrees, supraspinatus muscle pain, and superior/posterior impingement with flexion/ER. PROM = AROM. Non-capsular pattern. Catching sensation moving from flexion back into extension. Calcific tendinitis on Xray.  R shoulder pain much improved today following rest at home over the weekend.      Brissa is progressing well towards her goals.   Pt prognosis is Excellent.     Pt will continue to benefit from skilled outpatient physical therapy to address the deficits listed in the problem list box on initial evaluation, provide pt/family education and to maximize pt's level of independence in the home and community environment.     Pt's spiritual, cultural and educational needs considered and pt agreeable to plan of care and goals.     Anticipated barriers to physical therapy: none    Goals:   Short Term Goals: 3-4 weeks   1.  Patient will normal horizontal abduction and shoulder IR as compared to non-painful R shoulder for improved reaching behind shoulder movement. progressing towards; not met.  2.  Patient will demonstrate 3+/5 L shoulder ER for improved tolerance to household chores. progressing towards; not met.  3.  Patient will demonstrate 165 degrees for L shoulder flexion for improved overhead  reaching ability. progressing towards; not met.    Long Term Goals: 8 weeks   1. Patient will report < 3/10 L shoulder pain at worst throughout her day.  progressing towards; not met.  2. Patient will report ability to reach in the backseat of the car without onset L shoulder pain or limitation.  progressing towards; not met.  3. Patient will report <24% limitation on Shoulder FOTO survey.  progressing towards; not met.    Plan   Shoulder complex strengthening emphasis > ROM.  Avoid red zone (painful arc).  Work on ROM/strength in green zone only (non-painful ROM).     Trip Gamino, PT

## 2020-06-03 ENCOUNTER — CLINICAL SUPPORT (OUTPATIENT)
Dept: REHABILITATION | Facility: HOSPITAL | Age: 50
End: 2020-06-03
Payer: COMMERCIAL

## 2020-06-03 DIAGNOSIS — M25.512 ACUTE PAIN OF LEFT SHOULDER: ICD-10-CM

## 2020-06-03 DIAGNOSIS — M25.612 DECREASED RANGE OF MOTION OF LEFT SHOULDER: ICD-10-CM

## 2020-06-03 PROCEDURE — 97140 MANUAL THERAPY 1/> REGIONS: CPT | Mod: PN

## 2020-06-03 PROCEDURE — 97110 THERAPEUTIC EXERCISES: CPT | Mod: PN

## 2020-06-03 NOTE — PROGRESS NOTES
"  Physical Therapy Treatment Note     [facility]  [id number]    Therapy Diagnosis:   Encounter Diagnosis   Name Primary?    Decreased range of motion of left shoulder      Physician: Joselito Garcia MD    Visit Date: 6/3/2020    Physician Orders: PT Eval and Treat  Medical Diagnosis: M25.512 (ICD-10-CM) - Left shoulder pain, unspecified chronicity  Evaluation Date: 5/14/2020   Authorization Period:  12/31/2020  Plan of Care Certification Period: 5/14/2020 to  07/10/2020  Visit # / Visits authorized: 6/ 20  FOTO: today     Time In: 1000  Time Out: 1040  Total Billable Time: 40 minutes (2 TE, 1 MT)  Precautions: Standard    Subjective     Pt reports: much improved R shoulder pain.  She did not work at her desk this weekend and feels likes her home work station is a key factor in her R shoulder pain at least.     She was compliant with home exercise program.  Response to previous treatment: muscle soreness L shoulder area.   Functional change: none voiced at this time.     Pain: 5/10, 2/10  Location: left shoulder anterior pain, right shoulder    Objective   O:  L shoulder flexion limited in AG position >140 degrees with superoanterior pinching sensation; improved with scapular retraction/ER assist  Good capsular joint play t/o L shoulder except anterior band IGHL   L scapular elevation with mild L thoracic scoliosis pattern  TTP anteriorly along anterior greater tuberosity of humerus      Brissa received therapeutic exercises to develop strength, endurance, ROM, flexibility and posture for 30 minutes including:  - supine RC stabilization flexion  5 rounds at 100 degrees (good) and 130 degrees (poor)  - supine serratus punches   5#; 3x10 (out of time)  - prone shoulder extension with ER  2x10 2#  - prone horizontal abd    2x10 AROM (out of time)  - prone scap setting    5x15" holds  - standing shoulder ER   RTB; 2x10 walk-outs  - standing shoulder ER TB   RTB; 2x15  - mat shoulder " taps    2x20 B  - standing scaption     1#; 2x10 B  - SAPD     Blue TB; 3x10 LUE    Brissa received the following manual therapy techniques: total time 15 minutes  - grade II/III left shoulder passive physiological flexion, IR, ER pain-free  - posterior capsule stretching with scapular pinning.   - scapular mobs with emphasis on posterior tilting and ER    Home Exercises Provided and Patient Education Provided   Education provided:   - updated HEP    Written Home Exercises Provided: Patient instructed to cont prior HEP.  Exercises were reviewed and Brissa was able to demonstrate them prior to the end of the session.  Brissa demonstrated good  understanding of the education provided.     See EMR under Patient Instructions for exercises provided 5/21/2020.    Assessment   A: L shoulder pain with flexion improved today with terminal flexion. Superior/posterior impingement with flexion/ER. PROM = AROM. Non-capsular pattern. Calcific tendinitis on Xray.  R shoulder pain much improved today following rest at home over the weekend.         Brissa is progressing well towards her goals.   Pt prognosis is Excellent.     Pt will continue to benefit from skilled outpatient physical therapy to address the deficits listed in the problem list box on initial evaluation, provide pt/family education and to maximize pt's level of independence in the home and community environment.   Pt's spiritual, cultural and educational needs considered and pt agreeable to plan of care and goals.     Anticipated barriers to physical therapy: none    Goals:   Short Term Goals: 3-4 weeks   1.  Patient will normal horizontal abduction and shoulder IR as compared to non-painful R shoulder for improved reaching behind shoulder movement. progressing towards; not met.  2.  Patient will demonstrate 3+/5 L shoulder ER for improved tolerance to household chores. progressing towards; not met.  3.  Patient will demonstrate 165 degrees for L shoulder flexion  for improved overhead reaching ability. progressing towards; not met.    Long Term Goals: 8 weeks   1. Patient will report < 3/10 L shoulder pain at worst throughout her day.  progressing towards; not met.  2. Patient will report ability to reach in the backseat of the car without onset L shoulder pain or limitation.  progressing towards; not met.  3. Patient will report <24% limitation on Shoulder FOTO survey.  progressing towards; not met.    Plan   Shoulder complex strengthening emphasis > ROM.  Avoid red zone (painful arc).  Work on ROM/strength in green zone only (non-painful ROM).     Trip Gamino, PT

## 2020-06-08 ENCOUNTER — CLINICAL SUPPORT (OUTPATIENT)
Dept: REHABILITATION | Facility: HOSPITAL | Age: 50
End: 2020-06-08
Payer: COMMERCIAL

## 2020-06-08 DIAGNOSIS — M25.612 DECREASED RANGE OF MOTION OF LEFT SHOULDER: ICD-10-CM

## 2020-06-08 PROCEDURE — 97110 THERAPEUTIC EXERCISES: CPT | Mod: PN

## 2020-06-08 PROCEDURE — 97140 MANUAL THERAPY 1/> REGIONS: CPT | Mod: PN

## 2020-06-08 NOTE — PROGRESS NOTES
"  Physical Therapy Treatment Note     [facility]  [id number]    Therapy Diagnosis:   Encounter Diagnosis   Name Primary?    Decreased range of motion of left shoulder      Physician: Joselito Garcia MD    Visit Date: 6/8/2020    Physician Orders: PT Eval and Treat  Medical Diagnosis: M25.512 (ICD-10-CM) - Left shoulder pain, unspecified chronicity  Evaluation Date: 5/14/2020   Authorization Period:  12/31/2020  Plan of Care Certification Period: 5/14/2020 to  07/10/2020  Visit # / Visits authorized: 7/ 20       Time In: 12:47 pm  Time Out: 1:32 pm  Total Billable Time: 45 minutes (2 TE, 1 MT)  Precautions: Standard    Subjective     Pt reports: her shoulder pain is continuing to improve. Patient has been partially compliant with HEP.     She was compliant with home exercise program.  Response to previous treatment: muscle soreness L shoulder area.   Functional change: none voiced at this time.     Pain: 4/10, 2/10  Location: left shoulder anterior pain, right shoulder    Objective   O:  L shoulder flexion limited in AG position >140 degrees with superoanterior pinching sensation; improved with scapular retraction/ER assist  Good capsular joint play t/o L shoulder except anterior band IGHL   L scapular elevation with mild L thoracic scoliosis pattern  TTP anteriorly along anterior greater tuberosity of humerus      Brissa received therapeutic exercises to develop strength, endurance, ROM, flexibility and posture for 30 minutes including:  - supine RC stabilization flexion  5 rounds at 100 degrees (good)   - supine serratus punches   5#; 3x10   - straight arm scapular walk outs   RTB; 2 x 5  - prone shoulder extension with ER  2x10 2#  - prone horizontal abd    2x10 AROM   - prone scap setting    5x15" holds  - standing shoulder ER   RTB; 3x5 walk-outs  - standing shoulder ER TB   RTB; 2x15 - HELD due to pain  - standing shoulder IR TB   RTB; 2x10  - mat shoulder taps    2x20 B  - " standing scaption     1#; 2x10 B  - SAPD     Blue TB; 3x10 LUE - HELD    Brissa received the following manual therapy techniques: total time 15 minutes  - Light cupping (L) anterior shoulder  - CFM anterior shoulder  - grade II/III left shoulder passive physiological flexion, IR, ER pain-free  - posterior capsule stretching with scapular pinning - not done today  - scapular mobs with emphasis on posterior tilting and ER    Home Exercises Provided and Patient Education Provided   Education provided:   - updated HEP    Written Home Exercises Provided: Patient instructed to cont prior HEP.  Exercises were reviewed and Brissa was able to demonstrate them prior to the end of the session.  Brissa demonstrated good  understanding of the education provided.     See EMR under Patient Instructions for exercises provided 5/21/2020.    Assessment   A: L shoulder pain with flexion improved today with terminal flexion. Superior/posterior impingement with flexion/ER. PROM = AROM. Non-capsular pattern. Calcific tendinitis on Xray.  R shoulder pain much improved today following rest at home over the weekend. Anterior shoulder pain present > 100 degrees (L) shoulder flexion. Plan to continue progressive shoulder stability strengthening.    Brissa is progressing well towards her goals.   Pt prognosis is Excellent.     Pt will continue to benefit from skilled outpatient physical therapy to address the deficits listed in the problem list box on initial evaluation, provide pt/family education and to maximize pt's level of independence in the home and community environment.   Pt's spiritual, cultural and educational needs considered and pt agreeable to plan of care and goals.     Anticipated barriers to physical therapy: none    Goals:   Short Term Goals: 3-4 weeks   1.  Patient will normal horizontal abduction and shoulder IR as compared to non-painful R shoulder for improved reaching behind shoulder movement. progressing towards; not  met.  2.  Patient will demonstrate 3+/5 L shoulder ER for improved tolerance to household chores. progressing towards; not met.  3.  Patient will demonstrate 165 degrees for L shoulder flexion for improved overhead reaching ability. progressing towards; not met.    Long Term Goals: 8 weeks   1. Patient will report < 3/10 L shoulder pain at worst throughout her day.  progressing towards; not met.  2. Patient will report ability to reach in the backseat of the car without onset L shoulder pain or limitation.  progressing towards; not met.  3. Patient will report <24% limitation on Shoulder FOTO survey.  progressing towards; not met.    Plan   Shoulder complex strengthening emphasis > ROM.  Avoid red zone (painful arc).  Work on ROM/strength in green zone only (non-painful ROM).     Yahir Bernal, PT      Home

## 2020-06-10 ENCOUNTER — CLINICAL SUPPORT (OUTPATIENT)
Dept: REHABILITATION | Facility: HOSPITAL | Age: 50
End: 2020-06-10
Payer: COMMERCIAL

## 2020-06-10 DIAGNOSIS — M25.612 DECREASED RANGE OF MOTION OF LEFT SHOULDER: ICD-10-CM

## 2020-06-10 PROCEDURE — 97140 MANUAL THERAPY 1/> REGIONS: CPT | Mod: PN

## 2020-06-10 PROCEDURE — 97110 THERAPEUTIC EXERCISES: CPT | Mod: PN

## 2020-06-10 NOTE — PROGRESS NOTES
"  Physical Therapy Treatment Note     [facility]  [facility] [id number]    Therapy Diagnosis:    Encounter Diagnosis   Name Primary?    Decreased range of motion of left shoulder      Physician: Joselito Garcia MD    Visit Date: 6/10/2020    Physician Orders: PT Eval and Treat  Medical Diagnosis: M25.512 (ICD-10-CM) - Left shoulder pain, unspecified chronicity  Evaluation Date: 5/14/2020   Authorization Period:  12/31/2020  Plan of Care Certification Period: 5/14/2020 to  07/10/2020  Visit # / Visits authorized: 8/20       Time In: 4:30 PM  Time Out: 5:18 PM  Total Billable Time: 48 minutes (2 TE, 1 MT)  Precautions: Standard    Subjective     Pt reports: that she doesn't really have pain when she's sitting, only when she's attempting to put her arm in certain positions.     She was not compliant with home exercise program.  Response to previous treatment: Some muscle soreness   Functional change: Ongoing    Pain: 0/10  Location: left shoulder anterior pain    Objective     Brissa received therapeutic exercises to develop strength, endurance, ROM, flexibility and posture for 38 minutes including:  - supine RC stabilization flexion  5 rounds at 100 degrees (good)   - supine serratus punches   5# dumbbells; 3x10   - prone shoulder extension with ER  3x10 2#  - prone horizontal abd    2x10 AROM   - prone scap setting    5x15" holds  - mat shoulder taps    2x20 B  - standing scaption     1#; 3x10 B, audible clicking in shoulder near end of second set  - straight arm scapular walk outs   RTB; 2 x 7  - standing shoulder ER   RTB; 4x5 walk-outs  - standing shoulder ER TB   RTB; 2x15 - HELD due to pain  - standing shoulder IR TB   RTB; 3x10  - SAPD     Blue TB; 3x10 LUE - HELD    Brissa received the following manual therapy techniques: total time 10 minutes  - Light cupping (L) anterior shoulder - NOT TODAY  - CFM anterior shoulder - NOT TODAY  - grade II/III left shoulder passive physiological " flexion, IR, ER pain-free  - posterior capsule stretching with scapular pinning  - scapular mobs with emphasis on posterior tilting and ER      Home Exercises Provided and Patient Education Provided   Education provided:   - updated HEP    Written Home Exercises Provided: Patient instructed to cont prior HEP.  Exercises were reviewed and Brissa was able to demonstrate them prior to the end of the session.  Brissa demonstrated good  understanding of the education provided.     See EMR under Patient Instructions for exercises provided 5/21/2020.    Assessment   Patient presented to therapy with no complaints of shoulder pain. Improved PROM specifically in shoulder flexion and ER with cueing for relaxation. Tolerated all increases in repetitions without adverse reactions. Had some complaints of mm soreness upon completion of ER walkouts and standing scaption.     Brissa is progressing well towards her goals.   Pt prognosis is Excellent.     Pt will continue to benefit from skilled outpatient physical therapy to address the deficits listed in the problem list box on initial evaluation, provide pt/family education and to maximize pt's level of independence in the home and community environment.   Pt's spiritual, cultural and educational needs considered and pt agreeable to plan of care and goals.     Anticipated barriers to physical therapy: none    Goals:   Short Term Goals: 3-4 weeks   1.  Patient will normal horizontal abduction and shoulder IR as compared to non-painful R shoulder for improved reaching behind shoulder movement. progressing towards; not met.  2.  Patient will demonstrate 3+/5 L shoulder ER for improved tolerance to household chores. progressing towards; not met.  3.  Patient will demonstrate 165 degrees for L shoulder flexion for improved overhead reaching ability. progressing towards; not met.    Long Term Goals: 8 weeks   1. Patient will report < 3/10 L shoulder pain at worst throughout her day.   progressing towards; not met.  2. Patient will report ability to reach in the backseat of the car without onset L shoulder pain or limitation.  progressing towards; not met.  3. Patient will report <24% limitation on Shoulder FOTO survey.  progressing towards; not met.    Plan   Shoulder complex strengthening emphasis > ROM.  Avoid red zone (painful arc).  Work on ROM/strength in green zone only (non-painful ROM).     Anahi Franco, PT

## 2020-06-26 ENCOUNTER — CLINICAL SUPPORT (OUTPATIENT)
Dept: REHABILITATION | Facility: HOSPITAL | Age: 50
End: 2020-06-26
Payer: COMMERCIAL

## 2020-06-26 DIAGNOSIS — M25.512 ACUTE PAIN OF LEFT SHOULDER: ICD-10-CM

## 2020-06-26 DIAGNOSIS — M25.612 DECREASED RANGE OF MOTION OF LEFT SHOULDER: ICD-10-CM

## 2020-06-26 PROCEDURE — 97110 THERAPEUTIC EXERCISES: CPT | Mod: PN

## 2020-06-26 PROCEDURE — 97140 MANUAL THERAPY 1/> REGIONS: CPT | Mod: PN

## 2020-06-26 NOTE — PROGRESS NOTES
"  Physical Therapy Treatment Note     [facility]  [facility] Number: [id number]    Therapy Diagnosis:    Encounter Diagnosis   Name Primary?    Decreased range of motion of left shoulder      Physician: Joselito Garcia MD    Visit Date: 6/26/2020    Physician Orders: PT Eval and Treat  Medical Diagnosis: M25.512 (ICD-10-CM) - Left shoulder pain, unspecified chronicity  Evaluation Date: 5/14/2020   Authorization Period:  12/31/2020  Plan of Care Certification Period: 5/14/2020 to  07/10/2020  Visit # / Visits authorized: 9/20  FOTO: next       Time In: 0830  Time Out: 0915  Total Billable Time: 40 minutes (2 TE, 1 MT)  Precautions: Standard    Subjective     Pt reports: that she doesn't really have pain when she's sitting, only when she's attempting to put her arm in certain positions.     She was not compliant with home exercise program.  Response to previous treatment: Some muscle soreness   Functional change: Ongoing    Pain: 3/10  Location: left shoulder anterior pain    Objective     Brissa received therapeutic exercises to develop strength, endurance, ROM, flexibility and posture for 30 minutes including:  - supine RC stabilization flexion  5 rounds at 100 degrees (good)   - supine serratus punches   5# dumbbells; 3x10   - prone shoulder extension with ER  3x10 2#  - prone horizontal abd    2x10 AROM   - prone scap setting    5x15" holds  - mat shoulder taps    2x20 B (not today)    - standing shoulder ER   RTB; 1x10 walk-outs  - standing shoulder ER TB   RTB; 2x15     Brissa received the following manual therapy techniques: total time 10 minutes  - IASTM with HawkGrip to anterior shoulder including supraspinatus fibers.   - grade II/III left shoulder passive physiological flexion, IR, ER pain-free  - posterior capsule stretching with scapular pinning  - scapular mobs with emphasis on posterior tilting and ER (not today)      Home Exercises Provided and Patient Education Provided   Education " provided:   - updated HEP    Written Home Exercises Provided: Patient instructed to cont prior HEP.  Exercises were reviewed and Brissa was able to demonstrate them prior to the end of the session.  Brissa demonstrated good  understanding of the education provided.     See EMR under Patient Instructions for exercises provided 5/21/2020.    Assessment   A: Calcific tendinopathy L shoulder.  Pain at end-range flexion and IR R shoulder.  Pain anterior shoulder with reaching behind her back.  Now able to sleep on her L shoulder for at least 50% of the night without pain.     Brissa is progressing well towards her goals.   Pt prognosis is Excellent.     Pt will continue to benefit from skilled outpatient physical therapy to address the deficits listed in the problem list box on initial evaluation, provide pt/family education and to maximize pt's level of independence in the home and community environment.   Pt's spiritual, cultural and educational needs considered and pt agreeable to plan of care and goals.     Anticipated barriers to physical therapy: none    Goals:   Short Term Goals: 3-4 weeks   1.  Patient will normal horizontal abduction and shoulder IR as compared to non-painful R shoulder for improved reaching behind shoulder movement. progressing towards; not met.  2.  Patient will demonstrate 3+/5 L shoulder ER for improved tolerance to household chores. progressing towards; not met.  3.  Patient will demonstrate 165 degrees for L shoulder flexion for improved overhead reaching ability. progressing towards; not met.    Long Term Goals: 8 weeks   1. Patient will report < 3/10 L shoulder pain at worst throughout her day.  progressing towards; not met.  2. Patient will report ability to reach in the backseat of the car without onset L shoulder pain or limitation.  progressing towards; not met.  3. Patient will report <24% limitation on Shoulder FOTO survey.  progressing towards; not met.    Plan   Shoulder complex  strengthening emphasis > ROM.  Avoid red zone (painful arc).  Work on ROM/strength in green zone only (non-painful ROM).     Trip Gamino, PT

## 2020-06-29 ENCOUNTER — CLINICAL SUPPORT (OUTPATIENT)
Dept: REHABILITATION | Facility: HOSPITAL | Age: 50
End: 2020-06-29
Payer: COMMERCIAL

## 2020-06-29 DIAGNOSIS — M25.612 DECREASED RANGE OF MOTION OF LEFT SHOULDER: ICD-10-CM

## 2020-06-29 DIAGNOSIS — M25.512 ACUTE PAIN OF LEFT SHOULDER: ICD-10-CM

## 2020-06-29 PROCEDURE — 97140 MANUAL THERAPY 1/> REGIONS: CPT | Mod: PN

## 2020-06-29 PROCEDURE — 97110 THERAPEUTIC EXERCISES: CPT | Mod: PN

## 2020-07-01 ENCOUNTER — CLINICAL SUPPORT (OUTPATIENT)
Dept: REHABILITATION | Facility: HOSPITAL | Age: 50
End: 2020-07-01
Payer: COMMERCIAL

## 2020-07-01 DIAGNOSIS — M25.512 ACUTE PAIN OF LEFT SHOULDER: ICD-10-CM

## 2020-07-01 DIAGNOSIS — M25.612 DECREASED RANGE OF MOTION OF LEFT SHOULDER: ICD-10-CM

## 2020-07-01 PROCEDURE — 97110 THERAPEUTIC EXERCISES: CPT | Mod: PN

## 2020-07-01 PROCEDURE — 97140 MANUAL THERAPY 1/> REGIONS: CPT | Mod: PN

## 2020-07-01 NOTE — PROGRESS NOTES
"  Physical Therapy Treatment Note     [facility]  [id number]    Therapy Diagnosis:    Encounter Diagnosis   Name Primary?    Decreased range of motion of left shoulder      Physician: Joselito Garcia MD    Visit Date: 7/1/2020    Physician Orders: PT Eval and Treat  Medical Diagnosis: M25.512 (ICD-10-CM) - Left shoulder pain, unspecified chronicity  Evaluation Date: 5/14/2020   Authorization Period:  12/31/2020  Plan of Care Certification Period: 5/14/2020 to  07/10/2020  Visit # / Visits authorized: 11/20  FOTO: next       Time In: 1515  Time Out: 1600  Total Billable Time: 45 minutes (2 TE, 1 MT)  Precautions: Standard    Subjective     Pt reports: only have L shoulder pain when she tries to reach overhead.  R shoulder pain is more low-grade, non-specific, and painful at night.     She was not compliant with home exercise program.  Response to previous treatment: Some muscle soreness   Functional change: Ongoing    Pain: 3/10  Location: left shoulder anterior pain and R posterior shoulder pain.     Objective     Brissa received therapeutic exercises to develop strength, endurance, ROM, flexibility and posture for 30 minutes including:  - supine serratus punches   7# dumbbells; 3x10 B  - prone shoulder extension with ER  3x10 2# B  - prone horizontal abd    2x10 AROM B  - prone scap setting    5x15" holds  - prone flexion     2x10  - mat shoulder taps    2x20 B   - standing shoulder ER   RTB; 1x10 walk-outs  - standing shoulder ER TB   RTB; 2x15   - UBE       3'/3'    Brissa received the following manual therapy techniques: total time 10 minutes  - IASTM with HawkGrip to anterior shoulder including supraspinatus fibers.   - grade II/III left shoulder passive physiological flexion, IR, ER pain-free      Home Exercises Provided and Patient Education Provided   Education provided:   - continue HEP    Written Home Exercises Provided: Patient instructed to cont prior HEP.  Exercises " were reviewed and Brissa was able to demonstrate them prior to the end of the session.  Brissa demonstrated good  understanding of the education provided.     See EMR under Patient Instructions for exercises provided 5/21/2020.    Assessment   A: Calcific tendinopathy L shoulder.  Pain at end-range flexion and IR L shoulder.  Pain L anterior shoulder with reaching behind her back and overhead at end-range.  Now able to sleep on her L shoulder for at least 50% of the night without pain.  No right shoulder pain today.  Nearing discharge.     Brissa is progressing well towards her goals.   Pt prognosis is Excellent.     Pt will continue to benefit from skilled outpatient physical therapy to address the deficits listed in the problem list box on initial evaluation, provide pt/family education and to maximize pt's level of independence in the home and community environment.   Pt's spiritual, cultural and educational needs considered and pt agreeable to plan of care and goals.     Anticipated barriers to physical therapy: none    Goals:   Short Term Goals: 3-4 weeks   1.  Patient will normal horizontal abduction and shoulder IR as compared to non-painful R shoulder for improved reaching behind shoulder movement. progressing towards; not met.  2.  Patient will demonstrate 3+/5 L shoulder ER for improved tolerance to household chores. progressing towards; not met.  3.  Patient will demonstrate 165 degrees for L shoulder flexion for improved overhead reaching ability. progressing towards; not met.    Long Term Goals: 8 weeks   1. Patient will report < 3/10 L shoulder pain at worst throughout her day.  progressing towards; not met.  2. Patient will report ability to reach in the backseat of the car without onset L shoulder pain or limitation.  progressing towards; not met.  3. Patient will report <24% limitation on Shoulder FOTO survey.  progressing towards; not met.    Plan   Shoulder complex strengthening emphasis > ROM.   Avoid red zone (painful arc).  Work on ROM/strength in green zone only (non-painful ROM).     Trip Gamino, PT

## 2020-07-14 ENCOUNTER — CLINICAL SUPPORT (OUTPATIENT)
Dept: REHABILITATION | Facility: HOSPITAL | Age: 50
End: 2020-07-14
Payer: COMMERCIAL

## 2020-07-14 DIAGNOSIS — M25.512 ACUTE PAIN OF LEFT SHOULDER: ICD-10-CM

## 2020-07-14 DIAGNOSIS — M25.612 DECREASED RANGE OF MOTION OF LEFT SHOULDER: ICD-10-CM

## 2020-07-14 PROCEDURE — 97140 MANUAL THERAPY 1/> REGIONS: CPT | Mod: PN

## 2020-07-14 PROCEDURE — 97110 THERAPEUTIC EXERCISES: CPT | Mod: PN

## 2020-07-14 NOTE — PROGRESS NOTES
"  Physical Therapy Treatment Note     [facility]  [id number]    Therapy Diagnosis:    Encounter Diagnosis   Name Primary?    Decreased range of motion of left shoulder      Physician: Joselito Garcia MD    Visit Date: 7/14/2020    Physician Orders: PT Eval and Treat  Medical Diagnosis: M25.512 (ICD-10-CM) - Left shoulder pain, unspecified chronicity  Evaluation Date: 5/14/2020   Authorization Period:  12/31/2020  Plan of Care Certification Period: 5/14/2020 to  07/10/2020  Visit # / Visits authorized: 11/20  FOTO: next      Time In: 1015  Time Out: 1100  Total Billable Time: 45 minutes (2 TE, 1 MT)  Precautions: Standard    Subjective     Pt reports: only have L shoulder pain when she tries to reach overhead.  No new complaints.     She was not compliant with home exercise program.  Response to previous treatment: Some muscle soreness   Functional change: Ongoing    Pain: 3/10  Location: left shoulder anterior pain and R posterior shoulder pain.     Objective     Brissa received therapeutic exercises to develop strength, endurance, ROM, flexibility and posture for 30 minutes including:  - supine serratus punches   7# dumbbells; 3x10 B  - prone shoulder extension with ER  3x10 2# B  - prone horizontal abd    2x10 AROM B  - prone scap setting    5x15" holds  - prone flexion     2x10  - mat shoulder taps    2x20 B   - standing shoulder ER   RTB; 1x10 walk-outs  - standing shoulder ER TB   RTB; 2x15   - UBE       3'/3'    Brissa received the following manual therapy techniques: total time 10 minutes  - IASTM with HawkGrip to anterior shoulder including supraspinatus fibers.   - grade II/III left shoulder passive physiological flexion, IR, ER pain-free      Home Exercises Provided and Patient Education Provided   Education provided:   - continue HEP    Written Home Exercises Provided: Patient instructed to cont prior HEP.  Exercises were reviewed and Brissa was able to demonstrate them " prior to the end of the session.  Brissa demonstrated good  understanding of the education provided.     See EMR under Patient Instructions for exercises provided 5/21/2020.    Assessment   A: Calcific tendinopathy L shoulder.  Pain at end-range flexion and IR L shoulder.  Pain L anterior shoulder with reaching behind her back and overhead at end-range.  Now able to sleep on her L shoulder for at least 50% of the night without pain.  Nearing discharge; next visit (?)    Brissa is progressing well towards her goals.   Pt prognosis is Excellent.     Pt will continue to benefit from skilled outpatient physical therapy to address the deficits listed in the problem list box on initial evaluation, provide pt/family education and to maximize pt's level of independence in the home and community environment.   Pt's spiritual, cultural and educational needs considered and pt agreeable to plan of care and goals.     Anticipated barriers to physical therapy: none    Goals:   Short Term Goals: 3-4 weeks   1.  Patient will normal horizontal abduction and shoulder IR as compared to non-painful R shoulder for improved reaching behind shoulder movement. progressing towards; not met.  2.  Patient will demonstrate 3+/5 L shoulder ER for improved tolerance to household chores. progressing towards; not met.  3.  Patient will demonstrate 165 degrees for L shoulder flexion for improved overhead reaching ability. progressing towards; not met.    Long Term Goals: 8 weeks   1. Patient will report < 3/10 L shoulder pain at worst throughout her day.  progressing towards; not met.  2. Patient will report ability to reach in the backseat of the car without onset L shoulder pain or limitation.  progressing towards; not met.  3. Patient will report <24% limitation on Shoulder FOTO survey.  progressing towards; not met.    Plan   Shoulder complex strengthening emphasis > ROM.  Avoid red zone (painful arc).  Work on ROM/strength in green zone only  (non-painful ROM).     Trip Gamino, PT

## 2020-07-22 ENCOUNTER — CLINICAL SUPPORT (OUTPATIENT)
Dept: REHABILITATION | Facility: HOSPITAL | Age: 50
End: 2020-07-22
Payer: COMMERCIAL

## 2020-07-22 DIAGNOSIS — M25.512 ACUTE PAIN OF LEFT SHOULDER: ICD-10-CM

## 2020-07-22 DIAGNOSIS — M25.612 DECREASED RANGE OF MOTION OF LEFT SHOULDER: ICD-10-CM

## 2020-07-22 PROCEDURE — 97140 MANUAL THERAPY 1/> REGIONS: CPT | Mod: PN

## 2020-07-22 PROCEDURE — 97110 THERAPEUTIC EXERCISES: CPT | Mod: PN

## 2020-07-22 NOTE — PROGRESS NOTES
Physical Therapy Treatment Note     [facility]  [facility] Number[facility] [id number]    Therapy Diagnosis:    Encounter Diagnosis   Name Primary?    Decreased range of motion of left shoulder      Physician: Joselito Garcia MD    Visit Date: 7/22/2020    Physician Orders: PT Eval and Treat  Medical Diagnosis: M25.512 (ICD-10-CM) - Left shoulder pain, unspecified chronicity  Evaluation Date: 5/14/2020   Authorization Period:  12/31/2020  Plan of Care Certification Period: 5/14/2020 to  07/10/2020  Visit # / Visits authorized: 12/20  FOTO: next      Time In: 1245  Time Out: 1330  Total Billable Time: 40 minutes (2 TE, 1 MT)  Precautions: Standard    Subjective     Pt reports: only have L shoulder pain when she tries to reach overhead.  No new complaints.     She was not compliant with home exercise program.  Response to previous treatment: Some muscle soreness   Functional change: Ongoing    Pain: 3/10  Location: left shoulder anterior pain and R posterior shoulder pain.     Objective     Brissa received therapeutic exercises to develop strength, endurance, ROM, flexibility and posture for 30 minutes including:  - supine serratus punches   7# dumbbells; 3x10 B  - prone shoulder extension with ER  3x10 2# B  - prone horizontal abd    2x10 AROM B  - prone rows with kick outs    2x15 2#  - prone flexion     2x10    - mat shoulder taps    2x20 B   - standing shoulder ER   RTB; 1x10 walk-outs  - standing shoulder 90/90 ER   RTB 2x10 B  - standing shoulder flexion   YTB; 2x10    - UBE       3'/3'    Brissa received the following manual therapy techniques: total time 10 minutes  - IASTM with HawkGrip to anterior shoulder including supraspinatus fibers.   - grade II/III left shoulder passive physiological flexion, IR, ER pain-free      Home Exercises Provided and Patient Education Provided   Education provided:   - continue HEP    Written Home Exercises Provided: Patient instructed to cont prior HEP.  Exercises  were reviewed and Brissa was able to demonstrate them prior to the end of the session.  Brissa demonstrated good  understanding of the education provided.     See EMR under Patient Instructions for exercises provided 5/21/2020.    Assessment   A: Calcific tendinopathy L shoulder.  Pain at end-range flexion and IR L shoulder.  Pain L anterior shoulder with reaching behind her back and overhead at end-range.  Now able to sleep on her L shoulder for at least 50% of the night without pain.  Nearing discharge; next visit (?)    Brissa is progressing well towards her goals.   Pt prognosis is Excellent.     Pt will continue to benefit from skilled outpatient physical therapy to address the deficits listed in the problem list box on initial evaluation, provide pt/family education and to maximize pt's level of independence in the home and community environment.   Pt's spiritual, cultural and educational needs considered and pt agreeable to plan of care and goals.     Anticipated barriers to physical therapy: none    Goals:   Short Term Goals: 3-4 weeks   1.  Patient will normal horizontal abduction and shoulder IR as compared to non-painful R shoulder for improved reaching behind shoulder movement. progressing towards; not met.  2.  Patient will demonstrate 3+/5 L shoulder ER for improved tolerance to household chores. progressing towards; not met.  3.  Patient will demonstrate 165 degrees for L shoulder flexion for improved overhead reaching ability. progressing towards; not met.    Long Term Goals: 8 weeks   1. Patient will report < 3/10 L shoulder pain at worst throughout her day.  progressing towards; not met.  2. Patient will report ability to reach in the backseat of the car without onset L shoulder pain or limitation.  progressing towards; not met.  3. Patient will report <24% limitation on Shoulder FOTO survey.  progressing towards; not met.    Plan   Shoulder complex strengthening emphasis > ROM.  Avoid red zone  (painful arc).  Work on ROM/strength in green zone only (non-painful ROM).     Trip Gamino, PT

## 2020-07-30 ENCOUNTER — CLINICAL SUPPORT (OUTPATIENT)
Dept: REHABILITATION | Facility: HOSPITAL | Age: 50
End: 2020-07-30
Payer: COMMERCIAL

## 2020-07-30 DIAGNOSIS — M25.512 ACUTE PAIN OF LEFT SHOULDER: ICD-10-CM

## 2020-07-30 DIAGNOSIS — M25.612 DECREASED RANGE OF MOTION OF LEFT SHOULDER: ICD-10-CM

## 2020-07-30 PROCEDURE — 97140 MANUAL THERAPY 1/> REGIONS: CPT | Mod: PN

## 2020-07-30 PROCEDURE — 97110 THERAPEUTIC EXERCISES: CPT | Mod: PN

## 2020-07-31 NOTE — PROGRESS NOTES
Physical Therapy Treatment Note     [facility]  [facility] [id number]    Therapy Diagnosis:    Encounter Diagnosis   Name Primary?    Decreased range of motion of left shoulder      Physician: Joselito Garcia MD    Visit Date: 7/30/2020    Physician Orders: PT Eval and Treat  Medical Diagnosis: M25.512 (ICD-10-CM) - Left shoulder pain, unspecified chronicity  Evaluation Date: 5/14/2020   Authorization Period:  12/31/2020  Plan of Care Certification Period: 5/14/2020 to  07/10/2020  Visit # / Visits authorized: 13/20  FOTO: done      Time In: 0915  Time Out: 1000  Total Billable Time: 40 minutes (2 TE, 1 MT)  Precautions: Standard    Subjective     Pt reports: only have L shoulder pain when she tries to reach overhead.  No new complaints.     She was compliant with home exercise program.  Response to previous treatment: Some muscle soreness   Functional change: Ongoing    Pain: 3/10  Location: left shoulder anterior pain and R posterior shoulder pain.     Objective     Brissa received therapeutic exercises to develop strength, endurance, ROM, flexibility and posture for 30 minutes including:  - supine serratus punches   7# dumbbells; 3x10 B  - prone shoulder extension with ER  3x10 2# B  - prone horizontal abd    2x10 AROM B  - prone rows with kick outs    2x15 2#  - prone flexion     2x10    - mat shoulder taps    2x20 B   - standing shoulder ER   RTB; 1x10 walk-outs  - standing shoulder 90/90 ER   RTB 2x10 B  - standing shoulder flexion   YTB; 2x10    - UBE       3'/3'    Brissa received the following manual therapy techniques: total time 10 minutes  - IASTM with HawkGrip to anterior shoulder including supraspinatus fibers.   - grade II/III left shoulder passive physiological flexion, IR, ER pain-free      Home Exercises Provided and Patient Education Provided   Education provided:   - updated HEP today.     Written Home Exercises Provided: Patient instructed to cont prior HEP.  Exercises  were reviewed and Brissa was able to demonstrate them prior to the end of the session.  Brissa demonstrated good  understanding of the education provided.     See EMR under Patient Instructions for exercises provided 5/21/2020.    Assessment   A: Calcific tendinopathy L shoulder.  Pain at end-range flexion and IR L shoulder.  Pain L anterior shoulder with reaching behind her back and overhead at end-range.  Now able to sleep on her L shoulder for at least 50% of the night without pain.  DC today with HEP    Brissa is progressing well towards her goals.   Pt prognosis is Excellent.     Pt will continue to benefit from skilled outpatient physical therapy to address the deficits listed in the problem list box on initial evaluation, provide pt/family education and to maximize pt's level of independence in the home and community environment.   Pt's spiritual, cultural and educational needs considered and pt agreeable to plan of care and goals.     Anticipated barriers to physical therapy: none    Goals:   Short Term Goals: 3-4 weeks   1.  Patient will normal horizontal abduction and shoulder IR as compared to non-painful R shoulder for improved reaching behind shoulder movement.  not met.  2.  Patient will demonstrate 3+/5 L shoulder ER for improved tolerance to household chores. MET  3.  Patient will demonstrate 165 degrees for L shoulder flexion for improved overhead reaching ability.  not met.    Long Term Goals: 8 weeks   1. Patient will report < 3/10 L shoulder pain at worst throughout her day. not met.  2. Patient will report ability to reach in the backseat of the car without onset L shoulder pain or limitation.   not met.  3. Patient will report <24% limitation on Shoulder FOTO survey.  not met.    Plan   DC today with HEP    Trip Gamino, PT       Outpatient Therapy Discharge Summary     [facility] [place]  [facility] Number[facility] [id number]    Therapy Diagnosis:   Encounter Diagnosis   Name Primary?    Decreased  range of motion of left shoulder      Physician: Joselito Garcia MD    Physician Orders: PT Eval and Treat  Medical Diagnosis: M25.512 (ICD-10-CM) - Left shoulder pain, unspecified chronicity  Evaluation Date: 5/14/2020     Date of Last visit: 7/30/2020  Total Visits Received: 13  Cancelled Visits: none  No Show Visits: none    Assessment    Goals: see above    Discharge reason: Patient has completed the physician's prescription and Patient has reached the maximum rehab potential for the present time    Plan   This patient is discharged from Physical Therapy

## 2020-08-19 ENCOUNTER — HOSPITAL ENCOUNTER (OUTPATIENT)
Dept: RADIOLOGY | Facility: HOSPITAL | Age: 50
Discharge: HOME | End: 2020-08-19
Attending: INTERNAL MEDICINE
Payer: COMMERCIAL

## 2020-08-19 ENCOUNTER — OFFICE VISIT (OUTPATIENT)
Dept: RHEUMATOLOGY | Facility: CLINIC | Age: 50
End: 2020-08-19
Payer: COMMERCIAL

## 2020-08-19 VITALS
WEIGHT: 136 LBS | BODY MASS INDEX: 22.66 KG/M2 | HEIGHT: 65 IN | TEMPERATURE: 99 F | SYSTOLIC BLOOD PRESSURE: 135 MMHG | DIASTOLIC BLOOD PRESSURE: 67 MMHG | HEART RATE: 74 BPM

## 2020-08-19 DIAGNOSIS — M25.50 ARTHRALGIA, UNSPECIFIED JOINT: ICD-10-CM

## 2020-08-19 DIAGNOSIS — M25.542 ARTHRALGIA OF HANDS, BILATERAL: Primary | ICD-10-CM

## 2020-08-19 DIAGNOSIS — M25.541 ARTHRALGIA OF HANDS, BILATERAL: ICD-10-CM

## 2020-08-19 DIAGNOSIS — M25.541 ARTHRALGIA OF HANDS, BILATERAL: Primary | ICD-10-CM

## 2020-08-19 DIAGNOSIS — M25.542 ARTHRALGIA OF HANDS, BILATERAL: ICD-10-CM

## 2020-08-19 PROCEDURE — 99205 OFFICE O/P NEW HI 60 MIN: CPT | Mod: S$GLB,,, | Performed by: INTERNAL MEDICINE

## 2020-08-19 PROCEDURE — 99999 PR PBB SHADOW E&M-EST. PATIENT-LVL III: ICD-10-PCS | Mod: PBBFAC,,, | Performed by: INTERNAL MEDICINE

## 2020-08-19 PROCEDURE — 99999 PR PBB SHADOW E&M-EST. PATIENT-LVL III: CPT | Mod: PBBFAC,,, | Performed by: INTERNAL MEDICINE

## 2020-08-19 PROCEDURE — 73130 X-RAY EXAM OF HAND: CPT | Mod: 26,50,, | Performed by: RADIOLOGY

## 2020-08-19 PROCEDURE — 73130 X-RAY EXAM OF HAND: CPT | Mod: TC,50,FY,PO

## 2020-08-19 PROCEDURE — 99205 PR OFFICE/OUTPT VISIT, NEW, LEVL V, 60-74 MIN: ICD-10-PCS | Mod: S$GLB,,, | Performed by: INTERNAL MEDICINE

## 2020-08-19 PROCEDURE — 73130 XR HAND COMPLETE 3 VIEWS BILATERAL: ICD-10-PCS | Mod: 26,50,, | Performed by: RADIOLOGY

## 2020-08-19 NOTE — LETTER
August 21, 2020      Sandra Betts MD  [address]  [facility] [address]           [facility]  [address] [address]  Phone: [phone]  Fax: [phone]          Patient: Brissa Mason   MR Number: [id number]   YOB: 1970   Date of Visit: 8/19/2020       Dear Dr. Sandra Betts:    Thank you for referring Brissa Mason to me for evaluation. Attached you will find relevant portions of my assessment and plan of care.    If you have questions, please do not hesitate to call me. I look forward to following Brissa Mason along with you.    Sincerely,    Helen Denson MD    Enclosure  CC:  No Recipients    If you would like to receive this communication electronically, please contact [email or web address] or [phone] to request more information on [employer] access.    For providers and/or their staff who would like to refer a patient to [facility], please contact us through our one-stop-shop provider referral line, [facility], at [phone].    If you feel you have received this communication in error or would no longer like to receive these types of communications, please e-mail [email or web address]

## 2020-08-19 NOTE — PROGRESS NOTES
Subjective:       Patient ID: Brissa Mason is a 50 y.o. female.    Chief Complaint: Disease Management    HPI     50 year F with no significant PMH here for evaluation. Since march, she is having pain from shoulders to elbows and hands.  She wakes up at night due to pain in shoulders and hands. Pain level during the day is 2/10 but at night as 5/10. On occasion, she has numbness in hands.  She has less strength in her hands. Denies joint swelling. Reports stiffness in knees with prolonged sitting.   Denies morning stiffness. Reports that 2 weeks ago, she had rash in her right hand and right volar aspect of wrist.   She put on steroid cream with improvement. Denies oral ulcers, hair loss, photosensitivity, or pleurisy.  Denies raynauds.      Family hx: negative for lupus; brother:psoriasis      Review of Systems   Constitutional: Negative for activity change, appetite change, chills, diaphoresis, fatigue, fever and unexpected weight change.   HENT: Negative for congestion, ear discharge, ear pain, facial swelling, mouth sores, sinus pressure, sneezing, sore throat, tinnitus and trouble swallowing.    Eyes: Negative for photophobia, pain, discharge, redness, itching and visual disturbance.   Respiratory: Negative for apnea, cough, chest tightness, shortness of breath, wheezing and stridor.    Cardiovascular: Negative for chest pain and leg swelling.   Gastrointestinal: Negative for abdominal distention, abdominal pain, anal bleeding, blood in stool, constipation, diarrhea and nausea.   Endocrine: Negative for cold intolerance and heat intolerance.   Genitourinary: Negative for difficulty urinating, dysuria and genital sores.   Musculoskeletal: Positive for arthralgias. Negative for back pain, gait problem, joint swelling, myalgias, neck pain and neck stiffness.   Skin: Negative for color change, pallor, rash and wound.   Neurological: Negative for dizziness, seizures, light-headedness, numbness and  "headaches.   Hematological: Negative for adenopathy. Does not bruise/bleed easily.   Psychiatric/Behavioral: Negative for sleep disturbance. The patient is not nervous/anxious.            Objective:   /67   Pulse 74   Temp 99 °F (37.2 °C)   Ht 5' 5" (1.651 m)   Wt 61.7 kg (136 lb 0.4 oz)   BMI 22.64 kg/m²      Physical Exam   Constitutional: She is oriented to person, place, and time.   HENT:   Head: Normocephalic and atraumatic.   Right Ear: External ear normal.   Left Ear: External ear normal.   Nose: Nose normal.   Mouth/Throat: Oropharynx is clear and moist. No oropharyngeal exudate.   Eyes: Conjunctivae and EOM are normal. Pupils are equal, round, and reactive to light. Right eye exhibits no discharge. Left eye exhibits no discharge. No scleral icterus.   Neck: Neck supple. No JVD present. No thyromegaly present.   Cardiovascular: Normal rate, regular rhythm, normal heart sounds and intact distal pulses.  Exam reveals no gallop and no friction rub.    No murmur heard.  Pulmonary/Chest: Effort normal and breath sounds normal. No respiratory distress. She has no wheezes. She has no rales. She exhibits no tenderness.   Abdominal: Soft. Bowel sounds are normal. She exhibits no distension and no mass. There is no abdominal tenderness. There is no rebound and no guarding.   Lymphadenopathy:     She has no cervical adenopathy.   Neurological: She is alert and oriented to person, place, and time. No cranial nerve deficit. Gait normal. Coordination normal.   Skin: Skin is dry. No rash noted. No erythema. No pallor.     Psychiatric: Affect and judgment normal.   Musculoskeletal: No tenderness, deformity or edema.            No data to display     Assessment:     50 year F with no significant PMH here for evaluation.  She is complaining of arthralgias. On exam, I do not see evidence of an inflammatory arthritis or connective tissue disease.  Patient is anxious about possibly having rheumatoid arthritis so I " reassured her.    1. Arthralgia, unspecified joint            Plan:       Problem List Items Addressed This Visit     None      Visit Diagnoses     Arthralgia, unspecified joint            Labs  xrays    One hour of face to face time spent with patient*

## 2020-08-31 ENCOUNTER — HOSPITAL ENCOUNTER (OUTPATIENT)
Dept: RADIOLOGY | Facility: HOSPITAL | Age: 50
Discharge: HOME | End: 2020-08-31
Attending: OBSTETRICS & GYNECOLOGY
Payer: COMMERCIAL

## 2020-08-31 DIAGNOSIS — Z12.39 BREAST CANCER SCREENING: ICD-10-CM

## 2020-08-31 PROCEDURE — 77067 SCR MAMMO BI INCL CAD: CPT | Mod: TC

## 2020-08-31 PROCEDURE — 77067 MAMMO DIGITAL SCREENING BILAT WITH TOMOSYNTHESIS_CAD: ICD-10-PCS | Mod: 26,,, | Performed by: RADIOLOGY

## 2020-08-31 PROCEDURE — 77063 BREAST TOMOSYNTHESIS BI: CPT | Mod: 26,,, | Performed by: RADIOLOGY

## 2020-08-31 PROCEDURE — 77067 SCR MAMMO BI INCL CAD: CPT | Mod: 26,,, | Performed by: RADIOLOGY

## 2020-08-31 PROCEDURE — 77063 MAMMO DIGITAL SCREENING BILAT WITH TOMOSYNTHESIS_CAD: ICD-10-PCS | Mod: 26,,, | Performed by: RADIOLOGY

## 2020-09-10 ENCOUNTER — PATIENT OUTREACH (OUTPATIENT)
Dept: ADMINISTRATIVE | Facility: OTHER | Age: 50
End: 2020-09-10

## 2020-09-11 NOTE — PROGRESS NOTES
Care Everywhere:   Immunization: updated  Health Maintenance: updated  Media Review:   Legacy Review:   Order placed:   Upcoming appts:  Colonoscopy case request 5/14/2020

## 2020-09-14 ENCOUNTER — LAB VISIT (OUTPATIENT)
Dept: LAB | Facility: HOSPITAL | Age: 50
End: 2020-09-14
Attending: INTERNAL MEDICINE
Payer: COMMERCIAL

## 2020-09-14 DIAGNOSIS — E78.5 HYPERLIPIDEMIA, UNSPECIFIED HYPERLIPIDEMIA TYPE: ICD-10-CM

## 2020-09-14 LAB
CHOLEST SERPL-MCNC: 208 MG/DL (ref 120–199)
CHOLEST/HDLC SERPL: 2.6 {RATIO} (ref 2–5)
HDLC SERPL-MCNC: 81 MG/DL (ref 40–75)
HDLC SERPL: 38.9 % (ref 20–50)
LDLC SERPL CALC-MCNC: 115.8 MG/DL (ref 63–159)
NONHDLC SERPL-MCNC: 127 MG/DL
TRIGL SERPL-MCNC: 56 MG/DL (ref 30–150)

## 2020-09-14 PROCEDURE — 36415 COLL VENOUS BLD VENIPUNCTURE: CPT

## 2020-09-14 PROCEDURE — 80061 LIPID PANEL: CPT

## 2020-09-15 ENCOUNTER — OFFICE VISIT (OUTPATIENT)
Dept: OBSTETRICS AND GYNECOLOGY | Facility: CLINIC | Age: 50
End: 2020-09-15
Payer: COMMERCIAL

## 2020-09-15 VITALS
BODY MASS INDEX: 22.7 KG/M2 | HEIGHT: 65 IN | WEIGHT: 136.25 LBS | SYSTOLIC BLOOD PRESSURE: 122 MMHG | DIASTOLIC BLOOD PRESSURE: 72 MMHG

## 2020-09-15 DIAGNOSIS — Z12.31 SCREENING MAMMOGRAM, ENCOUNTER FOR: ICD-10-CM

## 2020-09-15 DIAGNOSIS — Z01.419 ENCOUNTER FOR GYNECOLOGICAL EXAMINATION WITHOUT ABNORMAL FINDING: Primary | ICD-10-CM

## 2020-09-15 PROCEDURE — 99396 PR PREVENTIVE VISIT,EST,40-64: ICD-10-PCS | Mod: S$GLB,,, | Performed by: OBSTETRICS & GYNECOLOGY

## 2020-09-15 PROCEDURE — 99999 PR PBB SHADOW E&M-EST. PATIENT-LVL III: CPT | Mod: PBBFAC,,, | Performed by: OBSTETRICS & GYNECOLOGY

## 2020-09-15 PROCEDURE — 99396 PREV VISIT EST AGE 40-64: CPT | Mod: S$GLB,,, | Performed by: OBSTETRICS & GYNECOLOGY

## 2020-09-15 PROCEDURE — 99999 PR PBB SHADOW E&M-EST. PATIENT-LVL III: ICD-10-PCS | Mod: PBBFAC,,, | Performed by: OBSTETRICS & GYNECOLOGY

## 2020-09-15 NOTE — PROGRESS NOTES
Well woman exam    Brissa Mason is a 50 y.o. female  presents for well woman exam.  LMP: Patient's last menstrual period was 2020.  No issues, problems, or complaints.  Patient reports occasional menopausal symptoms.  No other complaints today.  Overall, cyclic menses continues (though not as consistent per patient report).    Past Medical History:   Diagnosis Date    Allergy     Anemia 2020    Anxiety disorder     Colitis     resolved; rectum ; neg flex sig 2006    Colitis: 2005 rectal area     resolved; rectum ; neg flex sig 2006     Menstrual migraine without status migrainosus, not intractable 2017     Past Surgical History:   Procedure Laterality Date    GANGLION CYST EXCISION       Social History     Socioeconomic History    Marital status:      Spouse name: Not on file    Number of children: Not on file    Years of education: Not on file    Highest education level: Not on file   Occupational History    Not on file   Social Needs    Financial resource strain: Not hard at all    Food insecurity     Worry: Never true     Inability: Never true    Transportation needs     Medical: No     Non-medical: No   Tobacco Use    Smoking status: Never Smoker    Smokeless tobacco: Never Used   Substance and Sexual Activity    Alcohol use: Yes     Alcohol/week: 1.0 standard drinks     Types: 1 Glasses of wine per week     Frequency: 2-4 times a month     Drinks per session: 1 or 2     Binge frequency: Never     Comment: 1-2 glasses of wine a week     Drug use: No    Sexual activity: Yes     Partners: Male     Birth control/protection: OCP     Comment: , menarche 14, no hx of abnormal    Lifestyle    Physical activity     Days per week: 3 days     Minutes per session: 60 min    Stress: Only a little   Relationships    Social connections     Talks on phone: More than three times a week     Gets together: Three times a week     Attends [employer]  "service: Not on file     Active member of club or organization: Yes     Attends meetings of clubs or organizations: More than 4 times per year     Relationship status:    Other Topics Concern    Are you pregnant or think you may be? No    Breast-feeding No   Social History Narrative         - [employer]    Parents - Mata and Robina Del Angel     Family History   Problem Relation Age of Onset    Colon polyps Mother         noncancerous colon tumor; polyps    Hyperlipidemia Mother     Osteoporosis Mother     Colon cancer Mother         pre cacer    Depression Mother         One time    Melanoma Mother         Squamous cell carcinoma    Gout Father     Hypertension Father     Hypertension Sister     Glaucoma Sister     Hyperlipidemia Brother     Cancer Maternal Uncle         colon    Colon cancer Maternal Uncle     Hypertension Brother     Psoriasis Brother     Breast cancer Paternal Aunt 60    Breast cancer Paternal Aunt     Depression Paternal Grandmother         One time    Miscarriages / Stillbirths Neg Hx     Amblyopia Neg Hx     Blindness Neg Hx     Cataracts Neg Hx     Diabetes Neg Hx     Macular degeneration Neg Hx     Retinal detachment Neg Hx     Strabismus Neg Hx     Stroke Neg Hx     Thyroid disease Neg Hx     Melanoma Neg Hx      OB History        0    Para        Term   0            AB        Living           SAB        TAB        Ectopic        Multiple        Live Births                     /72   Ht 5' 5" (1.651 m)   Wt 61.8 kg (136 lb 3.9 oz)   LMP 2020   BMI 22.67 kg/m²       ROS:  GENERAL: Denies weight gain or weight loss. Feeling well overall.   SKIN: Denies rash or lesions.   HEAD: Denies head injury or headache.   NODES: Denies enlarged lymph nodes.   CHEST: Denies chest pain or shortness of breath.   CARDIOVASCULAR: Denies palpitations or left sided chest pain.   ABDOMEN: No abdominal pain, constipation, " diarrhea, nausea, vomiting or rectal bleeding.   URINARY: No frequency, dysuria, hematuria, or burning on urination.  REPRODUCTIVE: See HPI.   BREASTS: The patient performs breast self-examination and denies pain, lumps, or nipple discharge.   HEMATOLOGIC: No easy bruisability or excessive bleeding.   MUSCULOSKELETAL: Denies joint pain or swelling.   NEUROLOGIC: Denies syncope or weakness.   PSYCHIATRIC: Denies depression, anxiety or mood swings.    PHYSICAL EXAM:  APPEARANCE: Well nourished, well developed, in no acute distress.  AFFECT: WNL, alert and oriented x 3  SKIN: No acne or hirsutism  NECK: Neck symmetric without masses or thyromegaly  NODES: No inguinal, cervical, axillary, or femoral lymph node enlargement  CHEST: Good respiratory effect  ABDOMEN: Soft.  No tenderness or masses.  No hepatosplenomegaly.  No hernias.  BREASTS: Symmetrical, no skin changes or visible lesions.  No palpable masses, nipple discharge bilaterally.  PELVIC: Normal external genitalia without lesions.  Normal hair distribution.  Adequate perineal body, normal urethral meatus.  Vagina moist and well rugated without lesions or discharge.  Cervix pink, without lesions, discharge or tenderness.  No significant cystocele or rectocele.  Bimanual exam shows uterus to be normal size, regular, mobile and nontender.  Adnexa without masses or tenderness.    EXTREMITIES: No edema.    Encounter for gynecological examination without abnormal finding    Screening mammogram, encounter for  -     Mammo Digital Screening Bilat w/ Aj; Future; Expected date: 09/15/2020      Age specific counseling    Risk and benefits to HRT reviewed.  Patient declines today.    Pap smear with high-risk HPV Co test will be performed in 1 year.  Patient verbalized understanding of this recommendation    Continue annual screening mammogram.    Patient instructed to keep a menstrual calendar.  Patient contact office if menses becomes irregular.    Patient was counseled  today on A.C.S. Pap guidelines and recommendations for yearly pelvic exams, mammograms and monthly self breast exams; to see her PCP for other health maintenance.     Follow up in about 1 year (around 9/15/2021) for Annual exam with Pap smear/high-risk HPV Co test.     Bran Oden IV, MD

## 2020-09-28 ENCOUNTER — LAB VISIT (OUTPATIENT)
Dept: LAB | Facility: HOSPITAL | Age: 50
End: 2020-09-28
Attending: INTERNAL MEDICINE
Payer: COMMERCIAL

## 2020-09-28 ENCOUNTER — OFFICE VISIT (OUTPATIENT)
Dept: INTERNAL MEDICINE | Facility: CLINIC | Age: 50
End: 2020-09-28
Payer: COMMERCIAL

## 2020-09-28 VITALS
WEIGHT: 136 LBS | DIASTOLIC BLOOD PRESSURE: 66 MMHG | BODY MASS INDEX: 22.64 KG/M2 | HEART RATE: 72 BPM | SYSTOLIC BLOOD PRESSURE: 122 MMHG

## 2020-09-28 DIAGNOSIS — Z00.00 WELLNESS EXAMINATION: Primary | ICD-10-CM

## 2020-09-28 DIAGNOSIS — Z00.00 WELLNESS EXAMINATION: ICD-10-CM

## 2020-09-28 LAB
ALBUMIN SERPL BCP-MCNC: 4.3 G/DL (ref 3.5–5.2)
ALP SERPL-CCNC: 43 U/L (ref 55–135)
ALT SERPL W/O P-5'-P-CCNC: 11 U/L (ref 10–44)
ANION GAP SERPL CALC-SCNC: 11 MMOL/L (ref 8–16)
AST SERPL-CCNC: 14 U/L (ref 10–40)
BASOPHILS # BLD AUTO: 0.05 K/UL (ref 0–0.2)
BASOPHILS NFR BLD: 0.5 % (ref 0–1.9)
BILIRUB SERPL-MCNC: 0.4 MG/DL (ref 0.1–1)
BUN SERPL-MCNC: 11 MG/DL (ref 6–20)
CALCIUM SERPL-MCNC: 8.9 MG/DL (ref 8.7–10.5)
CHLORIDE SERPL-SCNC: 106 MMOL/L (ref 95–110)
CO2 SERPL-SCNC: 22 MMOL/L (ref 23–29)
CREAT SERPL-MCNC: 0.8 MG/DL (ref 0.5–1.4)
DIFFERENTIAL METHOD: NORMAL
EOSINOPHIL # BLD AUTO: 0.1 K/UL (ref 0–0.5)
EOSINOPHIL NFR BLD: 1 % (ref 0–8)
ERYTHROCYTE [DISTWIDTH] IN BLOOD BY AUTOMATED COUNT: 12.6 % (ref 11.5–14.5)
EST. GFR  (AFRICAN AMERICAN): >60 ML/MIN/1.73 M^2
EST. GFR  (NON AFRICAN AMERICAN): >60 ML/MIN/1.73 M^2
FERRITIN SERPL-MCNC: 27 NG/ML (ref 20–300)
GLUCOSE SERPL-MCNC: 94 MG/DL (ref 70–110)
HCT VFR BLD AUTO: 41.2 % (ref 37–48.5)
HGB BLD-MCNC: 13.5 G/DL (ref 12–16)
IMM GRANULOCYTES # BLD AUTO: 0.02 K/UL (ref 0–0.04)
IMM GRANULOCYTES NFR BLD AUTO: 0.2 % (ref 0–0.5)
IRON SERPL-MCNC: 128 UG/DL (ref 30–160)
LYMPHOCYTES # BLD AUTO: 1.8 K/UL (ref 1–4.8)
LYMPHOCYTES NFR BLD: 18.8 % (ref 18–48)
MCH RBC QN AUTO: 31 PG (ref 27–31)
MCHC RBC AUTO-ENTMCNC: 32.8 G/DL (ref 32–36)
MCV RBC AUTO: 95 FL (ref 82–98)
MONOCYTES # BLD AUTO: 0.7 K/UL (ref 0.3–1)
MONOCYTES NFR BLD: 6.8 % (ref 4–15)
NEUTROPHILS # BLD AUTO: 7 K/UL (ref 1.8–7.7)
NEUTROPHILS NFR BLD: 72.7 % (ref 38–73)
NRBC BLD-RTO: 0 /100 WBC
PLATELET # BLD AUTO: 274 K/UL (ref 150–350)
PMV BLD AUTO: 11.3 FL (ref 9.2–12.9)
POTASSIUM SERPL-SCNC: 3.9 MMOL/L (ref 3.5–5.1)
PROT SERPL-MCNC: 7.5 G/DL (ref 6–8.4)
RBC # BLD AUTO: 4.36 M/UL (ref 4–5.4)
SATURATED IRON: 32 % (ref 20–50)
SODIUM SERPL-SCNC: 139 MMOL/L (ref 136–145)
TOTAL IRON BINDING CAPACITY: 397 UG/DL (ref 250–450)
TRANSFERRIN SERPL-MCNC: 268 MG/DL (ref 200–375)
TSH SERPL DL<=0.005 MIU/L-ACNC: 1.68 UIU/ML (ref 0.4–4)
WBC # BLD AUTO: 9.59 K/UL (ref 3.9–12.7)

## 2020-09-28 PROCEDURE — 83540 ASSAY OF IRON: CPT

## 2020-09-28 PROCEDURE — 36415 COLL VENOUS BLD VENIPUNCTURE: CPT

## 2020-09-28 PROCEDURE — 80053 COMPREHEN METABOLIC PANEL: CPT

## 2020-09-28 PROCEDURE — 99999 PR PBB SHADOW E&M-EST. PATIENT-LVL III: ICD-10-PCS | Mod: PBBFAC,,, | Performed by: INTERNAL MEDICINE

## 2020-09-28 PROCEDURE — 82607 VITAMIN B-12: CPT

## 2020-09-28 PROCEDURE — 82728 ASSAY OF FERRITIN: CPT

## 2020-09-28 PROCEDURE — 99396 PR PREVENTIVE VISIT,EST,40-64: ICD-10-PCS | Mod: S$GLB,,, | Performed by: INTERNAL MEDICINE

## 2020-09-28 PROCEDURE — 99999 PR PBB SHADOW E&M-EST. PATIENT-LVL III: CPT | Mod: PBBFAC,,, | Performed by: INTERNAL MEDICINE

## 2020-09-28 PROCEDURE — 99396 PREV VISIT EST AGE 40-64: CPT | Mod: S$GLB,,, | Performed by: INTERNAL MEDICINE

## 2020-09-28 PROCEDURE — 84443 ASSAY THYROID STIM HORMONE: CPT

## 2020-09-28 PROCEDURE — 85025 COMPLETE CBC W/AUTO DIFF WBC: CPT

## 2020-09-28 NOTE — PATIENT INSTRUCTIONS
Flu shot    New shingles vaccine: SHINGRIX ( 2018) not live, 90%,  2 shots, one at day zero and the 2nd at 2-6 months: any pharmacy can give it.

## 2020-09-28 NOTE — PROGRESS NOTES
Subjective:      Patient ID: Brissa Mason is a 50 y.o. female.    Chief Complaint: No chief complaint on file.    HPI:  HPI     Annual exam: 9/28/2020  Colonoscopy: 7/9/2013 due at age 50: Colonoscopy scheduled  Maternal uncle : colon cancer at advanced age   Mammogram: 8/31/2020  Paternal Aunt breast cancer and Paternal great aunt  Breast Surgery Consult:made  Gyn: Dr. Pratibha Oden: 2020  Optho : 2020  Flu: will get it at [facility]   Tetanus: 9/21/2015  Tdap 2017  Shingles: discussed  Pneumovax: not due  Dermatology: 2020     Labs reviewed:  Vit D 37  HGB donates blood : did not donated recently  LDL 1115  HDL81  CMP normal  Iron levels low result of blood donation`  Patient Active Problem List   Diagnosis    Vitamin D deficiency    Acute left-sided low back pain without sciatica    Anemia    Iron deficiency    B12 nutritional deficiency    Decreased range of motion of left shoulder     Past Medical History:   Diagnosis Date    Allergy     Anemia 4/20/2020    Anxiety disorder     Colitis     resolved; rectum 2005; neg flex sig 2006    Colitis: 2005 rectal area     resolved; rectum 2005; neg flex sig 2006     Menstrual migraine without status migrainosus, not intractable 9/5/2017     Past Surgical History:   Procedure Laterality Date    GANGLION CYST EXCISION  2017     Family History   Problem Relation Age of Onset    Colon polyps Mother         noncancerous colon tumor; polyps    Hyperlipidemia Mother     Osteoporosis Mother     Colon cancer Mother         pre cacer    Depression Mother         One time    Melanoma Mother         Squamous cell carcinoma    Gout Father     Hypertension Father     Hypertension Sister     Glaucoma Sister     Hyperlipidemia Brother     Cancer Maternal Uncle         colon    Colon cancer Maternal Uncle     Hypertension Brother     Psoriasis Brother     Breast cancer Paternal Aunt 60    Breast cancer Paternal Aunt     Depression Paternal Grandmother          One time    Miscarriages / Stillbirths Neg Hx     Amblyopia Neg Hx     Blindness Neg Hx     Cataracts Neg Hx     Diabetes Neg Hx     Macular degeneration Neg Hx     Retinal detachment Neg Hx     Strabismus Neg Hx     Stroke Neg Hx     Thyroid disease Neg Hx     Melanoma Neg Hx      Review of Systems   Constitutional: Negative for activity change and unexpected weight change.   HENT: Negative for hearing loss, rhinorrhea and trouble swallowing.    Eyes: Negative for discharge and visual disturbance.   Respiratory: Negative for chest tightness and wheezing.    Cardiovascular: Negative for chest pain and palpitations.   Gastrointestinal: Negative for blood in stool, constipation, diarrhea and vomiting.   Endocrine: Negative for polydipsia and polyuria.   Genitourinary: Negative for difficulty urinating, dysuria, hematuria and menstrual problem.   Musculoskeletal: Positive for arthralgias. Negative for joint swelling and neck pain.   Neurological: Negative for weakness and headaches.   Psychiatric/Behavioral: Negative for confusion and dysphoric mood.     Objective:     Vitals:    09/28/20 1259 09/28/20 1303   BP: 122/66    Pulse:  72   Weight: 61.7 kg (136 lb 0.4 oz)      Body mass index is 22.64 kg/m².  Physical Exam  Constitutional:       General: She is not in acute distress.     Appearance: Normal appearance. She is well-developed and normal weight.   Neck:      Thyroid: No thyromegaly.      Vascular: No carotid bruit.   Cardiovascular:      Rate and Rhythm: Normal rate and regular rhythm.      Chest Wall: PMI is not displaced.      Heart sounds: Normal heart sounds.   Pulmonary:      Effort: Pulmonary effort is normal. No respiratory distress.      Breath sounds: Normal breath sounds.   Abdominal:      General: Bowel sounds are normal. There is no distension.      Palpations: Abdomen is soft.      Tenderness: There is no abdominal tenderness.   Neurological:      Mental Status: She is alert and  oriented to person, place, and time.   Psychiatric:         Mood and Affect: Mood normal.         Behavior: Behavior normal.         Thought Content: Thought content normal.         Judgment: Judgment normal.       Assessment:     1. Wellness examination      Plan:   Diagnoses and all orders for this visit:    Wellness examination  -     CBC auto differential; Future  -     Comprehensive metabolic panel; Future  -     Iron and TIBC; Future  -     Ferritin; Future  -     TSH; Future  -     Vitamin B12; Future    Follow up one year    Problem List Items Addressed This Visit     None      Visit Diagnoses     Wellness examination    -  Primary    Relevant Orders    CBC auto differential    Comprehensive metabolic panel    Iron and TIBC    Ferritin    TSH    Vitamin B12        Orders Placed This Encounter   Procedures    CBC auto differential     Standing Status:   Future     Standing Expiration Date:   11/27/2020    Comprehensive metabolic panel     Standing Status:   Future     Standing Expiration Date:   11/27/2020    Iron and TIBC     Standing Status:   Future     Standing Expiration Date:   9/28/2021    Ferritin     Standing Status:   Future     Standing Expiration Date:   11/27/2021    TSH     Standing Status:   Future     Standing Expiration Date:   11/27/2020    Vitamin B12     Standing Status:   Future     Standing Expiration Date:   11/27/2021     Follow up in about 1 year (around 9/28/2021) for Annual exam.     Medication List          Accurate as of September 28, 2020  1:20 PM. If you have any questions, ask your nurse or doctor.            CONTINUE taking these medications    alclomethasone 0.05 % ointment  Commonly known as: ACLOVATE  AAA lips bid

## 2020-09-29 LAB — VIT B12 SERPL-MCNC: 627 PG/ML (ref 210–950)

## 2020-10-12 ENCOUNTER — PATIENT OUTREACH (OUTPATIENT)
Dept: ADMINISTRATIVE | Facility: OTHER | Age: 50
End: 2020-10-12

## 2020-10-12 ENCOUNTER — LAB VISIT (OUTPATIENT)
Dept: SURGERY | Facility: CLINIC | Age: 50
End: 2020-10-12
Payer: COMMERCIAL

## 2020-10-12 DIAGNOSIS — Z80.0 FH: COLON CANCER: ICD-10-CM

## 2020-10-12 LAB — SARS-COV-2 RNA RESP QL NAA+PROBE: NOT DETECTED

## 2020-10-12 PROCEDURE — U0003 INFECTIOUS AGENT DETECTION BY NUCLEIC ACID (DNA OR RNA); SEVERE ACUTE RESPIRATORY SYNDROME CORONAVIRUS 2 (SARS-COV-2) (CORONAVIRUS DISEASE [COVID-19]), AMPLIFIED PROBE TECHNIQUE, MAKING USE OF HIGH THROUGHPUT TECHNOLOGIES AS DESCRIBED BY CMS-2020-01-R: HCPCS

## 2020-10-12 NOTE — PROGRESS NOTES
Care Everywhere: updated  Immunization: updated (delay in links)   Health Maintenance: updated  Media Review:   Legacy Review:   Order placed:   Upcoming appts:colonoscopy 10/15/2020

## 2020-10-13 ENCOUNTER — OFFICE VISIT (OUTPATIENT)
Dept: SPORTS MEDICINE | Facility: CLINIC | Age: 50
End: 2020-10-13
Payer: COMMERCIAL

## 2020-10-13 VITALS
HEIGHT: 65 IN | DIASTOLIC BLOOD PRESSURE: 60 MMHG | BODY MASS INDEX: 22.68 KG/M2 | SYSTOLIC BLOOD PRESSURE: 113 MMHG | WEIGHT: 136.13 LBS | HEART RATE: 80 BPM

## 2020-10-13 DIAGNOSIS — M25.512 LEFT SHOULDER PAIN, UNSPECIFIED CHRONICITY: Primary | ICD-10-CM

## 2020-10-13 DIAGNOSIS — M25.521 RIGHT ELBOW PAIN: ICD-10-CM

## 2020-10-13 DIAGNOSIS — M25.522 LEFT ELBOW PAIN: ICD-10-CM

## 2020-10-13 PROCEDURE — 99214 OFFICE O/P EST MOD 30 MIN: CPT | Mod: 25,S$GLB,, | Performed by: ORTHOPAEDIC SURGERY

## 2020-10-13 PROCEDURE — 99999 PR PBB SHADOW E&M-EST. PATIENT-LVL III: CPT | Mod: PBBFAC,,, | Performed by: ORTHOPAEDIC SURGERY

## 2020-10-13 PROCEDURE — 99999 PR PBB SHADOW E&M-EST. PATIENT-LVL III: ICD-10-PCS | Mod: PBBFAC,,, | Performed by: ORTHOPAEDIC SURGERY

## 2020-10-13 PROCEDURE — 99214 PR OFFICE/OUTPT VISIT, EST, LEVL IV, 30-39 MIN: ICD-10-PCS | Mod: 25,S$GLB,, | Performed by: ORTHOPAEDIC SURGERY

## 2020-10-13 NOTE — PROGRESS NOTES
CC: Left shoulder pain    Brissa Mason returns to clinic for follow up evaluation of left shoulder. She reports physical therapy did not help much.  She is now experiencing bilateral (L>R) elbow pain; associated when lifting (ex: pot off stove).  She also reports pain and numbness/tingling at night of bilateral hands.  Denies neck pain.    History: Patient had a similar issue 5 years ago that resolved with PT or CSI. No new traumas or falls. Pain is worse with exercising and overhead activities. It is not waking her up at night.     She is a  and does a lot of computer work.    Past Medical History    Diagnosis  Date      Colitis       resolved; rectum 2005; neg flex sig 2006      Annual physical exam  7/21/2011      History of mammogram  7/1/2011      Allergy       History of colonoscopy  due 2013        History reviewed. No pertinent past surgical history.  Family History    Problem  Relation  Age of Onset      Colon polyps  Mother       noncancerous colon tumor; polyps      Hyperlipidemia  Mother       Osteoporosis  Mother       Gout  Father       Hypertension  Father       Hypertension  Sister       Glaucoma  Sister       Hyperlipidemia  Brother       Cancer  Maternal Uncle       colon      Cancer  Paternal Aunt       Breast      Hypertension  Brother       Breast cancer  Paternal Aunt       Ovarian cancer  Paternal Aunt       Colon cancer  Paternal Uncle       Miscarriages / Stillbirths  Neg Hx       Amblyopia  Neg Hx       Blindness  Neg Hx       Cataracts  Neg Hx       Diabetes  Neg Hx       Macular degeneration  Neg Hx       Retinal detachment  Neg Hx       Strabismus  Neg Hx       Stroke  Neg Hx       Thyroid disease  Neg Hx         Current outpatient prescriptions:alprazolam (XANAX) 0.5 MG tablet, Take 1 tablet (0.5 mg total) by mouth nightly as needed for Insomnia or Anxiety., Disp: 30 tablet, Rfl: 0; desogestrel-ethinyl estradiol (VELIVET) 0.1/.125/.15-25  mg-mcg tablet, Take 1 tablet by mouth once daily., Disp: 90 tablet, Rfl: 3; fexofenadine (ALLEGRA) 180 MG tablet, Take 180 mg by mouth. 1 Tablet Oral Every day, Disp: , Rfl:   omeprazole (PRILOSEC OTC) 20 MG tablet, Take 1 tablet (20 mg total) by mouth once daily., Disp: 60 tablet, Rfl: 1  Allergies    Allergen  Reactions      Mobic [Meloxicam]  Rash      Erythromycin  Other (See Comments)      Sulfa (Sulfonamide Antibiotics)       Other reaction(s): Rash        Review of Systems   Constitution: Negative. Negative for chills, fever and night sweats.   HENT: Negative for congestion and headaches.   Eyes: Negative for blurred vision, left vision loss and right vision loss.   Cardiovascular: Negative for chest pain and syncope.   Respiratory: Negative for cough and shortness of breath.   Endocrine: Negative for polydipsia, polyphagia and polyuria.   Hematologic/Lymphatic: Negative for bleeding problem. Does not bruise/bleed easily.   Skin: Negative for dry skin, itching and rash.   Musculoskeletal: Negative for falls. Positive for left shoulder pain and muscle weakness.   Gastrointestinal: Negative for abdominal pain and bowel incontinence.   Genitourinary: Negative for bladder incontinence and nocturia.   Neurological: Negative for disturbances in coordination, loss of balance and seizures.   Psychiatric/Behavioral: Negative for depression. The patient does not have insomnia.   Allergic/Immunologic: Negative for hives and persistent infections.      PHYSICAL EXAMINATION:  Vitals:  There were no vitals taken for this visit.   General: The patient is alert and oriented x 3. Mood is pleasant. Observation of ears, eyes and nose reveal no gross abnormalities. No labored breathing observed.  Gait is coordinated. Patient can toe walk and heel walk without difficulty.      LEFT SHOULDER / UPPER EXTREMITY EXAM    OBSERVATION:   Swelling none Deformity none  Discoloration none Scapular winging none  Scars none Atrophy  none    TENDERNESS / CREPITUS (T/C):   T/C T/C  Clavicle -/- SUPRAspinatus +/-   AC Jt. -/- INFRAspinatus +/-   SC Jt. -/- Deltoid -/-   G. Tuberosity -/- LH BICEP groove -/-  Acromion: -/- Midline Neck -/-   Scapular Spine -/- Trapezium -/-  SMA Scapula -/- GH jt. line - post -/-   Scapulothoracic -/-   ROM: (* = with pain) Right shoulder Left shoulder   AROM (PROM) AROM (PROM)  FE  170° (175°) 170° (175°)   ER at 0°  60° (65°) 60° (65°)  ER at 90° ABD 90° (90°) 90° (90°)  IR at 90° ABD NA (40°) NA (40°)   IR (spine level) T6  T10    STRENGTH: (* = with pain) RIGHT SHOULDER LEFT SHOULDER  SCAPTION 5/5 5-/5   IR 5/5 5/5  ER 5/5 5-/5  BICEPS 5/5 5/5  Deltoid 5/5 5/5  SIGNS: Painful side    NEER + ONELIAS +   DIXON - SPEEDS neg   DROP ARM - BELLY PRESS neg  Superior escape none LIFT-OFF neg  X-Body ADD neg MOVING VALGUS neg     STABILITY TESTING RIGHT SHOULDER LEFT SHOULDER    Translation   Anterior  up face   up face  Posterior up face up face  Sulcus < 10mm < 10 mm    Signs  Apprehension neg neg   Relocation no change no change   Jerk test neg neg    EXTREMITY NEURO-VASCULAR EXAM   Sensation grossly intact to light touch all dermatomal regions.   DTR 2+ Biceps, Triceps, BR and Negative Roneys sign  Grossly intact motor function at Elbow, Wrist and Hand  Distal pulses radial and ulnar 2+, brisk cap refill, symmetric.   NECK: Painless FROM and spinous processes non-tender. Negative Spurlings sign.     IMAGING:    XRAY: There is calcific chronic rotator cuff tendinitis.  No fracture dislocation bone destruction seen.     MRI Left shoulder, impression (6/25/15): Unremarkable MRI examination of left shoulder, specifically without evidence of rotator cuff tear.     ASSESSMENT:   Left shoulder pain, calcific tendonitis   Left elbow pain    PLAN:   1. Steroid injection today  2. HEP  3. Follow up 2 months

## 2020-10-15 ENCOUNTER — HOSPITAL ENCOUNTER (OUTPATIENT)
Facility: HOSPITAL | Age: 50
Discharge: HOME | End: 2020-10-15
Attending: COLON & RECTAL SURGERY | Admitting: COLON & RECTAL SURGERY
Payer: COMMERCIAL

## 2020-10-15 ENCOUNTER — ANESTHESIA EVENT (OUTPATIENT)
Dept: ENDOSCOPY | Facility: HOSPITAL | Age: 50
End: 2020-10-15
Payer: COMMERCIAL

## 2020-10-15 ENCOUNTER — ANESTHESIA (OUTPATIENT)
Dept: ENDOSCOPY | Facility: HOSPITAL | Age: 50
End: 2020-10-15
Payer: COMMERCIAL

## 2020-10-15 VITALS
DIASTOLIC BLOOD PRESSURE: 75 MMHG | HEIGHT: 65 IN | BODY MASS INDEX: 22.49 KG/M2 | TEMPERATURE: 98 F | SYSTOLIC BLOOD PRESSURE: 126 MMHG | WEIGHT: 135 LBS | OXYGEN SATURATION: 100 % | HEART RATE: 68 BPM | RESPIRATION RATE: 20 BRPM

## 2020-10-15 DIAGNOSIS — Z80.0 FAMILY HX OF COLON CANCER REQUIRING SCREENING COLONOSCOPY: ICD-10-CM

## 2020-10-15 LAB
B-HCG UR QL: NEGATIVE
CTP QC/QA: YES

## 2020-10-15 PROCEDURE — 81025 URINE PREGNANCY TEST: CPT | Performed by: COLON & RECTAL SURGERY

## 2020-10-15 PROCEDURE — 45380 COLONOSCOPY AND BIOPSY: CPT | Performed by: COLON & RECTAL SURGERY

## 2020-10-15 PROCEDURE — 63600175 PHARM REV CODE 636 W HCPCS: Performed by: STUDENT IN AN ORGANIZED HEALTH CARE EDUCATION/TRAINING PROGRAM

## 2020-10-15 PROCEDURE — E9220 PRA ENDO ANESTHESIA: HCPCS | Mod: 33,,, | Performed by: STUDENT IN AN ORGANIZED HEALTH CARE EDUCATION/TRAINING PROGRAM

## 2020-10-15 PROCEDURE — 45380 COLONOSCOPY AND BIOPSY: CPT | Mod: 33,,, | Performed by: COLON & RECTAL SURGERY

## 2020-10-15 PROCEDURE — 88305 TISSUE EXAM BY PATHOLOGIST: ICD-10-PCS | Mod: 26,,, | Performed by: PATHOLOGY

## 2020-10-15 PROCEDURE — 37000008 HC ANESTHESIA 1ST 15 MINUTES: Performed by: COLON & RECTAL SURGERY

## 2020-10-15 PROCEDURE — 37000009 HC ANESTHESIA EA ADD 15 MINS: Performed by: COLON & RECTAL SURGERY

## 2020-10-15 PROCEDURE — 88305 TISSUE EXAM BY PATHOLOGIST: CPT | Mod: 26,,, | Performed by: PATHOLOGY

## 2020-10-15 PROCEDURE — 88305 TISSUE EXAM BY PATHOLOGIST: CPT | Performed by: PATHOLOGY

## 2020-10-15 PROCEDURE — 45380 PR COLONOSCOPY,BIOPSY: ICD-10-PCS | Mod: 33,,, | Performed by: COLON & RECTAL SURGERY

## 2020-10-15 PROCEDURE — 25000003 PHARM REV CODE 250: Performed by: COLON & RECTAL SURGERY

## 2020-10-15 PROCEDURE — E9220 PRA ENDO ANESTHESIA: ICD-10-PCS | Mod: 33,,, | Performed by: STUDENT IN AN ORGANIZED HEALTH CARE EDUCATION/TRAINING PROGRAM

## 2020-10-15 PROCEDURE — 27202410 HC FORCEPS, WIRE-GUIDED: Performed by: COLON & RECTAL SURGERY

## 2020-10-15 RX ORDER — PROPOFOL 10 MG/ML
VIAL (ML) INTRAVENOUS CONTINUOUS PRN
Status: DISCONTINUED | OUTPATIENT
Start: 2020-10-15 | End: 2020-10-15

## 2020-10-15 RX ORDER — SODIUM CHLORIDE 9 MG/ML
INJECTION, SOLUTION INTRAVENOUS CONTINUOUS
Status: DISCONTINUED | OUTPATIENT
Start: 2020-10-15 | End: 2020-10-15 | Stop reason: HOSPADM

## 2020-10-15 RX ORDER — LIDOCAINE HCL/PF 100 MG/5ML
SYRINGE (ML) INTRAVENOUS
Status: DISCONTINUED | OUTPATIENT
Start: 2020-10-15 | End: 2020-10-15

## 2020-10-15 RX ORDER — PROPOFOL 10 MG/ML
VIAL (ML) INTRAVENOUS
Status: DISCONTINUED | OUTPATIENT
Start: 2020-10-15 | End: 2020-10-15

## 2020-10-15 RX ADMIN — PROPOFOL 20 MG: 10 INJECTION, EMULSION INTRAVENOUS at 07:10

## 2020-10-15 RX ADMIN — SODIUM CHLORIDE: 0.9 INJECTION, SOLUTION INTRAVENOUS at 06:10

## 2020-10-15 RX ADMIN — PROPOFOL 10 MG: 10 INJECTION, EMULSION INTRAVENOUS at 07:10

## 2020-10-15 RX ADMIN — PROPOFOL 60 MG: 10 INJECTION, EMULSION INTRAVENOUS at 07:10

## 2020-10-15 RX ADMIN — PROPOFOL 150 MCG/KG/MIN: 10 INJECTION, EMULSION INTRAVENOUS at 07:10

## 2020-10-15 RX ADMIN — Medication 60 MG: at 07:10

## 2020-10-15 NOTE — H&P
Endoscopy H&P    Procedure : Colonoscopy      asymptomatic screening exam      Past Medical History:   Diagnosis Date    Allergy     Anemia 4/20/2020    Anxiety disorder     Colitis     resolved; rectum 2005; neg flex sig 2006    Colitis: 2005 rectal area     resolved; rectum 2005; neg flex sig 2006     Menstrual migraine without status migrainosus, not intractable 9/5/2017       Family History   Problem Relation Age of Onset    Colon polyps Mother         noncancerous colon tumor; polyps    Hyperlipidemia Mother     Osteoporosis Mother     Colon cancer Mother         pre cacer    Depression Mother         One time    Melanoma Mother         Squamous cell carcinoma    Gout Father     Hypertension Father     Hypertension Sister     Glaucoma Sister     Hyperlipidemia Brother     Cancer Maternal Uncle         colon    Colon cancer Maternal Uncle     Hypertension Brother     Psoriasis Brother     Breast cancer Paternal Aunt 60    Breast cancer Paternal Aunt     Depression Paternal Grandmother         One time    Miscarriages / Stillbirths Neg Hx     Amblyopia Neg Hx     Blindness Neg Hx     Cataracts Neg Hx     Diabetes Neg Hx     Macular degeneration Neg Hx     Retinal detachment Neg Hx     Strabismus Neg Hx     Stroke Neg Hx     Thyroid disease Neg Hx     Melanoma Neg Hx        Social History     Socioeconomic History    Marital status:      Spouse name: Not on file    Number of children: Not on file    Years of education: Not on file    Highest education level: Not on file   Occupational History    Not on file   Social Needs    Financial resource strain: Not hard at all    Food insecurity     Worry: Never true     Inability: Never true    Transportation needs     Medical: No     Non-medical: No   Tobacco Use    Smoking status: Never Smoker    Smokeless tobacco: Never Used   Substance and Sexual  Activity    Alcohol use: Yes     Alcohol/week: 1.0 standard drinks     Types: 1 Glasses of wine per week     Frequency: 2-4 times a month     Drinks per session: 1 or 2     Binge frequency: Never     Comment: 1-2 glasses of wine a week     Drug use: No    Sexual activity: Yes     Partners: Male     Birth control/protection: OCP     Comment: , menarche 14, no hx of abnormal    Lifestyle    Physical activity     Days per week: 3 days     Minutes per session: 60 min    Stress: Only a little   Relationships    Social connections     Talks on phone: More than three times a week     Gets together: Three times a week     Attends Gnosticism service: Not on file     Active member of club or organization: Yes     Attends meetings of clubs or organizations: More than 4 times per year     Relationship status:    Other Topics Concern    Are you pregnant or think you may be? No    Breast-feeding No   Social History Narrative         - Rastafarian    Parents - Milton Del Angel       Review of Systems:  Respiratory ROS: no cough, shortness of breath, or wheezing  Cardiovascular ROS: no chest pain or dyspnea on exertion  Gastrointestinal ROS: no abdominal pain, change in bowel habits, or black or bloody stools  Musculoskeletal ROS: negative  Neurological ROS: no TIA or stroke symptoms        Physical Exam:  General: no distress  Head: normocephalic  Neck: supple, symmetrical, trachea midline  Lungs:  clear to auscultation bilaterally and normal respiratory effort  Heart: regular rate and rhythm, S1, S2 normal, no murmur, rub or gallop  Abdomen: soft, non-tender non-distented; bowel sounds normal; no masses,  no organomegaly  Extremities: no cyanosis or edema, or clubbing       Deep Sedation: Mallampati Score II (hard and soft palate, upper portion of tonsils anduvula visible)    II    Assessment and Plan:  Proceed with Colonoscopy

## 2020-10-15 NOTE — TRANSFER OF CARE
"Anesthesia Transfer of Care Note    Patient: Brissa Mason    Procedure(s) Performed: Procedure(s) (LRB):  COLONOSCOPY (N/A)    Patient location: GI    Anesthesia Type: general    Transport from OR: Transported from OR on 6-10 L/min O2 by face mask with adequate spontaneous ventilation    Post pain: adequate analgesia    Post assessment: no apparent anesthetic complications and tolerated procedure well    Post vital signs: stable    Level of consciousness: sedated    Nausea/Vomiting: no nausea/vomiting    Complications: none    Transfer of care protocol was followed      Last vitals:   Visit Vitals  /63   Pulse 76   Temp 36.6 °C (97.9 °F)   Resp 12   Ht 5' 5" (1.651 m)   Wt 61.2 kg (135 lb)   LMP 10/07/2020   SpO2 99%   Breastfeeding No   BMI 22.47 kg/m²     "

## 2020-10-15 NOTE — ANESTHESIA PREPROCEDURE EVALUATION
10/15/2020  Brissa Mason is a 50 y.o., female.  Patient Active Problem List   Diagnosis    Vitamin D deficiency    Acute left-sided low back pain without sciatica    Anemia    Iron deficiency    B12 nutritional deficiency    Decreased range of motion of left shoulder     Past Medical History:   Diagnosis Date    Allergy     Anemia 4/20/2020    Anxiety disorder     Colitis     resolved; rectum 2005; neg flex sig 2006    Colitis: 2005 rectal area     resolved; rectum 2005; neg flex sig 2006     Menstrual migraine without status migrainosus, not intractable 9/5/2017     Past Surgical History:   Procedure Laterality Date    GANGLION CYST EXCISION  2017         Anesthesia Evaluation    I have reviewed the Patient Summary Reports.   I have reviewed the NPO Status.   I have reviewed the Medications.     Review of Systems  Anesthesia Hx:  No problems with previous Anesthesia    Hematology/Oncology:  Hematology Normal   Oncology Normal     EENT/Dental:EENT/Dental Normal   Cardiovascular:  Cardiovascular Normal     Pulmonary:  Pulmonary Normal    Hepatic/GI:  Hepatic/GI Normal    Neurological:   Headaches    Endocrine:  Endocrine Normal    Psych:   Psychiatric History          Physical Exam  General:  Well nourished    Airway/Jaw/Neck:  Airway Findings: Mouth Opening: Normal Tongue: Normal  General Airway Assessment: Adult  Mallampati: II  TM Distance: Normal, at least 6 cm      Dental:  Dental Findings: In tact             Anesthesia Plan  Type of Anesthesia, risks & benefits discussed:  Anesthesia Type:  general  Patient's Preference:   Intra-op Monitoring Plan:   Intra-op Monitoring Plan Comments:   Post Op Pain Control Plan:   Post Op Pain Control Plan Comments:   Induction:   IV  Beta Blocker:  Patient is not currently on a Beta-Blocker (No further documentation required).       Informed  Consent: Patient understands risks and agrees with Anesthesia plan.  Questions answered. Anesthesia consent signed with patient.  ASA Score: 1     Day of Surgery Review of History & Physical: I have interviewed and examined the patient. I have reviewed the patient's H&P dated:  There are no significant changes.          Ready For Surgery From Anesthesia Perspective.

## 2020-10-15 NOTE — DISCHARGE INSTRUCTIONS
Colonoscopy     A camera attached to a flexible tube with a viewing lens is used to take video pictures.     Colonoscopy is a test to view the inside of your lower digestive tract (colon and rectum). Sometimes it can show the last part of the small intestine (ileum). During the test, small pieces of tissue may be removed for testing. This is called a biopsy. Small growths, such as polyps, may also be removed.   Why is colonoscopy done?  The test is done to help look for colon cancer. And it can help find the source of abdominal pain, bleeding, and changes in bowel habits. It may be needed once a year, depending on factors such as your:  · Age  · Health history  · Family health history  · Symptoms  · Results from any prior colonoscopy  Risks and possible complications  These include:  · Bleeding               · A puncture or tear in the colon   · Risks of anesthesia  · A cancer lesion not being seen  Getting ready   To prepare for the test:  · Talk with your healthcare provider about the risks of the test (see below). Also ask your healthcare provider about alternatives to the test.  · Tell your healthcare provider about any medicines you take. Also tell him or her about any health conditions you may have.  · Make sure your rectum and colon are empty for the test. Follow the diet and bowel prep instructions exactly. If you dont, the test may need to be rescheduled.  · Plan for a friend or family member to drive you home after the test.     Colonoscopy provides an inside view of the entire colon.     You may discuss the results with your doctor right away or at a future visit.  During the test   The test is usually done in the hospital on an outpatient basis. This means you go home the same day. The procedure takes about 30 minutes. During that time:  · You are given relaxing (sedating) medicine through an IV line. You may be drowsy, or fully asleep.  · The healthcare provider will first give you a physical exam to  check for anal and rectal problems.  · Then the anus is lubricated and the scope inserted.  · If you are awake, you may have a feeling similar to needing to have a bowel movement. You may also feel pressure as air is pumped into the colon. Its OK to pass gas during the procedure.  · Biopsy, polyp removal, or other treatments may be done during the test.  After the test   You may have gas right after the test. It can help to try to pass it to help prevent later bloating. Your healthcare provider may discuss the results with you right away. Or you may need to schedule a follow-up visit to talk about the results. After the test, you can go back to your normal eating and other activities. You may be tired from the sedation and need to rest for a few hours.  Date Last Reviewed: 11/1/2016 © 2000-2017 [employer]. [address] [place] [address], [place] [address]. All rights reserved. This information is not intended as a substitute for professional medical care. Always follow your healthcare professional's instructions.

## 2020-10-15 NOTE — [employer] PATIENT INSTRUCTIONS
Discharge Summary/Instructions after an Endoscopic Procedure  Patient Name: Brissa Mason  Patient MRN: [id number]  Patient YOB: 1970  Thursday, October 15, 2020  Vern Harkins MD  RESTRICTIONS:  During your procedure today, you received medications for sedation.  These   medications may affect your judgment, balance and coordination.  Therefore,   for 24 hours, you have the following restrictions:   - DO NOT drive a car, operate machinery, make legal/financial decisions,   sign important papers or drink alcohol.    ACTIVITY:  Today: no heavy lifting, straining or running due to procedural   sedation/anesthesia.  The following day: return to full activity including work.  DIET:  Eat and drink normally unless instructed otherwise.     TREATMENT FOR COMMON SIDE EFFECTS:  - Mild abdominal pain, nausea, belching, bloating or excessive gas:  rest,   eat lightly and use a heating pad.  - Sore Throat: treat with throat lozenges and/or gargle with warm salt   water.  - Because air was used during the procedure, expelling large amounts of air   from your rectum or belching is normal.  - If a bowel prep was taken, you may not have a bowel movement for 1-3 days.    This is normal.  SYMPTOMS TO WATCH FOR AND REPORT TO YOUR PHYSICIAN:  1. Abdominal pain or bloating, other than gas cramps.  2. Chest pain.  3. Back pain.  4. Signs of infection such as: chills or fever occurring within 24 hours   after the procedure.  5. Rectal bleeding, which would show as bright red, maroon, or black stools.   (A tablespoon of blood from the rectum is not serious, especially if   hemorrhoids are present.)  6. Vomiting.  7. Weakness or dizziness.  GO DIRECTLY TO THE NEAREST EMERGENCY ROOM IF YOU HAVE ANY OF THE FOLLOWING:      Difficulty breathing              Chills and/or fever over 101 F   Persistent vomiting and/or vomiting blood   Severe abdominal pain   Severe chest pain   Black, tarry stools   Bleeding- more than one  tablespoon   Any other symptom or condition that you feel may need urgent attention  Your doctor recommends these additional instructions:  If any biopsies were taken, your doctors clinic will contact you in 1 to 2   weeks with any results.  - Discharge patient to home (ambulatory).   - Resume previous diet indefinitely.   - Continue present medications.   - Repeat colonoscopy in 5 years for surveillance based on pathology   results.  For questions, problems or results please call your physician - Vern Harkins MD at Work:  [phone].  [facility], EMERGENCY ROOM PHONE NUMBER: [phone]  IF A COMPLICATION OR EMERGENCY SITUATION ARISES AND YOU ARE UNABLE TO REACH   YOUR PHYSICIAN - GO DIRECTLY TO THE EMERGENCY ROOM.  Vern Harkins MD  10/15/2020 7:43:31 AM  This report has been verified and signed electronically.  [employer]

## 2020-10-15 NOTE — ANESTHESIA POSTPROCEDURE EVALUATION
Anesthesia Post Evaluation    Patient: Brissa Mason    Procedure(s) Performed: Procedure(s) (LRB):  COLONOSCOPY (N/A)    Final Anesthesia Type: general    Patient location during evaluation: PACU  Patient participation: Yes- Able to Participate  Level of consciousness: awake and alert  Post-procedure vital signs: reviewed and stable  Pain management: adequate  Airway patency: patent    PONV status at discharge: No PONV  Anesthetic complications: no      Cardiovascular status: blood pressure returned to baseline  Respiratory status: unassisted  Hydration status: euvolemic  Follow-up not needed.          Vitals Value Taken Time   /75 10/15/20 0814   Temp 36.6 °C (97.9 °F) 10/15/20 0743   Pulse 68 10/15/20 0814   Resp 20 10/15/20 0814   SpO2 100 % 10/15/20 0814         Event Time   Out of Recovery 08:16:12         Pain/Naman Score: Naman Score: 9 (10/15/2020  8:15 AM)

## 2020-10-19 LAB
FINAL PATHOLOGIC DIAGNOSIS: NORMAL
GROSS: NORMAL
Lab: NORMAL

## 2020-11-10 ENCOUNTER — PATIENT MESSAGE (OUTPATIENT)
Dept: SURGERY | Facility: CLINIC | Age: 50
End: 2020-11-10

## 2020-11-11 ENCOUNTER — PATIENT MESSAGE (OUTPATIENT)
Dept: INTERNAL MEDICINE | Facility: CLINIC | Age: 50
End: 2020-11-11

## 2020-12-01 ENCOUNTER — PATIENT MESSAGE (OUTPATIENT)
Dept: DERMATOLOGY | Facility: CLINIC | Age: 50
End: 2020-12-01

## 2020-12-07 ENCOUNTER — PATIENT MESSAGE (OUTPATIENT)
Dept: DERMATOLOGY | Facility: CLINIC | Age: 50
End: 2020-12-07

## 2020-12-10 ENCOUNTER — OFFICE VISIT (OUTPATIENT)
Dept: DERMATOLOGY | Facility: CLINIC | Age: 50
End: 2020-12-10
Payer: COMMERCIAL

## 2020-12-10 DIAGNOSIS — L98.9 DISEASE OF SKIN AND SUBCUTANEOUS TISSUE: Primary | ICD-10-CM

## 2020-12-10 PROCEDURE — 99214 OFFICE O/P EST MOD 30 MIN: CPT | Mod: S$GLB,,, | Performed by: DERMATOLOGY

## 2020-12-10 PROCEDURE — 99214 PR OFFICE/OUTPT VISIT, EST, LEVL IV, 30-39 MIN: ICD-10-PCS | Mod: S$GLB,,, | Performed by: DERMATOLOGY

## 2020-12-10 PROCEDURE — 99999 PR PBB SHADOW E&M-EST. PATIENT-LVL II: CPT | Mod: PBBFAC,,, | Performed by: DERMATOLOGY

## 2020-12-10 PROCEDURE — 99999 PR PBB SHADOW E&M-EST. PATIENT-LVL II: ICD-10-PCS | Mod: PBBFAC,,, | Performed by: DERMATOLOGY

## 2020-12-10 RX ORDER — TRIAMCINOLONE ACETONIDE 1 MG/G
CREAM TOPICAL
Qty: 80 G | Refills: 3 | Status: SHIPPED | OUTPATIENT
Start: 2020-12-10 | End: 2021-06-15

## 2020-12-10 NOTE — PROGRESS NOTES
Subjective:       Patient ID:  Brissa Mason is a 50 y.o. female who presents for   Chief Complaint   Patient presents with    Rash     right lower leg     Rash - Initial  Affected locations: right lower leg  Duration: 1 month  Signs / symptoms: itching  Severity: mild  Timing: constant and no history of same complaint  Exacerbated by: water from bath (hot)  Relieving factors/Treatments tried: OTC antibiotic cream (benadryl)  Improvement on treatment: mild        Review of Systems   Skin: Positive for itching, rash and activity-related sunscreen use. Negative for daily sunscreen use and recent sunburn.        Objective:    Physical Exam   Constitutional: She appears well-developed and well-nourished. No distress.   Neurological: She is alert and oriented to person, place, and time. She is not disoriented.   Psychiatric: She has a normal mood and affect.   Skin:   Areas Examined (abnormalities noted in diagram):   RLE Inspected              Diagram Legend     Erythematous scaling macule/papule c/w actinic keratosis       Vascular papule c/w angioma      Pigmented verrucoid papule/plaque c/w seborrheic keratosis      Yellow umbilicated papule c/w sebaceous hyperplasia      Irregularly shaped tan macule c/w lentigo     1-2 mm smooth white papules consistent with Milia      Movable subcutaneous cyst with punctum c/w epidermal inclusion cyst      Subcutaneous movable cyst c/w pilar cyst      Firm pink to brown papule c/w dermatofibroma      Pedunculated fleshy papule(s) c/w skin tag(s)      Evenly pigmented macule c/w junctional nevus     Mildly variegated pigmented, slightly irregular-bordered macule c/w mildly atypical nevus      Flesh colored to evenly pigmented papule c/w intradermal nevus       Pink pearly papule/plaque c/w basal cell carcinoma      Erythematous hyperkeratotic cursted plaque c/w SCC      Surgical scar with no sign of skin cancer recurrence      Open and closed comedones      Inflammatory  papules and pustules      Verrucoid papule consistent consistent with wart     Erythematous eczematous patches and plaques     Dystrophic onycholytic nail with subungual debris c/w onychomycosis     Umbilicated papule    Erythematous-base heme-crusted tan verrucoid plaque consistent with inflamed seborrheic keratosis     Erythematous Silvery Scaling Plaque c/w Psoriasis     See annotation      Assessment / Plan:        Disease of skin and subcutaneous tissue  -     triamcinolone acetonide 0.1% (KENALOG) 0.1 % cream; AAA right lower leg bid  Dispense: 80 g; Refill: 3 - can occlude qhs  D/c hot water  D/c scratching/rubbing  Use ice for extreme itching           No follow-ups on file.

## 2021-01-14 ENCOUNTER — LAB VISIT (OUTPATIENT)
Dept: PRIMARY CARE CLINIC | Facility: OTHER | Age: 51
End: 2021-01-14
Attending: INTERNAL MEDICINE
Payer: COMMERCIAL

## 2021-01-14 ENCOUNTER — PATIENT MESSAGE (OUTPATIENT)
Dept: INTERNAL MEDICINE | Facility: CLINIC | Age: 51
End: 2021-01-14

## 2021-01-14 DIAGNOSIS — Z20.822 ENCOUNTER FOR LABORATORY TESTING FOR COVID-19 VIRUS: ICD-10-CM

## 2021-01-14 PROCEDURE — U0003 INFECTIOUS AGENT DETECTION BY NUCLEIC ACID (DNA OR RNA); SEVERE ACUTE RESPIRATORY SYNDROME CORONAVIRUS 2 (SARS-COV-2) (CORONAVIRUS DISEASE [COVID-19]), AMPLIFIED PROBE TECHNIQUE, MAKING USE OF HIGH THROUGHPUT TECHNOLOGIES AS DESCRIBED BY CMS-2020-01-R: HCPCS

## 2021-01-15 LAB — SARS-COV-2 RNA RESP QL NAA+PROBE: NOT DETECTED

## 2021-02-04 ENCOUNTER — PATIENT MESSAGE (OUTPATIENT)
Dept: DERMATOLOGY | Facility: CLINIC | Age: 51
End: 2021-02-04

## 2021-02-22 ENCOUNTER — PATIENT MESSAGE (OUTPATIENT)
Dept: OBSTETRICS AND GYNECOLOGY | Facility: CLINIC | Age: 51
End: 2021-02-22

## 2021-02-23 ENCOUNTER — PATIENT MESSAGE (OUTPATIENT)
Dept: OBSTETRICS AND GYNECOLOGY | Facility: CLINIC | Age: 51
End: 2021-02-23

## 2021-02-23 ENCOUNTER — TELEPHONE (OUTPATIENT)
Dept: OBSTETRICS AND GYNECOLOGY | Facility: CLINIC | Age: 51
End: 2021-02-23

## 2021-02-23 ENCOUNTER — PATIENT MESSAGE (OUTPATIENT)
Dept: RHEUMATOLOGY | Facility: CLINIC | Age: 51
End: 2021-02-23

## 2021-02-24 ENCOUNTER — TELEPHONE (OUTPATIENT)
Dept: OBSTETRICS AND GYNECOLOGY | Facility: CLINIC | Age: 51
End: 2021-02-24

## 2021-02-24 DIAGNOSIS — N93.9 ABNORMAL UTERINE BLEEDING: Primary | ICD-10-CM

## 2021-02-26 ENCOUNTER — HOSPITAL ENCOUNTER (OUTPATIENT)
Dept: RADIOLOGY | Facility: OTHER | Age: 51
Discharge: HOME | End: 2021-02-26
Attending: OBSTETRICS & GYNECOLOGY
Payer: COMMERCIAL

## 2021-02-26 DIAGNOSIS — N93.9 ABNORMAL UTERINE BLEEDING: ICD-10-CM

## 2021-02-26 PROCEDURE — 76856 US EXAM PELVIC COMPLETE: CPT | Mod: 26,,, | Performed by: RADIOLOGY

## 2021-02-26 PROCEDURE — 76830 TRANSVAGINAL US NON-OB: CPT | Mod: 26,,, | Performed by: RADIOLOGY

## 2021-02-26 PROCEDURE — 76856 US EXAM PELVIC COMPLETE: CPT | Mod: TC

## 2021-02-26 PROCEDURE — 76830 US PELVIS COMP WITH TRANSVAG NON-OB (XPD): ICD-10-PCS | Mod: 26,,, | Performed by: RADIOLOGY

## 2021-02-26 PROCEDURE — 76856 US PELVIS COMP WITH TRANSVAG NON-OB (XPD): ICD-10-PCS | Mod: 26,,, | Performed by: RADIOLOGY

## 2021-02-28 ENCOUNTER — PATIENT MESSAGE (OUTPATIENT)
Dept: INTERNAL MEDICINE | Facility: CLINIC | Age: 51
End: 2021-02-28

## 2021-03-02 ENCOUNTER — TELEPHONE (OUTPATIENT)
Dept: OBSTETRICS AND GYNECOLOGY | Facility: CLINIC | Age: 51
End: 2021-03-02

## 2021-03-02 ENCOUNTER — PATIENT MESSAGE (OUTPATIENT)
Dept: OBSTETRICS AND GYNECOLOGY | Facility: CLINIC | Age: 51
End: 2021-03-02

## 2021-03-18 ENCOUNTER — PATIENT MESSAGE (OUTPATIENT)
Dept: INTERNAL MEDICINE | Facility: CLINIC | Age: 51
End: 2021-03-18

## 2021-03-26 ENCOUNTER — IMMUNIZATION (OUTPATIENT)
Dept: PRIMARY CARE CLINIC | Facility: CLINIC | Age: 51
End: 2021-03-26
Payer: COMMERCIAL

## 2021-03-26 DIAGNOSIS — Z23 NEED FOR VACCINATION: Primary | ICD-10-CM

## 2021-03-26 PROCEDURE — 0001A PR IMMUNIZ ADMIN, SARS-COV-2 COVID-19 VACC, 30MCG/0.3ML, 1ST DOSE: ICD-10-PCS | Mod: CV19,S$GLB,, | Performed by: INTERNAL MEDICINE

## 2021-03-26 PROCEDURE — 91300 PR SARS-COV- 2 COVID-19 VACCINE, NO PRSV, 30MCG/0.3ML, IM: CPT | Mod: S$GLB,,, | Performed by: INTERNAL MEDICINE

## 2021-03-26 PROCEDURE — 91300 PR SARS-COV- 2 COVID-19 VACCINE, NO PRSV, 30MCG/0.3ML, IM: ICD-10-PCS | Mod: S$GLB,,, | Performed by: INTERNAL MEDICINE

## 2021-03-26 PROCEDURE — 0001A PR IMMUNIZ ADMIN, SARS-COV-2 COVID-19 VACC, 30MCG/0.3ML, 1ST DOSE: CPT | Mod: CV19,S$GLB,, | Performed by: INTERNAL MEDICINE

## 2021-03-26 RX ADMIN — Medication 0.3 ML: at 04:03

## 2021-04-18 ENCOUNTER — IMMUNIZATION (OUTPATIENT)
Dept: PRIMARY CARE CLINIC | Facility: CLINIC | Age: 51
End: 2021-04-18
Payer: COMMERCIAL

## 2021-04-18 DIAGNOSIS — Z23 NEED FOR VACCINATION: Primary | ICD-10-CM

## 2021-04-18 PROCEDURE — 0002A PR IMMUNIZ ADMIN, SARS-COV-2 COVID-19 VACC, 30MCG/0.3ML, 2ND DOSE: CPT | Mod: CV19,S$GLB,, | Performed by: INTERNAL MEDICINE

## 2021-04-18 PROCEDURE — 91300 PR SARS-COV- 2 COVID-19 VACCINE, NO PRSV, 30MCG/0.3ML, IM: ICD-10-PCS | Mod: S$GLB,,, | Performed by: INTERNAL MEDICINE

## 2021-04-18 PROCEDURE — 91300 PR SARS-COV- 2 COVID-19 VACCINE, NO PRSV, 30MCG/0.3ML, IM: CPT | Mod: S$GLB,,, | Performed by: INTERNAL MEDICINE

## 2021-04-18 PROCEDURE — 0002A PR IMMUNIZ ADMIN, SARS-COV-2 COVID-19 VACC, 30MCG/0.3ML, 2ND DOSE: ICD-10-PCS | Mod: CV19,S$GLB,, | Performed by: INTERNAL MEDICINE

## 2021-04-18 RX ADMIN — Medication 0.3 ML: at 11:04

## 2021-05-24 ENCOUNTER — PATIENT MESSAGE (OUTPATIENT)
Dept: OBSTETRICS AND GYNECOLOGY | Facility: CLINIC | Age: 51
End: 2021-05-24

## 2021-06-07 ENCOUNTER — PATIENT MESSAGE (OUTPATIENT)
Dept: INTERNAL MEDICINE | Facility: CLINIC | Age: 51
End: 2021-06-07

## 2021-06-07 DIAGNOSIS — Z00.00 WELLNESS EXAMINATION: Primary | ICD-10-CM

## 2021-06-15 ENCOUNTER — PROCEDURE VISIT (OUTPATIENT)
Dept: OBSTETRICS AND GYNECOLOGY | Facility: CLINIC | Age: 51
End: 2021-06-15
Payer: COMMERCIAL

## 2021-06-15 VITALS
WEIGHT: 136 LBS | SYSTOLIC BLOOD PRESSURE: 134 MMHG | HEIGHT: 65 IN | BODY MASS INDEX: 22.66 KG/M2 | DIASTOLIC BLOOD PRESSURE: 70 MMHG

## 2021-06-15 DIAGNOSIS — N92.6 IRREGULAR MENSES: Primary | ICD-10-CM

## 2021-06-15 DIAGNOSIS — N95.1 PERIMENOPAUSE: ICD-10-CM

## 2021-06-15 DIAGNOSIS — Z32.02 NEGATIVE PREGNANCY TEST: ICD-10-CM

## 2021-06-15 LAB
B-HCG UR QL: NEGATIVE
CTP QC/QA: YES

## 2021-06-15 PROCEDURE — 81025 POCT URINE PREGNANCY: ICD-10-PCS | Mod: S$GLB,,, | Performed by: OBSTETRICS & GYNECOLOGY

## 2021-06-15 PROCEDURE — 81025 URINE PREGNANCY TEST: CPT | Mod: S$GLB,,, | Performed by: OBSTETRICS & GYNECOLOGY

## 2021-06-15 PROCEDURE — 58100 BIOPSY (GYNECOLOGICAL): ICD-10-PCS | Mod: S$GLB,,, | Performed by: OBSTETRICS & GYNECOLOGY

## 2021-06-15 PROCEDURE — 58100 BIOPSY OF UTERUS LINING: CPT | Mod: S$GLB,,, | Performed by: OBSTETRICS & GYNECOLOGY

## 2021-06-15 PROCEDURE — 88305 TISSUE EXAM BY PATHOLOGIST: CPT | Performed by: PATHOLOGY

## 2021-06-15 PROCEDURE — 88305 TISSUE EXAM BY PATHOLOGIST: CPT | Mod: 26,,, | Performed by: PATHOLOGY

## 2021-06-15 PROCEDURE — 88305 TISSUE EXAM BY PATHOLOGIST: ICD-10-PCS | Mod: 26,,, | Performed by: PATHOLOGY

## 2021-06-21 ENCOUNTER — PATIENT MESSAGE (OUTPATIENT)
Dept: INTERNAL MEDICINE | Facility: CLINIC | Age: 51
End: 2021-06-21

## 2021-06-21 ENCOUNTER — PATIENT MESSAGE (OUTPATIENT)
Dept: OBSTETRICS AND GYNECOLOGY | Facility: CLINIC | Age: 51
End: 2021-06-21

## 2021-06-22 ENCOUNTER — PATIENT MESSAGE (OUTPATIENT)
Dept: INTERNAL MEDICINE | Facility: CLINIC | Age: 51
End: 2021-06-22

## 2021-06-22 ENCOUNTER — OFFICE VISIT (OUTPATIENT)
Dept: INTERNAL MEDICINE | Facility: CLINIC | Age: 51
End: 2021-06-22
Payer: COMMERCIAL

## 2021-06-22 VITALS
BODY MASS INDEX: 22.22 KG/M2 | HEIGHT: 65 IN | HEART RATE: 68 BPM | OXYGEN SATURATION: 99 % | WEIGHT: 133.38 LBS | DIASTOLIC BLOOD PRESSURE: 80 MMHG | SYSTOLIC BLOOD PRESSURE: 132 MMHG

## 2021-06-22 DIAGNOSIS — R10.13 ABDOMINAL PAIN, EPIGASTRIC: Primary | ICD-10-CM

## 2021-06-22 PROCEDURE — 99214 PR OFFICE/OUTPT VISIT, EST, LEVL IV, 30-39 MIN: ICD-10-PCS | Mod: S$GLB,,, | Performed by: INTERNAL MEDICINE

## 2021-06-22 PROCEDURE — 99999 PR PBB SHADOW E&M-EST. PATIENT-LVL III: ICD-10-PCS | Mod: PBBFAC,,, | Performed by: INTERNAL MEDICINE

## 2021-06-22 PROCEDURE — 99999 PR PBB SHADOW E&M-EST. PATIENT-LVL III: CPT | Mod: PBBFAC,,, | Performed by: INTERNAL MEDICINE

## 2021-06-22 PROCEDURE — 99214 OFFICE O/P EST MOD 30 MIN: CPT | Mod: S$GLB,,, | Performed by: INTERNAL MEDICINE

## 2021-06-22 RX ORDER — PANTOPRAZOLE SODIUM 40 MG/1
40 TABLET, DELAYED RELEASE ORAL DAILY
Qty: 30 TABLET | Refills: 1 | Status: SHIPPED | OUTPATIENT
Start: 2021-06-22 | End: 2021-07-20 | Stop reason: SDUPTHER

## 2021-06-23 LAB
FINAL PATHOLOGIC DIAGNOSIS: NORMAL
GROSS: NORMAL
Lab: NORMAL

## 2021-06-24 ENCOUNTER — PATIENT OUTREACH (OUTPATIENT)
Dept: ADMINISTRATIVE | Facility: OTHER | Age: 51
End: 2021-06-24

## 2021-06-25 ENCOUNTER — OFFICE VISIT (OUTPATIENT)
Dept: OPTOMETRY | Facility: CLINIC | Age: 51
End: 2021-06-25
Payer: COMMERCIAL

## 2021-06-25 DIAGNOSIS — Z04.9 DISEASE RULED OUT AFTER EXAMINATION: ICD-10-CM

## 2021-06-25 DIAGNOSIS — H52.4 PRESBYOPIA: ICD-10-CM

## 2021-06-25 DIAGNOSIS — H25.13 NS (NUCLEAR SCLEROSIS), BILATERAL: Primary | ICD-10-CM

## 2021-06-25 PROCEDURE — 99999 PR PBB SHADOW E&M-EST. PATIENT-LVL II: ICD-10-PCS | Mod: PBBFAC,,, | Performed by: OPTOMETRIST

## 2021-06-25 PROCEDURE — 92014 COMPRE OPH EXAM EST PT 1/>: CPT | Mod: S$GLB,,, | Performed by: OPTOMETRIST

## 2021-06-25 PROCEDURE — 92014 PR EYE EXAM, EST PATIENT,COMPREHESV: ICD-10-PCS | Mod: S$GLB,,, | Performed by: OPTOMETRIST

## 2021-06-25 PROCEDURE — 99999 PR PBB SHADOW E&M-EST. PATIENT-LVL II: CPT | Mod: PBBFAC,,, | Performed by: OPTOMETRIST

## 2021-07-02 ENCOUNTER — TELEPHONE (OUTPATIENT)
Dept: OBSTETRICS AND GYNECOLOGY | Facility: CLINIC | Age: 51
End: 2021-07-02

## 2021-07-08 ENCOUNTER — TELEPHONE (OUTPATIENT)
Dept: URGENT CARE | Facility: CLINIC | Age: 51
End: 2021-07-08

## 2021-07-08 ENCOUNTER — CLINICAL SUPPORT (OUTPATIENT)
Dept: URGENT CARE | Facility: CLINIC | Age: 51
End: 2021-07-08
Payer: COMMERCIAL

## 2021-07-08 DIAGNOSIS — U07.1 COVID-19 VIRUS DETECTED: ICD-10-CM

## 2021-07-08 DIAGNOSIS — U07.1 COVID-19 VIRUS INFECTION: Primary | ICD-10-CM

## 2021-07-08 DIAGNOSIS — Z20.828 EXPOSURE TO VIRAL DISEASE: Primary | ICD-10-CM

## 2021-07-08 LAB
CTP QC/QA: YES
SARS-COV-2 RDRP RESP QL NAA+PROBE: POSITIVE

## 2021-07-08 PROCEDURE — U0002: ICD-10-PCS | Mod: QW,S$GLB,, | Performed by: NURSE PRACTITIONER

## 2021-07-08 PROCEDURE — U0002 COVID-19 LAB TEST NON-CDC: HCPCS | Mod: QW,S$GLB,, | Performed by: NURSE PRACTITIONER

## 2021-07-09 ENCOUNTER — PATIENT MESSAGE (OUTPATIENT)
Dept: INTERNAL MEDICINE | Facility: CLINIC | Age: 51
End: 2021-07-09

## 2021-07-20 ENCOUNTER — OFFICE VISIT (OUTPATIENT)
Dept: INTERNAL MEDICINE | Facility: CLINIC | Age: 51
End: 2021-07-20
Payer: COMMERCIAL

## 2021-07-20 VITALS
WEIGHT: 135.81 LBS | SYSTOLIC BLOOD PRESSURE: 120 MMHG | HEART RATE: 62 BPM | OXYGEN SATURATION: 99 % | DIASTOLIC BLOOD PRESSURE: 62 MMHG | HEIGHT: 65 IN | BODY MASS INDEX: 22.63 KG/M2

## 2021-07-20 DIAGNOSIS — R10.13 ABDOMINAL PAIN, EPIGASTRIC: Primary | ICD-10-CM

## 2021-07-20 PROCEDURE — 99213 PR OFFICE/OUTPT VISIT, EST, LEVL III, 20-29 MIN: ICD-10-PCS | Mod: S$GLB,,, | Performed by: INTERNAL MEDICINE

## 2021-07-20 PROCEDURE — 99999 PR PBB SHADOW E&M-EST. PATIENT-LVL III: ICD-10-PCS | Mod: PBBFAC,,, | Performed by: INTERNAL MEDICINE

## 2021-07-20 PROCEDURE — 99213 OFFICE O/P EST LOW 20 MIN: CPT | Mod: S$GLB,,, | Performed by: INTERNAL MEDICINE

## 2021-07-20 PROCEDURE — 99999 PR PBB SHADOW E&M-EST. PATIENT-LVL III: CPT | Mod: PBBFAC,,, | Performed by: INTERNAL MEDICINE

## 2021-07-20 RX ORDER — PANTOPRAZOLE SODIUM 40 MG/1
40 TABLET, DELAYED RELEASE ORAL DAILY
Qty: 30 TABLET | Refills: 0 | Status: SHIPPED | OUTPATIENT
Start: 2021-07-20 | End: 2021-08-16

## 2021-08-27 ENCOUNTER — PATIENT MESSAGE (OUTPATIENT)
Dept: INTERNAL MEDICINE | Facility: CLINIC | Age: 51
End: 2021-08-27

## 2021-08-27 DIAGNOSIS — M25.559 HIP PAIN: Primary | ICD-10-CM

## 2021-09-01 ENCOUNTER — PATIENT OUTREACH (OUTPATIENT)
Dept: ADMINISTRATIVE | Facility: OTHER | Age: 51
End: 2021-09-01

## 2021-09-09 ENCOUNTER — PATIENT MESSAGE (OUTPATIENT)
Dept: ORTHOPEDICS | Facility: CLINIC | Age: 51
End: 2021-09-09

## 2021-09-13 ENCOUNTER — HOSPITAL ENCOUNTER (OUTPATIENT)
Dept: RADIOLOGY | Facility: HOSPITAL | Age: 51
Discharge: HOME | End: 2021-09-13
Attending: OBSTETRICS & GYNECOLOGY
Payer: COMMERCIAL

## 2021-09-13 DIAGNOSIS — Z12.31 SCREENING MAMMOGRAM, ENCOUNTER FOR: ICD-10-CM

## 2021-09-13 PROCEDURE — 77067 SCR MAMMO BI INCL CAD: CPT | Mod: TC

## 2021-09-13 PROCEDURE — 77067 SCR MAMMO BI INCL CAD: CPT | Mod: 26,,, | Performed by: RADIOLOGY

## 2021-09-13 PROCEDURE — 77063 MAMMO DIGITAL SCREENING BILAT WITH TOMO: ICD-10-PCS | Mod: 26,,, | Performed by: RADIOLOGY

## 2021-09-13 PROCEDURE — 77063 BREAST TOMOSYNTHESIS BI: CPT | Mod: 26,,, | Performed by: RADIOLOGY

## 2021-09-13 PROCEDURE — 77067 MAMMO DIGITAL SCREENING BILAT WITH TOMO: ICD-10-PCS | Mod: 26,,, | Performed by: RADIOLOGY

## 2021-09-24 ENCOUNTER — LAB VISIT (OUTPATIENT)
Dept: LAB | Facility: HOSPITAL | Age: 51
End: 2021-09-24
Attending: INTERNAL MEDICINE
Payer: COMMERCIAL

## 2021-09-24 DIAGNOSIS — Z00.00 WELLNESS EXAMINATION: ICD-10-CM

## 2021-09-24 LAB
25(OH)D3+25(OH)D2 SERPL-MCNC: 29 NG/ML (ref 30–96)
ALBUMIN SERPL BCP-MCNC: 4.1 G/DL (ref 3.5–5.2)
ALP SERPL-CCNC: 42 U/L (ref 55–135)
ALT SERPL W/O P-5'-P-CCNC: 21 U/L (ref 10–44)
ANION GAP SERPL CALC-SCNC: 12 MMOL/L (ref 8–16)
AST SERPL-CCNC: 20 U/L (ref 10–40)
BASOPHILS # BLD AUTO: 0.02 K/UL (ref 0–0.2)
BASOPHILS NFR BLD: 0.3 % (ref 0–1.9)
BILIRUB SERPL-MCNC: 0.6 MG/DL (ref 0.1–1)
BUN SERPL-MCNC: 10 MG/DL (ref 6–20)
CALCIUM SERPL-MCNC: 9.6 MG/DL (ref 8.7–10.5)
CHLORIDE SERPL-SCNC: 104 MMOL/L (ref 95–110)
CHOLEST SERPL-MCNC: 198 MG/DL (ref 120–199)
CHOLEST/HDLC SERPL: 2.4 {RATIO} (ref 2–5)
CO2 SERPL-SCNC: 21 MMOL/L (ref 23–29)
CREAT SERPL-MCNC: 0.7 MG/DL (ref 0.5–1.4)
DIFFERENTIAL METHOD: ABNORMAL
EOSINOPHIL # BLD AUTO: 0.1 K/UL (ref 0–0.5)
EOSINOPHIL NFR BLD: 1.6 % (ref 0–8)
ERYTHROCYTE [DISTWIDTH] IN BLOOD BY AUTOMATED COUNT: 12.5 % (ref 11.5–14.5)
EST. GFR  (AFRICAN AMERICAN): >60 ML/MIN/1.73 M^2
EST. GFR  (NON AFRICAN AMERICAN): >60 ML/MIN/1.73 M^2
GLUCOSE SERPL-MCNC: 88 MG/DL (ref 70–110)
HCT VFR BLD AUTO: 38.3 % (ref 37–48.5)
HDLC SERPL-MCNC: 84 MG/DL (ref 40–75)
HDLC SERPL: 42.4 % (ref 20–50)
HGB BLD-MCNC: 12.9 G/DL (ref 12–16)
IMM GRANULOCYTES # BLD AUTO: 0.01 K/UL (ref 0–0.04)
IMM GRANULOCYTES NFR BLD AUTO: 0.2 % (ref 0–0.5)
LDLC SERPL CALC-MCNC: 100.8 MG/DL (ref 63–159)
LYMPHOCYTES # BLD AUTO: 1.4 K/UL (ref 1–4.8)
LYMPHOCYTES NFR BLD: 24.7 % (ref 18–48)
MCH RBC QN AUTO: 31.2 PG (ref 27–31)
MCHC RBC AUTO-ENTMCNC: 33.7 G/DL (ref 32–36)
MCV RBC AUTO: 93 FL (ref 82–98)
MONOCYTES # BLD AUTO: 0.4 K/UL (ref 0.3–1)
MONOCYTES NFR BLD: 6.8 % (ref 4–15)
NEUTROPHILS # BLD AUTO: 3.8 K/UL (ref 1.8–7.7)
NEUTROPHILS NFR BLD: 66.4 % (ref 38–73)
NONHDLC SERPL-MCNC: 114 MG/DL
NRBC BLD-RTO: 0 /100 WBC
PLATELET # BLD AUTO: 247 K/UL (ref 150–450)
PMV BLD AUTO: 10.9 FL (ref 9.2–12.9)
POTASSIUM SERPL-SCNC: 4 MMOL/L (ref 3.5–5.1)
PROT SERPL-MCNC: 6.8 G/DL (ref 6–8.4)
RBC # BLD AUTO: 4.13 M/UL (ref 4–5.4)
SODIUM SERPL-SCNC: 137 MMOL/L (ref 136–145)
TRIGL SERPL-MCNC: 66 MG/DL (ref 30–150)
TSH SERPL DL<=0.005 MIU/L-ACNC: 2.39 UIU/ML (ref 0.4–4)
VIT B12 SERPL-MCNC: 602 PG/ML (ref 210–950)
WBC # BLD AUTO: 5.72 K/UL (ref 3.9–12.7)

## 2021-09-24 PROCEDURE — 80053 COMPREHEN METABOLIC PANEL: CPT | Performed by: INTERNAL MEDICINE

## 2021-09-24 PROCEDURE — 80061 LIPID PANEL: CPT | Performed by: INTERNAL MEDICINE

## 2021-09-24 PROCEDURE — 36415 COLL VENOUS BLD VENIPUNCTURE: CPT | Performed by: INTERNAL MEDICINE

## 2021-09-24 PROCEDURE — 82306 VITAMIN D 25 HYDROXY: CPT | Performed by: INTERNAL MEDICINE

## 2021-09-24 PROCEDURE — 85025 COMPLETE CBC W/AUTO DIFF WBC: CPT | Performed by: INTERNAL MEDICINE

## 2021-09-24 PROCEDURE — 84443 ASSAY THYROID STIM HORMONE: CPT | Performed by: INTERNAL MEDICINE

## 2021-09-24 PROCEDURE — 82607 VITAMIN B-12: CPT | Performed by: INTERNAL MEDICINE

## 2021-09-29 ENCOUNTER — OFFICE VISIT (OUTPATIENT)
Dept: INTERNAL MEDICINE | Facility: CLINIC | Age: 51
End: 2021-09-29
Payer: COMMERCIAL

## 2021-09-29 ENCOUNTER — IMMUNIZATION (OUTPATIENT)
Dept: INTERNAL MEDICINE | Facility: CLINIC | Age: 51
End: 2021-09-29
Payer: COMMERCIAL

## 2021-09-29 ENCOUNTER — HOSPITAL ENCOUNTER (OUTPATIENT)
Dept: RADIOLOGY | Facility: CLINIC | Age: 51
Discharge: HOME | End: 2021-09-29
Attending: INTERNAL MEDICINE
Payer: COMMERCIAL

## 2021-09-29 VITALS
SYSTOLIC BLOOD PRESSURE: 122 MMHG | OXYGEN SATURATION: 98 % | BODY MASS INDEX: 23.03 KG/M2 | HEIGHT: 65 IN | HEART RATE: 73 BPM | WEIGHT: 138.25 LBS | DIASTOLIC BLOOD PRESSURE: 66 MMHG

## 2021-09-29 DIAGNOSIS — E55.9 VITAMIN D DEFICIENCY: ICD-10-CM

## 2021-09-29 DIAGNOSIS — Z00.00 WELLNESS EXAMINATION: Primary | ICD-10-CM

## 2021-09-29 DIAGNOSIS — M81.0 POSTMENOPAUSAL BONE LOSS: ICD-10-CM

## 2021-09-29 DIAGNOSIS — E53.8 B12 NUTRITIONAL DEFICIENCY: ICD-10-CM

## 2021-09-29 PROBLEM — D64.9 ANEMIA: Status: RESOLVED | Noted: 2020-04-20 | Resolved: 2021-09-29

## 2021-09-29 PROCEDURE — 90471 FLU VACCINE (QUAD) GREATER THAN OR EQUAL TO 3YO PRESERVATIVE FREE IM: ICD-10-PCS | Mod: S$GLB,,, | Performed by: INTERNAL MEDICINE

## 2021-09-29 PROCEDURE — 77080 DXA BONE DENSITY AXIAL: CPT | Mod: 26,,, | Performed by: INTERNAL MEDICINE

## 2021-09-29 PROCEDURE — 90686 IIV4 VACC NO PRSV 0.5 ML IM: CPT | Mod: S$GLB,,, | Performed by: INTERNAL MEDICINE

## 2021-09-29 PROCEDURE — 99999 PR PBB SHADOW E&M-EST. PATIENT-LVL III: ICD-10-PCS | Mod: PBBFAC,,, | Performed by: INTERNAL MEDICINE

## 2021-09-29 PROCEDURE — 90471 IMMUNIZATION ADMIN: CPT | Mod: S$GLB,,, | Performed by: INTERNAL MEDICINE

## 2021-09-29 PROCEDURE — 90686 FLU VACCINE (QUAD) GREATER THAN OR EQUAL TO 3YO PRESERVATIVE FREE IM: ICD-10-PCS | Mod: S$GLB,,, | Performed by: INTERNAL MEDICINE

## 2021-09-29 PROCEDURE — 99396 PR PREVENTIVE VISIT,EST,40-64: ICD-10-PCS | Mod: 25,S$GLB,, | Performed by: INTERNAL MEDICINE

## 2021-09-29 PROCEDURE — 77080 DEXA BONE DENSITY SPINE HIP: ICD-10-PCS | Mod: 26,,, | Performed by: INTERNAL MEDICINE

## 2021-09-29 PROCEDURE — 99396 PREV VISIT EST AGE 40-64: CPT | Mod: 25,S$GLB,, | Performed by: INTERNAL MEDICINE

## 2021-09-29 PROCEDURE — 77080 DXA BONE DENSITY AXIAL: CPT | Mod: TC

## 2021-09-29 PROCEDURE — 99999 PR PBB SHADOW E&M-EST. PATIENT-LVL III: CPT | Mod: PBBFAC,,, | Performed by: INTERNAL MEDICINE

## 2021-10-10 PROBLEM — M25.612 DECREASED RANGE OF MOTION OF LEFT SHOULDER: Status: RESOLVED | Noted: 2020-05-18 | Resolved: 2021-10-10

## 2021-10-10 PROBLEM — M54.50 ACUTE LEFT-SIDED LOW BACK PAIN WITHOUT SCIATICA: Status: RESOLVED | Noted: 2020-02-11 | Resolved: 2021-10-10

## 2021-10-20 ENCOUNTER — OFFICE VISIT (OUTPATIENT)
Dept: ENDOCRINOLOGY | Facility: CLINIC | Age: 51
End: 2021-10-20
Payer: COMMERCIAL

## 2021-10-20 ENCOUNTER — LAB VISIT (OUTPATIENT)
Dept: LAB | Facility: HOSPITAL | Age: 51
End: 2021-10-20
Attending: INTERNAL MEDICINE
Payer: COMMERCIAL

## 2021-10-20 VITALS
WEIGHT: 137.81 LBS | OXYGEN SATURATION: 99 % | BODY MASS INDEX: 22.96 KG/M2 | HEART RATE: 71 BPM | SYSTOLIC BLOOD PRESSURE: 112 MMHG | HEIGHT: 65 IN | RESPIRATION RATE: 18 BRPM | DIASTOLIC BLOOD PRESSURE: 64 MMHG

## 2021-10-20 DIAGNOSIS — R74.8 LOW SERUM ALKALINE PHOSPHATASE: ICD-10-CM

## 2021-10-20 DIAGNOSIS — M81.8 OTHER OSTEOPOROSIS WITHOUT CURRENT PATHOLOGICAL FRACTURE: ICD-10-CM

## 2021-10-20 DIAGNOSIS — N95.1 PERIMENOPAUSAL: ICD-10-CM

## 2021-10-20 DIAGNOSIS — E55.9 VITAMIN D DEFICIENCY: ICD-10-CM

## 2021-10-20 DIAGNOSIS — M85.80 LOW BONE MASS: ICD-10-CM

## 2021-10-20 DIAGNOSIS — E61.1 IRON DEFICIENCY: ICD-10-CM

## 2021-10-20 DIAGNOSIS — E53.8 B12 NUTRITIONAL DEFICIENCY: ICD-10-CM

## 2021-10-20 DIAGNOSIS — M81.8 OTHER OSTEOPOROSIS WITHOUT CURRENT PATHOLOGICAL FRACTURE: Primary | ICD-10-CM

## 2021-10-20 LAB
ALBUMIN SERPL BCP-MCNC: 4.2 G/DL (ref 3.5–5.2)
ALP SERPL-CCNC: 48 U/L (ref 55–135)
ALT SERPL W/O P-5'-P-CCNC: 11 U/L (ref 10–44)
ANION GAP SERPL CALC-SCNC: 8 MMOL/L (ref 8–16)
AST SERPL-CCNC: 15 U/L (ref 10–40)
BILIRUB SERPL-MCNC: 0.3 MG/DL (ref 0.1–1)
BUN SERPL-MCNC: 11 MG/DL (ref 6–20)
CALCIUM SERPL-MCNC: 9.7 MG/DL (ref 8.7–10.5)
CHLORIDE SERPL-SCNC: 104 MMOL/L (ref 95–110)
CO2 SERPL-SCNC: 26 MMOL/L (ref 23–29)
CREAT SERPL-MCNC: 0.8 MG/DL (ref 0.5–1.4)
EST. GFR  (AFRICAN AMERICAN): >60 ML/MIN/1.73 M^2
EST. GFR  (NON AFRICAN AMERICAN): >60 ML/MIN/1.73 M^2
FSH SERPL-ACNC: 8.22 MIU/ML
GLUCOSE SERPL-MCNC: 93 MG/DL (ref 70–110)
PHOSPHATE SERPL-MCNC: 3.2 MG/DL (ref 2.7–4.5)
POTASSIUM SERPL-SCNC: 4.1 MMOL/L (ref 3.5–5.1)
PROT SERPL-MCNC: 7.4 G/DL (ref 6–8.4)
PTH-INTACT SERPL-MCNC: 50.9 PG/ML (ref 9–77)
SODIUM SERPL-SCNC: 138 MMOL/L (ref 136–145)

## 2021-10-20 PROCEDURE — 99204 PR OFFICE/OUTPT VISIT, NEW, LEVL IV, 45-59 MIN: ICD-10-PCS | Mod: S$GLB,,, | Performed by: INTERNAL MEDICINE

## 2021-10-20 PROCEDURE — 83519 RIA NONANTIBODY: CPT | Performed by: INTERNAL MEDICINE

## 2021-10-20 PROCEDURE — 84207 ASSAY OF VITAMIN B-6: CPT | Performed by: INTERNAL MEDICINE

## 2021-10-20 PROCEDURE — 84100 ASSAY OF PHOSPHORUS: CPT | Performed by: INTERNAL MEDICINE

## 2021-10-20 PROCEDURE — 80053 COMPREHEN METABOLIC PANEL: CPT | Performed by: INTERNAL MEDICINE

## 2021-10-20 PROCEDURE — 99204 OFFICE O/P NEW MOD 45 MIN: CPT | Mod: S$GLB,,, | Performed by: INTERNAL MEDICINE

## 2021-10-20 PROCEDURE — 99999 PR PBB SHADOW E&M-EST. PATIENT-LVL III: CPT | Mod: PBBFAC,,, | Performed by: INTERNAL MEDICINE

## 2021-10-20 PROCEDURE — 83516 IMMUNOASSAY NONANTIBODY: CPT | Mod: 59 | Performed by: INTERNAL MEDICINE

## 2021-10-20 PROCEDURE — 99999 PR PBB SHADOW E&M-EST. PATIENT-LVL III: ICD-10-PCS | Mod: PBBFAC,,, | Performed by: INTERNAL MEDICINE

## 2021-10-20 PROCEDURE — 36415 COLL VENOUS BLD VENIPUNCTURE: CPT | Performed by: INTERNAL MEDICINE

## 2021-10-20 PROCEDURE — 83970 ASSAY OF PARATHORMONE: CPT | Performed by: INTERNAL MEDICINE

## 2021-10-20 PROCEDURE — 82523 COLLAGEN CROSSLINKS: CPT | Performed by: INTERNAL MEDICINE

## 2021-10-20 PROCEDURE — 83001 ASSAY OF GONADOTROPIN (FSH): CPT | Performed by: INTERNAL MEDICINE

## 2021-10-20 PROCEDURE — 84075 ASSAY ALKALINE PHOSPHATASE: CPT | Mod: 59 | Performed by: INTERNAL MEDICINE

## 2021-10-22 ENCOUNTER — TELEPHONE (OUTPATIENT)
Dept: ENDOCRINOLOGY | Facility: CLINIC | Age: 51
End: 2021-10-22

## 2021-10-22 ENCOUNTER — PATIENT MESSAGE (OUTPATIENT)
Dept: ENDOCRINOLOGY | Facility: CLINIC | Age: 51
End: 2021-10-22
Payer: COMMERCIAL

## 2021-10-22 DIAGNOSIS — M81.8 OTHER OSTEOPOROSIS WITHOUT CURRENT PATHOLOGICAL FRACTURE: Primary | ICD-10-CM

## 2021-10-22 LAB
ALP BONE SERPL-MCNC: 8 UG/L (ref 5.6–29)
COLLAGEN CTX SERPL-MCNC: 102 PG/ML

## 2021-10-25 ENCOUNTER — OFFICE VISIT (OUTPATIENT)
Dept: DERMATOLOGY | Facility: CLINIC | Age: 51
End: 2021-10-25
Payer: COMMERCIAL

## 2021-10-25 DIAGNOSIS — L82.1 SK (SEBORRHEIC KERATOSIS): Primary | ICD-10-CM

## 2021-10-25 DIAGNOSIS — L73.8 SEBACEOUS GLAND HYPERPLASIA: ICD-10-CM

## 2021-10-25 DIAGNOSIS — D22.9 MULTIPLE BENIGN NEVI: ICD-10-CM

## 2021-10-25 DIAGNOSIS — Z12.83 SCREENING EXAM FOR SKIN CANCER: ICD-10-CM

## 2021-10-25 DIAGNOSIS — L24.9 IRRITANT CONTACT DERMATITIS, UNSPECIFIED TRIGGER: ICD-10-CM

## 2021-10-25 LAB
GLIADIN PEPTIDE IGA SER-ACNC: 5 UNITS
GLIADIN PEPTIDE IGG SER-ACNC: 2 UNITS
IGA SERPL-MCNC: 249 MG/DL (ref 70–400)
TTG IGA SER-ACNC: 8 UNITS
TTG IGG SER-ACNC: 4 UNITS

## 2021-10-25 PROCEDURE — 99999 PR PBB SHADOW E&M-EST. PATIENT-LVL II: ICD-10-PCS | Mod: PBBFAC,,, | Performed by: DERMATOLOGY

## 2021-10-25 PROCEDURE — 99999 PR PBB SHADOW E&M-EST. PATIENT-LVL II: CPT | Mod: PBBFAC,,, | Performed by: DERMATOLOGY

## 2021-10-25 PROCEDURE — 99213 OFFICE O/P EST LOW 20 MIN: CPT | Mod: S$GLB,,, | Performed by: DERMATOLOGY

## 2021-10-25 PROCEDURE — 99213 PR OFFICE/OUTPT VISIT, EST, LEVL III, 20-29 MIN: ICD-10-PCS | Mod: S$GLB,,, | Performed by: DERMATOLOGY

## 2021-10-25 RX ORDER — ALCLOMETASONE DIPROPIONATE 0.5 MG/G
OINTMENT TOPICAL
Qty: 30 G | Refills: 3 | Status: SHIPPED | OUTPATIENT
Start: 2021-10-25 | End: 2022-11-15

## 2021-10-26 ENCOUNTER — PATIENT OUTREACH (OUTPATIENT)
Dept: ADMINISTRATIVE | Facility: OTHER | Age: 51
End: 2021-10-26
Payer: COMMERCIAL

## 2021-10-26 ENCOUNTER — OFFICE VISIT (OUTPATIENT)
Dept: OBSTETRICS AND GYNECOLOGY | Facility: CLINIC | Age: 51
End: 2021-10-26
Payer: COMMERCIAL

## 2021-10-26 VITALS
WEIGHT: 135.38 LBS | DIASTOLIC BLOOD PRESSURE: 70 MMHG | SYSTOLIC BLOOD PRESSURE: 122 MMHG | BODY MASS INDEX: 22.56 KG/M2 | HEIGHT: 65 IN

## 2021-10-26 DIAGNOSIS — Z01.419 ENCOUNTER FOR GYNECOLOGICAL EXAMINATION WITHOUT ABNORMAL FINDING: Primary | ICD-10-CM

## 2021-10-26 DIAGNOSIS — Z12.4 CERVICAL CANCER SCREENING: ICD-10-CM

## 2021-10-26 DIAGNOSIS — Z12.31 SCREENING MAMMOGRAM, ENCOUNTER FOR: ICD-10-CM

## 2021-10-26 LAB — PROCOLLAGEN TYPE 1 PROPEPTIDE: 55 MCG/L

## 2021-10-26 PROCEDURE — 99999 PR PBB SHADOW E&M-EST. PATIENT-LVL III: ICD-10-PCS | Mod: PBBFAC,,, | Performed by: OBSTETRICS & GYNECOLOGY

## 2021-10-26 PROCEDURE — 99396 PR PREVENTIVE VISIT,EST,40-64: ICD-10-PCS | Mod: S$GLB,,, | Performed by: OBSTETRICS & GYNECOLOGY

## 2021-10-26 PROCEDURE — 87624 HPV HI-RISK TYP POOLED RSLT: CPT | Performed by: OBSTETRICS & GYNECOLOGY

## 2021-10-26 PROCEDURE — 88175 CYTOPATH C/V AUTO FLUID REDO: CPT | Performed by: OBSTETRICS & GYNECOLOGY

## 2021-10-26 PROCEDURE — 99396 PREV VISIT EST AGE 40-64: CPT | Mod: S$GLB,,, | Performed by: OBSTETRICS & GYNECOLOGY

## 2021-10-26 PROCEDURE — 99999 PR PBB SHADOW E&M-EST. PATIENT-LVL III: CPT | Mod: PBBFAC,,, | Performed by: OBSTETRICS & GYNECOLOGY

## 2021-10-27 ENCOUNTER — PATIENT MESSAGE (OUTPATIENT)
Dept: ENDOCRINOLOGY | Facility: CLINIC | Age: 51
End: 2021-10-27
Payer: COMMERCIAL

## 2021-10-27 LAB — PYRIDOXAL SERPL-MCNC: 20 UG/L (ref 5–50)

## 2021-10-29 ENCOUNTER — LAB VISIT (OUTPATIENT)
Dept: LAB | Facility: HOSPITAL | Age: 51
End: 2021-10-29
Attending: INTERNAL MEDICINE
Payer: COMMERCIAL

## 2021-10-29 DIAGNOSIS — M81.8 OTHER OSTEOPOROSIS WITHOUT CURRENT PATHOLOGICAL FRACTURE: ICD-10-CM

## 2021-10-29 LAB
CALCIUM 24H UR-MRATE: 8 MG/HR (ref 4–12)
CALCIUM UR-MCNC: 18.6 MG/DL (ref 0–15)
CALCIUM URINE (MG/SPEC): 186 MG/SPEC
CREAT 24H UR-MRATE: 45 MG/HR (ref 40–75)
CREAT UR-MCNC: 108 MG/DL (ref 15–325)
CREATININE, URINE (MG/SPEC): 1080 MG/SPEC
URINE COLLECTION DURATION: 24 HR
URINE COLLECTION DURATION: 24 HR
URINE VOLUME: 1000 ML
URINE VOLUME: 1000 ML

## 2021-10-29 PROCEDURE — 82340 ASSAY OF CALCIUM IN URINE: CPT | Performed by: INTERNAL MEDICINE

## 2021-10-29 PROCEDURE — 82570 ASSAY OF URINE CREATININE: CPT | Performed by: INTERNAL MEDICINE

## 2021-11-01 LAB
FINAL PATHOLOGIC DIAGNOSIS: NORMAL
Lab: NORMAL

## 2021-11-06 LAB
HPV HR 12 DNA SPEC QL NAA+PROBE: NEGATIVE
HPV16 AG SPEC QL: NEGATIVE
HPV18 DNA SPEC QL NAA+PROBE: NEGATIVE

## 2021-12-16 ENCOUNTER — PATIENT MESSAGE (OUTPATIENT)
Dept: INTERNAL MEDICINE | Facility: CLINIC | Age: 51
End: 2021-12-16
Payer: COMMERCIAL

## 2022-01-29 ENCOUNTER — PATIENT MESSAGE (OUTPATIENT)
Dept: OBSTETRICS AND GYNECOLOGY | Facility: CLINIC | Age: 52
End: 2022-01-29
Payer: COMMERCIAL

## 2022-01-31 ENCOUNTER — PATIENT MESSAGE (OUTPATIENT)
Dept: OBSTETRICS AND GYNECOLOGY | Facility: CLINIC | Age: 52
End: 2022-01-31
Payer: COMMERCIAL

## 2022-01-31 ENCOUNTER — TELEPHONE (OUTPATIENT)
Dept: OBSTETRICS AND GYNECOLOGY | Facility: CLINIC | Age: 52
End: 2022-01-31
Payer: COMMERCIAL

## 2022-02-22 ENCOUNTER — PATIENT MESSAGE (OUTPATIENT)
Dept: RESEARCH | Facility: HOSPITAL | Age: 52
End: 2022-02-22
Payer: COMMERCIAL

## 2022-04-18 ENCOUNTER — OFFICE VISIT (OUTPATIENT)
Dept: URGENT CARE | Facility: CLINIC | Age: 52
End: 2022-04-18
Payer: COMMERCIAL

## 2022-04-18 VITALS
SYSTOLIC BLOOD PRESSURE: 113 MMHG | TEMPERATURE: 98 F | HEIGHT: 65 IN | RESPIRATION RATE: 18 BRPM | OXYGEN SATURATION: 96 % | HEART RATE: 94 BPM | WEIGHT: 134 LBS | DIASTOLIC BLOOD PRESSURE: 72 MMHG | BODY MASS INDEX: 22.33 KG/M2

## 2022-04-18 DIAGNOSIS — J06.9 URI WITH COUGH AND CONGESTION: Primary | ICD-10-CM

## 2022-04-18 DIAGNOSIS — R05.9 COUGH: ICD-10-CM

## 2022-04-18 LAB
CTP QC/QA: YES
CTP QC/QA: YES
POC MOLECULAR INFLUENZA A AGN: NEGATIVE
POC MOLECULAR INFLUENZA B AGN: NEGATIVE
SARS-COV-2 RDRP RESP QL NAA+PROBE: NEGATIVE

## 2022-04-18 PROCEDURE — 87502 POCT INFLUENZA A/B MOLECULAR: ICD-10-PCS | Mod: QW,S$GLB,, | Performed by: PHYSICIAN ASSISTANT

## 2022-04-18 PROCEDURE — U0002: ICD-10-PCS | Mod: QW,S$GLB,, | Performed by: PHYSICIAN ASSISTANT

## 2022-04-18 PROCEDURE — 99213 OFFICE O/P EST LOW 20 MIN: CPT | Mod: S$GLB,,, | Performed by: PHYSICIAN ASSISTANT

## 2022-04-18 PROCEDURE — U0002 COVID-19 LAB TEST NON-CDC: HCPCS | Mod: QW,S$GLB,, | Performed by: PHYSICIAN ASSISTANT

## 2022-04-18 PROCEDURE — 87502 INFLUENZA DNA AMP PROBE: CPT | Mod: QW,S$GLB,, | Performed by: PHYSICIAN ASSISTANT

## 2022-04-18 PROCEDURE — 99213 PR OFFICE/OUTPT VISIT, EST, LEVL III, 20-29 MIN: ICD-10-PCS | Mod: S$GLB,,, | Performed by: PHYSICIAN ASSISTANT

## 2022-04-18 RX ORDER — BROMPHENIRAMINE MALEATE, PSEUDOEPHEDRINE HYDROCHLORIDE, AND DEXTROMETHORPHAN HYDROBROMIDE 2; 30; 10 MG/5ML; MG/5ML; MG/5ML
10 SYRUP ORAL EVERY 6 HOURS PRN
Qty: 118 ML | Refills: 0 | Status: SHIPPED | OUTPATIENT
Start: 2022-04-18 | End: 2022-04-28

## 2022-04-18 RX ORDER — IPRATROPIUM BROMIDE 42 UG/1
2 SPRAY, METERED NASAL 2 TIMES DAILY
Qty: 15 ML | Refills: 0 | Status: SHIPPED | OUTPATIENT
Start: 2022-04-18 | End: 2022-06-15

## 2022-04-18 NOTE — PATIENT INSTRUCTIONS
If your symptoms persist on 4/22.22, call the clinic at [phone] and I will send antibiotic prescription in if needed at that time. Follow-up with primary care.    If not allergic,take tylenol (acetominophen) for fever control, chills, or body aches every 4 hours. Do not exceed 4000 mg/ day.If not allergic, take Motrin (Ibuprofen) every 4 hours for fever, chills, pain or inflammation. Do not exceed 2400 mg/day. You can alternate taking tylenol and motrin.     You must understand that you've received an Urgent Care treatment only and that you may be released before all your medical problems are known or treated. You, the patient, will arrange for follow up care as instructed.      Follow up with your PCP or specialty clinic as instructed in the next 2-3 days if not improved or as needed. You can call [phone] to schedule an appointment with appropriate provider.      If you condition worsens, we recommend that you receive another evaluation at the emergency room immediately or contact your primary medical clinic's after hours call service to discuss your concerns.      Please return here or go to the Emergency Department for any concerns or worsening condition.      If you were prescribed a narcotic or controlled substance, do not drive or operate heavy equipment or machinery while taking these medications.

## 2022-04-18 NOTE — PROGRESS NOTES
"Subjective:       Patient ID: Brissa Mason is a 52 y.o. female.    Vitals:  height is 5' 5" (1.651 m) and weight is 60.8 kg (134 lb). Her temperature is 98.2 °F (36.8 °C). Her blood pressure is 113/72 and her pulse is 94. Her respiration is 18 and oxygen saturation is 96%.     Chief Complaint: Cough, Nasal Congestion, and Fever    Patient presents to clinic with cough/congestion/fever 100.3 last night x 3 days.    Patient provider note starts here:  Patient presents with complaints of a 3 day history of sinus pressure, nasal congestion, dry cough, post nasal drip. Temp of 100.3 last night which has prompted her visit here with concern for COVID. Reports that her grandchildren were over recently and has runny noses and one of them has bronchitis right now as well. She has been vaccinated but not boosted against COVID 19. Denies associated chest pain or SOB. She has taken Dayquil and Tylenol sinus medications with little relief of her symptoms. No fever today but last took Tylenol about 2 hours ago.     Cough  This is a new problem. The current episode started in the past 7 days. The problem has been gradually worsening. The problem occurs constantly. The cough is non-productive. Associated symptoms include a fever, nasal congestion and postnasal drip. Pertinent negatives include no chest pain, ear pain, sore throat, shortness of breath or wheezing. The symptoms are aggravated by lying down. Treatments tried: tylenol sinus nyquil. The treatment provided mild relief.       Constitution: Positive for fever.   HENT: Positive for congestion and postnasal drip. Negative for ear pain, ear discharge and sore throat.    Neck: Negative for neck pain.   Cardiovascular: Negative for chest pain, palpitations and sob on exertion.   Respiratory: Positive for cough. Negative for chest tightness, shortness of breath and wheezing.    Gastrointestinal: Negative for abdominal pain, nausea, vomiting and diarrhea.   Skin: " Negative for color change and wound.   Neurological: Negative for numbness and tingling.       Objective:      Physical Exam   Constitutional: She is oriented to person, place, and time. She appears well-developed. She is cooperative.  Non-toxic appearance. She does not appear ill. No distress.   HENT:   Head: Normocephalic and atraumatic.   Ears:   Right Ear: Hearing, tympanic membrane, external ear and ear canal normal.   Left Ear: Hearing, tympanic membrane, external ear and ear canal normal.   Nose: Congestion present. No mucosal edema, rhinorrhea or nasal deformity. No epistaxis. Right sinus exhibits no maxillary sinus tenderness and no frontal sinus tenderness. Left sinus exhibits no maxillary sinus tenderness and no frontal sinus tenderness.   Mouth/Throat: Uvula is midline and mucous membranes are normal. No trismus in the jaw. Normal dentition. No uvula swelling. Posterior oropharyngeal erythema present. No oropharyngeal exudate or posterior oropharyngeal edema.      Comments: Uvula is erythematous and boggy. Uvula remains midline. Tolerating secretions well.   Eyes: Conjunctivae and lids are normal. No scleral icterus.   Neck: Trachea normal and phonation normal. Neck supple. No edema present. No erythema present. No neck rigidity present.   Cardiovascular: Normal rate, regular rhythm, normal heart sounds and normal pulses.   Pulmonary/Chest: Effort normal and breath sounds normal. No respiratory distress. She has no decreased breath sounds. She has no wheezes. She has no rhonchi.   Abdominal: Normal appearance.   Musculoskeletal: Normal range of motion.         General: No deformity. Normal range of motion.   Neurological: She is alert and oriented to person, place, and time. She exhibits normal muscle tone. Coordination normal.   Skin: Skin is warm, dry, intact, not diaphoretic and not pale.   Psychiatric: Her speech is normal and behavior is normal. Judgment and thought content normal.   Nursing note  and vitals reviewed.        Assessment:       1. URI with cough and congestion    2. Cough        Results for orders placed or performed in visit on 04/18/22   POCT COVID-19 Rapid Screening   Result Value Ref Range    POC Rapid COVID Negative Negative     Acceptable Yes    POCT Influenza A/B MOLECULAR   Result Value Ref Range    POC Molecular Influenza A Ag Negative Negative, Not Reported    POC Molecular Influenza B Ag Negative Negative, Not Reported     Acceptable Yes        Plan:         URI with cough and congestion  -     ipratropium (ATROVENT) 42 mcg (0.06 %) nasal spray; 2 sprays by Nasal route 2 (two) times daily.  Dispense: 15 mL; Refill: 0  -     brompheniramine-pseudoeph-DM (BROMFED DM) 2-30-10 mg/5 mL Syrp; Take 10 mLs by mouth every 6 (six) hours as needed (Cough and congestion).  Dispense: 118 mL; Refill: 0    Cough  -     POCT COVID-19 Rapid Screening  -     POCT Influenza A/B MOLECULAR           Medical Decision Making:   History:   Old Medical Records: I decided to obtain old medical records.  Differential Diagnosis:   Differential diagnosis includes but is not limited to: viral vs bacterial URI, pharyngitis, otitis, COVID 19, influenza, pneumonia.    Clinical Tests:   Lab Tests: Ordered and Reviewed       <> Summary of Lab: COVID negative  Flu negative  Urgent Care Management:  Patient presents with complaints of URI like symptoms and elevated temp of 100.3 yesterday. On exam, she is afebrile and nontoxic appearing. Lungs CTAB and vitals stable. COVID and Flu negative. She was prescribed Bromfed and Atrovent nasal spray for symptomatic relief. If no symptom improvement by 4/22, she will call me and I will send in antibiotics at that time. She verbalized understanding. Encouraged follow-up with PCP.        Patient Instructions   If your symptoms persist on 4/22.22, call the clinic at [phone] and I will send antibiotic prescription in if needed at that time.  Follow-up with primary care.    If not allergic,take tylenol (acetominophen) for fever control, chills, or body aches every 4 hours. Do not exceed 4000 mg/ day.If not allergic, take Motrin (Ibuprofen) every 4 hours for fever, chills, pain or inflammation. Do not exceed 2400 mg/day. You can alternate taking tylenol and motrin.     You must understand that you've received an Urgent Care treatment only and that you may be released before all your medical problems are known or treated. You, the patient, will arrange for follow up care as instructed.      Follow up with your PCP or specialty clinic as instructed in the next 2-3 days if not improved or as needed. You can call [phone] to schedule an appointment with appropriate provider.      If you condition worsens, we recommend that you receive another evaluation at the emergency room immediately or contact your primary medical clinic's after hours call service to discuss your concerns.      Please return here or go to the Emergency Department for any concerns or worsening condition.      If you were prescribed a narcotic or controlled substance, do not drive or operate heavy equipment or machinery while taking these medications.

## 2022-04-26 ENCOUNTER — PATIENT MESSAGE (OUTPATIENT)
Dept: SPORTS MEDICINE | Facility: CLINIC | Age: 52
End: 2022-04-26
Payer: COMMERCIAL

## 2022-04-26 NOTE — TELEPHONE ENCOUNTER
Spoke with patient per message.     Stated she would like to start with conservative treatments for her shoulder. Told her that if she would like to start with this we can schedule her with Dr. Christie to discuss a possible tenjet procedure for her calcific tendonitis.   Patient agreed and requested first available.   Scheduled for Friday (4/29/22), patient agreed and understood.

## 2022-04-27 ENCOUNTER — TELEPHONE (OUTPATIENT)
Dept: SPORTS MEDICINE | Facility: CLINIC | Age: 52
End: 2022-04-27
Payer: COMMERCIAL

## 2022-04-27 NOTE — TELEPHONE ENCOUNTER
Spoke to patient to reschedule her appointment.     Cap[facility]  Clinical Assistant to Dr. Mathieu Christie

## 2022-04-27 NOTE — PROGRESS NOTES
Brissa aMson, a 52 y.o. female, is here for evaluation of left shoulder pain.     HISTORY OF PRESENT ILLNESS  Hand dominance, right    Location:  anterior and lateral   Onset:  chronic, insidious  Palliative:     relative rest   oral analgesics, OTC   CSI- Dr. Garcia   Formal physical therapy in the past  Provocative:    glenohumeral ABduction    Prior:  none  Progression:  plateau discomfort  Quality:  sharp  Radiation: none  Severity:  per nursing documentation  Timing:  intermittent with use  Trauma:  none    Review of systems (ROS):  A 10+ review of systems was performed with pertinent positives and negatives noted above in the history of present illness. Other systems were negative unless otherwise specified.    PHYSICAL EXAMINATION  General:  The patient is alert and oriented x 3. Mood is pleasant. Observation of ears, eyes and nose reveal no gross abnormalities. HEENT: NCAT, sclera anicteric. Lungs: Respirations are equal and unlabored.     SHOULDER EXAMINATION     OBSERVATION:     Swelling  none  Deformity  none   Discoloration  none   Scapular winging none   Scars   none  Atrophy  none    TENDERNESS / CREPITUS (T/C):          T/C      T/C   Clavicle   -/-  SUPRAspinatus    -/-     AC Jt.    -/-  INFRAspinatus  -/-    SC Jt.    -/-  Deltoid    -/-      G. Tuberosity  -/-  LH BICEP groove  -/-   Acromion:  -/-  Midline Neck   -/-     Scapular Spine -/-  Trapezium   -/-   SMA Scapula  -/-  GH jt. line - post  -/-     Scapulothoracic  -/-         ROM:     Right shoulder   Left shoulder        AROM (PROM)   AROM (PROM)   FE    170° (175°)     170° (175°)     ER at 0°    60°  (65°)    60°  (65°)   ER at 90° ABD  90°  (90°)    90°  (90°)   IR at 90°  ABD   NA  (40°)     NA  (40°)      IR (spine level)   T10     T10    STRENGTH: (* = with pain) RIGHT SHOULDER  LEFT SHOULDER   SCAPTION   5/5    5/5    IR    5/5    5/5   ER    5/5    5/5   BICEPS   5/5    5/5   Deltoid    5/5    5/5     SIGNS:   Painful side       NEER   -   CRISTIAN        -    ZACK   -   SPEEDS        -   DROP ARM   -   BELLY PRESS       -    X-Body ADD    -   LIFT-OFF        -   HORNBLOWERS      -              STABILITY TESTING   RIGHT SHOULDER  LEFT SHOULDER     Translation     Anterior up face    up face    Posterior up face   up face    Sulcus  < 10mm   < 10 mm     Signs   Apprehension   neg     neg       Relocation   no change    no change      Jerk test  neg    neg    EXTREMITY NEURO-VASCULAR EXAM    Sensation grossly intact to light touch all dermatomal regions.    DTR 2+ Biceps, Triceps, BR and Negative Roneys sign   Grossly intact motor function at Elbow, Wrist and Hand   Distal pulses radial and ulnar 2+, brisk cap refill, symmetric.      NECK:  Painless FROM and spinous processes non-tender. Negative Spurlings sign.       Other Findings:    ASSESSMENT & PLAN   Assessment  #1 recalcitrant supraspinatus tendinosis, left /nd   W/ calcific depositions    No evidence of neurologic pathology  No evidence of vascular pathology    Imaging studies reviewed:   X-ray shoulder, bliateral 22.04    Plan    We discussed options including    Watchful waiting / relative rest x   Physical therapy x   Injection therapy Perc ten sup spin tend left   Consultation    The patient chooses As above   x = prescribed  CSI = corticosteroid injection  VSI = viscosupplement injection  PRPI = platelet rich plasma injection  ia = intra articular  R = right  L = left  B = bilateral   nfSx = surgical consultation was recommended, but patient is not interested in consultation at this time    Physical Therapy        Formal (fPT), @ [facility] facility Per protocol   Formal (fPT), @ OSH facility        Homegoing (hgPT), per concurrent fPT recommendations    Homegoing (hgPT), per prior fPT recommendations    Homegoing (hgPT), handout provided        w/  (atPT)    [blank] = not prescribed  x = prescribed  b = prescribed, and begin as  indicated  t = continue as indicated  r = prescribed, and restart as indicated  p = completed prior as indicated  hs = prescribed, and with high school   col = prescribed, and with college or university   nfPT = physical therapy was recommended, but patient is not interested in PT at this time    Activity (e.g. sports, work) restrictions    [blank] = as tolerated  pt = per physical therapist  at = per   NWB = non weight bearing on affected lower extremity, with crutches assistance for ambulation    Bracing    [blank] = not prescribed  r = recommended, but not fit with at todays visit  f = prescribed and fit with at todays visit  t = continue as indicated  d = d/c  p = as needed  rare = use on rare, as-needed basis; advised against chronic use    Pain management    [blank] = No prescription necessary. A handout detailing dosing of appropriate   over-the-counter musculoskeletal analgesics was made available to the patient.   m = meloxicam x 14 days  mp = 14 day course of meloxicam prescribed prior    Follow up perct ten procedure    [blank] = as needed  [number] = in [number] weeks  CSI = for corticosteroid injection  VSI = for viscosupplement injection or injection series  PRP = for platelet rich plasma injection or injection series  MRI = after MRI imaging  ns = should surgical options be deferred (no surgery)  o = appointment offered, deferred by patient    Should symptoms worsen or fail to resolve, consider    Revisiting the above options and / or surg consult w/ Team Jose     Vocation:     Was swimming   Walks for physical activity, elliptical

## 2022-04-28 ENCOUNTER — HOSPITAL ENCOUNTER (OUTPATIENT)
Dept: RADIOLOGY | Facility: HOSPITAL | Age: 52
Discharge: HOME | End: 2022-04-28
Attending: FAMILY MEDICINE
Payer: COMMERCIAL

## 2022-04-28 ENCOUNTER — PATIENT MESSAGE (OUTPATIENT)
Dept: SPORTS MEDICINE | Facility: CLINIC | Age: 52
End: 2022-04-28

## 2022-04-28 ENCOUNTER — OFFICE VISIT (OUTPATIENT)
Dept: SPORTS MEDICINE | Facility: CLINIC | Age: 52
End: 2022-04-28
Payer: COMMERCIAL

## 2022-04-28 VITALS — WEIGHT: 134 LBS | HEIGHT: 65 IN | TEMPERATURE: 98 F | BODY MASS INDEX: 22.33 KG/M2

## 2022-04-28 DIAGNOSIS — G89.29 CHRONIC LEFT SHOULDER PAIN: ICD-10-CM

## 2022-04-28 DIAGNOSIS — M25.511 BILATERAL SHOULDER PAIN, UNSPECIFIED CHRONICITY: ICD-10-CM

## 2022-04-28 DIAGNOSIS — M65.20 CALCIFIC TENDINITIS: Primary | ICD-10-CM

## 2022-04-28 DIAGNOSIS — M25.512 CHRONIC LEFT SHOULDER PAIN: ICD-10-CM

## 2022-04-28 DIAGNOSIS — S46.819S SPRAIN OF SUPRASPINATUS MUSCLE OR TENDON, UNSPECIFIED LATERALITY, SEQUELA: ICD-10-CM

## 2022-04-28 DIAGNOSIS — M25.512 BILATERAL SHOULDER PAIN, UNSPECIFIED CHRONICITY: ICD-10-CM

## 2022-04-28 DIAGNOSIS — M67.80 TENDINOSIS: ICD-10-CM

## 2022-04-28 DIAGNOSIS — M67.912 DYSFUNCTION OF LEFT ROTATOR CUFF: ICD-10-CM

## 2022-04-28 PROCEDURE — 73030 X-RAY EXAM OF SHOULDER: CPT | Mod: TC,50

## 2022-04-28 PROCEDURE — 73030 X-RAY EXAM OF SHOULDER: CPT | Mod: 26,50,, | Performed by: RADIOLOGY

## 2022-04-28 PROCEDURE — 99214 PR OFFICE/OUTPT VISIT, EST, LEVL IV, 30-39 MIN: ICD-10-PCS | Mod: S$GLB,,, | Performed by: FAMILY MEDICINE

## 2022-04-28 PROCEDURE — 99999 PR PBB SHADOW E&M-EST. PATIENT-LVL III: ICD-10-PCS | Mod: PBBFAC,,, | Performed by: FAMILY MEDICINE

## 2022-04-28 PROCEDURE — 99999 PR PBB SHADOW E&M-EST. PATIENT-LVL III: CPT | Mod: PBBFAC,,, | Performed by: FAMILY MEDICINE

## 2022-04-28 PROCEDURE — 99214 OFFICE O/P EST MOD 30 MIN: CPT | Mod: S$GLB,,, | Performed by: FAMILY MEDICINE

## 2022-04-28 PROCEDURE — 73030 XR SHOULDER COMPLETE 2 OR MORE VIEWS BILATERAL: ICD-10-PCS | Mod: 26,50,, | Performed by: RADIOLOGY

## 2022-05-05 ENCOUNTER — PATIENT MESSAGE (OUTPATIENT)
Dept: SPORTS MEDICINE | Facility: CLINIC | Age: 52
End: 2022-05-05

## 2022-05-05 ENCOUNTER — OFFICE VISIT (OUTPATIENT)
Dept: SPORTS MEDICINE | Facility: CLINIC | Age: 52
End: 2022-05-05
Payer: COMMERCIAL

## 2022-05-05 VITALS — WEIGHT: 134 LBS | TEMPERATURE: 98 F | BODY MASS INDEX: 22.33 KG/M2 | HEIGHT: 65 IN

## 2022-05-05 DIAGNOSIS — G89.29 CHRONIC LEFT SHOULDER PAIN: ICD-10-CM

## 2022-05-05 DIAGNOSIS — M67.80 TENDINOSIS: ICD-10-CM

## 2022-05-05 DIAGNOSIS — S46.819S SPRAIN OF SUPRASPINATUS MUSCLE OR TENDON, UNSPECIFIED LATERALITY, SEQUELA: ICD-10-CM

## 2022-05-05 DIAGNOSIS — M67.912 DYSFUNCTION OF LEFT ROTATOR CUFF: ICD-10-CM

## 2022-05-05 DIAGNOSIS — M25.512 CHRONIC LEFT SHOULDER PAIN: ICD-10-CM

## 2022-05-05 DIAGNOSIS — M65.80 TENDON CALCIFICATION: Primary | ICD-10-CM

## 2022-05-05 PROCEDURE — 23929 PR TENOTOMY PERCUTANEOUS SUPRASPINATUS: ICD-10-PCS | Mod: S$GLB,,, | Performed by: FAMILY MEDICINE

## 2022-05-05 PROCEDURE — 99999 PR PBB SHADOW E&M-EST. PATIENT-LVL III: CPT | Mod: PBBFAC,,, | Performed by: FAMILY MEDICINE

## 2022-05-05 PROCEDURE — 99499 UNLISTED E&M SERVICE: CPT | Mod: S$GLB,,, | Performed by: FAMILY MEDICINE

## 2022-05-05 PROCEDURE — 99499 NO LOS: ICD-10-PCS | Mod: S$GLB,,, | Performed by: FAMILY MEDICINE

## 2022-05-05 PROCEDURE — 23929 UNLISTED PROCEDURE SHOULDER: CPT | Mod: S$GLB,,, | Performed by: FAMILY MEDICINE

## 2022-05-05 PROCEDURE — 99999 PR PBB SHADOW E&M-EST. PATIENT-LVL III: ICD-10-PCS | Mod: PBBFAC,,, | Performed by: FAMILY MEDICINE

## 2022-05-05 NOTE — PROGRESS NOTES
PRE-PROCEDURE DIAGNOSIS and  PATIENT INDICATIONS  Brissa Mason, a 52 y.o. female, presents today, with:  #1 Chronic distal supraspinatus tendinosis, LEFT  with calcification    PROCEDURE PERFORMED  Percutaneous tenotomy of distal supraspinatus tendon using ultrasound guidance to cut and remove pathologic tendon tissue    POTENTIAL RISKS AND BENEFITS  We discussed that this is generally a well-tolerated and safe procedure. Even so, as with any invasive medical procedure, there may be associated rare risks. These risks were discussed in detail. The patient agreed to proceed with this procedure. Please see the electronic medical record for full written and signed patient consent documentation.    DESCRIPTION OF PROCEDURE   A) Procedural time out  A procedural time out was performed, including verification of patient ID, procedure to be performed, site and side/laterality, availability of patient information, review of safety issues, and the patients agreement and consent.     B) Preparation for tenotomy  Using 1) physical examination and 2) musculoskeletal ultrasound, the anatomy was visualized and the diseased tissue was identified and confirmed. The procedure site was marked with a skin marker. The patient was placed in position maximizing 1) physician access to pathologic tissue and 2) patient comfort. The skin about the area was sterilized with ChloraPrep (2%w/v CHG, 70% IPA). The intended pathway of probe insertion was anesthetized using approximately 3cc of injected 1% lidocaine via a 22 gauge needle. An #11 blade scalpel was then used to puncture through the skin and down to the site of pathologic tissue.     C) Cutting and removal of pathologic tendon tissue  Tenotomy site and laterality:  LEFT SUPRASPINATUS TENDON  The surgical instrument (Spinnakrision TenJet probe) was introduced through the puncture site and advanced to the site of tendinosis / chronic inflammatory change. When the tip of the  surgical probe was confirmed to be on the diseased supraspinatus tendon tissue, the foot pedal was depressed and the tendon was incised along its length cutting and removing the diseased tendinotic tissue.     POST-PROCEDURE DIAGNOSIS  #1 Chronic distal supraspinatus tendinosis, LEFT  with calcification    POST-PROCEDURE CARE  Following the procedure, a sterile 4 x 4 inch gauze was placed over the incision site and the gauze was covered with a sterile transparent dressing. The patient was fit with a shoulder sling before discharge. A procedure after care instructions (ACI) handout was provided to the patient.    Complications: none    Estimated blood loss from procedure: none    DISPOSITION  The patient tolerated the procedure well and there were no immediate complications. The patient was instructed to call the clinic immediately for any mild to moderate adverse side effects, or to call 911 in the event of an emergency.       Description of ultrasound utilization for needle / probe guidance  Ultrasound guidance was used for needle / probe localization. Images (and videos, if appropriate) were saved and stored for documentation. Dynamic visualization of the needle / probe was continuous throughout the procedure.    [id number] The patient was fit with, adjusted to, and trained in the use of a custom orthotic / brace. The patient demonstrated understanding in the use and proper care of the equipment.   This interaction took 15 minutes.    [id number]

## 2022-05-19 ENCOUNTER — OFFICE VISIT (OUTPATIENT)
Dept: SPORTS MEDICINE | Facility: CLINIC | Age: 52
End: 2022-05-19
Payer: COMMERCIAL

## 2022-05-19 VITALS — TEMPERATURE: 98 F | HEIGHT: 65 IN | BODY MASS INDEX: 22.33 KG/M2 | WEIGHT: 134 LBS

## 2022-05-19 DIAGNOSIS — M67.912 DYSFUNCTION OF LEFT ROTATOR CUFF: ICD-10-CM

## 2022-05-19 DIAGNOSIS — M25.512 CHRONIC LEFT SHOULDER PAIN: Primary | ICD-10-CM

## 2022-05-19 DIAGNOSIS — M65.20 CALCIFIC TENDONITIS: ICD-10-CM

## 2022-05-19 DIAGNOSIS — M67.80 TENDINOSIS: ICD-10-CM

## 2022-05-19 DIAGNOSIS — G89.29 CHRONIC LEFT SHOULDER PAIN: Primary | ICD-10-CM

## 2022-05-19 DIAGNOSIS — S46.819S SPRAIN OF SUPRASPINATUS MUSCLE OR TENDON, UNSPECIFIED LATERALITY, SEQUELA: ICD-10-CM

## 2022-05-19 PROCEDURE — 99024 PR POST-OP FOLLOW-UP VISIT: ICD-10-PCS | Mod: S$GLB,,, | Performed by: FAMILY MEDICINE

## 2022-05-19 PROCEDURE — 99024 POSTOP FOLLOW-UP VISIT: CPT | Mod: S$GLB,,, | Performed by: FAMILY MEDICINE

## 2022-05-19 PROCEDURE — 99999 PR PBB SHADOW E&M-EST. PATIENT-LVL III: CPT | Mod: PBBFAC,,, | Performed by: FAMILY MEDICINE

## 2022-05-19 PROCEDURE — 99999 PR PBB SHADOW E&M-EST. PATIENT-LVL III: ICD-10-PCS | Mod: PBBFAC,,, | Performed by: FAMILY MEDICINE

## 2022-05-19 NOTE — PROGRESS NOTES
Brissa Mason, a 52 y.o. female, is here for evaluation of left shoulder pain.     HISTORY OF PRESENT ILLNESS  Hand dominance, right    Location:  anterior and lateral   Onset:  chronic, insidious  Palliative:     relative rest   oral analgesics, OTC   CSI- Dr. Garcia   Formal physical therapy in the past  Provocative:    glenohumeral ABduction    Prior:  none  Progression:  plateau discomfort  Quality:  sharp  Radiation: none  Severity:  per nursing documentation  Timing:  intermittent with use  Trauma:  none    Review of systems (ROS):  A 10+ review of systems was performed with pertinent positives and negatives noted above in the history of present illness. Other systems were negative unless otherwise specified.    PHYSICAL EXAMINATION  General:  The patient is alert and oriented x 3. Mood is pleasant. Observation of ears, eyes and nose reveal no gross abnormalities. HEENT: NCAT, sclera anicteric. Lungs: Respirations are equal and unlabored.     SHOULDER EXAMINATION     OBSERVATION:     Swelling  none  Deformity  none   Discoloration  none   Scapular winging none   Scars   none  Atrophy  none    TENDERNESS / CREPITUS (T/C):          T/C      T/C   Clavicle   -/-  SUPRAspinatus    -/-     AC Jt.    -/-  INFRAspinatus  -/-    SC Jt.    -/-  Deltoid    -/-      G. Tuberosity  -/-  LH BICEP groove  -/-   Acromion:  -/-  Midline Neck   -/-     Scapular Spine -/-  Trapezium   -/-   SMA Scapula  -/-  GH jt. line - post  -/-     Scapulothoracic  -/-         ROM:     Right shoulder   Left shoulder        AROM (PROM)   AROM (PROM)   FE    170° (175°)     170° (175°)     ER at 0°    60°  (65°)    60°  (65°)   ER at 90° ABD  90°  (90°)    90°  (90°)   IR at 90°  ABD   NA  (40°)     NA  (40°)      IR (spine level)   T10     T10    STRENGTH: (* = with pain) RIGHT SHOULDER  LEFT SHOULDER   SCAPTION   5/5    5/5    IR    5/5    5/5   ER    5/5    5/5   BICEPS   5/5    5/5   Deltoid    5/5    5/5     SIGNS:   Painful side       NEER   -   CRISTIAN        -    ZACK   -   SPEEDS        -   DROP ARM   -   BELLY PRESS       -    X-Body ADD    -   LIFT-OFF        -   HORNBLOWERS      -              STABILITY TESTING   RIGHT SHOULDER  LEFT SHOULDER     Translation     Anterior up face    up face    Posterior up face   up face    Sulcus  < 10mm   < 10 mm     Signs   Apprehension   neg     neg       Relocation   no change    no change      Jerk test  neg    neg    EXTREMITY NEURO-VASCULAR EXAM    Sensation grossly intact to light touch all dermatomal regions.    DTR 2+ Biceps, Triceps, BR and Negative Roneys sign   Grossly intact motor function at Elbow, Wrist and Hand   Distal pulses radial and ulnar 2+, brisk cap refill, symmetric.      NECK:  Painless FROM and spinous processes non-tender. Negative Spurlings sign.       Other Findings:    ASSESSMENT & PLAN   Assessment  #1 recalcitrant supraspinatus tendinosis, left /nd   W/ calcific depositions   S/p perc ten 22.05 AWG   -no evidence of infection   -no evidence of tendon compromise    No evidence of neurologic pathology  No evidence of vascular pathology    Imaging studies reviewed:   X-ray shoulder, bliateral 22.04    Plan  Reassuring eval  Proceed w/ fPT per protocol    We discussed options including    Watchful waiting / relative rest    Physical therapy x   Injection therapy    Consultation    The patient chooses As above   x = prescribed  CSI = corticosteroid injection  VSI = viscosupplement injection  PRPI = platelet rich plasma injection  ia = intra articular  R = right  L = left  B = bilateral   nfSx = surgical consultation was recommended, but patient is not interested in consultation at this time    Physical Therapy        Formal (fPT), @ [facility] facility r   Formal (fPT), @ [facility]SH facility        Homegoing (hgPT), per concurrent fPT recommendations    Homegoing (hgPT), per prior fPT recommendations    Homegoing (hgPT), handout provided        w/ Athletic   (atPT)    [blank] = not prescribed  x = prescribed  b = prescribed, and begin as indicated  t = continue as indicated  r = prescribed, and restart as indicated  p = completed prior as indicated  hs = prescribed, and with high school   col = prescribed, and with college or university   nfPT = physical therapy was recommended, but patient is not interested in PT at this time    Activity (e.g. sports, work) restrictions    [blank] = as tolerated  pt = per physical therapist  at = per   NWB = non weight bearing on affected lower extremity, with crutches assistance for ambulation    Bracing    [blank] = not prescribed  r = recommended, but not fit with at todays visit  f = prescribed and fit with at todays visit  t = continue as indicated  d = d/c  p = as needed  rare = use on rare, as-needed basis; advised against chronic use    Pain management    [blank] = No prescription necessary. A handout detailing dosing of appropriate   over-the-counter musculoskeletal analgesics was made available to the patient.   m = meloxicam x 14 days  mp = 14 day course of meloxicam prescribed prior    Follow up 6   [blank] = as needed  [number] = in [number] weeks  CSI = for corticosteroid injection  VSI = for viscosupplement injection or injection series  PRP = for platelet rich plasma injection or injection series  MRI = after MRI imaging  ns = should surgical options be deferred (no surgery)  o = appointment offered, deferred by patient    Should symptoms worsen or fail to resolve, consider    Revisiting the above options and / or surg consult w/ Team Jose     Vocation:     Was swimming   Walks for physical activity, elliptical

## 2022-05-26 ENCOUNTER — PATIENT MESSAGE (OUTPATIENT)
Dept: SPORTS MEDICINE | Facility: CLINIC | Age: 52
End: 2022-05-26
Payer: COMMERCIAL

## 2022-05-26 DIAGNOSIS — M67.912 DYSFUNCTION OF LEFT ROTATOR CUFF: ICD-10-CM

## 2022-05-26 DIAGNOSIS — G89.29 CHRONIC LEFT SHOULDER PAIN: ICD-10-CM

## 2022-05-26 DIAGNOSIS — S46.819S SPRAIN OF SUPRASPINATUS MUSCLE OR TENDON, UNSPECIFIED LATERALITY, SEQUELA: Primary | ICD-10-CM

## 2022-05-26 DIAGNOSIS — M25.512 CHRONIC LEFT SHOULDER PAIN: ICD-10-CM

## 2022-06-10 ENCOUNTER — TELEPHONE (OUTPATIENT)
Dept: SPORTS MEDICINE | Facility: CLINIC | Age: 52
End: 2022-06-10
Payer: COMMERCIAL

## 2022-06-15 ENCOUNTER — OFFICE VISIT (OUTPATIENT)
Dept: INTERNAL MEDICINE | Facility: CLINIC | Age: 52
End: 2022-06-15
Payer: COMMERCIAL

## 2022-06-15 VITALS
DIASTOLIC BLOOD PRESSURE: 70 MMHG | WEIGHT: 138 LBS | HEIGHT: 65 IN | OXYGEN SATURATION: 99 % | BODY MASS INDEX: 22.99 KG/M2 | SYSTOLIC BLOOD PRESSURE: 110 MMHG | HEART RATE: 76 BPM

## 2022-06-15 DIAGNOSIS — Z86.16 HISTORY OF 2019 NOVEL CORONAVIRUS DISEASE (COVID-19): Primary | ICD-10-CM

## 2022-06-15 DIAGNOSIS — J32.9 SINUSITIS, UNSPECIFIED CHRONICITY, UNSPECIFIED LOCATION: ICD-10-CM

## 2022-06-15 PROCEDURE — 99213 OFFICE O/P EST LOW 20 MIN: CPT | Mod: S$GLB,,, | Performed by: INTERNAL MEDICINE

## 2022-06-15 PROCEDURE — 99213 PR OFFICE/OUTPT VISIT, EST, LEVL III, 20-29 MIN: ICD-10-PCS | Mod: S$GLB,,, | Performed by: INTERNAL MEDICINE

## 2022-06-15 PROCEDURE — 99999 PR PBB SHADOW E&M-EST. PATIENT-LVL III: CPT | Mod: PBBFAC,,, | Performed by: INTERNAL MEDICINE

## 2022-06-15 PROCEDURE — 99999 PR PBB SHADOW E&M-EST. PATIENT-LVL III: ICD-10-PCS | Mod: PBBFAC,,, | Performed by: INTERNAL MEDICINE

## 2022-06-15 RX ORDER — METHYLPREDNISOLONE 4 MG/1
TABLET ORAL
Qty: 1 EACH | Refills: 0 | Status: SHIPPED | OUTPATIENT
Start: 2022-06-15 | End: 2022-06-21

## 2022-06-15 RX ORDER — BENZONATATE 100 MG/1
100 CAPSULE ORAL 3 TIMES DAILY PRN
Qty: 30 CAPSULE | Refills: 0 | Status: SHIPPED | OUTPATIENT
Start: 2022-06-15 | End: 2022-06-25

## 2022-06-15 NOTE — PROGRESS NOTES
Sinus Problem  This is a new (she tested positive for covid last week.  She is past 5 days now.  Her sinuses are very congested and she has a slight cough in the mornings. She is flying at the end of this week and is afraid this will cause her sinuses to worsen. ) problem. The problem has been gradually improving since onset. There has been no fever. She is experiencing no pain. Associated symptoms include congestion, coughing and sinus pressure. Pertinent negatives include no chills, ear pain, hoarse voice, sneezing or sore throat. Past treatments include acetaminophen and oral decongestants. The treatment provided mild relief.        PMFSH: all information reviewed and updated in this encounter.  Medications: reviewed and reconciled today.    Physical Exam  Constitutional:       General: She is not in acute distress.     Appearance: Normal appearance. She is not ill-appearing.   HENT:      Head: Normocephalic and atraumatic.      Right Ear: Tympanic membrane and ear canal normal.      Left Ear: Tympanic membrane and ear canal normal.      Nose: Nose normal. No congestion.      Mouth/Throat:      Mouth: Mucous membranes are moist.      Pharynx: Oropharynx is clear. No oropharyngeal exudate or posterior oropharyngeal erythema.   Eyes:      Conjunctiva/sclera: Conjunctivae normal.   Cardiovascular:      Rate and Rhythm: Normal rate.   Pulmonary:      Effort: Pulmonary effort is normal.      Breath sounds: Normal breath sounds.   Neurological:      Mental Status: She is alert.          Assessment/plan:  1. Sinusitis, unspecified chronicity, unspecified location  Orders:  - methylPREDNISolone (MEDROL DOSEPACK) 4 mg tablet; use as directed  Dispense: 1 each; Refill: 0  - benzonatate (TESSALON) 100 MG capsule; Take 1 capsule (100 mg total) by mouth 3 (three) times daily as needed for Cough.  Dispense: 30 capsule; Refill: 0    2. History of 2019 novel coronavirus disease (COVID-19)  Orders  - methylPREDNISolone (MEDROL  DOSEPACK) 4 mg tablet; use as directed  Dispense: 1 each; Refill: 0  - benzonatate (TESSALON) 100 MG capsule; Take 1 capsule (100 mg total) by mouth 3 (three) times daily as needed for Cough.  Dispense: 30 capsule; Refill: 0    Recommend Mucinex, nasal saline and sudafed as needed.   RTC PRN.

## 2022-06-16 ENCOUNTER — CLINICAL SUPPORT (OUTPATIENT)
Dept: REHABILITATION | Facility: HOSPITAL | Age: 52
End: 2022-06-16
Payer: COMMERCIAL

## 2022-06-16 DIAGNOSIS — M67.912 DYSFUNCTION OF LEFT ROTATOR CUFF: ICD-10-CM

## 2022-06-16 DIAGNOSIS — G89.29 CHRONIC LEFT SHOULDER PAIN: ICD-10-CM

## 2022-06-16 DIAGNOSIS — M25.512 ACUTE PAIN OF LEFT SHOULDER: Primary | ICD-10-CM

## 2022-06-16 DIAGNOSIS — S46.819S SPRAIN OF SUPRASPINATUS MUSCLE OR TENDON, UNSPECIFIED LATERALITY, SEQUELA: ICD-10-CM

## 2022-06-16 DIAGNOSIS — M25.512 CHRONIC LEFT SHOULDER PAIN: ICD-10-CM

## 2022-06-16 PROCEDURE — 97110 THERAPEUTIC EXERCISES: CPT | Performed by: PHYSICAL THERAPIST

## 2022-06-16 PROCEDURE — 97161 PT EVAL LOW COMPLEX 20 MIN: CPT | Performed by: PHYSICAL THERAPIST

## 2022-06-16 NOTE — PLAN OF CARE
[facility]  [place] [address]   Physical Therapy Initial Evaluation    Name: Brissa Mason  Clinic Number: [id number]    Therapy Diagnosis:   Encounter Diagnoses   Name Primary?    Sprain of supraspinatus muscle or tendon, unspecified laterality, sequela     Chronic left shoulder pain     Dysfunction of left rotator cuff     Acute pain of left shoulder Yes     Physician: Mathieu Christie, *    Physician Orders: PT Eval and Treat     Medical Diagnosis from Referral:   S46.819S (ICD-10-CM) - Sprain of supraspinatus muscle or tendon, unspecified laterality, sequela   M25.512,G89.29 (ICD-10-CM) - Chronic left shoulder pain   M67.912 (ICD-10-CM) - Dysfunction of left rotator cuff       Evaluation Date: 6/16/2022  Authorization Period Expiration: pend   Plan of Care Expiration: 10/16/2022  Progress Note Due: 8/16/2022  Visit # / Visits authorized: 1/ 1   FOTO: Issued Visit # 2       Time In: 11:00  Time Out: 12:00  Total Billable Time: 60  minutes    Precautions: Standard    Subjective   Date of onset: chronic   History of current condition - Brissa reports experiencing L shoulder pain beginning in 2015 or 2016, attempted PT with some relief of sxs but did not get 100% better, underwent two CSIs again with some relief and attended PT, again with some relief but not 100% improvement, and notes 2 months ago awoke with severe pain that did not nancy leading to (HydroCision TenJet probe) tenotomy procedure on 5/5/2022 and now referred back to PT        Past Medical History:   Diagnosis Date    Allergy     Anemia 4/20/2020    Anxiety disorder     B12 deficiency     Cataract     Colitis     resolved; rectum 2005; neg flex sig 2006    Colitis: 2005 rectal area     resolved; rectum 2005; neg flex sig 2006     Iron deficiency     Menstrual migraine without status migrainosus, not intractable 9/5/2017    Vitamin D deficiency      Brissa Mason  has a past surgical history that  "includes Ganglion cyst excision (2017) and Colonoscopy (N/A, 10/15/2020).    Brissa has a current medication list which includes the following prescription(s): alclomethasone, benzonatate, and methylprednisolone.    Review of patient's allergies indicates:   Allergen Reactions    Mobic [meloxicam] Rash    Erythromycin Other (See Comments)    Sulfa (sulfonamide antibiotics)      Other reaction(s): Rash        Pain:  Current 0/10, worst 4/10, best 0/10   Location: left shoulder   Description: Aching and Sharp  Aggravating Factors: positional in impingement zone   Easing Factors: rest    Prior Therapy: yes   Social History:  NA   Occupation:  computer work   Prior Level of Function: I   Current Level of Function: restricted     Pts goals:  "get back full motion and be pain free"     Objective     Observation: normal skin integrity, fair unsupported sitting posture, no overt delt atrophy some guarding L shoulder / UE     Range of Motion:   AROM Right Left Comment   Shoulder Elevatiom: 162  degrees 148 degrees    PROM Right Left Comment   Shoulder Flexion: 170  degrees 152 degrees    Shoulder Abduction: 180 POS  degrees 135 POS  degrees    Shoulder ER, 90°ABD: 101 degrees 85 degrees    Shoulder IR, 90° ABD: 57 degrees 45 degrees      FIR - 3"     Strength:    Right Left Comment   Shoulder flexion: 5/5 4/5    Shoulder Abduction: 5/5 4/5    Shoulder ER: 5/5 4/5    Shoulder IR: 5/5 4/5      Scapular Control/Dyskinesis: no   Special Tests: (+) impingement signs   Palpation: non TTP t/o L shoulder       CMS Impairment/Limitation/Restriction for FOTO  Survey    Therapist reviewed FOTO scores for Brissa Mason on 6/16/2022.   FOTO documents entered into EPIC - see Media section.    Limitation Score: NV %  Category: Carrying and self care          TREATMENT   Treatment Time In: 11:30  Treatment Time Out: 12:00  Total Treatment time separate from Evaluation time:15'    Brissa received therapeutic exercises to " develop strength, endurance, ROM, flexibility and posture for 15 minutes including:    Arm prop x 5'   Pendulum swings 1x30 4D 1#  Supine lying over 1/2 roll x 5'   St scap repositioning 1x10:10     Education     Home Exercises Provided and Patient Education Provided         Education provided:   - rehab approach     Written Home Exercises Provided: yes .  Exercises were reviewed and Brissa was able to demonstrate them prior to the end of the session.  Brissa demonstrated good  understanding of the education provided.     See EMR under hand out  for exercises provided 6/16/2022.      Assessment   Brissa is a 52 y.o. female referred to outpatient Physical Therapy with a medical diagnosis of s/p L shoulder Tenjet procedure for calcific tendinopathy . Pt presents with       Pain  Stiffness  Decreased ROM  Decreased strength  Decreased functional status       Pt prognosis is Good.   Pt will benefit from skilled outpatient Physical Therapy to address the deficits stated above and in the chart below, provide pt/family education, and to maximize pt's level of independence.     Plan of care discussed with patient: Yes  Pt's spiritual, cultural and educational needs considered and pt agreeable to plan of care and goals as stated below:     Anticipated Barriers for therapy: chronicity of condition     Medical Necessity is demonstrated by the following  History  Co-morbidities and personal factors that may impact the plan of care Co-morbidities:   None     Personal Factors:   None      low   Examination  Body Structures and Functions, activity limitations and participation restrictions that may impact the plan of care Body Regions:   upper extremities    Body Systems:    ROM  strength  motor control  scar formation    Participation Restrictions:   ADLs  Swimming     Activity limitations:   Mobility  lifting and carrying objects    Self care  washing oneself (bathing, drying, washing hands)  caring for body parts (brushing  teeth, shaving, grooming)  toileting  dressing    Domestic Life  shopping  cooking  doing house work (cleaning house, washing dishes, laundry)    Community and Social Life  recreation and leisure         low   Clinical Presentation stable and uncomplicated low   Decision Making/ Complexity Score: low     Goals     Short-Term Goals: 6 weeks  - The patient will be independent with initial home exercise program.  - The patient is independent with donning/doffing sling to protect tissue healing.  - The patient will demonstrate good unsupported sitting posture with min verbal cues for 15 minutes.  - The patient will increase ROM 15 degrees to perform bathing and hygiene, grooming, toileting and dressing with pain < 210.  - The patient will increase strength by 1.2 muscle grade  to perform bathing and hygiene, grooming, toileting and dressing with pain < 2/10.    Long-Term Goals: 16 weeks  - The patient will be independent with home exercise program and symptom management.  - The patient will increase ROM = to uninvolved shoulder  to perform work duties and recreation/leisure activities   with pain < 0/10.  - The patient will increase strength = to uninvolved shoulder  to perform work duties and recreation/leisure activities   with pain < 0/10.      Plan   Certification Period: 6/16/2022 to 10/16/2022.    Outpatient Physical Therapy 1 times weekly for 4 months to include the following interventions: patient education, Manual Therapy, Moist Heat/ Ice, Neuromuscular Re-ed, Patient Education, Therapeutic Activities and Therapeutic Exercise.     Sean Mendez, PT

## 2022-06-29 ENCOUNTER — CLINICAL SUPPORT (OUTPATIENT)
Dept: REHABILITATION | Facility: HOSPITAL | Age: 52
End: 2022-06-29
Payer: COMMERCIAL

## 2022-06-29 DIAGNOSIS — M25.612 DECREASED RANGE OF MOTION OF LEFT SHOULDER: Primary | ICD-10-CM

## 2022-06-29 PROCEDURE — 97110 THERAPEUTIC EXERCISES: CPT

## 2022-06-29 PROCEDURE — 97140 MANUAL THERAPY 1/> REGIONS: CPT

## 2022-07-07 ENCOUNTER — CLINICAL SUPPORT (OUTPATIENT)
Dept: REHABILITATION | Facility: HOSPITAL | Age: 52
End: 2022-07-07
Payer: COMMERCIAL

## 2022-07-07 DIAGNOSIS — M25.512 ACUTE PAIN OF LEFT SHOULDER: Primary | ICD-10-CM

## 2022-07-07 PROCEDURE — 97140 MANUAL THERAPY 1/> REGIONS: CPT | Performed by: PHYSICAL THERAPIST

## 2022-07-07 PROCEDURE — 97110 THERAPEUTIC EXERCISES: CPT | Performed by: PHYSICAL THERAPIST

## 2022-07-07 NOTE — PROGRESS NOTES
Physical Therapy Daily Treatment Note   [place] [address]      Visit Date: 7/7/2022    [facility]  [facility] [id number]    Therapy Diagnosis:   Encounter Diagnosis   Name Primary?    Acute pain of left shoulder Yes     Physician: Mathieu Christie, *    Physician Orders: PT Eval and Treat     Medical Diagnosis from Referral:   S46.819S (ICD-10-CM) - Sprain of supraspinatus muscle or tendon, unspecified laterality, sequela   M25.512,G89.29 (ICD-10-CM) - Chronic left shoulder pain   M67.912 (ICD-10-CM) - Dysfunction of left rotator cuff       Evaluation Date: 6/16/2022  Authorization Period Expiration: pend   Plan of Care Expiration: 10/16/2022  Progress Note Due: 8/16/2022  Visit # / Visits authorized: 2/ 1   FOTO: Issued Visit # 2       Time In: 15:00  Time Out: 16:00  Total Billable Time: 45  minutes    Precautions: Standard    Subjective      Pt reports soreness after IE / HEP but is pain free this PM and willing to attempt Avril as tolerated     she was compliant with home exercise program given last session.   Response to previous treatment:good   Functional change: none as yet     Pain: 0/10  Location: left shoulder      Objective     Measurements taken:      Brissa received therapeutic exercises to develop strength, endurance, ROM, flexibility and posture for 30 minutes including:    Pendulum swings 1x30 4D 1#  scap repositioning 2x10:10   S/L ER 3x15 0#   Prone s' ext / ER 3x10 0#  Prone row 3x15 0#   Extensive PROM 4D     Brissa received the following manual therapy techniques: Joint mobilizations were applied to the: 8 for 15 minutes, including:    Grade II - III post and inf GHJ       Home Exercises Provided and Patient Education Provided     Education provided:   - none this visit     Written Home Exercises Provided: yes.  Exercises were reviewed and Brissa was able to demonstrate them prior to the end of the session.  Brissa demonstrated good  understanding of the education provided.      See EMR under hand out  for exercises provided 7/7/2022.      Assessment     cuong Rx without increase in sxs, focus of this session was on scapular and RC activation and ROM, pt able to get a training effect in s' girdle musculature indicating strengthening occurring     Brissa Is progressing well towards her goals.   Pt prognosis is Good.     Pt will continue to benefit from skilled outpatient physical therapy to address the deficits listed in the problem list box on initial evaluation, provide pt/family education and to maximize pt's level of independence in the home and community environment.     Pt's spiritual, cultural and educational needs considered and pt agreeable to plan of care and goals.    Anticipated barriers to physical therapy: none    Goals:     Short-Term Goals: 6 weeks  - The patient will be independent with initial home exercise program.  - The patient is independent with donning/doffing sling to protect tissue healing.  - The patient will demonstrate good unsupported sitting posture with min verbal cues for 15 minutes.  - The patient will increase ROM 15 degrees to perform bathing and hygiene, grooming, toileting and dressing with pain < 210.  - The patient will increase strength by 1.2 muscle grade  to perform bathing and hygiene, grooming, toileting and dressing with pain < 2/10.    Long-Term Goals: 16 weeks  - The patient will be independent with home exercise program and symptom management.  - The patient will increase ROM = to uninvolved shoulder  to perform work duties and recreation/leisure activities   with pain < 0/10.  - The patient will increase strength = to uninvolved shoulder  to perform work duties and recreation/leisure activities   with pain < 0/10.    Plan     Continue with established Plan of Care towards PT goals with focus on decreasing pain, increasing ROM, strength, neuromuscular control and functional status       Sean Mendez, PT

## 2022-07-11 PROBLEM — M25.612 DECREASED RANGE OF MOTION OF LEFT SHOULDER: Status: ACTIVE | Noted: 2022-07-11

## 2022-07-11 NOTE — PROGRESS NOTES
"  Physical Therapy Daily Treatment Note   [place] [facility]      Visit Date: 6/29/2022    [facility]  [facility] [id number]    Therapy Diagnosis:   Encounter Diagnosis   Name Primary?    Decreased range of motion of left shoulder Yes     Physician: Mathieu Christie, *    Physician Orders: PT Eval and Treat      Medical Diagnosis from Referral:   S46.819S (ICD-10-CM) - Sprain of supraspinatus muscle or tendon, unspecified laterality, sequela   M25.512,G89.29 (ICD-10-CM) - Chronic left shoulder pain   M67.912 (ICD-10-CM) - Dysfunction of left rotator cuff         Evaluation Date: 6/16/2022  Authorization Period Expiration: pend   Plan of Care Expiration: 10/16/2022  Progress Note Due: 8/16/2022  Visit # / Visits authorized: 1/ 1   FOTO: Issued Visit # 2        Time In: 11:00  Time Out: 12:00  Total Billable Time: 60  minutes     Precautions: Standard      Subjective      Pt reports: "It's a little better than last time"     she was compliant with home exercise program given last session.   Response to previous treatment:Improvement in FE   Functional change: none at this time     Pain: 2/10  Location: left shoulder      Objective     Measurements taken:      Brissa received therapeutic exercises to develop strength, endurance, ROM and flexibility for 30 minutes including:  x10min shoulder Inferior glide hang #5  OpenBooks 2x15 ea  Scap Pinches 2x10,5s ea   Supine IR/ER 4x30s ea MR       Brissa received the following manual therapy techniques: Joint mobilizations and Soft tissue Mobilization were applied to the: Shoulder for 30 minutes, including:  GH joint inferior glide Gd 2/3  GH joint posterior glide Gd 2/3  Cross Body Stretch 4x30s   STM To her axilla    Home Exercises Provided and Patient Education Provided     Education provided:   - Importance of motion interventions  - HEP compliance   - Tolerance to activity    Written Home Exercises Provided: Patient instructed to cont prior HEP.  Exercises " were reviewed and Brissa was able to demonstrate them prior to the end of the session.  Brissa demonstrated good  understanding of the education provided.     See EMR under Patient Instructions for exercises provided prior visit.      Assessment     Patient continues to demonstrate limitations of her inferior/posterior GH joint capsule. She presents with a superiorly migrated humerus and painful AROM. Patient responded well to manual therapy to her capsule and demonstrated improved FE of her shoulder by the end of the session. Plan to continue to emphasize motion and rotator cuff strength to encourage a inferior humeral glide.     Brissa Is progressing well towards her goals.   Pt prognosis is Fair.     Pt will continue to benefit from skilled outpatient physical therapy to address the deficits listed in the problem list box on initial evaluation, provide pt/family education and to maximize pt's level of independence in the home and community environment.     Pt's spiritual, cultural and educational needs considered and pt agreeable to plan of care and goals.    Anticipated barriers to physical therapy: none    Goals:     Short-Term Goals: 6 weeks  - The patient will be independent with initial home exercise program.  - The patient is independent with donning/doffing sling to protect tissue healing.  - The patient will demonstrate good unsupported sitting posture with min verbal cues for 15 minutes.  - The patient will increase ROM 15 degrees to perform bathing and hygiene, grooming, toileting and dressing with pain < 210.  - The patient will increase strength by 1.2 muscle grade  to perform bathing and hygiene, grooming, toileting and dressing with pain < 2/10.     Long-Term Goals: 16 weeks  - The patient will be independent with home exercise program and symptom management.  - The patient will increase ROM = to uninvolved shoulder  to perform work duties and recreation/leisure activities   with pain < 0/10.  - The  patient will increase strength = to uninvolved shoulder  to perform work duties and recreation/leisure activities   with pain < 0/10.      Plan     Continue with established Plan of Care towards PT goals with focus on decreasing pain, increasing ROM, strength, neuromuscular control and functional status       Arsh Munson, PT

## 2022-07-13 ENCOUNTER — CLINICAL SUPPORT (OUTPATIENT)
Dept: REHABILITATION | Facility: HOSPITAL | Age: 52
End: 2022-07-13
Payer: COMMERCIAL

## 2022-07-13 DIAGNOSIS — M25.512 ACUTE PAIN OF LEFT SHOULDER: Primary | ICD-10-CM

## 2022-07-13 PROCEDURE — 97110 THERAPEUTIC EXERCISES: CPT | Performed by: PHYSICAL THERAPIST

## 2022-07-13 PROCEDURE — 97140 MANUAL THERAPY 1/> REGIONS: CPT | Performed by: PHYSICAL THERAPIST

## 2022-07-13 NOTE — PROGRESS NOTES
"  Physical Therapy Daily Treatment Note   [place] [facility]      Visit Date: 7/13/2022    [facility]  [facility] [id number]    Therapy Diagnosis:   Encounter Diagnosis   Name Primary?    Acute pain of left shoulder Yes     Physician: Mathieu Christie, *    Physician Orders: PT Eval and Treat     Medical Diagnosis from Referral:   S46.819S (ICD-10-CM) - Sprain of supraspinatus muscle or tendon, unspecified laterality, sequela   M25.512,G89.29 (ICD-10-CM) - Chronic left shoulder pain   M67.912 (ICD-10-CM) - Dysfunction of left rotator cuff       Evaluation Date: 6/16/2022  Authorization Period Expiration: pend   Plan of Care Expiration: 10/16/2022  Progress Note Due: 8/16/2022  Visit # / Visits authorized: 4/ 1   FOTO: Issued Visit # 2       Time In: 7:00  Time Out: 8:15  Total Billable Time: 60  minutes    Precautions: Standard    Subjective      Pt reports that intermittently she still has pain in L shoulder, but overall feels better, has some difficulty with HEP in terms of where she can do the Avril at home     she was compliant with home exercise program given last session.   Response to previous treatment:good   Functional change: none as yet     Pain: 0/10  Location: left shoulder      Objective     Measurements taken:  AROM WFLs except for FIR -3"     Brissa received therapeutic exercises to develop strength, endurance, ROM, flexibility and posture for 45 minutes one on one with PT including:    Pendulum swings 1x30 4D 2#  TB row  2x15:05 green  TB s' ext / ER 2x10:05 Or    S/L ER 3x15 0#   Prone s' ext / ER 3x10 0#  Prone row 3x15 0#   Extensive PROM 4D     Brissa received the following manual therapy techniques: Joint mobilizations were applied to the: L shoulder x 8  minutes, including:    Grade II - III post and inf GHJ       Home Exercises Provided and Patient Education Provided     Education provided:   - none this visit     Written Home Exercises Provided: yes.  Exercises were reviewed and " Brissa was able to demonstrate them prior to the end of the session.  Brissa demonstrated good  understanding of the education provided.     See EMR under hand out  for exercises provided 7/7/2022.      Assessment     cuong Rx without increase in sxs, focus of this session was on scapular and RC activation and ROM, pt able to get a training effect in s' girdle musculature indicating strengthening occurring   Decreased inferior glide noted     Brissa Is progressing well towards her goals.   Pt prognosis is Good.     Pt will continue to benefit from skilled outpatient physical therapy to address the deficits listed in the problem list box on initial evaluation, provide pt/family education and to maximize pt's level of independence in the home and community environment.     Pt's spiritual, cultural and educational needs considered and pt agreeable to plan of care and goals.    Anticipated barriers to physical therapy: none    Goals:     Short-Term Goals: 6 weeks  - The patient will be independent with initial home exercise program.  - The patient is independent with donning/doffing sling to protect tissue healing.  - The patient will demonstrate good unsupported sitting posture with min verbal cues for 15 minutes.  - The patient will increase ROM 15 degrees to perform bathing and hygiene, grooming, toileting and dressing with pain < 210.  - The patient will increase strength by 1.2 muscle grade  to perform bathing and hygiene, grooming, toileting and dressing with pain < 2/10.    Long-Term Goals: 16 weeks  - The patient will be independent with home exercise program and symptom management.  - The patient will increase ROM = to uninvolved shoulder  to perform work duties and recreation/leisure activities   with pain < 0/10.  - The patient will increase strength = to uninvolved shoulder  to perform work duties and recreation/leisure activities   with pain < 0/10.    Plan     Continue with established Plan of Care towards  PT goals with focus on decreasing pain, increasing ROM, strength, neuromuscular control and functional status       Sean Mendez, PT

## 2022-07-18 NOTE — PROGRESS NOTES
Brissa Mason, a 52 y.o. female, is here for evaluation of left shoulder pain.     HISTORY OF PRESENT ILLNESS  Hand dominance, right    Location:  anterior and lateral   Onset:  chronic, insidious  Palliative:     relative rest   oral analgesics, OTC   CSI- Dr. Garcia   Formal physical therapy in the past   Percutaneus tendontomy L. Supraspinatus 5/5/2022 significant improvement   Provocative:    glenohumeral ABduction    Prior:  none  Progression:  plateau discomfort  Quality:  sharp  Radiation: none  Severity:  per nursing documentation  Timing:  intermittent with use  Trauma:  none    Review of systems (ROS):  A 10+ review of systems was performed with pertinent positives and negatives noted above in the history of present illness. Other systems were negative unless otherwise specified.    PHYSICAL EXAMINATION  General:  The patient is alert and oriented x 3. Mood is pleasant. Observation of ears, eyes and nose reveal no gross abnormalities. HEENT: NCAT, sclera anicteric. Lungs: Respirations are equal and unlabored.     SHOULDER EXAMINATION     OBSERVATION:     Swelling  none  Deformity  none   Discoloration  none   Scapular winging none   Scars   none  Atrophy  none    TENDERNESS / CREPITUS (T/C):          T/C      T/C   Clavicle   -/-  SUPRAspinatus    -/-     AC Jt.    -/-  INFRAspinatus  -/-    SC Jt.    -/-  Deltoid    -/-      G. Tuberosity  -/-  LH BICEP groove  -/-   Acromion:  -/-  Midline Neck   -/-     Scapular Spine -/-  Trapezium   -/-   SMA Scapula  -/-  GH jt. line - post  -/-     Scapulothoracic  -/-         ROM:     Right shoulder   Left shoulder        AROM (PROM)   AROM (PROM)   FE    170° (175°)     170° (175°)     ER at 0°    60°  (65°)    60°  (65°)   ER at 90° ABD  90°  (90°)    90°  (90°)   IR at 90°  ABD   NA  (40°)     NA  (40°)      IR (spine level)   T10     T10    STRENGTH: (* = with pain) RIGHT SHOULDER  LEFT  SHOULDER   SCAPTION   5/5    5/5    IR    5/5    5/5   ER    5/5    5/5   BICEPS   5/5    5/5   Deltoid    5/5    5/5     SIGNS:  Painful side       NEER   -   ONELIAS        -    DIXON   -   SPEEDS        -   DROP ARM   -   BELLY PRESS       -    X-Body ADD    -   LIFT-OFF        -   HORNBLOWERS      -              STABILITY TESTING   RIGHT SHOULDER  LEFT SHOULDER     Translation     Anterior up face    up face    Posterior up face   up face    Sulcus  < 10mm   < 10 mm     Signs   Apprehension   neg     neg       Relocation   no change    no change      Jerk test  neg    neg    EXTREMITY NEURO-VASCULAR EXAM    Sensation grossly intact to light touch all dermatomal regions.    DTR 2+ Biceps, Triceps, BR and Negative Roneys sign   Grossly intact motor function at Elbow, Wrist and Hand   Distal pulses radial and ulnar 2+, brisk cap refill, symmetric.      NECK:  Painless FROM and spinous processes non-tender. Negative Spurlings sign.       Other Findings:    ASSESSMENT & PLAN   Assessment  #1 recalcitrant supraspinatus tendinosis, left /nd   W/ calcific depositions   S/p perc ten 22.05 AWG    No evidence of neurologic pathology  No evidence of vascular pathology    Imaging studies reviewed:   X-ray shoulder, bliateral 22.04    Plan  Doing well, though certainly some GH ABduction deficits remain  She insists she is 'night and day' better than pre perc ten  We discussed that the next step treatment-wise would be RCR, which she just as strongly would like to avoid  We will continue fPT aimed at GH RoM, scap stability, and RC strengthening    We discussed options including    Watchful waiting / relative rest    Physical therapy x   Injection therapy    Consultation    The patient chooses As above   x = prescribed  CSI = corticosteroid injection  VSI = viscosupplement injection  PRPI = platelet rich plasma injection  ia = intra articular  R = right  L = left  B = bilateral   nfSx = surgical consultation was  recommended, but patient is not interested in consultation at this time    Physical Therapy        Formal (fPT), @ [facility] facility t   Formal (fPT), @ [facility] facility        Homegoing (hgPT), per concurrent fPT recommendations t   Homegoing (hgPT), per prior fPT recommendations    Homegoing (hgPT), handout provided        w/  (atPT)    [blank] = not prescribed  x = prescribed  b = prescribed, and begin as indicated  t = continue as indicated  r = prescribed, and restart as indicated  p = completed prior as indicated  hs = prescribed, and with high school   col = prescribed, and with college or university   nfPT = physical therapy was recommended, but patient is not interested in PT at this time    Activity (e.g. sports, work) restrictions    [blank] = as tolerated  pt = per physical therapist  at = per   NWB = non weight bearing on affected lower extremity, with crutches assistance for ambulation    Bracing    [blank] = not prescribed  r = recommended, but not fit with at todays visit  f = prescribed and fit with at todays visit  t = continue as indicated  d = d/c  p = as needed  rare = use on rare, as-needed basis; advised against chronic use    Pain management    [blank] = No prescription necessary. A handout detailing dosing of appropriate   over-the-counter musculoskeletal analgesics was made available to the patient.   m = meloxicam x 14 days  mp = 14 day course of meloxicam prescribed prior    Follow up    [blank] = as needed  [number] = in [number] weeks  CSI = for corticosteroid injection  VSI = for viscosupplement injection or injection series  PRP = for platelet rich plasma injection or injection series  MRI = after MRI imaging  ns = should surgical options be deferred (no surgery)  o = appointment offered, deferred by patient    Should symptoms worsen or fail to resolve, consider    Revisiting the above options and / or surg consult w/ Team  Jose     Vocation:     Was swimming   Walks for physical activity, elliptical

## 2022-07-19 ENCOUNTER — CLINICAL SUPPORT (OUTPATIENT)
Dept: REHABILITATION | Facility: HOSPITAL | Age: 52
End: 2022-07-19
Payer: COMMERCIAL

## 2022-07-19 ENCOUNTER — OFFICE VISIT (OUTPATIENT)
Dept: SPORTS MEDICINE | Facility: CLINIC | Age: 52
End: 2022-07-19
Payer: COMMERCIAL

## 2022-07-19 VITALS
SYSTOLIC BLOOD PRESSURE: 117 MMHG | BODY MASS INDEX: 22.99 KG/M2 | WEIGHT: 138 LBS | HEIGHT: 65 IN | DIASTOLIC BLOOD PRESSURE: 69 MMHG

## 2022-07-19 DIAGNOSIS — M67.80 TENDINOSIS: Primary | ICD-10-CM

## 2022-07-19 DIAGNOSIS — M67.912 DYSFUNCTION OF LEFT ROTATOR CUFF: ICD-10-CM

## 2022-07-19 DIAGNOSIS — M25.612 DECREASED RANGE OF MOTION OF SHOULDER, LEFT: ICD-10-CM

## 2022-07-19 DIAGNOSIS — M25.612 DECREASED RANGE OF MOTION OF LEFT SHOULDER: Primary | ICD-10-CM

## 2022-07-19 DIAGNOSIS — M75.32 CALCIFIC TENDINITIS OF LEFT SHOULDER: ICD-10-CM

## 2022-07-19 PROCEDURE — 99024 POSTOP FOLLOW-UP VISIT: CPT | Mod: S$GLB,,, | Performed by: FAMILY MEDICINE

## 2022-07-19 PROCEDURE — 99999 PR PBB SHADOW E&M-EST. PATIENT-LVL III: CPT | Mod: PBBFAC,,, | Performed by: FAMILY MEDICINE

## 2022-07-19 PROCEDURE — 97110 THERAPEUTIC EXERCISES: CPT | Performed by: PHYSICAL THERAPIST

## 2022-07-19 PROCEDURE — 99999 PR PBB SHADOW E&M-EST. PATIENT-LVL III: ICD-10-PCS | Mod: PBBFAC,,, | Performed by: FAMILY MEDICINE

## 2022-07-19 PROCEDURE — 99024 PR POST-OP FOLLOW-UP VISIT: ICD-10-PCS | Mod: S$GLB,,, | Performed by: FAMILY MEDICINE

## 2022-07-19 NOTE — PROGRESS NOTES
"  Physical Therapy Daily Treatment Note   [facility] 1      Visit Date: 7/19/2022    [facility]  [facility] Number[facility] [id number]    Therapy Diagnosis:   Encounter Diagnosis   Name Primary?    Decreased range of motion of left shoulder Yes     Physician: Mathieu Christie, *    Physician Orders: PT Eval and Treat     Medical Diagnosis from Referral:   S46.819S (ICD-10-CM) - Sprain of supraspinatus muscle or tendon, unspecified laterality, sequela   M25.512,G89.29 (ICD-10-CM) - Chronic left shoulder pain   M67.912 (ICD-10-CM) - Dysfunction of left rotator cuff     PROCEDURE PERFORMED  Percutaneous tenotomy of distal supraspinatus tendon using ultrasound guidance to cut and remove pathologic tendon tissue    Date of Procedure;  5/5/2022     Evaluation Date: 6/16/2022  Authorization Period Expiration: pend   Plan of Care Expiration: 10/16/2022  Progress Note Due: 8/16/2022  Visit # / Visits authorized: 5/ 1   FOTO: Issued Visit # 2       Time In: 13:15  Time Out: 14:15  Total Billable Time: 45  minutes    Precautions: Standard    Subjective      Pt reports that her L shoulder is improving, able to dress without difficulty or pain     she was compliant with home exercise program given last session.   Response to previous treatment:good   Functional change: see subjective above     Pain: 0/10  Location: left shoulder      Objective     Post procedure:   75 days    Measurements taken:  None taken this visit     (AROM WFLs except for FIR -3")     Brissa received therapeutic exercises to develop strength, endurance, ROM, flexibility and posture for 45 minutes  including:    UBE 6' 3.0   TB row  2x15:05 blue   TB ER 3x15 yll   TB IR 3x15 Or   Sup pro 3x15 0#     S/L ER 3x15 1#   Prone s' ext / ER 3x10 0#  Prone row 3x15 3#  Prone HAB/ER 3x10 0#    Brissa received the following manual therapy techniques: Joint mobilizations were applied to the: L shoulder DNP   minutes, including:    Grade II - III post and inf GHJ "     Pt declined use of CP     Home Exercises Provided and Patient Education Provided     Education provided:   - none this visit     Written Home Exercises Provided: yes.  Exercises were reviewed and Brissa was able to demonstrate them prior to the end of the session.  Brissa demonstrated good  understanding of the education provided.     See EMR under hand out  for exercises provided 7/7/2022.      Assessment     cuong Rx without increase in sxs, focus of this session was on strengthening the scapular and RC musculature, pt achieved good training effect in muscles indicating strengthening occurring, pt continues with LOM and loss of strength affecting ADLs but is improving     Brissa Is progressing well towards her goals.   Pt prognosis is Good.     Pt will continue to benefit from skilled outpatient physical therapy to address the deficits listed in the problem list box on initial evaluation, provide pt/family education and to maximize pt's level of independence in the home and community environment.     Pt's spiritual, cultural and educational needs considered and pt agreeable to plan of care and goals.    Anticipated barriers to physical therapy: none    Goals:     Short-Term Goals: 6 weeks  - The patient will be independent with initial home exercise program.  - The patient is independent with donning/doffing sling to protect tissue healing.  - The patient will demonstrate good unsupported sitting posture with min verbal cues for 15 minutes.  - The patient will increase ROM 15 degrees to perform bathing and hygiene, grooming, toileting and dressing with pain < 210.  - The patient will increase strength by 1.2 muscle grade  to perform bathing and hygiene, grooming, toileting and dressing with pain < 2/10.    Long-Term Goals: 16 weeks  - The patient will be independent with home exercise program and symptom management.  - The patient will increase ROM = to uninvolved shoulder  to perform work duties and  recreation/leisure activities   with pain < 0/10.  - The patient will increase strength = to uninvolved shoulder  to perform work duties and recreation/leisure activities   with pain < 0/10.    Plan     Continue with established Plan of Care towards PT goals with focus on decreasing pain, increasing ROM, strength, neuromuscular control and functional status       Sean Mendez, PT

## 2022-07-25 ENCOUNTER — CLINICAL SUPPORT (OUTPATIENT)
Dept: REHABILITATION | Facility: HOSPITAL | Age: 52
End: 2022-07-25
Payer: COMMERCIAL

## 2022-07-25 DIAGNOSIS — M25.512 ACUTE PAIN OF LEFT SHOULDER: ICD-10-CM

## 2022-07-25 DIAGNOSIS — M25.612 DECREASED RANGE OF MOTION OF LEFT SHOULDER: Primary | ICD-10-CM

## 2022-07-25 PROCEDURE — 97140 MANUAL THERAPY 1/> REGIONS: CPT

## 2022-07-25 PROCEDURE — 97110 THERAPEUTIC EXERCISES: CPT

## 2022-07-25 NOTE — PROGRESS NOTES
"  Physical Therapy Daily Treatment Note   [facility] 1      Visit Date: 7/25/2022    [facility]  [facility] [id number]    Therapy Diagnosis:   No diagnosis found.  Physician: No ref. provider found    Physician Orders: PT Eval and Treat      Medical Diagnosis from Referral:   S46.819S (ICD-10-CM) - Sprain of supraspinatus muscle or tendon, unspecified laterality, sequela   M25.512,G89.29 (ICD-10-CM) - Chronic left shoulder pain   M67.912 (ICD-10-CM) - Dysfunction of left rotator cuff         Evaluation Date: 6/16/2022  Authorization Period Expiration: pend   Plan of Care Expiration: 10/16/2022  Progress Note Due: 8/16/2022  Visit # / Visits authorized: 5/ 1   FOTO: Issued Visit # 2        Time In: 4PM  Time Out: 5PM  Total Billable Time: 60  minutes     Precautions: Standard      Subjective      Pt reports: "It's much better than the last time. It still hurts in one position"      she was compliant with home exercise program given last session.   Response to previous treatment:Improvement in FE   Functional change: none at this time     Pain: 2/10  Location: left shoulder      Objective     Measurements taken:      Brissa received therapeutic exercises to develop strength, endurance, ROM and flexibility for 30 minutes including:  x10min shoulder Inferior glide hang #5  OpenBooks 2x15 ea  Scap Pinches 2x10,5s ea   Supine IR/ER 3x15 walkouts   FE Wand 3x15    Brissa received the following manual therapy techniques: Joint mobilizations and Soft tissue Mobilization were applied to the: Shoulder for 30 minutes, including:  GH joint inferior glide Gd 2/3  GH joint posterior glide Gd 2/3  Cross Body Stretch 4x30s   STM To her axilla    Home Exercises Provided and Patient Education Provided     Education provided:   - Importance of motion interventions  - HEP compliance   - Tolerance to activity    Written Home Exercises Provided: Patient instructed to cont prior HEP.  Exercises were reviewed and Brissa was " able to demonstrate them prior to the end of the session.  Brissa demonstrated good  understanding of the education provided.     See EMR under Patient Instructions for exercises provided prior visit.      Assessment     Patient demonstrated improved motion since her last visit with me. She continues to lack some motion which she localizes at her anterior shoulder. This prevents her from achieving a pain-free 90/90 stretch as she feels pain anteriorly. This pain was improved with manual therapy as well as gabriel-scapular activation and strengthening. She will need to continue to improve her scapular motor control and strength to avoid positions of impingement.      Brissa Is progressing well towards her goals.   Pt prognosis is Fair.     Pt will continue to benefit from skilled outpatient physical therapy to address the deficits listed in the problem list box on initial evaluation, provide pt/family education and to maximize pt's level of independence in the home and community environment.     Pt's spiritual, cultural and educational needs considered and pt agreeable to plan of care and goals.    Anticipated barriers to physical therapy: none    Goals:     Short-Term Goals: 6 weeks  - The patient will be independent with initial home exercise program.  - The patient is independent with donning/doffing sling to protect tissue healing.  - The patient will demonstrate good unsupported sitting posture with min verbal cues for 15 minutes.  - The patient will increase ROM 15 degrees to perform bathing and hygiene, grooming, toileting and dressing with pain < 210.  - The patient will increase strength by 1.2 muscle grade  to perform bathing and hygiene, grooming, toileting and dressing with pain < 2/10.     Long-Term Goals: 16 weeks  - The patient will be independent with home exercise program and symptom management.  - The patient will increase ROM = to uninvolved shoulder  to perform work duties and recreation/leisure  activities   with pain < 0/10.  - The patient will increase strength = to uninvolved shoulder  to perform work duties and recreation/leisure activities   with pain < 0/10.      Plan     Continue with established Plan of Care towards PT goals with focus on decreasing pain, increasing ROM, strength, neuromuscular control and functional status       Arsh Munson, PT

## 2022-08-01 ENCOUNTER — CLINICAL SUPPORT (OUTPATIENT)
Dept: REHABILITATION | Facility: HOSPITAL | Age: 52
End: 2022-08-01
Payer: COMMERCIAL

## 2022-08-01 DIAGNOSIS — M25.512 ACUTE PAIN OF LEFT SHOULDER: ICD-10-CM

## 2022-08-01 DIAGNOSIS — M25.612 DECREASED RANGE OF MOTION OF LEFT SHOULDER: Primary | ICD-10-CM

## 2022-08-01 PROCEDURE — 97110 THERAPEUTIC EXERCISES: CPT

## 2022-08-01 PROCEDURE — 97140 MANUAL THERAPY 1/> REGIONS: CPT

## 2022-08-02 NOTE — PROGRESS NOTES
"  Physical Therapy Daily Treatment Note   [place] 1      Visit Date: 8/1/2022    [facility]  [facility] [id number]    Therapy Diagnosis:   Encounter Diagnoses   Name Primary?    Decreased range of motion of left shoulder Yes    Acute pain of left shoulder      Physician: Mathieu Christie, *    Physician Orders: PT Eval and Treat      Medical Diagnosis from Referral:   S46.819S (ICD-10-CM) - Sprain of supraspinatus muscle or tendon, unspecified laterality, sequela   M25.512,G89.29 (ICD-10-CM) - Chronic left shoulder pain   M67.912 (ICD-10-CM) - Dysfunction of left rotator cuff         Evaluation Date: 6/16/2022  Authorization Period Expiration: pend   Plan of Care Expiration: 10/16/2022  Progress Note Due: 8/16/2022  Visit # / Visits authorized: 6/ 1   FOTO: Issued Visit # 2        Time In: 4PM  Time Out: 5PM  Total Billable Time: 60  minutes     Precautions: Standard      Subjective      Pt reports: "It's getting better, but it still feels like it catches in this one position"     she was compliant with home exercise program given last session.   Response to previous treatment:Improvement in FE   Functional change: none at this time     Pain: 2/10  Location: left shoulder      Objective     Measurements taken:      Brissa received therapeutic exercises to develop strength, endurance, ROM and flexibility for 30 minutes including:  OpenBooks 2x15 ea  Scap Pinches 2x10,5s ea    IR/ER 3x15 walkouts purple/green   Serratus Wall Slides 3x15   Prone Rows 3x15       Brissa received the following manual therapy techniques: Joint mobilizations and Soft tissue Mobilization were applied to the: Shoulder for 30 minutes, including:  GH joint inferior glide Gd 2/3  GH joint posterior glide Gd 2/3  Cross Body Stretch 4x30s   STM To her axilla    Home Exercises Provided and Patient Education Provided     Education provided:   - Importance of motion interventions  - HEP compliance   - Tolerance to " "activity    Written Home Exercises Provided: Patient instructed to cont prior HEP.  Exercises were reviewed and Brissa was able to demonstrate them prior to the end of the session.  Brissa demonstrated good  understanding of the education provided.     See EMR under Patient Instructions for exercises provided prior visit.      Assessment     Patient continues to experience some difficulty with achieving a full 90/90 stretch of her shoulder. Her "catch" is now localized at about 70 deg of abduction instead of 30 deg like it was last time. Her catch improved with rotator cuff activation and improvement in her posteriorly tilt of her scapula. Her catch is very positional and occurs during ipsilateral lifting tasks in the frontal plane secondary to superior humeral migration of the humerus and via deltoid dominance.     Brissa Is progressing well towards her goals.   Pt prognosis is Fair.     Pt will continue to benefit from skilled outpatient physical therapy to address the deficits listed in the problem list box on initial evaluation, provide pt/family education and to maximize pt's level of independence in the home and community environment.     Pt's spiritual, cultural and educational needs considered and pt agreeable to plan of care and goals.    Anticipated barriers to physical therapy: none    Goals:     Short-Term Goals: 6 weeks  - The patient will be independent with initial home exercise program.  - The patient is independent with donning/doffing sling to protect tissue healing.  - The patient will demonstrate good unsupported sitting posture with min verbal cues for 15 minutes.  - The patient will increase ROM 15 degrees to perform bathing and hygiene, grooming, toileting and dressing with pain < 210.  - The patient will increase strength by 1.2 muscle grade  to perform bathing and hygiene, grooming, toileting and dressing with pain < 2/10.     Long-Term Goals: 16 weeks  - The patient will be independent with " home exercise program and symptom management.  - The patient will increase ROM = to uninvolved shoulder  to perform work duties and recreation/leisure activities   with pain < 0/10.  - The patient will increase strength = to uninvolved shoulder  to perform work duties and recreation/leisure activities   with pain < 0/10.      Plan     Continue with established Plan of Care towards PT goals with focus on decreasing pain, increasing ROM, strength, neuromuscular control and functional status       Arsh Munson, PT

## 2022-08-10 ENCOUNTER — CLINICAL SUPPORT (OUTPATIENT)
Dept: REHABILITATION | Facility: HOSPITAL | Age: 52
End: 2022-08-10
Payer: COMMERCIAL

## 2022-08-10 DIAGNOSIS — M25.612 DECREASED RANGE OF MOTION OF LEFT SHOULDER: Primary | ICD-10-CM

## 2022-08-10 PROCEDURE — 97110 THERAPEUTIC EXERCISES: CPT

## 2022-08-10 PROCEDURE — 97140 MANUAL THERAPY 1/> REGIONS: CPT

## 2022-08-10 NOTE — PROGRESS NOTES
"  Physical Therapy Daily Treatment Note   [place] 1      Visit Date: 8/10/2022    [facility]  [id number]    Therapy Diagnosis:   Encounter Diagnosis   Name Primary?    Decreased range of motion of left shoulder Yes     Physician: Mathieu Christie, *    Physician Orders: PT Eval and Treat      Medical Diagnosis from Referral:   S46.819S (ICD-10-CM) - Sprain of supraspinatus muscle or tendon, unspecified laterality, sequela   M25.512,G89.29 (ICD-10-CM) - Chronic left shoulder pain   M67.912 (ICD-10-CM) - Dysfunction of left rotator cuff         Evaluation Date: 6/16/2022  Authorization Period Expiration: pend   Plan of Care Expiration: 10/16/2022  Progress Note Due: 8/16/2022  Visit # / Visits authorized: 7/ 1   FOTO: Issued Visit # 2        Time In: 9AM  Time Out: 10AM  Total Billable Time: 60  minutes     Precautions: Standard      Subjective      Pt reports: "It's still catching some"     she was compliant with home exercise program given last session.   Response to previous treatment:Improvement in FE   Functional change: none at this time     Pain: 1/10  Location: left shoulder      Objective     Measurements taken:      Brissa received therapeutic exercises to develop strength, endurance, ROM and flexibility for 30 minutes including:  OpenBooks 2x15 ea   IR/ER 3x15 walkouts purple/green   Serratus Wall Slides 3x15   Prone Rows 3x15 #4      Brissa received the following manual therapy techniques: Joint mobilizations and Soft tissue Mobilization were applied to the: Shoulder for 30 minutes, including:  GH joint inferior glide Gd 2/3  GH joint posterior glide Gd 2/3  Cross Body Stretch 4x30s   STM To her axilla  R Stabilization in Supine at 90 deg 5x30s eyes closed     Home Exercises Provided and Patient Education Provided     Education provided:   - Importance of motion interventions  - HEP compliance   - Tolerance to activity    Written Home Exercises Provided: Patient " instructed to cont prior HEP.  Exercises were reviewed and Brissa was able to demonstrate them prior to the end of the session.  Brissa demonstrated good  understanding of the education provided.     See EMR under Patient Instructions for exercises provided prior visit.      Assessment     Upon arrival patient demonstrated full symmetrical motion in all planes aside from her FIR. This was able to be improved with rotator cuff activation as well as gabriel-scapular strengthening and activation. She now nearly demonstrates full FIR after this activation. She will need to continue to get a stronger rotator cuff and shoulder blade to support her lifting and functional tasks.     Brissa Is progressing well towards her goals.   Pt prognosis is Fair.     Pt will continue to benefit from skilled outpatient physical therapy to address the deficits listed in the problem list box on initial evaluation, provide pt/family education and to maximize pt's level of independence in the home and community environment.     Pt's spiritual, cultural and educational needs considered and pt agreeable to plan of care and goals.    Anticipated barriers to physical therapy: none    Goals:     Short-Term Goals: 6 weeks  - The patient will be independent with initial home exercise program.  - The patient is independent with donning/doffing sling to protect tissue healing.  - The patient will demonstrate good unsupported sitting posture with min verbal cues for 15 minutes.  - The patient will increase ROM 15 degrees to perform bathing and hygiene, grooming, toileting and dressing with pain < 210.  - The patient will increase strength by 1.2 muscle grade  to perform bathing and hygiene, grooming, toileting and dressing with pain < 2/10.     Long-Term Goals: 16 weeks  - The patient will be independent with home exercise program and symptom management.  - The patient will increase ROM = to uninvolved shoulder  to perform work duties and  recreation/leisure activities   with pain < 0/10.  - The patient will increase strength = to uninvolved shoulder  to perform work duties and recreation/leisure activities   with pain < 0/10.      Plan     Continue with established Plan of Care towards PT goals with focus on decreasing pain, increasing ROM, strength, neuromuscular control and functional status       Arsh Munson, PT

## 2022-08-17 ENCOUNTER — PATIENT MESSAGE (OUTPATIENT)
Dept: DERMATOLOGY | Facility: CLINIC | Age: 52
End: 2022-08-17
Payer: COMMERCIAL

## 2022-08-18 ENCOUNTER — TELEPHONE (OUTPATIENT)
Dept: DERMATOLOGY | Facility: CLINIC | Age: 52
End: 2022-08-18
Payer: COMMERCIAL

## 2022-08-18 NOTE — TELEPHONE ENCOUNTER
----- Message from Leia Holbrook sent at 8/18/2022  2:05 PM CDT -----  Contact: [phone]  Pt is calling to schedule an appt for something on foot.   No avail appts until sept.     Pt states she can do virtual or in person, whichever is sooner, if possible.     [phone]

## 2022-08-19 ENCOUNTER — CLINICAL SUPPORT (OUTPATIENT)
Dept: REHABILITATION | Facility: HOSPITAL | Age: 52
End: 2022-08-19
Payer: COMMERCIAL

## 2022-08-19 DIAGNOSIS — M25.612 DECREASED RANGE OF MOTION OF LEFT SHOULDER: Primary | ICD-10-CM

## 2022-08-19 PROCEDURE — 97110 THERAPEUTIC EXERCISES: CPT

## 2022-08-19 PROCEDURE — 97140 MANUAL THERAPY 1/> REGIONS: CPT

## 2022-08-24 ENCOUNTER — CLINICAL SUPPORT (OUTPATIENT)
Dept: REHABILITATION | Facility: HOSPITAL | Age: 52
End: 2022-08-24
Payer: COMMERCIAL

## 2022-08-24 ENCOUNTER — OFFICE VISIT (OUTPATIENT)
Dept: INTERNAL MEDICINE | Facility: CLINIC | Age: 52
End: 2022-08-24
Payer: COMMERCIAL

## 2022-08-24 VITALS
SYSTOLIC BLOOD PRESSURE: 132 MMHG | HEART RATE: 66 BPM | OXYGEN SATURATION: 99 % | BODY MASS INDEX: 23.4 KG/M2 | WEIGHT: 140.44 LBS | TEMPERATURE: 99 F | HEIGHT: 65 IN | DIASTOLIC BLOOD PRESSURE: 74 MMHG

## 2022-08-24 DIAGNOSIS — M25.612 DECREASED RANGE OF MOTION OF LEFT SHOULDER: Primary | ICD-10-CM

## 2022-08-24 DIAGNOSIS — S90.822A BLISTER OF LEFT FOOT, INITIAL ENCOUNTER: ICD-10-CM

## 2022-08-24 DIAGNOSIS — L03.116 CELLULITIS OF LEFT LOWER EXTREMITY: Primary | ICD-10-CM

## 2022-08-24 PROCEDURE — 99214 PR OFFICE/OUTPT VISIT, EST, LEVL IV, 30-39 MIN: ICD-10-PCS | Mod: S$GLB,,, | Performed by: NURSE PRACTITIONER

## 2022-08-24 PROCEDURE — 99214 OFFICE O/P EST MOD 30 MIN: CPT | Mod: S$GLB,,, | Performed by: NURSE PRACTITIONER

## 2022-08-24 PROCEDURE — 99999 PR PBB SHADOW E&M-EST. PATIENT-LVL III: CPT | Mod: PBBFAC,,, | Performed by: NURSE PRACTITIONER

## 2022-08-24 PROCEDURE — 97140 MANUAL THERAPY 1/> REGIONS: CPT

## 2022-08-24 PROCEDURE — 99999 PR PBB SHADOW E&M-EST. PATIENT-LVL III: ICD-10-PCS | Mod: PBBFAC,,, | Performed by: NURSE PRACTITIONER

## 2022-08-24 PROCEDURE — 97110 THERAPEUTIC EXERCISES: CPT

## 2022-08-24 RX ORDER — MUPIROCIN 20 MG/G
OINTMENT TOPICAL 3 TIMES DAILY
Qty: 15 G | Refills: 0 | Status: SHIPPED | OUTPATIENT
Start: 2022-08-24 | End: 2022-11-15

## 2022-08-24 RX ORDER — DOXYCYCLINE HYCLATE 100 MG
100 TABLET ORAL 2 TIMES DAILY
Qty: 14 TABLET | Refills: 0 | Status: SHIPPED | OUTPATIENT
Start: 2022-08-24 | End: 2022-08-31

## 2022-08-24 NOTE — PROGRESS NOTES
[facility] Note    Chief Complaint      Chief Complaint   Patient presents with    Blister     Left foot blisters for 1 week, possible bug bite      History of Present Illness      Brissa Mason is a 52 y.o. female patient of Dr. Kauffman who is new to me and presents today for c/o two blisters that are on the dorsal area of her left foot after coming back from a camping trip x 1 week ago.    Health Maintenance   Topic Date Due    Mammogram  09/13/2022    DEXA Scan  09/29/2023    TETANUS VACCINE  09/21/2025    Lipid Panel  09/24/2026    Hepatitis C Screening  Completed       Past Medical History:   Diagnosis Date    Allergy     Anemia 4/20/2020    Anxiety disorder     B12 deficiency     Cataract     Colitis     resolved; rectum 2005; neg flex sig 2006    Colitis: 2005 rectal area     resolved; rectum 2005; neg flex sig 2006     Iron deficiency     Menstrual migraine without status migrainosus, not intractable 9/5/2017    Vitamin D deficiency        Past Surgical History:   Procedure Laterality Date    COLONOSCOPY N/A 10/15/2020    Procedure: COLONOSCOPY;  Surgeon: Vern Harkins MD;  Location: [facility] ([address] [place] [address]);  Service: Endoscopy;  Laterality: N/A;  covid test 10/12-[facility]    GANGLION CYST EXCISION  2017    shoulder injection Left 05/2022       family history includes Breast cancer in her paternal aunt; Breast cancer (age of onset: 60) in her paternal aunt; Cancer in her maternal uncle; Colon cancer in her maternal uncle and mother; Colon polyps in her mother; Depression in her mother and paternal grandmother; Glaucoma in her sister; Gout in her father; Hyperlipidemia in her brother and mother; Hypertension in her brother, father, and sister; Osteoporosis in her mother; Psoriasis in her brother.    Social History     Tobacco Use    Smoking status: Never    Smokeless tobacco: Never   Substance Use Topics    Alcohol use: Yes     Alcohol/week: 1.0 standard drink     Types: 1 Glasses  "of wine per week     Comment: 1-2 glasses of wine a week     Drug use: No       ROS     Outpatient Encounter Medications as of 2022   Medication Sig Dispense Refill    alclomethasone (ACLOVATE) 0.05 % ointment AAA lips bid 30 g 3    [] doxycycline (VIBRA-TABS) 100 MG tablet Take 1 tablet (100 mg total) by mouth 2 (two) times daily. for 7 days 14 tablet 0    mupirocin (BACTROBAN) 2 % ointment Apply topically 3 (three) times daily. 15 g 0     No facility-administered encounter medications on file as of 2022.        Review of patient's allergies indicates:   Allergen Reactions    Mobic [meloxicam] Rash    Sulfa (sulfonamide antibiotics)      Other reaction(s): Rash       Physical Exam      Vital Signs  Temp: 98.6 °F (37 °C)  Temp src: Oral  Pulse: 66  SpO2: 99 %  BP: 132/74  BP Location: Left arm  Patient Position: Sitting  Pain Score: 0-No pain  Height and Weight  Height: 5' 5" (165.1 cm)  Weight: 63.7 kg (140 lb 6.9 oz)  BSA (Calculated - sq m): 1.71 sq meters  BMI (Calculated): 23.4  Weight in (lb) to have BMI = 25: 149.9    Physical Exam  Vitals and nursing note reviewed.   Constitutional:       General: She is not in acute distress.     Appearance: Normal appearance. She is not ill-appearing.   HENT:      Head: Normocephalic and atraumatic.      Nose: Nose normal.   Eyes:      Pupils: Pupils are equal, round, and reactive to light.   Cardiovascular:      Rate and Rhythm: Normal rate and regular rhythm.      Heart sounds: Normal heart sounds.   Pulmonary:      Effort: Pulmonary effort is normal. No respiratory distress.      Breath sounds: Normal breath sounds.   Musculoskeletal:         General: Swelling present. No tenderness. Normal range of motion.      Cervical back: Normal range of motion and neck supple.   Skin:     General: Skin is warm and dry.      Findings: Erythema present.      Comments: Left dorsal foot with two raised raised large blisters w/ redness around borders, no drainage "   Neurological:      General: No focal deficit present.      Mental Status: She is alert and oriented to person, place, and time.   Psychiatric:         Mood and Affect: Mood normal.         Behavior: Behavior normal.         Thought Content: Thought content normal.         Judgment: Judgment normal.          Laboratory:  CBC:  Lab Results   Component Value Date    WBC 5.72 09/24/2021    RBC 4.13 09/24/2021    HGB 12.9 09/24/2021    HCT 38.3 09/24/2021     09/24/2021    MCV 93 09/24/2021    MCH 31.2 (H) 09/24/2021    MCHC 33.7 09/24/2021    MCHC 32.8 09/28/2020    MCHC 31.7 (L) 05/28/2020     CMP:  Lab Results   Component Value Date    GLU 93 10/20/2021    CALCIUM 9.7 10/20/2021    ALBUMIN 4.2 10/20/2021    PROT 7.4 10/20/2021     10/20/2021    K 4.1 10/20/2021    CO2 26 10/20/2021     10/20/2021    BUN 11 10/20/2021    ALKPHOS 48 (L) 10/20/2021    ALT 11 10/20/2021    AST 15 10/20/2021    BILITOT 0.3 10/20/2021    BILITOT 0.6 09/24/2021    BILITOT 0.4 09/28/2020     URINALYSIS:  Lab Results   Component Value Date    COLORU YELLOW 12/06/2005    SPECGRAV 1.026 12/06/2005    PHUR 6.0 12/06/2005    PROTEINUA NEG 12/06/2005    BACTERIA FEW 12/06/2005    NITRITE NEG 12/06/2005    LEUKOCYTESUR NEG 12/06/2005    UROBILINOGEN 0.2 12/06/2005      LIPIDS:  Lab Results   Component Value Date    TSH 2.391 09/24/2021    TSH 1.676 09/28/2020    TSH 1.974 09/12/2016    HDL 84 (H) 09/24/2021    HDL 81 (H) 09/14/2020    HDL 78 (H) 09/19/2019    CHOL 198 09/24/2021    CHOL 208 (H) 09/14/2020    CHOL 205 (H) 09/19/2019    TRIG 66 09/24/2021    TRIG 56 09/14/2020    TRIG 104 09/19/2019    LDLCALC 100.8 09/24/2021    LDLCALC 115.8 09/14/2020    LDLCALC 106.2 09/19/2019    CHOLHDL 42.4 09/24/2021    CHOLHDL 38.9 09/14/2020    CHOLHDL 38.0 09/19/2019    NONHDLCHOL 114 09/24/2021    NONHDLCHOL 127 09/14/2020    NONHDLCHOL 127 09/19/2019    TOTALCHOLEST 2.4 09/24/2021    TOTALCHOLEST 2.6 09/14/2020    TOTALCHOLEST 2.6  09/19/2019     TSH:  Lab Results   Component Value Date    TSH 2.391 09/24/2021    TSH 1.676 09/28/2020    TSH 1.974 09/12/2016     A1C:  No results found for: HGBA1C      Assessment/Plan     Brissa Mason is a 52 y.o.female with:    Cellulitis of left lower extremity  -     doxycycline (VIBRA-TABS) 100 MG tablet; Take 1 tablet (100 mg total) by mouth 2 (two) times daily. for 7 days  Dispense: 14 tablet; Refill: 0  -     mupirocin (BACTROBAN) 2 % ointment; Apply topically 3 (three) times daily.  Dispense: 15 g; Refill: 0    Blister of left foot, initial encounter      Apply calamine lotion to sites to dry blister  Wash with mild soap and water, pat dry      I spent 45 minutes on the day of this encounter for preparing for, evaluating, treating, and managing this patient.      -Continue current medications and maintain follow up with specialists.  Return to clinic as needed for any concerns   No follow-ups on file.       DONITA Phelan  [facility]

## 2022-08-25 ENCOUNTER — PATIENT MESSAGE (OUTPATIENT)
Dept: INTERNAL MEDICINE | Facility: CLINIC | Age: 52
End: 2022-08-25
Payer: COMMERCIAL

## 2022-08-25 NOTE — PROGRESS NOTES
"  Physical Therapy Daily Treatment Note   [place] [address]      Visit Date: 8/19/2022    [facility]  [facility] [id number]    Therapy Diagnosis:   No diagnosis found.  Physician: Mathieu Christie, *    Physician Orders: PT Eval and Treat      Medical Diagnosis from Referral:   S46.819S (ICD-10-CM) - Sprain of supraspinatus muscle or tendon, unspecified laterality, sequela   M25.512,G89.29 (ICD-10-CM) - Chronic left shoulder pain   M67.912 (ICD-10-CM) - Dysfunction of left rotator cuff         Evaluation Date: 6/16/2022  Authorization Period Expiration: pend   Plan of Care Expiration: 10/16/2022  Progress Note Due: 8/16/2022  Visit # / Visits authorized: 8/ 1   FOTO: Issued Visit # 2        Time In: 10AM  Time Out: 11AM  Total Billable Time: 60  minutes     Precautions: Standard      Subjective      Pt reports: "It's getting better"     she was compliant with home exercise program given last session.   Response to previous treatment:Improvement in FE   Functional change: none at this time     Pain: 1/10  Location: left shoulder      Objective     Measurements taken:      Brissa received therapeutic exercises to develop strength, endurance, ROM and flexibility for 45 minutes including:  OpenBooks 2x15 ea   IR/ER 3x15 walkouts purple/green   Serratus Wall Slides 3x15   Prone Rows 3x15 #4  Pec Stretch 4x30s   Self Lat Stretch 4x30s       Brissa received the following manual therapy techniques: Joint mobilizations and Soft tissue Mobilization were applied to the: Shoulder for 15 minutes, including:  GH joint inferior glide Gd 2/3  GH joint posterior glide Gd 2/3  Cross Body Stretch 4x30s   STM To her axilla  R Stabilization in Supine at 90 deg 5x30s eyes closed     Home Exercises Provided and Patient Education Provided     Education provided:   - Importance of motion interventions  - HEP compliance   - Tolerance to activity    Written Home Exercises Provided: Patient instructed to cont prior " "HEP.  Exercises were reviewed and Brissa was able to demonstrate them prior to the end of the session.  Brissa demonstrated good  understanding of the education provided.     See EMR under Patient Instructions for exercises provided prior visit.      Assessment     Brissa is doing well at this time. Her primary limitation continues to be the experience of a "catch" during FE activities as well as limited FIR. Plan to initiate at/above shoulder level rotator cuff strengthening in order decrease this catch. In addition plan to address her poor ability to perform a posterior tilt to decrease positions of impingement.     Brissa Is progressing well towards her goals.   Pt prognosis is Fair.     Pt will continue to benefit from skilled outpatient physical therapy to address the deficits listed in the problem list box on initial evaluation, provide pt/family education and to maximize pt's level of independence in the home and community environment.     Pt's spiritual, cultural and educational needs considered and pt agreeable to plan of care and goals.    Anticipated barriers to physical therapy: none    Goals:     Short-Term Goals: 6 weeks  - The patient will be independent with initial home exercise program.  - The patient is independent with donning/doffing sling to protect tissue healing.  - The patient will demonstrate good unsupported sitting posture with min verbal cues for 15 minutes.  - The patient will increase ROM 15 degrees to perform bathing and hygiene, grooming, toileting and dressing with pain < 210.  - The patient will increase strength by 1.2 muscle grade  to perform bathing and hygiene, grooming, toileting and dressing with pain < 2/10.     Long-Term Goals: 16 weeks  - The patient will be independent with home exercise program and symptom management.  - The patient will increase ROM = to uninvolved shoulder  to perform work duties and recreation/leisure activities   with pain < 0/10.  - The patient will " increase strength = to uninvolved shoulder  to perform work duties and recreation/leisure activities   with pain < 0/10.      Plan     Continue with established Plan of Care towards PT goals with focus on decreasing pain, increasing ROM, strength, neuromuscular control and functional status       Arsh Munson, PT

## 2022-08-30 NOTE — PROGRESS NOTES
"  Physical Therapy Daily Treatment Note   [place] [address]      Visit Date: 8/24/2022    [facility]  [id number]    Therapy Diagnosis:   Encounter Diagnosis   Name Primary?    Decreased range of motion of left shoulder Yes     Physician: Mathieu Christie, *    Physician Orders: PT Eval and Treat      Medical Diagnosis from Referral:   S46.819S (ICD-10-CM) - Sprain of supraspinatus muscle or tendon, unspecified laterality, sequela   M25.512,G89.29 (ICD-10-CM) - Chronic left shoulder pain   M67.912 (ICD-10-CM) - Dysfunction of left rotator cuff         Evaluation Date: 6/16/2022  Authorization Period Expiration: pend   Plan of Care Expiration: 10/16/2022  Progress Note Due: 8/16/2022  Visit # / Visits authorized: 9/ 1   FOTO: Issued Visit # 2        Time In: 10AM  Time Out: 11AM  Total Billable Time: 60  minutes     Precautions: Standard      Subjective      Pt reports: "It only catches at random times now"     she was compliant with home exercise program given last session.   Response to previous treatment:Improvement in FE   Functional change: none at this time     Pain: 1/10  Location: left shoulder      Objective     Measurements taken:      Brissa received therapeutic exercises to develop strength, endurance, ROM and flexibility for 45 minutes including:  OpenBooks 2x15 ea   IR/ER 3x15 walkouts purple/green at 90 degrees   Serratus Wall Slides 3x15   Prone Rows 3x15 #4  Pec Stretch 4x30s   Self Lat Stretch 4x30s   Prone T's 3x10  Prone Y's 3x10       Brissa received the following manual therapy techniques: Joint mobilizations and Soft tissue Mobilization were applied to the: Shoulder for 15 minutes, including:  GH joint inferior glide Gd 2/3  GH joint posterior glide Gd 2/3  Cross Body Stretch 4x30s   STM To her axilla  R Stabilization in Supine at 90 deg 5x30s eyes closed     Home Exercises Provided and Patient Education Provided     Education provided:   - Importance of motion " "interventions  - HEP compliance   - Tolerance to activity    Written Home Exercises Provided: Patient instructed to cont prior HEP.  Exercises were reviewed and Brissa was able to demonstrate them prior to the end of the session.  Brissa demonstrated good  understanding of the education provided.     See EMR under Patient Instructions for exercises provided prior visit.      Assessment     Patient will only have a catch during random "unguarded" movements. Her catch is at the transition zone between her humerus and shoulder blade kicking in for upward rotation. Her cuff at this time is strong and she will likely continue to benefit from a shoulder strengthening program to address her weak gabriel-scapular musculature that leads to her anterior impingement.    Brissa Is progressing well towards her goals.   Pt prognosis is Fair.     Pt will continue to benefit from skilled outpatient physical therapy to address the deficits listed in the problem list box on initial evaluation, provide pt/family education and to maximize pt's level of independence in the home and community environment.     Pt's spiritual, cultural and educational needs considered and pt agreeable to plan of care and goals.    Anticipated barriers to physical therapy: none    Goals:     Short-Term Goals: 6 weeks  - The patient will be independent with initial home exercise program.  - The patient is independent with donning/doffing sling to protect tissue healing.  - The patient will demonstrate good unsupported sitting posture with min verbal cues for 15 minutes.  - The patient will increase ROM 15 degrees to perform bathing and hygiene, grooming, toileting and dressing with pain < 210.  - The patient will increase strength by 1.2 muscle grade  to perform bathing and hygiene, grooming, toileting and dressing with pain < 2/10.     Long-Term Goals: 16 weeks  - The patient will be independent with home exercise program and symptom management.  - The patient " will increase ROM = to uninvolved shoulder  to perform work duties and recreation/leisure activities   with pain < 0/10.  - The patient will increase strength = to uninvolved shoulder  to perform work duties and recreation/leisure activities   with pain < 0/10.      Plan     Continue with established Plan of Care towards PT goals with focus on decreasing pain, increasing ROM, strength, neuromuscular control and functional status       Arsh Munson, PT

## 2022-09-09 ENCOUNTER — CLINICAL SUPPORT (OUTPATIENT)
Dept: REHABILITATION | Facility: HOSPITAL | Age: 52
End: 2022-09-09
Payer: COMMERCIAL

## 2022-09-09 DIAGNOSIS — M25.612 DECREASED RANGE OF MOTION OF LEFT SHOULDER: Primary | ICD-10-CM

## 2022-09-09 PROCEDURE — 97140 MANUAL THERAPY 1/> REGIONS: CPT

## 2022-09-09 PROCEDURE — 97110 THERAPEUTIC EXERCISES: CPT

## 2022-09-14 ENCOUNTER — HOSPITAL ENCOUNTER (OUTPATIENT)
Dept: RADIOLOGY | Facility: HOSPITAL | Age: 52
Discharge: HOME | End: 2022-09-14
Attending: OBSTETRICS & GYNECOLOGY
Payer: COMMERCIAL

## 2022-09-14 DIAGNOSIS — Z12.31 SCREENING MAMMOGRAM, ENCOUNTER FOR: ICD-10-CM

## 2022-09-14 PROCEDURE — 77067 SCR MAMMO BI INCL CAD: CPT | Mod: 26,,, | Performed by: RADIOLOGY

## 2022-09-14 PROCEDURE — 77063 MAMMO DIGITAL SCREENING BILAT WITH TOMO: ICD-10-PCS | Mod: 26,,, | Performed by: RADIOLOGY

## 2022-09-14 PROCEDURE — 77063 BREAST TOMOSYNTHESIS BI: CPT | Mod: 26,,, | Performed by: RADIOLOGY

## 2022-09-14 PROCEDURE — 77067 MAMMO DIGITAL SCREENING BILAT WITH TOMO: ICD-10-PCS | Mod: 26,,, | Performed by: RADIOLOGY

## 2022-09-14 PROCEDURE — 77067 SCR MAMMO BI INCL CAD: CPT | Mod: TC

## 2022-09-14 NOTE — PROGRESS NOTES
"  Physical Therapy Daily Treatment Note   [place] [facility]      Visit Date: 9/9/2022    [facility]  [id number]    Therapy Diagnosis:   Encounter Diagnosis   Name Primary?    Decreased range of motion of left shoulder Yes     Physician: Mathieu Christie, *    Physician Orders: PT Eval and Treat      Medical Diagnosis from Referral:   S46.819S (ICD-10-CM) - Sprain of supraspinatus muscle or tendon, unspecified laterality, sequela   M25.512,G89.29 (ICD-10-CM) - Chronic left shoulder pain   M67.912 (ICD-10-CM) - Dysfunction of left rotator cuff         Evaluation Date: 6/16/2022  Authorization Period Expiration: pend   Plan of Care Expiration: 10/16/2022  Progress Note Due: 8/16/2022  Visit # / Visits authorized: 10/ 1   FOTO: Issued Visit # 2        Time In: 11AM  Time Out: 12PM  Total Billable Time: 60  minutes     Precautions: Standard      Subjective      Pt reports: "It feels great now"     she was compliant with home exercise program given last session.   Response to previous treatment:Improvement in FE   Functional change: none at this time     Pain: 1/10  Location: left shoulder      Objective     Measurements taken:      Brissa received therapeutic exercises to develop strength, endurance, ROM and flexibility for 45 minutes including:  OpenBooks 2x15 ea   IR/ER 3x15 walkouts purple/green at 90 degrees   Serratus Wall Slides 3x15   Prone Rows 3x15 #4  Self Lat Stretch 4x30s   Prone T's 3x10  Prone Y's 3x10   QuadPed Serratus Punch 4x10       Brissa received the following manual therapy techniques: Joint mobilizations and Soft tissue Mobilization were applied to the: Shoulder for 15 minutes, including:  GH joint inferior glide Gd 2/3  GH joint posterior glide Gd 2/3  Cross Body Stretch 4x30s   STM To her axilla  R Stabilization in Supine at 90 deg 5x30s eyes closed     Home Exercises Provided and Patient Education Provided     Education provided:   - Importance of motion " interventions  - HEP compliance   - Tolerance to activity    Written Home Exercises Provided: Patient instructed to cont prior HEP.  Exercises were reviewed and Brissa was able to demonstrate them prior to the end of the session.  Brissa demonstrated good  understanding of the education provided.     See EMR under Patient Instructions for exercises provided prior visit.      Assessment     Patients catch frequency continues to decrease on this date. Overall shes only experienced a catch about 1-2 times over the past few weeks. Her motion is symmetrical at this time. Plan to continue to address her gabriel-scapular strength deficit in order to decrease her catching.     Brissa Is progressing well towards her goals.   Pt prognosis is Fair.     Pt will continue to benefit from skilled outpatient physical therapy to address the deficits listed in the problem list box on initial evaluation, provide pt/family education and to maximize pt's level of independence in the home and community environment.     Pt's spiritual, cultural and educational needs considered and pt agreeable to plan of care and goals.    Anticipated barriers to physical therapy: none    Goals:     Short-Term Goals: 6 weeks  - The patient will be independent with initial home exercise program.  - The patient is independent with donning/doffing sling to protect tissue healing.  - The patient will demonstrate good unsupported sitting posture with min verbal cues for 15 minutes.  - The patient will increase ROM 15 degrees to perform bathing and hygiene, grooming, toileting and dressing with pain < 210.  - The patient will increase strength by 1.2 muscle grade  to perform bathing and hygiene, grooming, toileting and dressing with pain < 2/10.     Long-Term Goals: 16 weeks  - The patient will be independent with home exercise program and symptom management.  - The patient will increase ROM = to uninvolved shoulder  to perform work duties and recreation/leisure  activities   with pain < 0/10.  - The patient will increase strength = to uninvolved shoulder  to perform work duties and recreation/leisure activities   with pain < 0/10.      Plan     Continue with established Plan of Care towards PT goals with focus on decreasing pain, increasing ROM, strength, neuromuscular control and functional status       Arsh Munson, PT

## 2022-09-19 ENCOUNTER — CLINICAL SUPPORT (OUTPATIENT)
Dept: REHABILITATION | Facility: HOSPITAL | Age: 52
End: 2022-09-19
Payer: COMMERCIAL

## 2022-09-19 DIAGNOSIS — M25.612 DECREASED RANGE OF MOTION OF LEFT SHOULDER: Primary | ICD-10-CM

## 2022-09-19 DIAGNOSIS — M25.512 ACUTE PAIN OF LEFT SHOULDER: ICD-10-CM

## 2022-09-19 PROCEDURE — 97140 MANUAL THERAPY 1/> REGIONS: CPT

## 2022-09-19 PROCEDURE — 97110 THERAPEUTIC EXERCISES: CPT

## 2022-09-25 ENCOUNTER — PATIENT MESSAGE (OUTPATIENT)
Dept: REHABILITATION | Facility: HOSPITAL | Age: 52
End: 2022-09-25
Payer: COMMERCIAL

## 2022-09-26 ENCOUNTER — CLINICAL SUPPORT (OUTPATIENT)
Dept: REHABILITATION | Facility: HOSPITAL | Age: 52
End: 2022-09-26
Payer: COMMERCIAL

## 2022-09-26 DIAGNOSIS — M25.612 DECREASED RANGE OF MOTION OF LEFT SHOULDER: Primary | ICD-10-CM

## 2022-09-26 PROCEDURE — 97140 MANUAL THERAPY 1/> REGIONS: CPT

## 2022-09-26 PROCEDURE — 97110 THERAPEUTIC EXERCISES: CPT

## 2022-09-27 NOTE — PROGRESS NOTES
"  Physical Therapy Daily Treatment Note   [place] [address]      Visit Date: 9/19/2022    [facility]  [facility] Number: [id number]    Therapy Diagnosis:   Encounter Diagnoses   Name Primary?    Decreased range of motion of left shoulder Yes    Acute pain of left shoulder      Physician: Mathieu Christie, *    Physician Orders: PT Eval and Treat      Medical Diagnosis from Referral:   S46.819S (ICD-10-CM) - Sprain of supraspinatus muscle or tendon, unspecified laterality, sequela   M25.512,G89.29 (ICD-10-CM) - Chronic left shoulder pain   M67.912 (ICD-10-CM) - Dysfunction of left rotator cuff         Evaluation Date: 6/16/2022  Authorization Period Expiration: pend   Plan of Care Expiration: 10/16/2022  Progress Note Due: 8/16/2022  Visit # / Visits authorized: 11/ 1   FOTO: Issued Visit # 2        Time In: 1PM  Time Out: 2PM  Total Billable Time: 60  minutes     Precautions: Standard      Subjective      Pt reports: "I just need to get stronger now"     she was compliant with home exercise program given last session.   Response to previous treatment:Improvement in FE   Functional change: none at this time     Pain: 1/10  Location: left shoulder      Objective     Measurements taken:      Brissa received therapeutic exercises to develop strength, endurance, ROM and flexibility for 45 minutes including:  OpenBooks 2x15 ea   IR/ER 3x15 walkouts purple/green at 90 degrees   Serratus Wall Slides 3x15   Prone Rows 3x15 #4  Self Lat Stretch 4x30s   Prone T's 3x10  Prone Y's 3x10   QuadPed Serratus Punch 4x10   Supine Floor Press 4x10 #15      Brissa received the following manual therapy techniques: Joint mobilizations and Soft tissue Mobilization were applied to the: Shoulder for 15 minutes, including:  GH joint inferior glide Gd 2/3  GH joint posterior glide Gd 2/3  Cross Body Stretch 4x30s   STM To her axilla  R Stabilization in Supine at 90 deg 5x30s eyes closed     Home Exercises Provided and Patient Education " Provided     Education provided:   - Importance of motion interventions  - HEP compliance   - Tolerance to activity    Written Home Exercises Provided: Patient instructed to cont prior HEP.  Exercises were reviewed and Brissa was able to demonstrate them prior to the end of the session.  Brissa demonstrated good  understanding of the education provided.     See EMR under Patient Instructions for exercises provided prior visit.      Assessment     Patient continues to progress well at this time. Plan to continue to address her strength deficits in order to prevent a reoccurrence of her injury.     Brissa Is progressing well towards her goals.   Pt prognosis is Fair.     Pt will continue to benefit from skilled outpatient physical therapy to address the deficits listed in the problem list box on initial evaluation, provide pt/family education and to maximize pt's level of independence in the home and community environment.     Pt's spiritual, cultural and educational needs considered and pt agreeable to plan of care and goals.    Anticipated barriers to physical therapy: none    Goals:     Short-Term Goals: 6 weeks  - The patient will be independent with initial home exercise program.  - The patient is independent with donning/doffing sling to protect tissue healing.  - The patient will demonstrate good unsupported sitting posture with min verbal cues for 15 minutes.  - The patient will increase ROM 15 degrees to perform bathing and hygiene, grooming, toileting and dressing with pain < 210.  - The patient will increase strength by 1.2 muscle grade  to perform bathing and hygiene, grooming, toileting and dressing with pain < 2/10.     Long-Term Goals: 16 weeks  - The patient will be independent with home exercise program and symptom management.  - The patient will increase ROM = to uninvolved shoulder  to perform work duties and recreation/leisure activities   with pain < 0/10.  - The patient will increase strength =  to uninvolved shoulder  to perform work duties and recreation/leisure activities   with pain < 0/10.      Plan     Continue with established Plan of Care towards PT goals with focus on decreasing pain, increasing ROM, strength, neuromuscular control and functional status       Arsh Munson, PT

## 2022-09-29 NOTE — PROGRESS NOTES
"  Physical Therapy Daily Treatment Note   [place] [address]      Visit Date: 9/26/2022    [facility]  [id number]    Therapy Diagnosis:   Encounter Diagnosis   Name Primary?    Decreased range of motion of left shoulder Yes     Physician: Mathieu Christie, *    Physician Orders: PT Eval and Treat      Medical Diagnosis from Referral:   S46.819S (ICD-10-CM) - Sprain of supraspinatus muscle or tendon, unspecified laterality, sequela   M25.512,G89.29 (ICD-10-CM) - Chronic left shoulder pain   M67.912 (ICD-10-CM) - Dysfunction of left rotator cuff         Evaluation Date: 6/16/2022  Authorization Period Expiration: pend   Plan of Care Expiration: 10/16/2022  Progress Note Due: 8/16/2022  Visit # / Visits authorized: 12/ 1   FOTO: Issued Visit # 2        Time In: 4PM  Time Out: 5PM  Total Billable Time: 60  minutes     Precautions: Standard      Subjective      Pt reports: "Shoulder is feeling great"     she was compliant with home exercise program given last session.   Response to previous treatment:Improvement in FE   Functional change: none at this time     Pain: 1/10  Location: left shoulder      Objective     Measurements taken:      Brissa received therapeutic exercises to develop strength, endurance, ROM and flexibility for 45 minutes including:  OpenBooks 2x15 ea   IR/ER 3x15 walkouts purple/green at 90 degrees   Serratus Wall Slides 3x15   Prone Rows 3x15 #4  Self Lat Stretch 4x30s   Prone T's 4x10  Prone Y's 4x10   QuadPed Serratus Punch 4x10   Supine Floor Press 4x10 #15  Prone Row 4x10 #8      Brissa received the following manual therapy techniques: Joint mobilizations and Soft tissue Mobilization were applied to the: Shoulder for 15 minutes, including:  GH joint inferior glide Gd 2/3  GH joint posterior glide Gd 2/3  Cross Body Stretch 4x30s   STM To her axilla  R Stabilization in Supine at 90 deg 5x30s eyes closed     Home Exercises Provided and Patient Education Provided "     Education provided:   - Importance of motion interventions  - HEP compliance   - Tolerance to activity    Written Home Exercises Provided: Patient instructed to cont prior HEP.  Exercises were reviewed and Brissa was able to demonstrate them prior to the end of the session.  Brissa demonstrated good  understanding of the education provided.     See EMR under Patient Instructions for exercises provided prior visit.      Assessment     Patient continues to strengthen without issue at this time. Overall she is progressing well and tolerating her upper body strength training program without any issue. Plan to continue to progressively overload in order for her to get back to the gym and maintain strength there.     Brissa Is progressing well towards her goals.   Pt prognosis is Fair.     Pt will continue to benefit from skilled outpatient physical therapy to address the deficits listed in the problem list box on initial evaluation, provide pt/family education and to maximize pt's level of independence in the home and community environment.     Pt's spiritual, cultural and educational needs considered and pt agreeable to plan of care and goals.    Anticipated barriers to physical therapy: none    Goals:     Short-Term Goals: 6 weeks  - The patient will be independent with initial home exercise program.  - The patient is independent with donning/doffing sling to protect tissue healing.  - The patient will demonstrate good unsupported sitting posture with min verbal cues for 15 minutes.  - The patient will increase ROM 15 degrees to perform bathing and hygiene, grooming, toileting and dressing with pain < 210.  - The patient will increase strength by 1.2 muscle grade  to perform bathing and hygiene, grooming, toileting and dressing with pain < 2/10.     Long-Term Goals: 16 weeks  - The patient will be independent with home exercise program and symptom management.  - The patient will increase ROM = to uninvolved  shoulder  to perform work duties and recreation/leisure activities   with pain < 0/10.  - The patient will increase strength = to uninvolved shoulder  to perform work duties and recreation/leisure activities   with pain < 0/10.      Plan     Continue with established Plan of Care towards PT goals with focus on decreasing pain, increasing ROM, strength, neuromuscular control and functional status       Arsh Munson, PT

## 2022-10-04 ENCOUNTER — OFFICE VISIT (OUTPATIENT)
Dept: OPTOMETRY | Facility: CLINIC | Age: 52
End: 2022-10-04
Attending: SURGERY
Payer: COMMERCIAL

## 2022-10-04 DIAGNOSIS — H25.13 NS (NUCLEAR SCLEROSIS), BILATERAL: Primary | ICD-10-CM

## 2022-10-04 DIAGNOSIS — Z04.9 DISEASE RULED OUT AFTER EXAMINATION: ICD-10-CM

## 2022-10-04 DIAGNOSIS — H52.4 PRESBYOPIA: ICD-10-CM

## 2022-10-04 PROCEDURE — 92015 PR REFRACTION: ICD-10-PCS | Mod: S$GLB,,, | Performed by: OPTOMETRIST

## 2022-10-04 PROCEDURE — 92014 PR EYE EXAM, EST PATIENT,COMPREHESV: ICD-10-PCS | Mod: S$GLB,,, | Performed by: OPTOMETRIST

## 2022-10-04 PROCEDURE — 92015 DETERMINE REFRACTIVE STATE: CPT | Mod: S$GLB,,, | Performed by: OPTOMETRIST

## 2022-10-04 PROCEDURE — 99999 PR PBB SHADOW E&M-EST. PATIENT-LVL II: CPT | Mod: PBBFAC,,, | Performed by: OPTOMETRIST

## 2022-10-04 PROCEDURE — 92014 COMPRE OPH EXAM EST PT 1/>: CPT | Mod: S$GLB,,, | Performed by: OPTOMETRIST

## 2022-10-04 PROCEDURE — 99999 PR PBB SHADOW E&M-EST. PATIENT-LVL II: ICD-10-PCS | Mod: PBBFAC,,, | Performed by: OPTOMETRIST

## 2022-10-04 NOTE — PROGRESS NOTES
HPI    Last eye exam was 06/25/2021 with     Pt here for routine eye exam.  No Va changes.  Patient denies diplopia, headaches, flashes/floaters, and pain.  No eye drops, and no eye sx.     Last edited by Reymundo Verdugo MA on 10/4/2022  8:48 AM.            Assessment /Plan     For exam results, see Encounter Report.    NS (nuclear sclerosis), bilateral    Disease ruled out after examination    Presbyopia      Use systane complete PRN      (+) sister with early signs of Mac degen- on AREDS  Good overall ocular health, monitor yearly  Ok to continue with OTC readers at this time     RTC 1 year, sooner PRN

## 2022-10-06 NOTE — PROGRESS NOTES
Physical Therapy Treatment Note     [facility]  [id number]    Therapy Diagnosis:   Encounter Diagnoses   Name Primary?    Decreased range of motion of left shoulder     Acute pain of left shoulder Yes     Physician: Joselito Garcia MD    Visit Date: 5/21/2020    Physician Orders: PT Eval and Treat  Medical Diagnosis: M25.512 (ICD-10-CM) - Left shoulder pain, unspecified chronicity  Evaluation Date: 5/14/2020   Authorization Period:  12/31/2020  Plan of Care Certification Period: 5/14/2020 to  07/10/2020  Visit # / Visits authorized: 2/ 20  FOTO: 2/4     Time In: 1530  Time Out: 1620  Total Billable Time: 45 minutes (2 TE, 1 MT)  Precautions: Standard    Subjective     Pt reports: L shoulder pain.  She was compliant with home exercise program.  Response to previous treatment: eval   Functional change: none voiced at this time.     Pain: 4/10  Location: left shoulder anterior pain     Objective   O:  L shoulder flexion limited in AG position >120 degrees        TTP anteriorly along anterior greater tuberosity of humerus    Brissa received therapeutic exercises to develop strength, endurance, ROM, flexibility and posture for 30 minutes including:  - sidelying shoulder ER   2x15 2#  - supine RC stabilization flexion  7 rounds  - prone shoulder extension with ER  2x10 2#  - prone horizontal abd    2x10 A/AAROM  - wall Y-slides     2x10   - standing shoulder ER   RTB; 2x20    Brissa received the following manual therapy techniques: total time 15 minutes  - grade II/III/IV left shoulder passive physiological flexion, IR, ER  - scapular mobs with emphasis on posterior tilting and ER    Home Exercises Provided and Patient Education Provided   Education provided:   - updated HEP    Written Home Exercises Provided: Patient instructed to cont prior HEP.  Exercises were reviewed and Brissa was able to demonstrate them prior to the end of the session.  Brissa demonstrated good  understanding  of the education provided.     See EMR under Patient Instructions for exercises provided 5/21/2020.    Assessment   A: L shoulder pain anteriorly with mobility deficit into terminal flexion.  Cannot r/o RC dysfunction.  Suboptimal (poor) RC stabilization demonstrated today.     Brissa is progressing well towards her goals.   Pt prognosis is Excellent.     Pt will continue to benefit from skilled outpatient physical therapy to address the deficits listed in the problem list box on initial evaluation, provide pt/family education and to maximize pt's level of independence in the home and community environment.     Pt's spiritual, cultural and educational needs considered and pt agreeable to plan of care and goals.     Anticipated barriers to physical therapy: none    Goals:   Short Term Goals: 3-4 weeks   1.  Patient will normal horizontal abduction and shoulder IR as compared to non-painful R shoulder for improved reaching behind shoulder movement. progressing towards; not met.  2.  Patient will demonstrate 3+/5 L shoulder ER for improved tolerance to household chores. progressing towards; not met.  3.  Patient will demonstrate 165 degrees for L shoulder flexion for improved overhead reaching ability. progressing towards; not met.    Long Term Goals: 8 weeks   1. Patient will report < 3/10 L shoulder pain at worst throughout her day.  progressing towards; not met.  2. Patient will report ability to reach in the backseat of the car without onset L shoulder pain or limitation.  progressing towards; not met.  3. Patient will report <24% limitation on Shoulder FOTO survey.  progressing towards; not met.    Plan     Continue plan of care.  Monitor response to interventions.  Adjust intensity accordingly.       Trip Gamino, PT      PRINCIPAL DISCHARGE DIAGNOSIS  Diagnosis: Diverticulitis  Assessment and Plan of Treatment:       SECONDARY DISCHARGE DIAGNOSES  Diagnosis: Personal history of DVT (deep vein thrombosis)  Assessment and Plan of Treatment:     Diagnosis: HTN (hypertension)  Assessment and Plan of Treatment:     Diagnosis: HLD (hyperlipidemia)  Assessment and Plan of Treatment:     Diagnosis: Antiphospholipid antibody syndrome  Assessment and Plan of Treatment:

## 2022-10-07 ENCOUNTER — CLINICAL SUPPORT (OUTPATIENT)
Dept: REHABILITATION | Facility: HOSPITAL | Age: 52
End: 2022-10-07
Payer: COMMERCIAL

## 2022-10-07 DIAGNOSIS — M25.512 ACUTE PAIN OF LEFT SHOULDER: Primary | ICD-10-CM

## 2022-10-07 DIAGNOSIS — M25.612 DECREASED RANGE OF MOTION OF LEFT SHOULDER: ICD-10-CM

## 2022-10-07 PROCEDURE — 97140 MANUAL THERAPY 1/> REGIONS: CPT

## 2022-10-07 PROCEDURE — 97110 THERAPEUTIC EXERCISES: CPT

## 2022-10-11 NOTE — PROGRESS NOTES
"  Physical Therapy Daily Treatment Note   [place] [address]      Visit Date: 10/7/2022    [facility]  Cl[facility] Number: [id number]    Therapy Diagnosis:   Encounter Diagnoses   Name Primary?    Acute pain of left shoulder Yes    Decreased range of motion of left shoulder      Physician: Mathieu Christie, *    Physician Orders: PT Eval and Treat      Medical Diagnosis from Referral:   S46.819S (ICD-10-CM) - Sprain of supraspinatus muscle or tendon, unspecified laterality, sequela   M25.512,G89.29 (ICD-10-CM) - Chronic left shoulder pain   M67.912 (ICD-10-CM) - Dysfunction of left rotator cuff         Evaluation Date: 6/16/2022  Authorization Period Expiration: pend   Plan of Care Expiration: 10/16/2022  Progress Note Due: 8/16/2022  Visit # / Visits authorized: 13/ 1   FOTO: Issued Visit # 2        Time In: 11AM  Time Out: 12PM  Total Billable Time: 60  minutes     Precautions: Standard      Subjective      Pt reports: "I feel fine"     she was compliant with home exercise program given last session.   Response to previous treatment:Improvement in FE   Functional change: none at this time     Pain: 1/10  Location: left shoulder      Objective     Measurements taken:      Brissa received therapeutic exercises to develop strength, endurance, ROM and flexibility for 45 minutes including:  OpenBooks 2x15 ea   IR/ER 3x15 walkouts purple/green at 90 degrees   Serratus Wall Slides 3x15   Prone Rows 3x15 #4  Self Lat Stretch 4x30s   Prone T's 4x10  Prone Y's 4x10   IR/ER concentric at 90 degrees 4x10       Brissa received the following manual therapy techniques: Joint mobilizations and Soft tissue Mobilization were applied to the: Shoulder for 15 minutes, including:  GH joint inferior glide Gd 2/3  GH joint posterior glide Gd 2/3  Cross Body Stretch 4x30s   STM To her axilla  R Stabilization in Supine at 90 deg 5x30s eyes closed     Home Exercises Provided and Patient Education Provided     Education provided:   - " Importance of motion interventions  - HEP compliance   - Tolerance to activity    Written Home Exercises Provided: Patient instructed to cont prior HEP.  Exercises were reviewed and Brissa was able to demonstrate them prior to the end of the session.  Brissa demonstrated good  understanding of the education provided.     See EMR under Patient Instructions for exercises provided prior visit.      Assessment     Patient will discharge from therapy at this time. She has accomplished all of her goals/ADL's at this time and will return to clinic with any further issues.     Brissa Is progressing well towards her goals.   Pt prognosis is Fair.     Pt will continue to benefit from skilled outpatient physical therapy to address the deficits listed in the problem list box on initial evaluation, provide pt/family education and to maximize pt's level of independence in the home and community environment.     Pt's spiritual, cultural and educational needs considered and pt agreeable to plan of care and goals.    Anticipated barriers to physical therapy: none    Goals:     Short-Term Goals: 6 weeks  - The patient will be independent with initial home exercise program.  - The patient is independent with donning/doffing sling to protect tissue healing.  - The patient will demonstrate good unsupported sitting posture with min verbal cues for 15 minutes.  - The patient will increase ROM 15 degrees to perform bathing and hygiene, grooming, toileting and dressing with pain < 210.  - The patient will increase strength by 1.2 muscle grade  to perform bathing and hygiene, grooming, toileting and dressing with pain < 2/10.     Long-Term Goals: 16 weeks  - The patient will be independent with home exercise program and symptom management.  - The patient will increase ROM = to uninvolved shoulder  to perform work duties and recreation/leisure activities   with pain < 0/10.  - The patient will increase strength = to uninvolved shoulder  to  perform work duties and recreation/leisure activities   with pain < 0/10.      Plan     Continue with established Plan of Care towards PT goals with focus on decreasing pain, increasing ROM, strength, neuromuscular control and functional status       Arsh Munson, PT

## 2022-10-27 ENCOUNTER — OFFICE VISIT (OUTPATIENT)
Dept: OBSTETRICS AND GYNECOLOGY | Facility: CLINIC | Age: 52
End: 2022-10-27
Payer: COMMERCIAL

## 2022-10-27 VITALS
BODY MASS INDEX: 21.42 KG/M2 | HEIGHT: 68 IN | DIASTOLIC BLOOD PRESSURE: 72 MMHG | WEIGHT: 141.31 LBS | SYSTOLIC BLOOD PRESSURE: 124 MMHG

## 2022-10-27 DIAGNOSIS — Z01.419 ENCOUNTER FOR GYNECOLOGICAL EXAMINATION WITHOUT ABNORMAL FINDING: Primary | ICD-10-CM

## 2022-10-27 DIAGNOSIS — Z12.31 VISIT FOR SCREENING MAMMOGRAM: ICD-10-CM

## 2022-10-27 PROCEDURE — 99999 PR PBB SHADOW E&M-EST. PATIENT-LVL III: ICD-10-PCS | Mod: PBBFAC,,, | Performed by: OBSTETRICS & GYNECOLOGY

## 2022-10-27 PROCEDURE — 99396 PREV VISIT EST AGE 40-64: CPT | Mod: S$GLB,,, | Performed by: OBSTETRICS & GYNECOLOGY

## 2022-10-27 PROCEDURE — 99999 PR PBB SHADOW E&M-EST. PATIENT-LVL III: CPT | Mod: PBBFAC,,, | Performed by: OBSTETRICS & GYNECOLOGY

## 2022-10-27 PROCEDURE — 99396 PR PREVENTIVE VISIT,EST,40-64: ICD-10-PCS | Mod: S$GLB,,, | Performed by: OBSTETRICS & GYNECOLOGY

## 2022-10-27 NOTE — PROGRESS NOTES
Well woman exam    Brissa Mason is a 52 y.o.   patient who presents for an annual well woman exam.  LMP: Patient's last menstrual period was 10/08/2022.  Regular cyclic menses reported.  No menopausal symptoms reported at this time.  No other gynecologic issues, problems, or complaints.      Past Medical History:   Diagnosis Date    Allergy     Anemia 2020    Anxiety disorder     B12 deficiency     Cataract     Colitis     resolved; rectum 2005; neg flex sig 2006    Colitis: 2005 rectal area     resolved; rectum 2005; neg flex sig 2006     Iron deficiency     Menstrual migraine without status migrainosus, not intractable 2017    Vitamin D deficiency      Past Surgical History:   Procedure Laterality Date    COLONOSCOPY N/A 10/15/2020    Procedure: COLONOSCOPY;  Surgeon: Vern Harkins MD;  Location: [facility] ([address] [place] [address]);  Service: Endoscopy;  Laterality: N/A;  covid test 10/12-[facility]    GANGLION CYST EXCISION      shoulder injection Left 2022     Social History     Socioeconomic History    Marital status:    Tobacco Use    Smoking status: Never    Smokeless tobacco: Never   Substance and Sexual Activity    Alcohol use: Yes     Alcohol/week: 1.0 standard drink     Types: 1 Glasses of wine per week     Comment: 1-2 glasses of wine a week     Drug use: No    Sexual activity: Yes     Partners: Male     Birth control/protection: Condom     Comment: , menarche 14, no hx of abnormal    Other Topics Concern    Are you pregnant or think you may be? No    Breast-feeding No   Social History Narrative         - [employer]    Parents - Milton Del Angel     Social Determinants of Health     Financial Resource Strain: Low Risk     Difficulty of Paying Living Expenses: Not hard at all   Food Insecurity: No Food Insecurity    Worried About Running Out of Food in the Last Year: Never true    Ran Out of Food in the Last Year: Never true  "  Transportation Needs: No Transportation Needs    Lack of Transportation (Medical): No    Lack of Transportation (Non-Medical): No   Physical Activity: Insufficiently Active    Days of Exercise per Week: 2 days    Minutes of Exercise per Session: 40 min   Stress: No Stress Concern Present    Feeling of Stress : Only a little   Social Connections: Unknown    Frequency of Communication with Friends and Family: More than three times a week    Frequency of Social Gatherings with Friends and Family: Once a week    Active Member of Clubs or Organizations: Yes    Attends Club or Organization Meetings: More than 4 times per year    Marital Status:    Housing Stability: Low Risk     Unable to Pay for Housing in the Last Year: No    Number of Places Lived in the Last Year: 1    Unstable Housing in the Last Year: No     Family History   Problem Relation Age of Onset    Depression Paternal Grandmother         One time    Gout Father     Hypertension Father     Colon polyps Mother         noncancerous colon tumor; polyps    Hyperlipidemia Mother     Osteoporosis Mother     Colon cancer Mother         pre cacer    Depression Mother         One time    Hyperlipidemia Brother     Hypertension Brother     Psoriasis Brother     Hypertension Sister     Glaucoma Sister     Cancer Maternal Uncle         colon    Colon cancer Maternal Uncle     Breast cancer Paternal Aunt 60    Breast cancer Paternal Aunt     Miscarriages / Stillbirths Neg Hx     Amblyopia Neg Hx     Blindness Neg Hx     Cataracts Neg Hx     Diabetes Neg Hx     Macular degeneration Neg Hx     Retinal detachment Neg Hx     Strabismus Neg Hx     Stroke Neg Hx     Thyroid disease Neg Hx     Melanoma Neg Hx     Ovarian cancer Neg Hx      OB History          0    Para        Term   0            AB        Living             SAB        IAB        Ectopic        Multiple        Live Births                     /72   Ht 5' 8" (1.727 m)   Wt 64.1 kg " (141 lb 5 oz)   LMP 10/08/2022   BMI 21.49 kg/m²       ROS:  GENERAL: Denies weight gain or weight loss. Feeling well overall.   SKIN: Denies rash or lesions.   HEAD: Denies head injury or headache.   NODES: Denies enlarged lymph nodes.   CHEST: Denies chest pain or shortness of breath.   CARDIOVASCULAR: Denies palpitations or left sided chest pain.   ABDOMEN: No abdominal pain, constipation, diarrhea, nausea, vomiting or rectal bleeding.   URINARY: No frequency, dysuria, hematuria, or burning on urination.  REPRODUCTIVE: See HPI.   BREASTS: The patient performs breast self-examination and denies pain, lumps, or nipple discharge.   HEMATOLOGIC: No easy bruisability or excessive bleeding.   MUSCULOSKELETAL: Denies joint pain or swelling.   NEUROLOGIC: Denies syncope or weakness.   PSYCHIATRIC: Denies depression, anxiety or mood swings.    PHYSICAL EXAM:  APPEARANCE: Well nourished, well developed, in no acute distress.  AFFECT: WNL, alert and oriented x 3  SKIN: No acne or hirsutism  NECK: Neck symmetric without masses or thyromegaly  NODES: No inguinal, cervical, axillary, or femoral lymph node enlargement  CHEST: Good respiratory effect  ABDOMEN: Soft.  No tenderness or masses.  No hepatosplenomegaly.  No hernias.  BREASTS: Symmetrical, no skin changes or visible lesions.  No palpable masses, nipple discharge bilaterally.  PELVIC: Normal external genitalia without lesions.  Normal hair distribution.  Adequate perineal body, normal urethral meatus.  Vagina moist and well rugated without lesions or discharge.  Cervix pink, without lesions, discharge or tenderness.  No significant cystocele or rectocele.  Bimanual exam shows uterus to be 8-10 weeks size, anteverted, mobile and nontender.  Adnexa without masses or tenderness.    EXTREMITIES: No edema.    Encounter for gynecological examination without abnormal finding    Visit for screening mammogram  -     Mammo Digital Screening Bilat w/ Aj; Future; Expected  date: 10/27/2022      Age specific counseling    Pap smear not done/not indicated    Cancer screening recommendations reviewed with patient today.  Screening mammogram ordered as above.    Patient instructed to keep a menstrual calendar/monitor menses.    Patient was counseled today on A.C.S. Pap guidelines and recommendations for yearly pelvic exams, mammograms and monthly self breast exams; to see her PCP for other health maintenance.     Follow up in about 1 year (around 10/27/2023) for Annual exam.    Bran Oden IV, MD

## 2022-11-14 NOTE — PROGRESS NOTES
"INTERNAL MEDICINE CLINIC - SAME DAY APPOINTMENT  Progress Note    PRESENTING HISTORY     PCP: Sandra Betts MD    Chief Complaint/Reason for Visit:   No chief complaint on file.     History of Present Illness & ROS : Ms. Brissa Mason is a 52 y.o. female.    Same day appt.   Very pleasant lady.  No longer has a PCP, will be establishing care with Dr. Sanon in December.   She is here today with recurrence of some " discomfort to mid epigastric region, some associated nausea; has seen Dr. Betts for this in the past, laying off coffee and was no longer on the PPI, started taking Prilosec for the past week while out of town on a pleasure trip; felt it when returned to the city and wonder if it is stress induced'.   No chronic use of NSAIDs, OLIVEIRA II inhibitors, ASA or ASA containing products. Only a social drinker.     Review of Systems:  Eyes: denies visual changes at this time denies floaters   ENT: no nasal congestion or sore throat  Respiratory: no cough or shorness of breath  Cardiovascular: no chest pain or palpitations  Genitourinary: no hematuria or dysuria; denies frequency  Hematologic/Lymphatic: no easy bruising or lymphadenopathy  Musculoskeletal: no arthralgias or myalgias  Neurological: no seizures or tremors  Endocrine: no heat or cold intolerance      PAST HISTORY:     Past Medical History:   Diagnosis Date    Allergy     Anemia 4/20/2020    Anxiety disorder     B12 deficiency     Cataract     Colitis     resolved; rectum 2005; neg flex sig 2006    Colitis: 2005 rectal area     resolved; rectum 2005; neg flex sig 2006     Iron deficiency     Menstrual migraine without status migrainosus, not intractable 9/5/2017    Vitamin D deficiency        Past Surgical History:   Procedure Laterality Date    COLONOSCOPY N/A 10/15/2020    Procedure: COLONOSCOPY;  Surgeon: Vern Harkins MD;  Location: [address]);  Service: Endoscopy;  Laterality: N/A;  cov[facility] test 10/12-[facility]    GANGLION " CYST EXCISION  2017    shoulder injection Left 05/2022       Family History   Problem Relation Age of Onset    Depression Paternal Grandmother         One time    Gout Father     Hypertension Father     Colon polyps Mother         noncancerous colon tumor; polyps    Hyperlipidemia Mother     Osteoporosis Mother     Colon cancer Mother         pre cacer    Depression Mother         One time    Hyperlipidemia Brother     Hypertension Brother     Psoriasis Brother     Hypertension Sister     Glaucoma Sister     Cancer Maternal Uncle         colon    Colon cancer Maternal Uncle     Breast cancer Paternal Aunt 60    Breast cancer Paternal Aunt     Miscarriages / Stillbirths Neg Hx     Amblyopia Neg Hx     Blindness Neg Hx     Cataracts Neg Hx     Diabetes Neg Hx     Macular degeneration Neg Hx     Retinal detachment Neg Hx     Strabismus Neg Hx     Stroke Neg Hx     Thyroid disease Neg Hx     Melanoma Neg Hx     Ovarian cancer Neg Hx        Social History     Socioeconomic History    Marital status:    Tobacco Use    Smoking status: Never    Smokeless tobacco: Never   Substance and Sexual Activity    Alcohol use: Yes     Alcohol/week: 1.0 standard drink     Types: 1 Glasses of wine per week     Comment: 1-2 glasses of wine a week     Drug use: No    Sexual activity: Yes     Partners: Male     Birth control/protection: Condom     Comment: , menarche 14, no hx of abnormal    Other Topics Concern    Are you pregnant or think you may be? No    Breast-feeding No   Social History Narrative         - Amish    Parents - Milton Del Angel     Social Determinants of Health     Financial Resource Strain: Low Risk     Difficulty of Paying Living Expenses: Not hard at all   Food Insecurity: No Food Insecurity    Worried About Running Out of Food in the Last Year: Never true    Ran Out of Food in the Last Year: Never true   Transportation Needs: No Transportation Needs    Lack of  Transportation (Medical): No    Lack of Transportation (Non-Medical): No   Physical Activity: Insufficiently Active    Days of Exercise per Week: 2 days    Minutes of Exercise per Session: 40 min   Stress: No Stress Concern Present    Feeling of Stress : Only a little   Social Connections: Unknown    Frequency of Communication with Friends and Family: More than three times a week    Frequency of Social Gatherings with Friends and Family: Once a week    Active Member of Clubs or Organizations: Yes    Attends Club or Organization Meetings: More than 4 times per year    Marital Status:    Housing Stability: Low Risk     Unable to Pay for Housing in the Last Year: No    Number of Places Lived in the Last Year: 1    Unstable Housing in the Last Year: No       MEDICATIONS & ALLERGIES:     Current Outpatient Medications on File Prior to Visit   Medication Sig Dispense Refill    alclomethasone (ACLOVATE) 0.05 % ointment AAA lips bid (Patient not taking: Reported on 10/27/2022) 30 g 3    mupirocin (BACTROBAN) 2 % ointment Apply topically 3 (three) times daily. (Patient not taking: Reported on 10/27/2022) 15 g 0     No current facility-administered medications on file prior to visit.        Review of patient's allergies indicates:   Allergen Reactions    Mobic [meloxicam] Rash    Sulfa (sulfonamide antibiotics)      Other reaction(s): Rash       Medications Reconciliation:   I have reconciled the patient's home medications with the patient/family. I have updated all changes.  Refer to After-Visit Medication List.    OBJECTIVE:     Vital Signs:  There were no vitals filed for this visit.  Wt Readings from Last 3 Encounters:   10/27/22 0853 64.1 kg (141 lb 5 oz)   08/24/22 1103 63.7 kg (140 lb 6.9 oz)   07/19/22 1401 62.6 kg (138 lb)     There is no height or weight on file to calculate BMI.   Wt Readings from Last 3 Encounters:   11/15/22 63.8 kg (140 lb 10.5 oz)   10/27/22 64.1 kg (141 lb 5 oz)   08/24/22 63.7 kg (140  lb 6.9 oz)     Temp Readings from Last 3 Encounters:   08/24/22 98.6 °F (37 °C) (Oral)   05/19/22 97.9 °F (36.6 °C) (Oral)   05/05/22 98 °F (36.7 °C)     BP Readings from Last 3 Encounters:   11/15/22 122/64   10/27/22 124/72   08/24/22 132/74     Pulse Readings from Last 3 Encounters:   11/15/22 75   08/24/22 66   06/15/22 76         Physical Exam:  (Focused Exam)  General: Well developed, well nourished. No distress.  HEENT: Head is normocephalic, atraumatic  Eyes: Clear conjunctiva.  Neck: Supple, symmetrical neck; trachea midline.  Cardiovascular: Heart with regular rate and rhythm. No murmurs, gallops or rubs  Extremities: No LE edema. Pulses 2+ and symmetric.   Abdomen: Abdomen is soft, non-tender non-distended with normal bowel sounds.  Skin: Skin color, texture, turgor normal. No rashes.  Musculoskeletal: Normal gait.   Neurologic: Normal strength and tone. No focal numbness or weakness.   Psychiatric: Not depressed.      Laboratory  Lab Results   Component Value Date    WBC 5.72 09/24/2021    HGB 12.9 09/24/2021    HCT 38.3 09/24/2021     09/24/2021    CHOL 198 09/24/2021    TRIG 66 09/24/2021    HDL 84 (H) 09/24/2021    ALT 11 10/20/2021    AST 15 10/20/2021     10/20/2021    K 4.1 10/20/2021     10/20/2021    CREATININE 0.8 10/20/2021    BUN 11 10/20/2021    CO2 26 10/20/2021    TSH 2.391 09/24/2021         ASSESSMENT & PLAN:     Same day appt.     *Noted C Scope in 2020: polyps    Midepigastric pain  ? Cratering and possible underlying ulcer forming; she is receptive to being seen by GI and reconsidering an EGD. Will refer to GI, check labs to ascertain no other underlying pathology to explain and will check stools for H Pylori, antigen.   -     Comprehensive Metabolic Panel; Future; Expected date: 11/15/2022  -     Amylase; Future; Expected date: 11/15/2022  -     Lipase; Future; Expected date: 11/15/2022  -     H. pylori antigen, stool; Future; Expected date: 11/15/2022  -     CBC  Auto Differential; Future; Expected date: 11/15/2022  -     Ambulatory referral/consult to Gastroenterology; Future; Expected date: 11/22/2022    Nausea  -     Comprehensive Metabolic Panel; Future; Expected date: 11/15/2022  -     Amylase; Future; Expected date: 11/15/2022  -     Lipase; Future; Expected date: 11/15/2022  -     H. pylori antigen, stool; Future; Expected date: 11/15/2022  -     CBC Auto Differential; Future; Expected date: 11/15/2022  -     Ambulatory referral/consult to Gastroenterology; Future; Expected date: 11/22/2022  -     pantoprazole (PROTONIX) 20 MG tablet; Take 1 tablet (20 mg total) by mouth once daily.  Dispense: 90 tablet; Refill: 0 (advised to start after collecting stool for H Pylori)      Future Appointments   Date Time Provider Department Center   11/15/2022  7:00 AM YOLANDA Jeter [facility]   12/24/2022  8:00 AM Ihsan Sanon MD [facility]        Medication List            Accurate as of November 15, 2022  7:23 AM. If you have any questions, ask your nurse or doctor.                START taking these medications      pantoprazole 20 MG tablet  Commonly known as: PROTONIX  Take 1 tablet (20 mg total) by mouth once daily.  Started by: YOLANDA Lozano            STOP taking these medications      alclomethasone 0.05 % ointment  Commonly known as: ACLOVATE  Stopped by: YOLANDA Lozano     mupirocin 2 % ointment  Commonly known as: BACTROBAN  Stopped by: YOLANDA Lozano               Where to Get Your Medications        These medications were sent to [id number] - [address] [address]      Phone: [phone]   pantoprazole 20 MG tablet         Signing Physician:  YOLANDA Lozano

## 2022-11-15 ENCOUNTER — LAB VISIT (OUTPATIENT)
Dept: LAB | Facility: HOSPITAL | Age: 52
End: 2022-11-15
Payer: COMMERCIAL

## 2022-11-15 ENCOUNTER — OFFICE VISIT (OUTPATIENT)
Dept: INTERNAL MEDICINE | Facility: CLINIC | Age: 52
End: 2022-11-15
Payer: COMMERCIAL

## 2022-11-15 VITALS
SYSTOLIC BLOOD PRESSURE: 122 MMHG | OXYGEN SATURATION: 97 % | DIASTOLIC BLOOD PRESSURE: 64 MMHG | WEIGHT: 140.63 LBS | BODY MASS INDEX: 21.31 KG/M2 | HEIGHT: 68 IN | RESPIRATION RATE: 18 BRPM | HEART RATE: 75 BPM

## 2022-11-15 DIAGNOSIS — R10.13 MIDEPIGASTRIC PAIN: Primary | ICD-10-CM

## 2022-11-15 DIAGNOSIS — R11.0 NAUSEA: ICD-10-CM

## 2022-11-15 DIAGNOSIS — R10.13 MIDEPIGASTRIC PAIN: ICD-10-CM

## 2022-11-15 LAB
ALBUMIN SERPL BCP-MCNC: 4.2 G/DL (ref 3.5–5.2)
ALP SERPL-CCNC: 44 U/L (ref 55–135)
ALT SERPL W/O P-5'-P-CCNC: 14 U/L (ref 10–44)
AMYLASE SERPL-CCNC: 52 U/L (ref 20–110)
ANION GAP SERPL CALC-SCNC: 6 MMOL/L (ref 8–16)
AST SERPL-CCNC: 15 U/L (ref 10–40)
BASOPHILS # BLD AUTO: 0.04 K/UL (ref 0–0.2)
BASOPHILS NFR BLD: 0.7 % (ref 0–1.9)
BILIRUB SERPL-MCNC: 0.5 MG/DL (ref 0.1–1)
BUN SERPL-MCNC: 13 MG/DL (ref 6–20)
CALCIUM SERPL-MCNC: 9.3 MG/DL (ref 8.7–10.5)
CHLORIDE SERPL-SCNC: 108 MMOL/L (ref 95–110)
CO2 SERPL-SCNC: 26 MMOL/L (ref 23–29)
CREAT SERPL-MCNC: 0.8 MG/DL (ref 0.5–1.4)
DIFFERENTIAL METHOD: NORMAL
EOSINOPHIL # BLD AUTO: 0.2 K/UL (ref 0–0.5)
EOSINOPHIL NFR BLD: 2.8 % (ref 0–8)
ERYTHROCYTE [DISTWIDTH] IN BLOOD BY AUTOMATED COUNT: 12.2 % (ref 11.5–14.5)
EST. GFR  (NO RACE VARIABLE): >60 ML/MIN/1.73 M^2
GLUCOSE SERPL-MCNC: 92 MG/DL (ref 70–110)
HCT VFR BLD AUTO: 42.2 % (ref 37–48.5)
HGB BLD-MCNC: 13.6 G/DL (ref 12–16)
IMM GRANULOCYTES # BLD AUTO: 0.01 K/UL (ref 0–0.04)
IMM GRANULOCYTES NFR BLD AUTO: 0.2 % (ref 0–0.5)
LIPASE SERPL-CCNC: 22 U/L (ref 4–60)
LYMPHOCYTES # BLD AUTO: 1.6 K/UL (ref 1–4.8)
LYMPHOCYTES NFR BLD: 26.1 % (ref 18–48)
MCH RBC QN AUTO: 30.3 PG (ref 27–31)
MCHC RBC AUTO-ENTMCNC: 32.2 G/DL (ref 32–36)
MCV RBC AUTO: 94 FL (ref 82–98)
MONOCYTES # BLD AUTO: 0.5 K/UL (ref 0.3–1)
MONOCYTES NFR BLD: 8.3 % (ref 4–15)
NEUTROPHILS # BLD AUTO: 3.8 K/UL (ref 1.8–7.7)
NEUTROPHILS NFR BLD: 61.9 % (ref 38–73)
NRBC BLD-RTO: 0 /100 WBC
PLATELET # BLD AUTO: 321 K/UL (ref 150–450)
PMV BLD AUTO: 10.9 FL (ref 9.2–12.9)
POTASSIUM SERPL-SCNC: 4.9 MMOL/L (ref 3.5–5.1)
PROT SERPL-MCNC: 7.2 G/DL (ref 6–8.4)
RBC # BLD AUTO: 4.49 M/UL (ref 4–5.4)
SODIUM SERPL-SCNC: 140 MMOL/L (ref 136–145)
WBC # BLD AUTO: 6.06 K/UL (ref 3.9–12.7)

## 2022-11-15 PROCEDURE — 99999 PR PBB SHADOW E&M-EST. PATIENT-LVL IV: ICD-10-PCS | Mod: PBBFAC,,, | Performed by: NURSE PRACTITIONER

## 2022-11-15 PROCEDURE — 83690 ASSAY OF LIPASE: CPT | Performed by: NURSE PRACTITIONER

## 2022-11-15 PROCEDURE — 36415 COLL VENOUS BLD VENIPUNCTURE: CPT | Performed by: NURSE PRACTITIONER

## 2022-11-15 PROCEDURE — 85025 COMPLETE CBC W/AUTO DIFF WBC: CPT | Performed by: NURSE PRACTITIONER

## 2022-11-15 PROCEDURE — 99214 OFFICE O/P EST MOD 30 MIN: CPT | Mod: S$GLB,,, | Performed by: NURSE PRACTITIONER

## 2022-11-15 PROCEDURE — 82150 ASSAY OF AMYLASE: CPT | Performed by: NURSE PRACTITIONER

## 2022-11-15 PROCEDURE — 80053 COMPREHEN METABOLIC PANEL: CPT | Performed by: NURSE PRACTITIONER

## 2022-11-15 PROCEDURE — 99214 PR OFFICE/OUTPT VISIT, EST, LEVL IV, 30-39 MIN: ICD-10-PCS | Mod: S$GLB,,, | Performed by: NURSE PRACTITIONER

## 2022-11-15 PROCEDURE — 99999 PR PBB SHADOW E&M-EST. PATIENT-LVL IV: CPT | Mod: PBBFAC,,, | Performed by: NURSE PRACTITIONER

## 2022-11-15 RX ORDER — PANTOPRAZOLE SODIUM 20 MG/1
20 TABLET, DELAYED RELEASE ORAL DAILY
Qty: 90 TABLET | Refills: 0 | Status: SHIPPED | OUTPATIENT
Start: 2022-11-15 | End: 2023-02-12

## 2022-11-16 ENCOUNTER — LAB VISIT (OUTPATIENT)
Dept: LAB | Facility: HOSPITAL | Age: 52
End: 2022-11-16
Payer: COMMERCIAL

## 2022-11-16 DIAGNOSIS — R11.0 NAUSEA: ICD-10-CM

## 2022-11-16 DIAGNOSIS — R10.13 MIDEPIGASTRIC PAIN: ICD-10-CM

## 2022-11-16 PROCEDURE — 87338 HPYLORI STOOL AG IA: CPT | Performed by: NURSE PRACTITIONER

## 2022-11-21 ENCOUNTER — PATIENT MESSAGE (OUTPATIENT)
Dept: SPORTS MEDICINE | Facility: CLINIC | Age: 52
End: 2022-11-21
Payer: COMMERCIAL

## 2022-11-21 ENCOUNTER — PATIENT MESSAGE (OUTPATIENT)
Dept: INTERNAL MEDICINE | Facility: CLINIC | Age: 52
End: 2022-11-21
Payer: COMMERCIAL

## 2022-11-22 ENCOUNTER — OFFICE VISIT (OUTPATIENT)
Dept: GASTROENTEROLOGY | Facility: CLINIC | Age: 52
End: 2022-11-22
Payer: COMMERCIAL

## 2022-11-22 ENCOUNTER — TELEPHONE (OUTPATIENT)
Dept: ENDOSCOPY | Facility: HOSPITAL | Age: 52
End: 2022-11-22
Payer: COMMERCIAL

## 2022-11-22 VITALS
BODY MASS INDEX: 23.49 KG/M2 | HEIGHT: 65 IN | HEART RATE: 73 BPM | SYSTOLIC BLOOD PRESSURE: 116 MMHG | DIASTOLIC BLOOD PRESSURE: 71 MMHG | WEIGHT: 141 LBS

## 2022-11-22 VITALS — WEIGHT: 141 LBS | BODY MASS INDEX: 23.49 KG/M2 | HEIGHT: 65 IN

## 2022-11-22 DIAGNOSIS — R10.13 MIDEPIGASTRIC PAIN: ICD-10-CM

## 2022-11-22 DIAGNOSIS — R10.10 UPPER ABDOMINAL PAIN: Primary | ICD-10-CM

## 2022-11-22 DIAGNOSIS — R11.0 NAUSEA: ICD-10-CM

## 2022-11-22 PROCEDURE — 99999 PR PBB SHADOW E&M-EST. PATIENT-LVL IV: ICD-10-PCS | Mod: PBBFAC,,, | Performed by: INTERNAL MEDICINE

## 2022-11-22 PROCEDURE — 99999 PR PBB SHADOW E&M-EST. PATIENT-LVL IV: CPT | Mod: PBBFAC,,, | Performed by: INTERNAL MEDICINE

## 2022-11-22 PROCEDURE — 99204 OFFICE O/P NEW MOD 45 MIN: CPT | Mod: S$GLB,,, | Performed by: INTERNAL MEDICINE

## 2022-11-22 PROCEDURE — 99204 PR OFFICE/OUTPT VISIT, NEW, LEVL IV, 45-59 MIN: ICD-10-PCS | Mod: S$GLB,,, | Performed by: INTERNAL MEDICINE

## 2022-11-22 NOTE — PROGRESS NOTES
Reason for visit: Upper abdominal pain    HPI:  is a 52 year old lady with upper abdominal pain. Has been noticing recurrence of discomfort in the epigastric region along with nausea. Has seen Dr. Betts for this in the past and laying off coffee and a trail of PPI helped. No chronic use of NSAIDs, OLIVEIRA II inhibitors, ASA or ASA containing products. Only a social drinker. Denies any dysphagia, odynophagia, nausea, vomiting, heartburn or acid regurgitation. No changes in bowel pattern, blood/ mucus in stool or unintentional weight loss. No melena or maroon stools. No recent changes in diet or medications. No family history of IBD, Celiac disease. Maternal uncle with CRC. No tobacco use. No recent antibiotic use, travels or sick contacts. No prior history of C.diff. Has tried Pantoprazole 20 mg for a week now. Has felt better since. No prior abdominal surgeries. Last colonoscopy in 2020 with a non adenomatous polyp.    Past medical, surgical, social and family history reviewed in epic    Medication allergies reviewed in epic    Review of systems:    Constitutional:  No fever, no chills, no weight loss, appetite is normal  Eyes:  No visual changes or red eyes  ENT:  No odynophagia or hoarseness of voice  Cardiovascular:  No angina or palpitation  Respiratory:  No shortness breath or wheezing  Genitourinary:  No dysuria or frequency  Musculoskeletal:  No myalgias; occasional neck arthralgias  Skin:  No pruritus or eczema  Neurologic:  No headache or seizures  Psychiatric:  No anxiety depression  Gastrointestinal:  See HPI    Physical exam:  Vitals see epic, awake, alert, oriented x3 in no acute distress    Neck:  Supple, no carotid bruit, no cervical adenopathy  Abdomen:  Flat, soft, nontender, nondistended, no masses palpable, no hepatosplenomegaly detected, bowel sounds are normal, no ascites clinically detectable  Eyes:  Conjunctivae anicteric, not injected  ENT:  Oral mucosa moist  Cardiovascular:  S1, S2  normal, no murmurs, no gallops, no abdominal bruits heard  Respiratory:  Bilateral air entry equal, no rhonchi, no crackles, normal effort  Skin:  No palmar erythema or spider angiomata  Neurologic:  No asterixis or tremors  Psychiatric:  Affect appropriate, proper judgment, proper insight, oriented to place and time  Lower extremities:  No pedal edema    Recent labs, imaging and endoscopy reports reviewed.    Impression: Upper abdominal pain    Recommendations:     US Abdomen  EGD

## 2022-11-22 NOTE — PLAN OF CARE
Pt scheduled for EGD on 12/2/22 with . Prep instructions reviewed and on Pt portal, Pt verbalized understanding.

## 2022-11-27 LAB
H PYLORI AG STL QL IA: NOT DETECTED
SPECIMEN SOURCE: NORMAL

## 2022-11-28 ENCOUNTER — HOSPITAL ENCOUNTER (OUTPATIENT)
Dept: RADIOLOGY | Facility: HOSPITAL | Age: 52
Discharge: HOME | End: 2022-11-28
Attending: INTERNAL MEDICINE
Payer: COMMERCIAL

## 2022-11-28 ENCOUNTER — TELEPHONE (OUTPATIENT)
Dept: ENDOSCOPY | Facility: HOSPITAL | Age: 52
End: 2022-11-28
Payer: COMMERCIAL

## 2022-11-28 DIAGNOSIS — R10.10 UPPER ABDOMINAL PAIN: ICD-10-CM

## 2022-11-28 PROCEDURE — 76700 US EXAM ABDOM COMPLETE: CPT | Mod: TC

## 2022-11-28 PROCEDURE — 76700 US EXAM ABDOM COMPLETE: CPT | Mod: 26,,, | Performed by: INTERNAL MEDICINE

## 2022-11-28 PROCEDURE — 76700 US ABDOMEN COMPLETE: ICD-10-PCS | Mod: 26,,, | Performed by: INTERNAL MEDICINE

## 2022-11-29 NOTE — TELEPHONE ENCOUNTER
----- Message from Patricio Silva MD sent at 11/28/2022 12:50 PM CST -----  The US looks fine. Will proceed with EGD.

## 2022-12-02 ENCOUNTER — HOSPITAL ENCOUNTER (OUTPATIENT)
Facility: HOSPITAL | Age: 52
Discharge: HOME | End: 2022-12-02
Attending: INTERNAL MEDICINE | Admitting: INTERNAL MEDICINE
Payer: COMMERCIAL

## 2022-12-02 ENCOUNTER — ANESTHESIA EVENT (OUTPATIENT)
Dept: ENDOSCOPY | Facility: HOSPITAL | Age: 52
End: 2022-12-02
Payer: COMMERCIAL

## 2022-12-02 ENCOUNTER — ANESTHESIA (OUTPATIENT)
Dept: ENDOSCOPY | Facility: HOSPITAL | Age: 52
End: 2022-12-02
Payer: COMMERCIAL

## 2022-12-02 VITALS
HEIGHT: 65 IN | TEMPERATURE: 98 F | DIASTOLIC BLOOD PRESSURE: 66 MMHG | OXYGEN SATURATION: 99 % | WEIGHT: 138 LBS | RESPIRATION RATE: 13 BRPM | HEART RATE: 80 BPM | SYSTOLIC BLOOD PRESSURE: 115 MMHG | BODY MASS INDEX: 22.99 KG/M2

## 2022-12-02 DIAGNOSIS — R10.10 UPPER ABDOMINAL PAIN: Primary | ICD-10-CM

## 2022-12-02 LAB
B-HCG UR QL: NEGATIVE
CTP QC/QA: YES

## 2022-12-02 PROCEDURE — E9220 PRA ENDO ANESTHESIA: ICD-10-PCS | Mod: ,,, | Performed by: NURSE ANESTHETIST, CERTIFIED REGISTERED

## 2022-12-02 PROCEDURE — 37000009 HC ANESTHESIA EA ADD 15 MINS: Performed by: INTERNAL MEDICINE

## 2022-12-02 PROCEDURE — 37000008 HC ANESTHESIA 1ST 15 MINUTES: Performed by: INTERNAL MEDICINE

## 2022-12-02 PROCEDURE — 88305 TISSUE EXAM BY PATHOLOGIST: CPT | Mod: 26,,, | Performed by: PATHOLOGY

## 2022-12-02 PROCEDURE — 43239 EGD BIOPSY SINGLE/MULTIPLE: CPT | Performed by: INTERNAL MEDICINE

## 2022-12-02 PROCEDURE — 43239 EGD BIOPSY SINGLE/MULTIPLE: CPT | Mod: ,,, | Performed by: INTERNAL MEDICINE

## 2022-12-02 PROCEDURE — 25000003 PHARM REV CODE 250: Performed by: NURSE ANESTHETIST, CERTIFIED REGISTERED

## 2022-12-02 PROCEDURE — 88305 TISSUE EXAM BY PATHOLOGIST: CPT | Mod: 59 | Performed by: PATHOLOGY

## 2022-12-02 PROCEDURE — 27201012 HC FORCEPS, HOT/COLD, DISP: Performed by: INTERNAL MEDICINE

## 2022-12-02 PROCEDURE — 81025 URINE PREGNANCY TEST: CPT | Performed by: INTERNAL MEDICINE

## 2022-12-02 PROCEDURE — 88305 TISSUE EXAM BY PATHOLOGIST: ICD-10-PCS | Mod: 26,,, | Performed by: PATHOLOGY

## 2022-12-02 PROCEDURE — 63600175 PHARM REV CODE 636 W HCPCS: Performed by: NURSE ANESTHETIST, CERTIFIED REGISTERED

## 2022-12-02 PROCEDURE — 43239 PR EGD, FLEX, W/BIOPSY, SGL/MULTI: ICD-10-PCS | Mod: ,,, | Performed by: INTERNAL MEDICINE

## 2022-12-02 PROCEDURE — E9220 PRA ENDO ANESTHESIA: HCPCS | Mod: ,,, | Performed by: NURSE ANESTHETIST, CERTIFIED REGISTERED

## 2022-12-02 RX ORDER — SODIUM CHLORIDE 9 MG/ML
INJECTION, SOLUTION INTRAVENOUS CONTINUOUS
Status: CANCELLED | OUTPATIENT
Start: 2022-12-02

## 2022-12-02 RX ORDER — PROPOFOL 10 MG/ML
VIAL (ML) INTRAVENOUS
Status: DISCONTINUED | OUTPATIENT
Start: 2022-12-02 | End: 2022-12-02

## 2022-12-02 RX ORDER — PROPOFOL 10 MG/ML
VIAL (ML) INTRAVENOUS CONTINUOUS PRN
Status: DISCONTINUED | OUTPATIENT
Start: 2022-12-02 | End: 2022-12-02

## 2022-12-02 RX ORDER — ONDANSETRON 2 MG/ML
INJECTION INTRAMUSCULAR; INTRAVENOUS
Status: DISCONTINUED | OUTPATIENT
Start: 2022-12-02 | End: 2022-12-02

## 2022-12-02 RX ORDER — SODIUM CHLORIDE 9 MG/ML
INJECTION, SOLUTION INTRAVENOUS CONTINUOUS
Status: DISCONTINUED | OUTPATIENT
Start: 2022-12-02 | End: 2022-12-02 | Stop reason: HOSPADM

## 2022-12-02 RX ORDER — LIDOCAINE HYDROCHLORIDE 20 MG/ML
INJECTION INTRAVENOUS
Status: DISCONTINUED | OUTPATIENT
Start: 2022-12-02 | End: 2022-12-02

## 2022-12-02 RX ADMIN — GLYCOPYRROLATE 0.1 MG: 0.2 INJECTION, SOLUTION INTRAMUSCULAR; INTRAVITREAL at 02:12

## 2022-12-02 RX ADMIN — PROPOFOL 100 MG: 10 INJECTION, EMULSION INTRAVENOUS at 02:12

## 2022-12-02 RX ADMIN — PROPOFOL 25 MG: 10 INJECTION, EMULSION INTRAVENOUS at 02:12

## 2022-12-02 RX ADMIN — ONDANSETRON 4 MG: 2 INJECTION INTRAMUSCULAR; INTRAVENOUS at 02:12

## 2022-12-02 RX ADMIN — Medication 175 MCG/KG/MIN: at 02:12

## 2022-12-02 RX ADMIN — LIDOCAINE HYDROCHLORIDE 100 MG: 20 INJECTION INTRAVENOUS at 02:12

## 2022-12-02 RX ADMIN — SODIUM CHLORIDE: 0.9 INJECTION, SOLUTION INTRAVENOUS at 01:12

## 2022-12-02 NOTE — TRANSFER OF CARE
"Anesthesia Transfer of Care Note    Patient: Brissa Mason    Procedure(s) Performed: Procedure(s) (LRB):  EGD (ESOPHAGOGASTRODUODENOSCOPY) (N/A)    Patient location: GI    Anesthesia Type: general    Transport from OR: Transported from OR on room air with adequate spontaneous ventilation    Post pain: adequate analgesia    Post assessment: no apparent anesthetic complications    Post vital signs: stable    Level of consciousness: awake    Nausea/Vomiting: no nausea/vomiting    Complications: none    Transfer of care protocol was followed      Last vitals:   Visit Vitals  BP (104/67  (BP Location: Left arm, Patient Position: Lying)   Pulse 76   Temp 36.7 °C (98.1 °F) (Temporal)   Resp 16   Ht 5' 5" (1.651 m)   Wt 62.6 kg (138 lb)   LMP 11/08/2022   SpO2 100%   Breastfeeding No   BMI 22.96 kg/m²     "

## 2022-12-02 NOTE — INTERVAL H&P NOTE
The patient has been examined and the H&P has been reviewed:    I concur with the findings and no changes have occurred since H&P was written.    Procedure risks, benefits and alternative options discussed and understood by patient/family.  EGD: Upper abdominal pain  Sedation: GA  ASA: Per anesthesia  Mallampati: Per anesthesia          There are no hospital problems to display for this patient.

## 2022-12-02 NOTE — PROVATION PATIENT INSTRUCTIONS
Discharge Summary/Instructions after an Endoscopic Procedure  Patient Name: Brissa Mason  Patient MRN: [id number]  Patient YOB: 1970  Friday, December 2, 2022  Patricio Silva MD  Dear patient,  As a result of recent federal legislation (The Federal Cures Act), you may   receive lab or pathology results from your procedure in your [facility]   account before your physician is able to contact you. Your physician or   their representative will relay the results to you with their   recommendations at their soonest availability.  Thank you,  RESTRICTIONS:  During your procedure today, you received medications for sedation.  These   medications may affect your judgment, balance and coordination.  Therefore,   for 24 hours, you have the following restrictions:   - DO NOT drive a car, operate machinery, make legal/financial decisions,   sign important papers or drink alcohol.    ACTIVITY:  Today: no heavy lifting, straining or running due to procedural   sedation/anesthesia.  The following day: return to full activity including work.  DIET:  Eat and drink normally unless instructed otherwise.     TREATMENT FOR COMMON SIDE EFFECTS:  - Mild abdominal pain, nausea, belching, bloating or excessive gas:  rest,   eat lightly and use a heating pad.  - Sore Throat: treat with throat lozenges and/or gargle with warm salt   water.  - Because air was used during the procedure, expelling large amounts of air   from your rectum or belching is normal.  - If a bowel prep was taken, you may not have a bowel movement for 1-3 days.    This is normal.  SYMPTOMS TO WATCH FOR AND REPORT TO YOUR PHYSICIAN:  1. Abdominal pain or bloating, other than gas cramps.  2. Chest pain.  3. Back pain.  4. Signs of infection such as: chills or fever occurring within 24 hours   after the procedure.  5. Rectal bleeding, which would show as bright red, maroon, or black stools.   (A tablespoon of blood from the rectum is not serious, especially if    hemorrhoids are present.)  6. Vomiting.  7. Weakness or dizziness.  GO DIRECTLY TO THE NEAREST EMERGENCY ROOM IF YOU HAVE ANY OF THE FOLLOWING:      Difficulty breathing              Chills and/or fever over 101 F   Persistent vomiting and/or vomiting blood   Severe abdominal pain   Severe chest pain   Black, tarry stools   Bleeding- more than one tablespoon   Any other symptom or condition that you feel may need urgent attention  Your doctor recommends these additional instructions:  If any biopsies were taken, your doctors clinic will contact you in 1 to 2   weeks with any results.  - Patient has a contact number available for emergencies.  The signs and   symptoms of potential delayed complications were discussed with the   patient.  Return to normal activities tomorrow.  Written discharge   instructions were provided to the patient.   - Discharge patient to home.   - Resume previous diet.   - Continue present medications.   - Await pathology results.   For questions, problems or results please call your physician - Patricio Silva MD at Work:  [phone].  [facility], EMERGENCY ROOM PHONE NUMBER: [phone]  IF A COMPLICATION OR EMERGENCY SITUATION ARISES AND YOU ARE UNABLE TO REACH   YOUR PHYSICIAN - GO DIRECTLY TO THE EMERGENCY ROOM.  Patricio Silva MD  12/2/2022 2:18:29 PM  This report has been verified and signed electronically.  Dear patient,  As a result of recent federal legislation (The Federal Cures Act), you may   receive lab or pathology results from your procedure in your [facility]   account before your physician is able to contact you. Your physician or   their representative will relay the results to you with their   recommendations at their soonest availability.  Thank you,  PROVATION

## 2022-12-02 NOTE — ANESTHESIA POSTPROCEDURE EVALUATION
Anesthesia Post Evaluation    Patient: Brissa Mason    Procedure(s) Performed: Procedure(s) (LRB):  EGD (ESOPHAGOGASTRODUODENOSCOPY) (N/A)    Final Anesthesia Type: general      Patient location during evaluation: PACU  Patient participation: Yes- Able to Participate  Level of consciousness: awake and alert and oriented  Post-procedure vital signs: reviewed and stable  Pain management: adequate  Airway patency: patent    PONV status at discharge: No PONV  Anesthetic complications: no      Cardiovascular status: blood pressure returned to baseline and hemodynamically stable  Respiratory status: unassisted and spontaneous ventilation  Hydration status: euvolemic  Follow-up not needed.          Vitals Value Taken Time   /66 12/02/22 1454   Temp 36.7 12/02/22 1515   Pulse 80 12/02/22 1454   Resp 13 12/02/22 1454   SpO2 99 % 12/02/22 1454         Event Time   Out of Recovery 14:57:54         Pain/Naman Score: Naman Score: 8 (12/2/2022  2:24 PM)

## 2022-12-02 NOTE — ANESTHESIA PREPROCEDURE EVALUATION
12/02/2022  Brissa Mason is a 52 y.o., female.  Past Medical History:   Diagnosis Date    Allergy     Anemia 4/20/2020    Anxiety disorder     B12 deficiency     Cataract     Colitis     resolved; rectum 2005; neg flex sig 2006    Colitis: 2005 rectal area     resolved; rectum 2005; neg flex sig 2006     Iron deficiency     Menstrual migraine without status migrainosus, not intractable 9/5/2017    Vitamin D deficiency      Past Surgical History:   Procedure Laterality Date    COLONOSCOPY N/A 10/15/2020    Procedure: COLONOSCOPY;  Surgeon: Vern Harkins MD;  Location: [facility] ([address] [place] [address]);  Service: Endoscopy;  Laterality: N/A;  covid test 10/12-[facility]    GANGLION CYST EXCISION  2017    shoulder injection Left 05/2022         Pre-op Assessment    I have reviewed the Patient Summary Reports.     I have reviewed the Nursing Notes. I have reviewed the NPO Status.   I have reviewed the Medications.     Review of Systems  Anesthesia Hx:  No problems with previous Anesthesia  Denies Family Hx of Anesthesia complications.   Denies Personal Hx of Anesthesia complications.   Hematology/Oncology:  Hematology Normal        Cardiovascular:  Cardiovascular Normal     Hepatic/GI:   Bowel Prep.    Musculoskeletal:  Musculoskeletal Normal    Neurological:   Headaches    Dermatological:  Skin Normal    Psych:   Psychiatric History          Physical Exam  General: Well nourished    Airway:  Mallampati: II   Mouth Opening: Normal  TM Distance: Normal  Tongue: Normal  Neck ROM: Normal ROM    Dental:  Intact        Anesthesia Plan  Type of Anesthesia, risks & benefits discussed:    Anesthesia Type: Gen Natural Airway  Intra-op Monitoring Plan: Standard ASA Monitors  Informed Consent: Informed consent signed with the Patient and all parties understand the risks and agree with anesthesia plan.  All  questions answered.   ASA Score: 2  Day of Surgery Review of History & Physical: H&P Update referred to the surgeon/provider.I have interviewed and examined the patient. I have reviewed the patient's H&P dated: There are no significant changes.     Ready For Surgery From Anesthesia Perspective.     .

## 2022-12-15 ENCOUNTER — TELEPHONE (OUTPATIENT)
Dept: GASTROENTEROLOGY | Facility: CLINIC | Age: 52
End: 2022-12-15
Payer: COMMERCIAL

## 2022-12-15 LAB
FINAL PATHOLOGIC DIAGNOSIS: NORMAL
GROSS: NORMAL
Lab: NORMAL

## 2022-12-15 NOTE — TELEPHONE ENCOUNTER
----- Message from Patricio Silva MD sent at 12/15/2022  9:29 AM CST -----  Nothing concerning on biopsies. Continue Pantoprazole 20 mg daily. Follow up prn.

## 2022-12-24 ENCOUNTER — LAB VISIT (OUTPATIENT)
Dept: LAB | Facility: HOSPITAL | Age: 52
End: 2022-12-24
Attending: INTERNAL MEDICINE
Payer: COMMERCIAL

## 2022-12-24 ENCOUNTER — OFFICE VISIT (OUTPATIENT)
Dept: INTERNAL MEDICINE | Facility: CLINIC | Age: 52
End: 2022-12-24
Payer: COMMERCIAL

## 2022-12-24 VITALS
RESPIRATION RATE: 18 BRPM | OXYGEN SATURATION: 99 % | SYSTOLIC BLOOD PRESSURE: 108 MMHG | WEIGHT: 137.38 LBS | BODY MASS INDEX: 22.89 KG/M2 | DIASTOLIC BLOOD PRESSURE: 62 MMHG | HEIGHT: 65 IN | HEART RATE: 69 BPM

## 2022-12-24 DIAGNOSIS — Z00.00 WELLNESS EXAMINATION: ICD-10-CM

## 2022-12-24 DIAGNOSIS — E53.8 B12 NUTRITIONAL DEFICIENCY: ICD-10-CM

## 2022-12-24 DIAGNOSIS — E55.9 VITAMIN D DEFICIENCY: ICD-10-CM

## 2022-12-24 DIAGNOSIS — Z00.00 WELLNESS EXAMINATION: Primary | ICD-10-CM

## 2022-12-24 LAB
25(OH)D3+25(OH)D2 SERPL-MCNC: 33 NG/ML (ref 30–96)
CHOLEST SERPL-MCNC: 189 MG/DL (ref 120–199)
CHOLEST/HDLC SERPL: 2.7 {RATIO} (ref 2–5)
HDLC SERPL-MCNC: 71 MG/DL (ref 40–75)
HDLC SERPL: 37.6 % (ref 20–50)
LDLC SERPL CALC-MCNC: 105.8 MG/DL (ref 63–159)
NONHDLC SERPL-MCNC: 118 MG/DL
TRIGL SERPL-MCNC: 61 MG/DL (ref 30–150)
VIT B12 SERPL-MCNC: 813 PG/ML (ref 210–950)

## 2022-12-24 PROCEDURE — 99396 PR PREVENTIVE VISIT,EST,40-64: ICD-10-PCS | Mod: S$GLB,,, | Performed by: INTERNAL MEDICINE

## 2022-12-24 PROCEDURE — 99999 PR PBB SHADOW E&M-EST. PATIENT-LVL IV: CPT | Mod: PBBFAC,,, | Performed by: INTERNAL MEDICINE

## 2022-12-24 PROCEDURE — 82306 VITAMIN D 25 HYDROXY: CPT | Performed by: INTERNAL MEDICINE

## 2022-12-24 PROCEDURE — 36415 COLL VENOUS BLD VENIPUNCTURE: CPT | Performed by: INTERNAL MEDICINE

## 2022-12-24 PROCEDURE — 82607 VITAMIN B-12: CPT | Performed by: INTERNAL MEDICINE

## 2022-12-24 PROCEDURE — 99999 PR PBB SHADOW E&M-EST. PATIENT-LVL IV: ICD-10-PCS | Mod: PBBFAC,,, | Performed by: INTERNAL MEDICINE

## 2022-12-24 PROCEDURE — 80061 LIPID PANEL: CPT | Performed by: INTERNAL MEDICINE

## 2022-12-24 PROCEDURE — 99396 PREV VISIT EST AGE 40-64: CPT | Mod: S$GLB,,, | Performed by: INTERNAL MEDICINE

## 2022-12-24 NOTE — PROGRESS NOTES
Subjective:       Patient ID: Brissa Mason is a 52 y.o. female.    Chief Complaint: Establish Care    Here for annual exam    Wonders if she needs blood work.    Would like Vit D abd B12 checked.    Taking protonix for stomach issues. No clear cause of stomach issues found.    Mild L sided neck pains. Pains do not shoot down the arms.  Gets L TMJ area discomfort. No chewing pains, no difficulty opening closing jaw.    Review of Systems   Constitutional:  Negative for activity change, appetite change and unexpected weight change.   Eyes:  Negative for visual disturbance.   Respiratory:  Negative for cough, chest tightness and shortness of breath.    Cardiovascular:  Negative for chest pain.   Gastrointestinal:  Positive for abdominal pain (mild at times, sees GI). Negative for abdominal distention.   Genitourinary:  Negative for difficulty urinating and urgency.        No breast lumps/masses/pain/nipple d/c in either breast     Musculoskeletal:  Positive for neck pain (mild, not radicular).   Skin:  Negative for rash.         Objective:      Physical Exam  Vitals reviewed.   Constitutional:       General: She is not in acute distress.     Appearance: Normal appearance. She is well-developed. She is not ill-appearing, toxic-appearing or diaphoretic.   HENT:      Head: Normocephalic and atraumatic.   Eyes:      General: No scleral icterus.     Pupils: Pupils are equal, round, and reactive to light.   Neck:      Thyroid: No thyromegaly.   Cardiovascular:      Rate and Rhythm: Normal rate and regular rhythm.      Heart sounds: Normal heart sounds. No murmur heard.    No friction rub. No gallop.   Pulmonary:      Effort: Pulmonary effort is normal. No respiratory distress.      Breath sounds: Normal breath sounds. No wheezing or rales.   Abdominal:      General: Bowel sounds are normal. There is no distension.      Palpations: Abdomen is soft. There is no mass.      Tenderness: There is no abdominal tenderness.  There is no guarding or rebound.   Musculoskeletal:         General: No tenderness. Normal range of motion.      Cervical back: Normal range of motion.      Comments: Nml neck rom, but some tenderness with L sided rotation/bending.  No L TMJ tenderness to deep palpation     Lymphadenopathy:      Cervical: No cervical adenopathy.   Neurological:      General: No focal deficit present.      Mental Status: She is alert and oriented to person, place, and time.   Psychiatric:         Mood and Affect: Mood normal.         Speech: Speech normal.         Behavior: Behavior normal.       Assessment:       1. Wellness examination    2. B12 nutritional deficiency    3. Vitamin D deficiency        Plan:       Brissa was seen today for establish care.    Diagnoses and all orders for this visit:    Wellness examination  -     Vitamin D; Future  -     Lipid Panel; Future  -     Vitamin B12; Future    B12 nutritional deficiency  recheck    Vitamin D deficiency  recheck    Neck pains -- ? Muscle tension, could be referred to L jaw as well. Exam is nml. Discussed rom exercises.  Explained that if not better in 1-2 mos, pt should rtc/call PCP      Health Maintenance         Date Due Completion Date    HIV Screening Never done ---    Shingles Vaccine (1 of 2) Never done ---    COVID-19 Vaccine (3 - Booster for Pfizer series) 06/13/2021 4/18/2021    Influenza Vaccine (1) 09/01/2022 9/29/2021    Mammogram 09/14/2023 9/14/2022    DEXA Scan 09/29/2023 9/29/2021    TETANUS VACCINE 09/21/2025 9/21/2015    Colorectal Cancer Screening 10/15/2025 10/15/2020    Lipid Panel 09/24/2026 9/24/2021    Cervical Cancer Screening 10/26/2026 10/26/2021    Override on 5/1/2012: (N/S)   Declines vaccines today         Follow up in about 1 year (around 12/24/2023).    No future appointments.

## 2023-01-19 ENCOUNTER — PATIENT MESSAGE (OUTPATIENT)
Dept: INTERNAL MEDICINE | Facility: CLINIC | Age: 53
End: 2023-01-19
Payer: COMMERCIAL

## 2023-01-20 ENCOUNTER — E-VISIT (OUTPATIENT)
Dept: INTERNAL MEDICINE | Facility: CLINIC | Age: 53
End: 2023-01-20
Payer: COMMERCIAL

## 2023-01-20 ENCOUNTER — PATIENT MESSAGE (OUTPATIENT)
Dept: INTERNAL MEDICINE | Facility: CLINIC | Age: 53
End: 2023-01-20
Payer: COMMERCIAL

## 2023-01-20 DIAGNOSIS — J06.9 URTI (ACUTE UPPER RESPIRATORY INFECTION): Primary | ICD-10-CM

## 2023-01-20 PROCEDURE — 99421 OL DIG E/M SVC 5-10 MIN: CPT | Mod: S$GLB,,, | Performed by: INTERNAL MEDICINE

## 2023-01-20 PROCEDURE — 99421 PR E&M, ONLINE DIGIT, EST, < 7 DAYS, 5-10 MINS: ICD-10-PCS | Mod: S$GLB,,, | Performed by: INTERNAL MEDICINE

## 2023-01-20 NOTE — PROGRESS NOTES
Patient ID: Brissa Mason is a 52 y.o. female.    Chief Complaint: URI    The patient initiated a request through [email or web address] on 1/20/2023 for evaluation and management with a chief complaint of URI     I evaluated the questionnaire submission on 01/24/2023  .    Ohs Peq Evisit Upper Respitatory/Cough Questionnaire    1/20/2023  9:49 AM CST - Filed by Patient   Do you agree to participate in an E-Visit? Yes   If you have any of the following symptoms, please present to your local ER or call 911:  I acknowledge   What is the main issue that you would like for your doctor to address today? Sinus and aboit yo leave town overseas for 3 weeks   Are you able to take your vital signs? No   What symptoms do you currently have?  Nasal Congestion;  Runny nose;  Sore throat   Have you had a fever? No   When did your symptoms first appear? 1/17/2023   In the last two weeks, have you been in close contact with someone who has COVID-19 or the Flu? No   In the last two weeks, have you worked or volunteered in a healthcare facility or as a ? Healthcare facilities include a hospital, medical or dental clinic, long-term care facility, or nursing home No   Do you live in a long-term care facility, nursing home, or homeless shelter? No   List what you have done or taken to help your symptoms. Advil sinus, Mucinex   How severe are your symptoms? Mild   Have you taken an at home Covid test? Yes   What were the results? Negative   Have you taken a Flu test? No   Have you been fully vaccinated for COVID? (2 Pfizer, 2 Moderna or 1 Tariq & Tariq vaccine injections) Yes   Have you received a booster? No   Have you recieved a Flu shot? Yes   When did you recieve your Flu shot? 11/4/2022   Do you have transportation to get tested for COVID if it is indicated and ordered for you at an [facility] location? Yes   Are you currently pregnant, could you be pregnant, or are you breast feeding? None of the above   Provide any  information you feel is important to your history not asked above    Please attach any relevant images or files           Active Problem List with Overview Notes    Diagnosis Date Noted    Decreased range of motion of left shoulder 07/11/2022    Low bone mass 10/20/2021    Family hx of colon cancer requiring screening colonoscopy 10/15/2020    Iron deficiency 04/20/2020    B12 nutritional deficiency 04/20/2020    Vitamin D deficiency 09/26/2019    Acute pain of left shoulder 06/22/2015      Recent Labs Obtained:  No visits with results within 7 Day(s) from this visit.   Latest known visit with results is:   Lab Visit on 12/24/2022   Component Date Value Ref Range Status    Vit D, 25-Hydroxy 12/24/2022 33  30 - 96 ng/mL Final    Comment: Vitamin D deficiency.........<10 ng/mL                              Vitamin D insufficiency......10-29 ng/mL       Vitamin D sufficiency........> or equal to 30 ng/mL  Vitamin D toxicity............>100 ng/mL      Cholesterol 12/24/2022 189  120 - 199 mg/dL Final    Comment: The National Cholesterol Education Program (NCEP) has set the  following guidelines (reference ranges) for Cholesterol:  Optimal.....................<200 mg/dL  Borderline High.............200-239 mg/dL  High........................> or = 240 mg/dL      Triglycerides 12/24/2022 61  30 - 150 mg/dL Final    Comment: The National Cholesterol Education Program (NCEP) has set the  following guidelines (reference values) for triglycerides:  Normal......................<150 mg/dL  Borderline High.............150-199 mg/dL  High........................200-499 mg/dL      HDL 12/24/2022 71  40 - 75 mg/dL Final    Comment: The National Cholesterol Education Program (NCEP) has set the  following guidelines (reference values) for HDL Cholesterol:  Low...............<40 mg/dL  Optimal...........>60 mg/dL      LDL Cholesterol 12/24/2022 105.8  63.0 - 159.0 mg/dL Final    Comment: The National Cholesterol Education Program  (NCEP) has set the  following guidelines (reference values) for LDL Cholesterol:  Optimal.......................<130 mg/dL  Borderline High...............130-159 mg/dL  High..........................160-189 mg/dL  Very High.....................>190 mg/dL      HDL/Cholesterol Ratio 12/24/2022 37.6  20.0 - 50.0 % Final    Total Cholesterol/HDL Ratio 12/24/2022 2.7  2.0 - 5.0 Final    Non-HDL Cholesterol 12/24/2022 118  mg/dL Final    Comment: Risk category and Non-HDL cholesterol goals:  Coronary heart disease (CHD)or equivalent (10-year risk of CHD >20%):  Non-HDL cholesterol goal     <130 mg/dL  Two or more CHD risk factors and 10-year risk of CHD <= 20%:  Non-HDL cholesterol goal     <160 mg/dL  0 to 1 CHD risk factor:  Non-HDL cholesterol goal     <190 mg/dL      Vitamin B-12 12/24/2022 813  210 - 950 pg/mL Final       Encounter Diagnosis   Name Primary?    URTI (acute upper respiratory infection) Yes   Hi, I understand your concern. I have sent in to your pharmacy:      Orders Placed This Encounter    amoxicillin (AMOXIL) 500 MG Tab   Take two tabs twice per day if you develop symptoms of a bacterial sinus infection (about 5-7 days from now):  Symptoms of bacterial sinusitis  Pressure or pain around the nose, in the forehead, in the cheeks or around the eyes. The pain often gets worse if the affected person bends forward.  Discolored, thick nasal discharge.  Decreased sense of smell and ability to taste.  Stuffy nose.  Bad breath.     I hope you are feeling better soon.  Let me know if you have any more questions.  Ihsan Sanon    Last read by Brissa Mason at 12:34 PM on 1/22/2023.  January 20, 2023  Brissa Mason  to Me      7:47 PM  i definitely dont have one right now but sometimes i start this way and then it turns bacterial.  i just didnt want to be on a 3 week trip which is a cruise and not have anything if i needed it.     Brissa  Me  to Brissa Mason      1:34 PM  Hi Ms.  Isabella,  Thank you for completing the E visit.  Your symptoms sound like a viral infection. I do not think you have a bacterial infection. Which symptoms bother you the most? Have you taken any over the counters? I see you will be going out of town soon.  Ihsan Sanon         No orders of the defined types were placed in this encounter.           E-Visit Time Tracking:    Day 1 Time (in minutes): 5     Total Time (in minutes): 5

## 2023-01-22 ENCOUNTER — PATIENT MESSAGE (OUTPATIENT)
Dept: OBSTETRICS AND GYNECOLOGY | Facility: CLINIC | Age: 53
End: 2023-01-22
Payer: COMMERCIAL

## 2023-01-22 RX ORDER — AMOXICILLIN 500 MG/1
1000 TABLET, FILM COATED ORAL EVERY 12 HOURS
Qty: 28 TABLET | Refills: 0 | Status: SHIPPED | OUTPATIENT
Start: 2023-01-22 | End: 2023-01-29

## 2023-03-15 ENCOUNTER — PATIENT MESSAGE (OUTPATIENT)
Dept: OBSTETRICS AND GYNECOLOGY | Facility: CLINIC | Age: 53
End: 2023-03-15
Payer: COMMERCIAL

## 2023-03-15 ENCOUNTER — TELEPHONE (OUTPATIENT)
Dept: ORTHOPEDICS | Facility: CLINIC | Age: 53
End: 2023-03-15
Payer: COMMERCIAL

## 2023-03-15 DIAGNOSIS — M50.30 DDD (DEGENERATIVE DISC DISEASE), CERVICAL: Primary | ICD-10-CM

## 2023-03-16 ENCOUNTER — TELEPHONE (OUTPATIENT)
Dept: OBSTETRICS AND GYNECOLOGY | Facility: CLINIC | Age: 53
End: 2023-03-16
Payer: COMMERCIAL

## 2023-03-16 NOTE — TELEPHONE ENCOUNTER
Patient informed if bleeding occurs per Dr. Oden embx and ultrasound recommended. Patient verbalized understanding.

## 2023-03-20 ENCOUNTER — HOSPITAL ENCOUNTER (OUTPATIENT)
Dept: RADIOLOGY | Facility: HOSPITAL | Age: 53
Discharge: HOME | End: 2023-03-20
Attending: ORTHOPAEDIC SURGERY
Payer: COMMERCIAL

## 2023-03-20 ENCOUNTER — OFFICE VISIT (OUTPATIENT)
Dept: ORTHOPEDICS | Facility: CLINIC | Age: 53
End: 2023-03-20
Payer: COMMERCIAL

## 2023-03-20 VITALS — HEIGHT: 66 IN | WEIGHT: 141.31 LBS | BODY MASS INDEX: 22.71 KG/M2

## 2023-03-20 DIAGNOSIS — M50.30 DDD (DEGENERATIVE DISC DISEASE), CERVICAL: ICD-10-CM

## 2023-03-20 DIAGNOSIS — M47.812 CERVICAL SPONDYLOSIS: Primary | ICD-10-CM

## 2023-03-20 PROCEDURE — 99204 OFFICE O/P NEW MOD 45 MIN: CPT | Mod: S$GLB,,, | Performed by: ORTHOPAEDIC SURGERY

## 2023-03-20 PROCEDURE — 72050 XR CERVICAL SPINE AP LAT WITH FLEX EXTEN: ICD-10-PCS | Mod: 26,,, | Performed by: RADIOLOGY

## 2023-03-20 PROCEDURE — 72050 X-RAY EXAM NECK SPINE 4/5VWS: CPT | Mod: 26,,, | Performed by: RADIOLOGY

## 2023-03-20 PROCEDURE — 99999 PR PBB SHADOW E&M-EST. PATIENT-LVL III: ICD-10-PCS | Mod: PBBFAC,,, | Performed by: ORTHOPAEDIC SURGERY

## 2023-03-20 PROCEDURE — 99204 PR OFFICE/OUTPT VISIT, NEW, LEVL IV, 45-59 MIN: ICD-10-PCS | Mod: S$GLB,,, | Performed by: ORTHOPAEDIC SURGERY

## 2023-03-20 PROCEDURE — 99999 PR PBB SHADOW E&M-EST. PATIENT-LVL III: CPT | Mod: PBBFAC,,, | Performed by: ORTHOPAEDIC SURGERY

## 2023-03-20 PROCEDURE — 72050 X-RAY EXAM NECK SPINE 4/5VWS: CPT | Mod: TC

## 2023-03-20 RX ORDER — METHOCARBAMOL 500 MG/1
500 TABLET, FILM COATED ORAL 3 TIMES DAILY
Qty: 60 TABLET | Refills: 1 | Status: SHIPPED | OUTPATIENT
Start: 2023-03-20 | End: 2023-05-11

## 2023-03-20 RX ORDER — CELECOXIB 200 MG/1
200 CAPSULE ORAL DAILY
Qty: 60 CAPSULE | Refills: 1 | Status: SHIPPED | OUTPATIENT
Start: 2023-03-20 | End: 2024-01-04

## 2023-03-22 NOTE — PROGRESS NOTES
DATE: 3/21/2023  PATIENT: Brissa Mason    Attending Physician: Joe Johnston M.D.    CHIEF COMPLAINT: neck pain    HISTORY:  Brissa Mason is a 52 y.o. female presents for initial evaluation of neck pain (Neck - 4). The pain has been present since Nov 22, 2022. The patient describes the pain as dull but it does not go down arsm. The pain is worse with activity and improved by rest. There is both hands (R>L) associated numbness and tingling. There is no subjective weakness. Prior treatments have included aleve, but no PT, ZAIN or surgery.     The Patient denies myelopathic symptoms such as handwriting changes or difficulty with buttons/coins/keys. Denies perineal paresthesias, bowel/bladder dysfunction.    The patient does not smoke, have DM or endorse IVDU. The patient is not on any blood thinners and does not take chronic narcotics. She works as a .    PAST MEDICAL/SURGICAL HISTORY:  Past Medical History:   Diagnosis Date    Allergy     Anemia 4/20/2020    Anxiety disorder     B12 deficiency     Cataract     Colitis     resolved; rectum 2005; neg flex sig 2006    Colitis: 2005 rectal area     resolved; rectum 2005; neg flex sig 2006     Iron deficiency     Menstrual migraine without status migrainosus, not intractable 9/5/2017    Vitamin D deficiency      Past Surgical History:   Procedure Laterality Date    COLONOSCOPY N/A 10/15/2020    Procedure: COLONOSCOPY;  Surgeon: Vern Harkins MD;  Location: [address]);  Service: Endoscopy;  Laterality: N/A;  [facility] test 10/12-[facility]    ESOPHAGOGASTRODUODENOSCOPY N/A 12/2/2022    Procedure: EGD (ESOPHAGOGASTRODUODENOSCOPY);  Surgeon: Patricio Silva MD;  Location: [address]);  Service: Endoscopy;  Laterality: N/A;  per referral/ prep ins. on portal - ERW  12/1 SWP-AWARE OF NEW ARRIVAL TIME-RT    GANGLION CYST EXCISION  2017    shoulder injection Left 05/2022       Current Medications:   Current Outpatient  Medications:     pantoprazole (PROTONIX) 20 MG tablet, TAKE 1 TABLET(20 MG) BY MOUTH EVERY DAY, Disp: 90 tablet, Rfl: 2    celecoxib (CELEBREX) 200 MG capsule, Take 1 capsule (200 mg total) by mouth once daily., Disp: 60 capsule, Rfl: 1    methocarbamoL (ROBAXIN) 500 MG Tab, Take 1 tablet (500 mg total) by mouth 3 (three) times daily., Disp: 60 tablet, Rfl: 1    Social History:   Social History     Socioeconomic History    Marital status:    Tobacco Use    Smoking status: Never    Smokeless tobacco: Never   Substance and Sexual Activity    Alcohol use: Yes     Alcohol/week: 1.0 standard drink     Types: 1 Glasses of wine per week     Comment: 1-2 glasses of wine a week     Drug use: No    Sexual activity: Yes     Partners: Male     Birth control/protection: Condom     Comment: , menarche 14, no hx of abnormal    Other Topics Concern    Are you pregnant or think you may be? No    Breast-feeding No   Social History Narrative         - Hindu    Parents - Mata and Robina Del Angel        , 3 step kids and 4 grands. Works as  for [employer].     Social Determinants of Health     Financial Resource Strain: Low Risk     Difficulty of Paying Living Expenses: Not hard at all   Food Insecurity: No Food Insecurity    Worried About Running Out of Food in the Last Year: Never true    Ran Out of Food in the Last Year: Never true   Transportation Needs: No Transportation Needs    Lack of Transportation (Medical): No    Lack of Transportation (Non-Medical): No   Physical Activity: Insufficiently Active    Days of Exercise per Week: 2 days    Minutes of Exercise per Session: 30 min   Stress: No Stress Concern Present    Feeling of Stress : Only a little   Social Connections: Unknown    Frequency of Communication with Friends and Family: More than three times a week    Frequency of Social Gatherings with Friends and Family: Once a week    Active Member of Clubs or  "Organizations: Yes    Attends Club or Organization Meetings: More than 4 times per year    Marital Status:    Housing Stability: Low Risk     Unable to Pay for Housing in the Last Year: No    Number of Places Lived in the Last Year: 1    Unstable Housing in the Last Year: No       REVIEW OF SYSTEMS:  Constitution: Negative. Negative for chills, fever and night sweats.   Cardiovascular: Negative for chest pain and syncope.   Respiratory: Negative for cough and shortness of breath.   Gastrointestinal: See HPI. Negative for nausea/vomiting. Negative for abdominal pain.  Genitourinary: See HPI. Negative for discoloration or dysuria.  Skin: Negative for dry skin, itching and rash.   Hematologic/Lymphatic: negative for bleeding/clotting disorders.   Musculoskeletal: Negative for falls and muscle weakness.   Neurological: See HPI. no history of seizures. no history of cranial surgery or shunts.  Endocrine: Negative for polydipsia, polyphagia and polyuria.   Allergic/Immunologic: Negative for hives and persistent infections.  Psychiatric/Behavioral: Negative for depression and insomnia.         EXAM:  Ht 5' 6" (1.676 m)   Wt 64.1 kg (141 lb 5 oz)   BMI 22.81 kg/m²     General: The patient is a 52 y.o. female in no apparent distress, the patient is orientatied to person, place and time.  Psych: Normal mood and affect  HEENT: Vision grossly intact, hearing intact to the spoken word.  Lungs: Respirations unlabored.  Gait: Normal station and gait, no difficulty with toe or heel walk.   Skin: Cervical skin negative for rashes, lesions, hairy patches and surgical scars.  Range of motion: Cervical range of motion is acceptable. There is posterior cervical tenderness to palpation.  Spinal Balance: Global saggital and coronal spinal balance acceptable, no significant for scoliosis and kyphosis.  Musculoskeletal: No pain with the range of motion of the bilateral shoulders and elbows. Normal bulk and contour of the bilateral " hands.  Vascular: Bilateral hands warm and well perfused, Radial pulses 2+ bilaterally.  Neurological: Normal strength and tone in all major motor groups in the bilateral upper and lower extremities. Normal sensation to light touch in the C5-T1 and L2-S1 dermatomes bilaterally.  Deep tendon reflexes symmetric 2+ in the bilateral upper and lower extremities.  Negative Inverted Radial Reflex and Nunes's bilaterally. Negative Babinski bilaterally.     IMAGING:   Today I independently reviewed the following images and my interpretations are as follows:    AP, Lat and Flex/Ex  upright C-spine films demonstrate spondylosis and DDD without listhesis.    DEXA in 2021 showed lumbar T-score of -1.0    Body mass index is 22.81 kg/m².  No results found for: HGBA1C    ASSESSMENT/PLAN:  Cervical spondylosis  -     Ambulatory referral/consult to Physical/Occupational Therapy; Future; Expected date: 03/27/2023  -     celecoxib (CELEBREX) 200 MG capsule; Take 1 capsule (200 mg total) by mouth once daily.  Dispense: 60 capsule; Refill: 1  -     methocarbamoL (ROBAXIN) 500 MG Tab; Take 1 tablet (500 mg total) by mouth 3 (three) times daily.  Dispense: 60 tablet; Refill: 1    DDD (degenerative disc disease), cervical      Follow up if symptoms worsen or fail to improve.    Patient has cervical spondylosis. I discussed the natural history of their diagnoses as well as surgical and nonsurgical treatment options. I educated the patient on the importance of core/back strengthening, correct posture, bending/lifting ergonomics, and low-impact aerobic exercises (walking, elliptical, and aquatherapy). I prescribed celebrex and robaxin. I will refer the patient to PT for neck ROM. Patient will follow up PRN. Next step is a cervical MRI.    I have provided the patient with a home exercise program. It is the [employer] cervical exercise program. Exercises include walking erectly with neutral head position, supine neutral head  position, supine retraction, sitting or standing neck retraction, posture training, forward/backward/sideward isometric strengthening, prone head lifts, supine head lifts, scapular retraction, and neck rotation. Pt will complete each exercise twice daily for 6-8 weeks.    Joe Johnston MD  Orthopaedic Spine Surgeon  Department of Orthopaedic Surgery  [phone]

## 2023-04-11 ENCOUNTER — OFFICE VISIT (OUTPATIENT)
Dept: OTOLARYNGOLOGY | Facility: CLINIC | Age: 53
End: 2023-04-11
Payer: COMMERCIAL

## 2023-04-11 ENCOUNTER — CLINICAL SUPPORT (OUTPATIENT)
Dept: OTOLARYNGOLOGY | Facility: CLINIC | Age: 53
End: 2023-04-11
Payer: COMMERCIAL

## 2023-04-11 VITALS
HEART RATE: 75 BPM | SYSTOLIC BLOOD PRESSURE: 127 MMHG | DIASTOLIC BLOOD PRESSURE: 75 MMHG | BODY MASS INDEX: 22.39 KG/M2 | HEIGHT: 66 IN | WEIGHT: 139.31 LBS

## 2023-04-11 DIAGNOSIS — M54.2 ANTERIOR NECK PAIN: Chronic | ICD-10-CM

## 2023-04-11 DIAGNOSIS — G54.2 CERVICAL PLEXUS NEUROPATHY: Chronic | ICD-10-CM

## 2023-04-11 DIAGNOSIS — H92.02 OTALGIA, LEFT EAR: Primary | ICD-10-CM

## 2023-04-11 DIAGNOSIS — H92.02 OTALGIA, LEFT: Primary | ICD-10-CM

## 2023-04-11 PROCEDURE — 92552 PR PURE TONE AUDIOMETRY, AIR: ICD-10-PCS | Mod: S$GLB,,, | Performed by: PHYSICIAN ASSISTANT

## 2023-04-11 PROCEDURE — 99999 PR PBB SHADOW E&M-EST. PATIENT-LVL I: ICD-10-PCS | Mod: PBBFAC,,, | Performed by: PHYSICIAN ASSISTANT

## 2023-04-11 PROCEDURE — 99999 PR PBB SHADOW E&M-EST. PATIENT-LVL III: ICD-10-PCS | Mod: PBBFAC,,, | Performed by: OTOLARYNGOLOGY

## 2023-04-11 PROCEDURE — 99999 PR PBB SHADOW E&M-EST. PATIENT-LVL I: CPT | Mod: PBBFAC,,, | Performed by: PHYSICIAN ASSISTANT

## 2023-04-11 PROCEDURE — 92567 TYMPANOMETRY: CPT | Mod: S$GLB,,, | Performed by: PHYSICIAN ASSISTANT

## 2023-04-11 PROCEDURE — 92552 PURE TONE AUDIOMETRY AIR: CPT | Mod: S$GLB,,, | Performed by: PHYSICIAN ASSISTANT

## 2023-04-11 PROCEDURE — 99204 OFFICE O/P NEW MOD 45 MIN: CPT | Mod: S$GLB,,, | Performed by: OTOLARYNGOLOGY

## 2023-04-11 PROCEDURE — 99999 PR PBB SHADOW E&M-EST. PATIENT-LVL III: CPT | Mod: PBBFAC,,, | Performed by: OTOLARYNGOLOGY

## 2023-04-11 PROCEDURE — 92556 SPEECH AUDIOMETRY COMPLETE: CPT | Mod: S$GLB,,, | Performed by: PHYSICIAN ASSISTANT

## 2023-04-11 PROCEDURE — 92556 PR SPEECH AUDIOMETRY, COMPLETE: ICD-10-PCS | Mod: S$GLB,,, | Performed by: PHYSICIAN ASSISTANT

## 2023-04-11 PROCEDURE — 99204 PR OFFICE/OUTPT VISIT, NEW, LEVL IV, 45-59 MIN: ICD-10-PCS | Mod: S$GLB,,, | Performed by: OTOLARYNGOLOGY

## 2023-04-11 PROCEDURE — 92567 PR TYMPA2METRY: ICD-10-PCS | Mod: S$GLB,,, | Performed by: PHYSICIAN ASSISTANT

## 2023-04-11 NOTE — PROGRESS NOTES
Brissa Mason, a 53 y.o. female, was seen today in the clinic for an audiologic evaluation.  Patients main complaint was left ear discomfort since December.      Audiogram results revealed normal hearing sensitivity bilaterally.  Speech reception thresholds were noted at 0 dB in the right ear and 0 dB in the left ear.  Speech discrimination scores were 100% in the right ear and 100% in the left ear.  Tympanometry revealed Type A in the right ear and Type A in the left ear.     Recommendations:  Otologic evaluation  Annual audiogram  Hearing protection when in noise

## 2023-04-11 NOTE — PROGRESS NOTES
Chief Complaint   Patient presents with    Other     Left ear intermittent discomfort    .     HPI: Brissa Mason is 53 y.o. female who is self-referred for evaluation of  ear pain/discomfort. She localizes the pain to below the left ear. She notes it may be worse when she moves a certain way or when she yawns. She noted that she moved her ear a certain way and felt that she could not hear.  She took some wax out of her ear that might  have helped.   Symptoms began 5 months ago and are unchanged since that time. She noted that she had neck pain. This has been getting better with Celebrex and exercises. Patient denies hearing loss, drainage from ear, post-auricular pain. No prior otologic surgery.        Past Medical History:   Diagnosis Date    Allergy     Anemia 4/20/2020    Anxiety disorder     B12 deficiency     Cataract     Colitis     resolved; rectum 2005; neg flex sig 2006    Colitis: 2005 rectal area     resolved; rectum 2005; neg flex sig 2006     Iron deficiency     Menstrual migraine without status migrainosus, not intractable 9/5/2017    Vitamin D deficiency      Social History     Socioeconomic History    Marital status:    Tobacco Use    Smoking status: Never    Smokeless tobacco: Never   Substance and Sexual Activity    Alcohol use: Yes     Alcohol/week: 1.0 standard drink     Types: 1 Glasses of wine per week     Comment: 1-2 glasses of wine a week     Drug use: No    Sexual activity: Yes     Partners: Male     Birth control/protection: Condom     Comment: , menarche 14, no hx of abnormal    Other Topics Concern    Are you pregnant or think you may be? No    Breast-feeding No   Social History Narrative         - Rastafarian    Parents - Mata and Robina Del Angel        , 3 step kids and 4 grands. Works as  for [employer].     Social Determinants of Health     Financial Resource Strain: Low Risk     Difficulty of Paying Living Expenses:  Not hard at all   Food Insecurity: No Food Insecurity    Worried About Running Out of Food in the Last Year: Never true    Ran Out of Food in the Last Year: Never true   Transportation Needs: No Transportation Needs    Lack of Transportation (Medical): No    Lack of Transportation (Non-Medical): No   Physical Activity: Insufficiently Active    Days of Exercise per Week: 2 days    Minutes of Exercise per Session: 30 min   Stress: No Stress Concern Present    Feeling of Stress : Only a little   Social Connections: Unknown    Frequency of Communication with Friends and Family: More than three times a week    Frequency of Social Gatherings with Friends and Family: Once a week    Active Member of Clubs or Organizations: Yes    Attends Club or Organization Meetings: More than 4 times per year    Marital Status:    Housing Stability: Low Risk     Unable to Pay for Housing in the Last Year: No    Number of Places Lived in the Last Year: 1    Unstable Housing in the Last Year: No     Past Surgical History:   Procedure Laterality Date    COLONOSCOPY N/A 10/15/2020    Procedure: COLONOSCOPY;  Surgeon: Vern Harkins MD;  Location: [facility] ([address] [place] [address]);  Service: Endoscopy;  Laterality: N/A;  covid test 10/12-[facility]    ESOPHAGOGASTRODUODENOSCOPY N/A 12/2/2022    Procedure: EGD (ESOPHAGOGASTRODUODENOSCOPY);  Surgeon: Patricio Silva MD;  Location: [address]);  Service: Endoscopy;  Laterality: N/A;  per referral/ prep ins. on portal - ERW  12/1 SWP-AWARE OF NEW ARRIVAL TIME-RT    GANGLION CYST EXCISION  2017    shoulder injection Left 05/2022     Family History   Problem Relation Age of Onset    Colon polyps Mother         noncancerous colon tumor; polyps    Hyperlipidemia Mother     Osteoporosis Mother     Colon cancer Mother         pre cacer    Depression Mother         One time    Dementia Mother         lewy body    Gout Father     Hypertension Father     Hypertension Sister     Glaucoma Sister      Hyperlipidemia Brother     Hypertension Brother     Psoriasis Brother     Cancer Maternal Uncle         colon    Colon cancer Maternal Uncle     Breast cancer Paternal Aunt 60    Breast cancer Paternal Aunt     Depression Paternal Grandmother         One time    Miscarriages / Stillbirths Neg Hx     Amblyopia Neg Hx     Blindness Neg Hx     Cataracts Neg Hx     Diabetes Neg Hx     Macular degeneration Neg Hx     Retinal detachment Neg Hx     Strabismus Neg Hx     Stroke Neg Hx     Thyroid disease Neg Hx     Melanoma Neg Hx     Ovarian cancer Neg Hx            Review of Systems  General: negative for chills, fever or weight loss  Psychological: negative for mood changes or depression  Ophthalmic: negative for blurry vision, photophobia or eye pain  ENT: see HPI  Respiratory: no cough, shortness of breath, or wheezing  Cardiovascular: no chest pain or dyspnea on exertion  Gastrointestinal: no abdominal pain, change in bowel habits, or black/ bloody stools  Musculoskeletal: negative for gait disturbance or muscular weakness  Neurological: no syncope or seizures; no ataxia  Dermatological: negative for puritis,  rash and jaundice  Hematologic/lymphatic: no easy bruising, no new lumps or bumps      Physical Exam:    Vitals:    04/11/23 0816   BP: 127/75   Pulse: 75       Constitutional: Well appearing / communicating without difficutly.  NAD.  Eyes: EOM I Bilaterally  Head/Face: Normocephalic.  Negative paranasal sinus pressure/tenderness.  Salivary glands WNL.  House Brackmann I Bilaterally.    Right Ear: Auricle normal appearance. External Auditory Canal within normal limits no lesions or masses,TM w/o masses/lesions/perforations. TM mobility noted.   Left Ear: Auricle normal appearance. External Auditory Canal within normal limits no lesions or masses,TM w/o masses/lesions/perforations. TM mobility noted.  Nose: No gross nasal septal deviation. Inferior Turbinates 3+ bilaterally. No septal perforation. No  masses/lesions. External nasal skin appears normal without masses/lesions.  Oral Cavity: Gingiva/lips within normal limits.  Dentition/gingiva healthy appearing. Mucus membranes moist. Floor of mouth soft, no masses palpated. Oral Tongue mobile. Hard Palate appears normal.    Oropharynx: Base of tongue appears normal. No masses/lesions noted. Tonsillar fossa/pharyngeal wall without lesions. Posterior oropharynx WNL.  Soft palate without masses. Midline uvula.   Neck/Lymphatic: No LAD I-VI bilaterally.  No thyromegaly.  No masses noted on exam.        Diagnostic testing reviewed:    Audiogram interpreted personally by me and discussed in detail with the patient today. Normal hearing bilaterally.Tympanometry shows type A tympanograms bilaterally.             Assessment:    ICD-10-CM ICD-9-CM    1. Otalgia, left  H92.02 388.70       2. Anterior neck pain  M54.2 723.1       3. Cervical plexus neuropathy  G54.2 353.2 CT Soft Tissue Neck With Contrast        The primary encounter diagnosis was Otalgia, left. Diagnoses of Anterior neck pain and Cervical plexus neuropathy were also pertinent to this visit.      Plan:  Orders Placed This Encounter   Procedures    CT Soft Tissue Neck With Contrast     Reassurance provided that I do not see a primary otologic cause for symptoms.   Suspect cervical plexus neuropathy/musculoskeletal cause of patient's neck pain.  Will obtain CT scan soft tissue neck to further evaluate for any other underlying pathology.  Continue Celebrex and physical therapy as recommended by orthopedics.       Marylou Ordoñez MD

## 2023-04-18 ENCOUNTER — HOSPITAL ENCOUNTER (OUTPATIENT)
Dept: RADIOLOGY | Facility: HOSPITAL | Age: 53
Discharge: HOME | End: 2023-04-18
Attending: OTOLARYNGOLOGY
Payer: COMMERCIAL

## 2023-04-18 DIAGNOSIS — G54.2 CERVICAL PLEXUS NEUROPATHY: ICD-10-CM

## 2023-04-18 PROCEDURE — 70491 CT SOFT TISSUE NECK W/DYE: CPT | Mod: TC

## 2023-04-18 PROCEDURE — 25500020 PHARM REV CODE 255: Performed by: OTOLARYNGOLOGY

## 2023-04-18 PROCEDURE — 70491 CT SOFT TISSUE NECK WITH CONTRAST: ICD-10-PCS | Mod: 26,,, | Performed by: RADIOLOGY

## 2023-04-18 PROCEDURE — 70491 CT SOFT TISSUE NECK W/DYE: CPT | Mod: 26,,, | Performed by: RADIOLOGY

## 2023-04-18 RX ADMIN — IOHEXOL 75 ML: 350 INJECTION, SOLUTION INTRAVENOUS at 05:04

## 2023-04-28 ENCOUNTER — PATIENT MESSAGE (OUTPATIENT)
Dept: OTOLARYNGOLOGY | Facility: CLINIC | Age: 53
End: 2023-04-28
Payer: COMMERCIAL

## 2023-05-03 ENCOUNTER — PATIENT MESSAGE (OUTPATIENT)
Dept: OBSTETRICS AND GYNECOLOGY | Facility: CLINIC | Age: 53
End: 2023-05-03
Payer: COMMERCIAL

## 2023-05-09 ENCOUNTER — PATIENT MESSAGE (OUTPATIENT)
Dept: OTOLARYNGOLOGY | Facility: CLINIC | Age: 53
End: 2023-05-09
Payer: COMMERCIAL

## 2023-05-10 RX ORDER — AMOXICILLIN AND CLAVULANATE POTASSIUM 875; 125 MG/1; MG/1
1 TABLET, FILM COATED ORAL 2 TIMES DAILY
Qty: 28 TABLET | Refills: 0 | Status: SHIPPED | OUTPATIENT
Start: 2023-05-10 | End: 2023-05-24

## 2023-05-11 ENCOUNTER — PROCEDURE VISIT (OUTPATIENT)
Dept: OBSTETRICS AND GYNECOLOGY | Facility: CLINIC | Age: 53
End: 2023-05-11
Payer: COMMERCIAL

## 2023-05-11 VITALS
SYSTOLIC BLOOD PRESSURE: 140 MMHG | BODY MASS INDEX: 22.25 KG/M2 | WEIGHT: 138.44 LBS | HEIGHT: 66 IN | DIASTOLIC BLOOD PRESSURE: 80 MMHG

## 2023-05-11 DIAGNOSIS — Z32.02 NEGATIVE PREGNANCY TEST: ICD-10-CM

## 2023-05-11 DIAGNOSIS — N93.9 ABNORMAL UTERINE BLEEDING (AUB): Primary | ICD-10-CM

## 2023-05-11 PROCEDURE — 88305 TISSUE EXAM BY PATHOLOGIST: ICD-10-PCS | Mod: 26,,, | Performed by: PATHOLOGY

## 2023-05-11 PROCEDURE — 88305 TISSUE EXAM BY PATHOLOGIST: CPT | Mod: 26,,, | Performed by: PATHOLOGY

## 2023-05-11 PROCEDURE — 88305 TISSUE EXAM BY PATHOLOGIST: CPT | Performed by: PATHOLOGY

## 2023-05-11 PROCEDURE — 58100 ENDOMETRIAL BIOPSY: ICD-10-PCS | Mod: S$GLB,,, | Performed by: OBSTETRICS & GYNECOLOGY

## 2023-05-11 PROCEDURE — 58100 BIOPSY OF UTERUS LINING: CPT | Mod: S$GLB,,, | Performed by: OBSTETRICS & GYNECOLOGY

## 2023-05-11 NOTE — PROCEDURES
Endometrial biopsy    Date/Time: 5/11/2023 11:00 AM  Performed by: Bran Oden IV, MD  Authorized by: Bran Oden IV, MD     Consent:     Consent obtained:  Written    Consent given by:  Patient    Patient questions answered: yes      Patient agrees, verbalizes understanding, and wants to proceed: yes      Educational handouts given: no      Instructions and paperwork completed: yes    Indication:     Indications: Other disorder of menstruation and other abnormal bleeding from female genital tract      Indications comment:  Abnormal uterine bleeding  Pre-procedure:     Pre-procedure timeout performed: yes    Procedure:     Procedure: endometrial biopsy with Pipelle      Cervix cleaned and prepped: yes (Betadine solution)      A paracervical block was performed: no      An intracervical block was performed: no      The cervix was dilated: yes (Disposable dilator)      Uterus sounded: yes      Uterus sound depth (cm):  7    Curettes used:  2    Specimen collected: specimen collected and sent to pathology      Patient tolerated procedure well with no complications: yes    Comments:     Procedure comments:  Will await biopsy results and offer follow-up recommendations.  Patient desires conservative management at this time.    Bran Oden IV, MD

## 2023-05-19 LAB
FINAL PATHOLOGIC DIAGNOSIS: NORMAL
GROSS: NORMAL
Lab: NORMAL

## 2023-07-06 ENCOUNTER — PATIENT MESSAGE (OUTPATIENT)
Dept: ORTHOPEDICS | Facility: CLINIC | Age: 53
End: 2023-07-06
Payer: COMMERCIAL

## 2023-07-06 DIAGNOSIS — M47.812 CERVICAL SPONDYLOSIS: Primary | ICD-10-CM

## 2023-07-06 RX ORDER — METHYLPREDNISOLONE 4 MG/1
TABLET ORAL
Qty: 1 EACH | Refills: 0 | Status: SHIPPED | OUTPATIENT
Start: 2023-07-06 | End: 2023-07-27

## 2023-07-06 NOTE — PROGRESS NOTES
Spoke with pt virtually. Pt was last seen by Dr. Johnston on 3/21/23 and continues to have neck and bilateral arm pain with new radiation to the right upper arm. Pt has tried home exercises and celebrex for 8 weeks with worsening of pain. Pain is 8/10. Dr. Johnston provided the patient with a home exercise program. It is the [employer] cervical exercise program. Exercises include walking erectly with neutral head position, supine neutral head position, supine retraction, sitting or standing neck retraction, posture training, forward/backward/sideward isometric strengthening, prone head lifts, supine head lifts, scapular retraction, and neck rotation. Pt completed each exercise daily for one hour with worsening of pain. Will obtain MRI to further evaluate and call with results.

## 2023-07-09 ENCOUNTER — PATIENT MESSAGE (OUTPATIENT)
Dept: ORTHOPEDICS | Facility: CLINIC | Age: 53
End: 2023-07-09
Payer: COMMERCIAL

## 2023-07-11 ENCOUNTER — PATIENT MESSAGE (OUTPATIENT)
Dept: ORTHOPEDICS | Facility: CLINIC | Age: 53
End: 2023-07-11
Payer: COMMERCIAL

## 2023-07-11 ENCOUNTER — HOSPITAL ENCOUNTER (EMERGENCY)
Facility: HOSPITAL | Age: 53
Discharge: HOME | End: 2023-07-11
Attending: EMERGENCY MEDICINE
Payer: COMMERCIAL

## 2023-07-11 VITALS
WEIGHT: 138 LBS | OXYGEN SATURATION: 98 % | SYSTOLIC BLOOD PRESSURE: 191 MMHG | TEMPERATURE: 98 F | HEART RATE: 70 BPM | BODY MASS INDEX: 22.27 KG/M2 | RESPIRATION RATE: 18 BRPM | DIASTOLIC BLOOD PRESSURE: 93 MMHG

## 2023-07-11 DIAGNOSIS — M54.12 CERVICAL RADICULOPATHY: Primary | ICD-10-CM

## 2023-07-11 PROCEDURE — 99282 EMERGENCY DEPT VISIT SF MDM: CPT

## 2023-07-11 NOTE — ED PROVIDER NOTES
Chief Complaint   Neck Pain (Pt has been having neck pain that has gotten worse the past 2 weeks. MRI yesterday for this. Nothing has relieved the pain thus far. )      History Of Present Illness   Brissa Mason is a 53 y.o. female presenting with neck pain that radiates down the right arm that has been present for a couple of weeks but is significantly worsened over the past few days.  She contacted her spine surgeon and had an MRI done yesterday that has not yet been read.  Denies any bowel or bladder incontinence.  No weakness or numbness in the arms or legs.  She is been taking a Medrol Dosepak with little relief.  Acetaminophen helped somewhat.  She has intermittently been taking her Celebrex and muscle relaxer with inconsistent relief.    History obtained from:  Patient    Review of patient's allergies indicates:   Allergen Reactions    Mobic [meloxicam] Rash    Sulfa (sulfonamide antibiotics)      Other reaction(s): Rash       No current facility-administered medications on file prior to encounter.     Current Outpatient Medications on File Prior to Encounter   Medication Sig Dispense Refill    celecoxib (CELEBREX) 200 MG capsule Take 1 capsule (200 mg total) by mouth once daily. 60 capsule 1    methylPREDNISolone (MEDROL DOSEPACK) 4 mg tablet use as directed 1 each 0       Past History   As per HPI and below:  Past Medical History:   Diagnosis Date    Allergy     Anemia 4/20/2020    Anxiety disorder     B12 deficiency     Cataract     Colitis     resolved; rectum 2005; neg flex sig 2006    Colitis: 2005 rectal area     resolved; rectum 2005; neg flex sig 2006     Iron deficiency     Menstrual migraine without status migrainosus, not intractable 9/5/2017    Vitamin D deficiency      Past Surgical History:   Procedure Laterality Date    COLONOSCOPY N/A 10/15/2020    Procedure: COLONOSCOPY;  Surgeon: Vern Harkins MD;  Location: [facility] ([address] [place] [address]);  Service: Endoscopy;  Laterality: N/A;  covid  test 10/12-[facility]    ESOPHAGOGASTRODUODENOSCOPY N/A 12/2/2022    Procedure: EGD (ESOPHAGOGASTRODUODENOSCOPY);  Surgeon: Patricio Silva MD;  Location: [facility] ([address] [place] [address]);  Service: Endoscopy;  Laterality: N/A;  per referral/ prep ins. on portal - ERW  12/1 SWP-AWARE OF NEW ARRIVAL TIME-RT    GANGLION CYST EXCISION  2017    shoulder injection Left 05/2022       Social History     Socioeconomic History    Marital status:    Tobacco Use    Smoking status: Never    Smokeless tobacco: Never   Substance and Sexual Activity    Alcohol use: Yes     Alcohol/week: 1.0 standard drink     Types: 1 Glasses of wine per week     Comment: 1-2 glasses of wine a week     Drug use: No    Sexual activity: Yes     Partners: Male     Birth control/protection: Condom     Comment: , menarche 14, no hx of abnormal    Other Topics Concern    Are you pregnant or think you may be? No    Breast-feeding No   Social History Narrative         - Religious    Parents - Mata and Robina Del Angel        , 3 step kids and 4 grands. Works as  for [employer].     Social Determinants of Health     Financial Resource Strain: Low Risk     Difficulty of Paying Living Expenses: Not hard at all   Food Insecurity: No Food Insecurity    Worried About Running Out of Food in the Last Year: Never true    Ran Out of Food in the Last Year: Never true   Transportation Needs: No Transportation Needs    Lack of Transportation (Medical): No    Lack of Transportation (Non-Medical): No   Physical Activity: Insufficiently Active    Days of Exercise per Week: 2 days    Minutes of Exercise per Session: 30 min   Stress: No Stress Concern Present    Feeling of Stress : Only a little   Social Connections: Unknown    Frequency of Communication with Friends and Family: More than three times a week    Frequency of Social Gatherings with Friends and Family: Once a week    Active Member of Clubs or Organizations: Yes     Attends Club or Organization Meetings: More than 4 times per year    Marital Status:    Housing Stability: Low Risk     Unable to Pay for Housing in the Last Year: No    Number of Places Lived in the Last Year: 1    Unstable Housing in the Last Year: No       Family History   Problem Relation Age of Onset    Colon polyps Mother         noncancerous colon tumor; polyps    Hyperlipidemia Mother     Osteoporosis Mother     Colon cancer Mother         pre cacer    Depression Mother         One time    Dementia Mother         lewy body    Gout Father     Hypertension Father     Hypertension Sister     Glaucoma Sister     Hyperlipidemia Brother     Hypertension Brother     Psoriasis Brother     Cancer Maternal Uncle         colon    Colon cancer Maternal Uncle     Breast cancer Paternal Aunt 60    Breast cancer Paternal Aunt     Depression Paternal Grandmother         One time    Miscarriages / Stillbirths Neg Hx     Amblyopia Neg Hx     Blindness Neg Hx     Cataracts Neg Hx     Diabetes Neg Hx     Macular degeneration Neg Hx     Retinal detachment Neg Hx     Strabismus Neg Hx     Stroke Neg Hx     Thyroid disease Neg Hx     Melanoma Neg Hx     Ovarian cancer Neg Hx        Physical Exam     Vitals:    07/11/23 0557 07/11/23 0558   BP: (!) 191/93    Pulse: 70    Resp: 18    Temp:  98.4 °F (36.9 °C)   TempSrc:  Oral   SpO2: 98%    Weight: 62.6 kg (138 lb)      Appearance: No acute distress.  Skin: No rashes seen.  Good turgor.  No abrasions.  No ecchymoses.  Eyes: No conjunctival injection.  Abdomen: Soft.  Not distended.  Nontender.  No guarding.  No rebound.  Musculoskeletal: Good range of motion all joints.  No deformities.  Neck supple.  No meningismus.  Neurologic: Motor intact including intrinsic hand muscles.  Sensation intact.  Cranial nerves intact.  Mental Status:  Alert and oriented x 3.  Appropriate, conversant.    Medications Given   Medications - No data to display    Results and Course   Labs Reviewed  - No data to display    Imaging Results    None                  MDM, Impression and Plan   53 y.o. female with cervical radiculopathy that has been worsening over the past few days.  No weakness or numbness any: No bowel or bladder incontinence.  Highly doubt cauda equina or other serious cervical cord compromise.  Chart review shows MRI has not been read yet.  She does not have any physical exam features that suggest a need for emergent surgery.  We had an extensive discussion about pharmacotherapy.  The patient is reluctant to start opiates and has concerns about how they have made her feel in the past and the potential for addiction.  We agreed that the patient should maximize her non opiate therapy and then discuss next steps with her spine surgeon.  I have also placed a referral to pain management for possible injection treatment.         Final diagnoses:  [M54.12] Cervical radiculopathy (Primary)        ED Disposition Condition    Discharge Stable          ED Prescriptions    None       Follow-up Information       Follow up With Specialties Details Why Contact Info    Joe Johnston MD Orthopedic Surgery Call   [address] [facility] [address] [id number] [phone]                 Wiliam Gaitan MD  07/11/23 0625

## 2023-07-11 NOTE — TELEPHONE ENCOUNTER
Spoke with patient and she stated that she went to the hospital last night for her back for pain and they gave her some medication that helped a little bit. Also they would not let her know what her MRI results stated  so she would like to move  appointment up. Rescheduled appointment and she stated an verbal understanding.

## 2023-07-11 NOTE — DISCHARGE INSTRUCTIONS
Continue Celebrex and methocarbamol as prescribed    Continue acetaminophen (Tylenol), maximum 3000 mg per day

## 2023-08-23 ENCOUNTER — PATIENT MESSAGE (OUTPATIENT)
Dept: OBSTETRICS AND GYNECOLOGY | Facility: CLINIC | Age: 53
End: 2023-08-23
Payer: COMMERCIAL

## 2023-08-24 NOTE — TELEPHONE ENCOUNTER
Rescheduled 7/1/22 appointment to 6/30/22 due to Dr. Christie being out of the office.   Acitretin Pregnancy And Lactation Text: This medication is Pregnancy Category X and should not be given to women who are pregnant or may become pregnant in the future. This medication is excreted in breast milk.

## 2023-08-30 ENCOUNTER — PATIENT MESSAGE (OUTPATIENT)
Dept: OBSTETRICS AND GYNECOLOGY | Facility: CLINIC | Age: 53
End: 2023-08-30
Payer: COMMERCIAL

## 2023-08-31 ENCOUNTER — TELEPHONE (OUTPATIENT)
Dept: OBSTETRICS AND GYNECOLOGY | Facility: CLINIC | Age: 53
End: 2023-08-31
Payer: COMMERCIAL

## 2023-08-31 DIAGNOSIS — N95.0 PMB (POSTMENOPAUSAL BLEEDING): Primary | ICD-10-CM

## 2023-09-07 ENCOUNTER — HOSPITAL ENCOUNTER (OUTPATIENT)
Dept: RADIOLOGY | Facility: OTHER | Age: 53
Discharge: HOME | End: 2023-09-07
Attending: OBSTETRICS & GYNECOLOGY
Payer: COMMERCIAL

## 2023-09-07 DIAGNOSIS — N95.0 PMB (POSTMENOPAUSAL BLEEDING): ICD-10-CM

## 2023-09-07 PROCEDURE — 76830 TRANSVAGINAL US NON-OB: CPT | Mod: 26,,, | Performed by: RADIOLOGY

## 2023-09-07 PROCEDURE — 76856 US PELVIS COMP WITH TRANSVAG NON-OB (XPD): ICD-10-PCS | Mod: 26,,, | Performed by: RADIOLOGY

## 2023-09-07 PROCEDURE — 76830 US PELVIS COMP WITH TRANSVAG NON-OB (XPD): ICD-10-PCS | Mod: 26,,, | Performed by: RADIOLOGY

## 2023-09-07 PROCEDURE — 76830 TRANSVAGINAL US NON-OB: CPT | Mod: TC

## 2023-09-07 PROCEDURE — 76856 US EXAM PELVIC COMPLETE: CPT | Mod: 26,,, | Performed by: RADIOLOGY

## 2023-09-11 ENCOUNTER — PATIENT MESSAGE (OUTPATIENT)
Dept: ADMINISTRATIVE | Facility: HOSPITAL | Age: 53
End: 2023-09-11
Payer: COMMERCIAL

## 2023-09-15 ENCOUNTER — HOSPITAL ENCOUNTER (OUTPATIENT)
Dept: RADIOLOGY | Facility: HOSPITAL | Age: 53
Discharge: HOME | End: 2023-09-15
Attending: OBSTETRICS & GYNECOLOGY
Payer: COMMERCIAL

## 2023-09-15 DIAGNOSIS — Z12.31 VISIT FOR SCREENING MAMMOGRAM: ICD-10-CM

## 2023-09-15 PROCEDURE — 77067 SCR MAMMO BI INCL CAD: CPT | Mod: 26,,, | Performed by: RADIOLOGY

## 2023-09-15 PROCEDURE — 77067 MAMMO DIGITAL SCREENING BILAT WITH TOMO: ICD-10-PCS | Mod: 26,,, | Performed by: RADIOLOGY

## 2023-09-15 PROCEDURE — 77063 BREAST TOMOSYNTHESIS BI: CPT | Mod: 26,,, | Performed by: RADIOLOGY

## 2023-09-15 PROCEDURE — 77067 SCR MAMMO BI INCL CAD: CPT | Mod: TC

## 2023-09-15 PROCEDURE — 77063 MAMMO DIGITAL SCREENING BILAT WITH TOMO: ICD-10-PCS | Mod: 26,,, | Performed by: RADIOLOGY

## 2023-09-18 ENCOUNTER — PATIENT MESSAGE (OUTPATIENT)
Dept: OBSTETRICS AND GYNECOLOGY | Facility: CLINIC | Age: 53
End: 2023-09-18
Payer: COMMERCIAL

## 2023-09-19 ENCOUNTER — TELEPHONE (OUTPATIENT)
Dept: OBSTETRICS AND GYNECOLOGY | Facility: CLINIC | Age: 53
End: 2023-09-19
Payer: COMMERCIAL

## 2023-09-19 NOTE — TELEPHONE ENCOUNTER
Patient reviewed ultrasound results, embx 5/2023. Per Dr. Akshat fletcher to monitor bleeding. If pelvic pain occurs, recommend u.s for ov cyst, patient verbalized understanding

## 2023-09-28 ENCOUNTER — HOSPITAL ENCOUNTER (EMERGENCY)
Facility: HOSPITAL | Age: 53
Discharge: HOME | End: 2023-09-28
Attending: EMERGENCY MEDICINE
Payer: COMMERCIAL

## 2023-09-28 VITALS
HEIGHT: 65 IN | RESPIRATION RATE: 18 BRPM | DIASTOLIC BLOOD PRESSURE: 70 MMHG | SYSTOLIC BLOOD PRESSURE: 158 MMHG | HEART RATE: 64 BPM | TEMPERATURE: 98 F | OXYGEN SATURATION: 100 % | BODY MASS INDEX: 22.99 KG/M2 | WEIGHT: 138 LBS

## 2023-09-28 DIAGNOSIS — R51.9 ACUTE NONINTRACTABLE HEADACHE, UNSPECIFIED HEADACHE TYPE: Primary | ICD-10-CM

## 2023-09-28 LAB
ALBUMIN SERPL BCP-MCNC: 4.8 G/DL (ref 3.5–5.2)
ALP SERPL-CCNC: 47 U/L (ref 55–135)
ALT SERPL W/O P-5'-P-CCNC: 16 U/L (ref 10–44)
ANION GAP SERPL CALC-SCNC: 8 MMOL/L (ref 8–16)
AST SERPL-CCNC: 20 U/L (ref 10–40)
B-HCG UR QL: NEGATIVE
BASOPHILS # BLD AUTO: 0.05 K/UL (ref 0–0.2)
BASOPHILS NFR BLD: 0.6 % (ref 0–1.9)
BILIRUB SERPL-MCNC: 0.4 MG/DL (ref 0.1–1)
BUN SERPL-MCNC: 13 MG/DL (ref 6–20)
BUN SERPL-MCNC: 14 MG/DL (ref 6–30)
CALCIUM SERPL-MCNC: 10 MG/DL (ref 8.7–10.5)
CHLORIDE SERPL-SCNC: 101 MMOL/L (ref 95–110)
CHLORIDE SERPL-SCNC: 103 MMOL/L (ref 95–110)
CO2 SERPL-SCNC: 28 MMOL/L (ref 23–29)
CREAT SERPL-MCNC: 0.8 MG/DL (ref 0.5–1.4)
CREAT SERPL-MCNC: 0.9 MG/DL (ref 0.5–1.4)
CTP QC/QA: YES
DIFFERENTIAL METHOD: NORMAL
EOSINOPHIL # BLD AUTO: 0.2 K/UL (ref 0–0.5)
EOSINOPHIL NFR BLD: 1.9 % (ref 0–8)
ERYTHROCYTE [DISTWIDTH] IN BLOOD BY AUTOMATED COUNT: 11.8 % (ref 11.5–14.5)
EST. GFR  (NO RACE VARIABLE): >60 ML/MIN/1.73 M^2
GLUCOSE SERPL-MCNC: 94 MG/DL (ref 70–110)
GLUCOSE SERPL-MCNC: 96 MG/DL (ref 70–110)
HCT VFR BLD AUTO: 41.6 % (ref 37–48.5)
HCT VFR BLD CALC: 45 %PCV (ref 36–54)
HGB BLD-MCNC: 14.4 G/DL (ref 12–16)
IMM GRANULOCYTES # BLD AUTO: 0.01 K/UL (ref 0–0.04)
IMM GRANULOCYTES NFR BLD AUTO: 0.1 % (ref 0–0.5)
LYMPHOCYTES # BLD AUTO: 1.9 K/UL (ref 1–4.8)
LYMPHOCYTES NFR BLD: 24.2 % (ref 18–48)
MCH RBC QN AUTO: 30.1 PG (ref 27–31)
MCHC RBC AUTO-ENTMCNC: 34.6 G/DL (ref 32–36)
MCV RBC AUTO: 87 FL (ref 82–98)
MONOCYTES # BLD AUTO: 0.5 K/UL (ref 0.3–1)
MONOCYTES NFR BLD: 6.5 % (ref 4–15)
NEUTROPHILS # BLD AUTO: 5.2 K/UL (ref 1.8–7.7)
NEUTROPHILS NFR BLD: 66.7 % (ref 38–73)
NRBC BLD-RTO: 0 /100 WBC
PLATELET # BLD AUTO: 269 K/UL (ref 150–450)
PMV BLD AUTO: 10.1 FL (ref 9.2–12.9)
POC IONIZED CALCIUM: 1.24 MMOL/L (ref 1.06–1.42)
POC TCO2 (MEASURED): 34 MMOL/L (ref 23–29)
POTASSIUM BLD-SCNC: 3.8 MMOL/L (ref 3.5–5.1)
POTASSIUM SERPL-SCNC: 3.8 MMOL/L (ref 3.5–5.1)
PROT SERPL-MCNC: 8.3 G/DL (ref 6–8.4)
RBC # BLD AUTO: 4.79 M/UL (ref 4–5.4)
SAMPLE: ABNORMAL
SODIUM BLD-SCNC: 140 MMOL/L (ref 136–145)
SODIUM SERPL-SCNC: 139 MMOL/L (ref 136–145)
WBC # BLD AUTO: 7.84 K/UL (ref 3.9–12.7)

## 2023-09-28 PROCEDURE — 80048 BASIC METABOLIC PNL TOTAL CA: CPT | Mod: XB

## 2023-09-28 PROCEDURE — 80053 COMPREHEN METABOLIC PANEL: CPT | Performed by: PHYSICIAN ASSISTANT

## 2023-09-28 PROCEDURE — 81025 URINE PREGNANCY TEST: CPT | Performed by: PHYSICIAN ASSISTANT

## 2023-09-28 PROCEDURE — 25500020 PHARM REV CODE 255: Performed by: EMERGENCY MEDICINE

## 2023-09-28 PROCEDURE — 99285 EMERGENCY DEPT VISIT HI MDM: CPT | Mod: 25

## 2023-09-28 PROCEDURE — 85025 COMPLETE CBC W/AUTO DIFF WBC: CPT | Performed by: PHYSICIAN ASSISTANT

## 2023-09-28 RX ADMIN — IOHEXOL 75 ML: 350 INJECTION, SOLUTION INTRAVENOUS at 03:09

## 2023-09-28 NOTE — DISCHARGE INSTRUCTIONS
Your CT scan did not show any evidence of brain bleed or aneurysm.  Follow-up with your primary doctor if you continue to have headaches.      Return to the ER immediately for any new or significantly worsening symptoms such as confusion, uncontrolled vomiting, severe and persistent headache, fever greater than 100.4° or any other worrisome symptoms.

## 2023-09-28 NOTE — ED PROVIDER NOTES
"Encounter Date: 9/28/2023       History     Chief Complaint   Patient presents with    Headache     Headache began Tuesday- history "bulging discs" in her neck- describes headache as throbbing /back of head     53-year-old female with below medical history presents to the ED with a chief complaint of headache.  Patient states that she had a sudden onset occipital headache while eating lunch today.  Symptoms began around 11:30 a.m..  She said the pain was severe and throbbing at onset. She reports mild nausea at onset, but this quickly and spontaneously resolved.  The pain has improved without treatment.  Currently rates her pain 2/10.  She states that she did have a similar occipital headache a couple of days ago after having an emotional discussion with work colleagues.  This completely resolved and was not as severe symptoms today.  She denies blurry vision, photosensitivity, nausea, vomiting, head trauma.  She does have a history of cervical disc disease with radiculopathy.  Does not have a history of similar headaches.       Review of patient's allergies indicates:   Allergen Reactions    Mobic [meloxicam] Rash    Sulfa (sulfonamide antibiotics)      Other reaction(s): Rash     Past Medical History:   Diagnosis Date    Allergy     Anemia 4/20/2020    Anxiety disorder     B12 deficiency     Cataract     Colitis     resolved; rectum 2005; neg flex sig 2006    Colitis: 2005 rectal area     resolved; rectum 2005; neg flex sig 2006     Iron deficiency     Menstrual migraine without status migrainosus, not intractable 9/5/2017    Vitamin D deficiency      Past Surgical History:   Procedure Laterality Date    COLONOSCOPY N/A 10/15/2020    Procedure: COLONOSCOPY;  Surgeon: Vern Harkins MD;  Location: [address];  Service: Endoscopy;  Laterality: N/A;  covid test 10/12-[facility]    ESOPHAGOGASTRODUODENOSCOPY N/A 12/2/2022    Procedure: EGD (ESOPHAGOGASTRODUODENOSCOPY);  Surgeon: Patricio Silva MD;  " Location: [facility] (4TH FLR);  Service: Endoscopy;  Laterality: N/A;  per referral/ prep ins. on portal - ERW  12/1 SWP-AWARE OF NEW ARRIVAL TIME-RT    GANGLION CYST EXCISION  2017    shoulder injection Left 05/2022     Family History   Problem Relation Age of Onset    Colon polyps Mother         noncancerous colon tumor; polyps    Hyperlipidemia Mother     Osteoporosis Mother     Colon cancer Mother         pre cacer    Depression Mother         One time    Dementia Mother         lewy body    Gout Father     Hypertension Father     Hypertension Sister     Glaucoma Sister     Hyperlipidemia Brother     Hypertension Brother     Psoriasis Brother     Cancer Maternal Uncle         colon    Colon cancer Maternal Uncle     Breast cancer Paternal Aunt 60    Breast cancer Paternal Aunt     Depression Paternal Grandmother         One time    Miscarriages / Stillbirths Neg Hx     Amblyopia Neg Hx     Blindness Neg Hx     Cataracts Neg Hx     Diabetes Neg Hx     Macular degeneration Neg Hx     Retinal detachment Neg Hx     Strabismus Neg Hx     Stroke Neg Hx     Thyroid disease Neg Hx     Melanoma Neg Hx     Ovarian cancer Neg Hx      Social History     Tobacco Use    Smoking status: Never    Smokeless tobacco: Never   Substance Use Topics    Alcohol use: Yes     Alcohol/week: 1.0 standard drink of alcohol     Types: 1 Glasses of wine per week     Comment: 1-2 glasses of wine a week     Drug use: No     Review of Systems   Neurological:  Positive for headaches.       Physical Exam     Initial Vitals [09/28/23 1252]   BP Pulse Resp Temp SpO2   (!) 184/100 77 15 98.3 °F (36.8 °C) 98 %      MAP       --         Physical Exam    Nursing note and vitals reviewed.  Constitutional: She appears well-developed and well-nourished. She is not diaphoretic.  Non-toxic appearance. She does not appear ill. No distress.   HENT:   Head: Normocephalic and atraumatic.   Neck: Neck supple.   Cardiovascular:  Normal rate and regular rhythm.      Exam reveals no gallop and no friction rub.       No murmur heard.  Pulmonary/Chest: Effort normal and breath sounds normal. No accessory muscle usage. No tachypnea. No respiratory distress. She has no decreased breath sounds. She has no wheezes. She has no rhonchi. She has no rales.   Abdominal: She exhibits no distension.   Musculoskeletal:      Cervical back: Neck supple.     Neurological: She is alert.   Skin: No rash noted.   Psychiatric: She has a normal mood and affect. Her behavior is normal.         ED Course   Procedures  Labs Reviewed   COMPREHENSIVE METABOLIC PANEL - Abnormal; Notable for the following components:       Result Value    Alkaline Phosphatase 47 (*)     All other components within normal limits   ISTAT PROCEDURE - Abnormal; Notable for the following components:    POC TCO2 (MEASURED) 34 (*)     All other components within normal limits   CBC W/ AUTO DIFFERENTIAL   POCT URINE PREGNANCY          Imaging Results              CTA Brain (Final result)  Result time 09/28/23 15:52:37      Final result by Mathieu Kuhn MD (09/28/23 15:52:37)                   Impression:      Brain appears within normal limits.  No evidence of acute hemorrhage or major vascular distribution infarct.    Unremarkable CT arteriogram without evidence of high-grade stenosis, occlusion, or intracranial aneurysm      Electronically signed by: Mathieu Kuhn MD  Date:    09/28/2023  Time:    15:52               Narrative:    EXAMINATION:  CTA HEAD    CLINICAL HISTORY:  Subarachnoid hemorrhage (SAH) suspected;    TECHNIQUE:  Low-dose axial CT were obtained throughout the region of the head without the use of intravenous contrast.    CT angiogram was performed from through the intracranial vasculature during the IV bolus administration of 75mL of Omnipaque 350.  Multiplanar MPR and MIP reformats were performed.    COMPARISON:  None    FINDINGS:  The ventricles are normal in size without evidence of hydrocephalus.    The  brain appears within normal limits. No parenchymal mass, hemorrhage, edema or major vascular distribution infarct.  No enhancing lesions.    No extra-axial blood or fluid collections.    The cranium appears intact. Mastoid air cells and paranasal sinuses are essentially clear.      CTA:    The visualized internal carotid arteries are normal in course and caliber.    The visualized vertebral arteries are normal in course and caliber. Vertebrobasilar system is within normal limits without focal abnormality.    The anterior, middle, and posterior cerebral arteries are within normal limits.  No intracranial, Without evidence of significant stenosis, focal occlusion aneurysm identified.                                       Medications   iohexoL (OMNIPAQUE 350) injection 75 mL (75 mLs Intravenous Given 9/28/23 1099)     Medical Decision Making  53-year-old female presents to the ED for evaluation of sudden onset occipital headache today.  She is hypertensive to 184/100 with no history of hypertension.  Nonfocal neurologic exam.  Vitals otherwise within normal limits.  CTA brain was obtained and revealed no evidence of ICH, aneurysm or other acute process.  Since imaging was obtained within 6 hours, lumbar puncture is not indicated for evaluation of ICH per up-to-date database.  Discussed findings with the patient.  Feel that she can be discharged in stable condition.  She will follow up with the primary doctor if symptoms continue.  ED return precautions given.  Patient and family member voiced understanding. I have reviewed the patient's records and discussed this case with my supervising physician.         Amount and/or Complexity of Data Reviewed  Labs: ordered.  Radiology: ordered.    Risk  Prescription drug management.               ED Course as of 09/29/23 1208   Thu Sep 28, 2023   1447 Offered patient medication for headache.  She declines at this time. [EH]   1447 BP(!): 184/100  No reported history of  hypertension. [EH]      ED Course User Index  [EH] Bernadette Colon PA-C                    Clinical Impression:   Final diagnoses:  [R51.9] Acute nonintractable headache, unspecified headache type (Primary)        ED Disposition Condition    Discharge Stable          ED Prescriptions    None       Follow-up Information    None          Bernadette Colon PA-C  09/29/23 1205

## 2023-09-28 NOTE — ED NOTES
I-STAT Chem-8+ Results:   Value Reference Range   Sodium 140 136-145 mmol/L   Potassium  3.8 3.5-5.1 mmol/L   Chloride 101  mmol/L   Ionized Calcium 1.24 1.06-1.42 mmol/L   CO2 (measured) 34 23-29 mmol/L   Glucose 96  mg/dL   BUN 14 6-30 mg/dL   Creatinine 0.8 0.5-1.4 mg/dL   Hematocrit 45 36-54%

## 2023-09-28 NOTE — ED TRIAGE NOTES
Pt arrives to ED stating while eating lunch she began having a headache at the back/base of her head. Pt states a hx of neck problems requiring injections. Pt states having a similar HA a few days ago but this feels worse. Pt denies CP or SOB.

## 2023-11-02 ENCOUNTER — OFFICE VISIT (OUTPATIENT)
Dept: OBSTETRICS AND GYNECOLOGY | Facility: CLINIC | Age: 53
End: 2023-11-02
Payer: COMMERCIAL

## 2023-11-02 VITALS
BODY MASS INDEX: 23.44 KG/M2 | SYSTOLIC BLOOD PRESSURE: 141 MMHG | WEIGHT: 140.88 LBS | DIASTOLIC BLOOD PRESSURE: 77 MMHG

## 2023-11-02 DIAGNOSIS — Z01.411 ENCOUNTER FOR GYNECOLOGICAL EXAMINATION WITH ABNORMAL FINDING: Primary | ICD-10-CM

## 2023-11-02 DIAGNOSIS — Z12.31 BREAST CANCER SCREENING BY MAMMOGRAM: ICD-10-CM

## 2023-11-02 DIAGNOSIS — N95.1 MENOPAUSAL SYMPTOMS: ICD-10-CM

## 2023-11-02 DIAGNOSIS — N95.2 VAGINAL ATROPHY: ICD-10-CM

## 2023-11-02 PROCEDURE — 99999 PR PBB SHADOW E&M-EST. PATIENT-LVL III: CPT | Mod: PBBFAC,,, | Performed by: OBSTETRICS & GYNECOLOGY

## 2023-11-02 PROCEDURE — 99396 PR PREVENTIVE VISIT,EST,40-64: ICD-10-PCS | Mod: S$GLB,,, | Performed by: OBSTETRICS & GYNECOLOGY

## 2023-11-02 PROCEDURE — 99999 PR PBB SHADOW E&M-EST. PATIENT-LVL III: ICD-10-PCS | Mod: PBBFAC,,, | Performed by: OBSTETRICS & GYNECOLOGY

## 2023-11-02 PROCEDURE — 99396 PREV VISIT EST AGE 40-64: CPT | Mod: S$GLB,,, | Performed by: OBSTETRICS & GYNECOLOGY

## 2023-11-02 NOTE — PROGRESS NOTES
Well woman exam    Brissa Mason is a 53 y.o.   patient who presents for a well woman exam.  LMP: Patient's last menstrual period was 2023 (exact date).  Patient reports mild menopausal symptoms.  No other gynecologic issues, problems, or complaints.      Patient underwent endometrial biopsy for abnormal uterine bleeding in May 2023.  Patient reports continued irregular, intermittent perimenopausal bleeding.    Past Medical History:   Diagnosis Date    Allergy     Anemia 2020    Anxiety disorder     B12 deficiency     Cataract     Colitis     resolved; rectum ; neg flex sig 2006    Colitis: 2005 rectal area     resolved; rectum ; neg flex sig 2006     Iron deficiency     Menstrual migraine without status migrainosus, not intractable 2017    Vitamin D deficiency      Past Surgical History:   Procedure Laterality Date    COLONOSCOPY N/A 10/15/2020    Procedure: COLONOSCOPY;  Surgeon: Vern Harkins MD;  [facility]  Service: Endoscopy;  Laterality: N/A;  covid test 10/12-[facility]    ESOPHAGOGASTRODUODENOSCOPY N/A 2022    Procedure: EGD (ESOPHAGOGASTRODUODENOSCOPY);  Surgeon: Patricio Silva MD;  Location: [facility] ([address] [place] [address]);  Service: Endoscopy;  Laterality: N/A;  per referral/ prep ins. on portal - ERW   SWP-AWARE OF NEW ARRIVAL TIME-RT    GANGLION CYST EXCISION  2017    shoulder injection Left 2022     Social History     Socioeconomic History    Marital status:    Tobacco Use    Smoking status: Never    Smokeless tobacco: Never   Substance and Sexual Activity    Alcohol use: Yes     Alcohol/week: 1.0 standard drink of alcohol     Types: 1 Glasses of wine per week     Comment: 1-2 glasses of wine a week     Drug use: No    Sexual activity: Yes     Partners: Male     Birth control/protection: Condom     Comment: , menarche 14, no hx of abnormal    Other Topics Concern    Are you pregnant or think you may be? No     Breast-feeding No   Social History Narrative         - Sabianist    Parents - Mata and Robina Del Angel        , 3 step kids and 4 grands. Works as  for [employer].     Social Determinants of Health     Financial Resource Strain: Low Risk  (12/21/2022)    Overall Financial Resource Strain (CARDIA)     Difficulty of Paying Living Expenses: Not hard at all   Food Insecurity: No Food Insecurity (12/21/2022)    Hunger Vital Sign     Worried About Running Out of Food in the Last Year: Never true     Ran Out of Food in the Last Year: Never true   Transportation Needs: No Transportation Needs (12/21/2022)    PRAPARE - Transportation     Lack of Transportation (Medical): No     Lack of Transportation (Non-Medical): No   Physical Activity: Insufficiently Active (12/21/2022)    Exercise Vital Sign     Days of Exercise per Week: 2 days     Minutes of Exercise per Session: 30 min   Stress: No Stress Concern Present (12/21/2022)    [employer] [employer] - Occupational Stress Questionnaire     Feeling of Stress : Only a little   Social Connections: Unknown (12/21/2022)    Social Connection and Isolation Panel [NHANES]     Frequency of Communication with Friends and Family: More than three times a week     Frequency of Social Gatherings with Friends and Family: Once a week     Active Member of Clubs or Organizations: Yes     Attends Club or Organization Meetings: More than 4 times per year     Marital Status:    Housing Stability: Low Risk  (12/21/2022)    Housing Stability Vital Sign     Unable to Pay for Housing in the Last Year: No     Number of Places Lived in the Last Year: 1     Unstable Housing in the Last Year: No     Family History   Problem Relation Age of Onset    Colon polyps Mother         noncancerous colon tumor; polyps    Hyperlipidemia Mother     Osteoporosis Mother     Colon cancer Mother         pre cacer    Depression Mother         One time     Dementia Mother         lewy body    Gout Father     Hypertension Father     Hypertension Sister     Glaucoma Sister     Hyperlipidemia Brother     Hypertension Brother     Psoriasis Brother     Cancer Maternal Uncle         colon    Colon cancer Maternal Uncle     Breast cancer Paternal Aunt 60    Breast cancer Paternal Aunt     Depression Paternal Grandmother         One time    Miscarriages / Stillbirths Neg Hx     Amblyopia Neg Hx     Blindness Neg Hx     Cataracts Neg Hx     Diabetes Neg Hx     Macular degeneration Neg Hx     Retinal detachment Neg Hx     Strabismus Neg Hx     Stroke Neg Hx     Thyroid disease Neg Hx     Melanoma Neg Hx     Ovarian cancer Neg Hx      OB History          0    Para        Term   0            AB        Living             SAB        IAB        Ectopic        Multiple        Live Births                     BP (!) 141/77   Wt 63.9 kg (140 lb 14 oz)   LMP 2023 (Exact Date)   BMI 23.44 kg/m²       ROS:  GENERAL: Denies weight gain or weight loss. Feeling well overall.   SKIN: Denies rash or lesions.   HEAD: Denies head injury or headache.   NODES: Denies enlarged lymph nodes.   CHEST: Denies chest pain or shortness of breath.   CARDIOVASCULAR: Denies palpitations or left sided chest pain.   ABDOMEN: No abdominal pain, constipation, diarrhea, nausea, vomiting or rectal bleeding.   URINARY: No frequency, dysuria, hematuria, or burning on urination.  REPRODUCTIVE: See HPI.   BREASTS: The patient performs breast self-examination and denies pain, lumps, or nipple discharge.   HEMATOLOGIC: No easy bruisability or excessive bleeding.   MUSCULOSKELETAL: Denies joint pain or swelling.   NEUROLOGIC: Denies syncope or weakness.   PSYCHIATRIC: Denies depression, anxiety or mood swings.    PHYSICAL EXAM:  APPEARANCE: Well nourished, well developed, in no acute distress.  AFFECT: WNL, alert and oriented x 3  SKIN: No acne or hirsutism  NECK: Neck symmetric without masses or  thyromegaly  NODES: No inguinal, cervical, axillary, or femoral lymph node enlargement  CHEST: Good respiratory effect  ABDOMEN: Soft.  No tenderness or masses.  No hepatosplenomegaly.  No hernias.  BREASTS: Symmetrical, no skin changes or visible lesions.  No palpable masses, nipple discharge bilaterally.  PELVIC: Normal external genitalia without lesions.  Normal hair distribution.  Adequate perineal body, normal urethral meatus.  Vagina moist and well rugated without lesions or discharge.  Cervix pink, without lesions, discharge or tenderness.  No significant cystocele or rectocele.  Bimanual exam shows uterus to be normal size, regular, mobile and nontender.  Adnexa without masses or tenderness.    EXTREMITIES: No edema.    Encounter for gynecological examination with abnormal finding    Vaginal atrophy    Menopausal symptoms    Breast cancer screening by mammogram  -     Mammo Digital Screening Bilat w/ Aj; Future; Expected date: 11/02/2023        Age specific counseling    Cancer screening recommendations reviewed with patient today.  Patient is up-to-date with cancer screening.  Pap smear with high-risk HPV Co test done October 2021.  Pap smear with high-risk HPV Co test is due next year.    Patient instructed keep a menstrual calendar/monitor menses.    Patient counseled on risks and benefits/pros and cons of HRT.  Patient verbalized understanding of this discussion.  Patient will contact office if HRT desired.  Both systemic and local/vaginal hormone use risks and benefits reviewed.    Patient was counseled today on A.C.S. Pap guidelines and recommendations for yearly pelvic exams, mammograms and monthly self breast exams; to see her PCP for other health maintenance.     Follow up in about 1 year (around 11/2/2024) for Annual exam with thin prep Pap smear/high-risk HPV Co test.    Bran Oden IV, MD

## 2024-01-04 ENCOUNTER — OFFICE VISIT (OUTPATIENT)
Dept: INTERNAL MEDICINE | Facility: CLINIC | Age: 54
End: 2024-01-04
Payer: COMMERCIAL

## 2024-01-04 VITALS
DIASTOLIC BLOOD PRESSURE: 65 MMHG | WEIGHT: 142.63 LBS | SYSTOLIC BLOOD PRESSURE: 130 MMHG | HEART RATE: 93 BPM | OXYGEN SATURATION: 98 % | HEIGHT: 65 IN | BODY MASS INDEX: 23.76 KG/M2

## 2024-01-04 DIAGNOSIS — Z20.828 EXPOSURE TO THE FLU: Primary | ICD-10-CM

## 2024-01-04 DIAGNOSIS — J01.00 ACUTE NON-RECURRENT MAXILLARY SINUSITIS: ICD-10-CM

## 2024-01-04 PROBLEM — M25.612 DECREASED RANGE OF MOTION OF LEFT SHOULDER: Status: RESOLVED | Noted: 2022-07-11 | Resolved: 2024-01-04

## 2024-01-04 PROCEDURE — 99999 PR PBB SHADOW E&M-EST. PATIENT-LVL III: CPT | Mod: PBBFAC,,, | Performed by: INTERNAL MEDICINE

## 2024-01-04 PROCEDURE — 99214 OFFICE O/P EST MOD 30 MIN: CPT | Mod: S$GLB,,, | Performed by: INTERNAL MEDICINE

## 2024-01-04 RX ORDER — DEXAMETHASONE 4 MG/1
4 TABLET ORAL EVERY 12 HOURS
Qty: 10 TABLET | Refills: 0 | Status: SHIPPED | OUTPATIENT
Start: 2024-01-04 | End: 2024-01-09

## 2024-01-04 RX ORDER — DOXYCYCLINE HYCLATE 100 MG
100 TABLET ORAL 2 TIMES DAILY
Qty: 14 TABLET | Refills: 0 | Status: SHIPPED | OUTPATIENT
Start: 2024-01-04 | End: 2024-01-11

## 2024-01-04 RX ORDER — OSELTAMIVIR PHOSPHATE 75 MG/1
75 CAPSULE ORAL 2 TIMES DAILY
Qty: 10 CAPSULE | Refills: 0 | Status: SHIPPED | OUTPATIENT
Start: 2024-01-04 | End: 2024-01-09

## 2024-01-04 NOTE — PROGRESS NOTES
INTERNAL MEDICINE CLINIC - SAME DAY APPOINTMENT  Progress Note    PRESENTING HISTORY     PCP: Georges Hoyt DO    Chief Complaint/Reason for Visit:     Chief Complaint   Patient presents with    Cough     History of Present Illness & ROS : Ms. Brissa Mason is a 53 y.o. female.       has flu (tested positive on Sunday).  is getting better.    She has sinus symptoms for one week.  It got worse since yesterday.   Coughing. Dry.  No fever at home.  This afternoon had fatigue and chills.    PAST HISTORY:     Past Medical History:   Diagnosis Date    Abnormal uterine bleeding (AUB) 05/11/2023    Acute pain of left shoulder 06/22/2015    Allergy     Anemia 04/20/2020    Anxiety disorder     B12 deficiency     B12 nutritional deficiency 04/20/2020    Cataract     Colitis     resolved; rectum 2005; neg flex sig 2006    Colitis: 2005 rectal area     resolved; rectum 2005; neg flex sig 2006     Family hx of colon cancer requiring screening colonoscopy 10/15/2020    Iron deficiency     Low bone mass 10/20/2021    Menstrual migraine without status migrainosus, not intractable 09/05/2017    Vitamin D deficiency        Past Surgical History:   Procedure Laterality Date    COLONOSCOPY N/A 10/15/2020    Procedure: COLONOSCOPY;  Surgeon: Vern Harkins MD;  Location: [address]);  Service: Endoscopy;  Laterality: N/A;  covid test 10/12-[facility]    ESOPHAGOGASTRODUODENOSCOPY N/A 12/2/2022    Procedure: EGD (ESOPHAGOGASTRODUODENOSCOPY);  Surgeon: Patricio Silva MD;  Location: [address]);  Service: Endoscopy;  Laterality: N/A;  per referral/ prep ins. on portal - ERW  12/1 SWP-AWARE OF NEW ARRIVAL TIME-RT    GANGLION CYST EXCISION  2017    shoulder injection Left 05/2022       Family History   Problem Relation Age of Onset    Colon polyps Mother         noncancerous colon tumor; polyps    Hyperlipidemia Mother     Osteoporosis Mother     Colon cancer Mother         pre cacer     Depression Mother         One time    Dementia Mother         lewy body    Gout Father     Hypertension Father     Hypertension Sister     Glaucoma Sister     Hyperlipidemia Brother     Hypertension Brother     Psoriasis Brother     Cancer Maternal Uncle         colon    Colon cancer Maternal Uncle     Breast cancer Paternal Aunt 60    Breast cancer Paternal Aunt     Depression Paternal Grandmother         One time    Miscarriages / Stillbirths Neg Hx     Amblyopia Neg Hx     Blindness Neg Hx     Cataracts Neg Hx     Diabetes Neg Hx     Macular degeneration Neg Hx     Retinal detachment Neg Hx     Strabismus Neg Hx     Stroke Neg Hx     Thyroid disease Neg Hx     Melanoma Neg Hx     Ovarian cancer Neg Hx        Social History     Socioeconomic History    Marital status:    Tobacco Use    Smoking status: Never    Smokeless tobacco: Never   Substance and Sexual Activity    Alcohol use: Yes     Alcohol/week: 1.0 standard drink of alcohol     Types: 1 Glasses of wine per week     Comment: 1-2 glasses of wine a week     Drug use: No    Sexual activity: Yes     Partners: Male     Birth control/protection: Condom     Comment: , menarche 14, no hx of abnormal    Other Topics Concern    Are you pregnant or think you may be? No    Breast-feeding No   Social History Narrative         - Congregational    Parents - Mata and Robina Del Angel        , 3 step kids and 4 grands. Works as  for [employer].     Social Determinants of Health     Financial Resource Strain: Low Risk  (1/4/2024)    Overall Financial Resource Strain (CARDIA)     Difficulty of Paying Living Expenses: Not hard at all   Food Insecurity: No Food Insecurity (1/4/2024)    Hunger Vital Sign     Worried About Running Out of Food in the Last Year: Never true     Ran Out of Food in the Last Year: Never true   Transportation Needs: No Transportation Needs (1/4/2024)    PRAPARE - Transportation     Lack of  Transportation (Medical): No     Lack of Transportation (Non-Medical): No   Physical Activity: Insufficiently Active (1/4/2024)    Exercise Vital Sign     Days of Exercise per Week: 2 days     Minutes of Exercise per Session: 30 min   Stress: No Stress Concern Present (1/4/2024)    [employer] [place] [employer] - Occupational Stress Questionnaire     Feeling of Stress : Not at all   Social Connections: Unknown (1/4/2024)    Social Connection and Isolation Panel [NHANES]     Frequency of Communication with Friends and Family: More than three times a week     Frequency of Social Gatherings with Friends and Family: Once a week     Active Member of Clubs or Organizations: Yes     Attends Club or Organization Meetings: More than 4 times per year     Marital Status:    Housing Stability: Low Risk  (1/4/2024)    Housing Stability Vital Sign     Unable to Pay for Housing in the Last Year: No     Number of Places Lived in the Last Year: 1     Unstable Housing in the Last Year: No       MEDICATIONS & ALLERGIES:     Current Outpatient Medications on File Prior to Visit   Medication Sig Dispense Refill    [DISCONTINUED] celecoxib (CELEBREX) 200 MG capsule Take 1 capsule (200 mg total) by mouth once daily. (Patient not taking: Reported on 11/2/2023) 60 capsule 1     No current facility-administered medications on file prior to visit.        Review of patient's allergies indicates:   Allergen Reactions    Mobic [meloxicam] Rash    Sulfa (sulfonamide antibiotics)      Other reaction(s): Rash       Medications Reconciliation:   I have reconciled the patient's home medications with the patient/family. I have updated all changes.  Refer to After-Visit Medication List.    OBJECTIVE:     Vital Signs:  Vitals:    01/04/24 1655   BP: 130/65   Pulse: 93     Wt Readings from Last 3 Encounters:   01/04/24 1655 64.7 kg (142 lb 10.2 oz)   11/02/23 0854 63.9 kg (140 lb 14 oz)   09/28/23 1252 62.6 kg (138 lb)     Body mass  index is 23.74 kg/m².     Physical Exam:General:   Well developed, well nourished. No distress.  HEENT: Head is normocephalic, atraumatic; ears are normal.    Increase maxillary pressure.  Eyes: Clear conjunctiva.  Neck: Supple, symmetrical neck; trachea midline.  Lungs: Clear to auscultation bilaterally and normal respiratory effort.  Cardiovascular: Heart with regular rate and rhythm.    Skin: Skin color, texture, turgor normal. No rashes.  Musculoskeletal: Normal gait.   Lymph Nodes: No cervical or supraclavicular adenopathy.  Psychiatric: Normal affect. Alert.    Laboratory  Lab Results   Component Value Date    WBC 7.84 09/28/2023    HGB 14.4 09/28/2023    HCT 45 09/28/2023     09/28/2023    CHOL 189 12/24/2022    TRIG 61 12/24/2022    HDL 71 12/24/2022    ALT 16 09/28/2023    AST 20 09/28/2023     09/28/2023    K 3.8 09/28/2023     09/28/2023    CREATININE 0.9 09/28/2023    BUN 13 09/28/2023    CO2 28 09/28/2023    TSH 2.391 09/24/2021       ASSESSMENT & PLAN:     Exposure to the flu  Acute non-recurrent maxillary sinusitis  - Treat empirically for flu.     Possible concomitant bacterial sinusitis (> 1 week symptoms)    Plan:  -     dexAMETHasone (DECADRON) 4 MG Tab; Take 1 tablet (4 mg total) by mouth every 12 (twelve) hours. for 5 days  Dispense: 10 tablet; Refill: 0  -     doxycycline (VIBRA-TABS) 100 MG tablet; Take 1 tablet (100 mg total) by mouth 2 (two) times daily. for 7 days  Dispense: 14 tablet; Refill: 0  -     loratadine-pseudoephedrine 5-120 mg (CLARITIN-D 12-HOUR) 5-120 mg per tablet; Take 1 tablet by mouth 2 (two) times daily. for 5 days  Dispense: 10 tablet; Refill: 0  -     oseltamivir (TAMIFLU) 75 MG capsule; Take 1 capsule (75 mg total) by mouth 2 (two) times daily. for 5 days  Dispense: 10 capsule; Refill: 0    Scheduled Follow-up :  Future Appointments   Date Time Provider Department Center   1/26/2024  8:00 AM Georges Hoyt DO [facility] [place]       After Visit  Medication List :     Medication List            Accurate as of January 4, 2024  5:19 PM. If you have any questions, ask your nurse or doctor.                START taking these medications      dexAMETHasone 4 MG Tab  Commonly known as: DECADRON  Take 1 tablet (4 mg total) by mouth every 12 (twelve) hours. for 5 days  Started by: Wiliam Shah MD     doxycycline 100 MG tablet  Commonly known as: VIBRA-TABS  Take 1 tablet (100 mg total) by mouth 2 (two) times daily. for 7 days  Started by: Wiliam Shah MD     loratadine-pseudoephedrine 5-120 mg 5-120 mg per tablet  Commonly known as: CLARITIN-D 12-hour  Take 1 tablet by mouth 2 (two) times daily. for 5 days  Started by: Wiliam Shah MD     oseltamivir 75 MG capsule  Commonly known as: TAMIFLU  Take 1 capsule (75 mg total) by mouth 2 (two) times daily. for 5 days  Started by: Wiliam Shah MD            STOP taking these medications      celecoxib 200 MG capsule  Commonly known as: CeleBREX  Stopped by: Wiliam Shah MD               Where to Get Your Medications        These medications were sent to [id number] - [place] - [address] [id number]      Phone: [phone]   dexAMETHasone 4 MG Tab  doxycycline 100 MG tablet  loratadine-pseudoephedrine 5-120 mg 5-120 mg per tablet  oseltamivir 75 MG capsule         Signing Physician:  Wiliam Shah MD

## 2024-01-08 ENCOUNTER — PATIENT MESSAGE (OUTPATIENT)
Dept: ADMINISTRATIVE | Facility: HOSPITAL | Age: 54
End: 2024-01-08
Payer: COMMERCIAL

## 2024-01-08 ENCOUNTER — PATIENT MESSAGE (OUTPATIENT)
Dept: OBSTETRICS AND GYNECOLOGY | Facility: CLINIC | Age: 54
End: 2024-01-08
Payer: COMMERCIAL

## 2024-01-09 RX ORDER — ESTRADIOL 0.1 MG/G
CREAM VAGINAL
Qty: 42.5 G | Refills: 4 | Status: SHIPPED | OUTPATIENT
Start: 2024-01-09

## 2024-01-22 ENCOUNTER — PATIENT MESSAGE (OUTPATIENT)
Dept: INTERNAL MEDICINE | Facility: CLINIC | Age: 54
End: 2024-01-22
Payer: COMMERCIAL

## 2024-01-22 DIAGNOSIS — Z00.00 WELLNESS EXAMINATION: ICD-10-CM

## 2024-01-22 DIAGNOSIS — E55.9 VITAMIN D DEFICIENCY: Primary | ICD-10-CM

## 2024-01-22 DIAGNOSIS — E53.8 B12 NUTRITIONAL DEFICIENCY: ICD-10-CM

## 2024-01-24 ENCOUNTER — PATIENT MESSAGE (OUTPATIENT)
Dept: OBSTETRICS AND GYNECOLOGY | Facility: CLINIC | Age: 54
End: 2024-01-24
Payer: COMMERCIAL

## 2024-01-24 ENCOUNTER — LAB VISIT (OUTPATIENT)
Dept: LAB | Facility: HOSPITAL | Age: 54
End: 2024-01-24
Attending: INTERNAL MEDICINE
Payer: COMMERCIAL

## 2024-01-24 DIAGNOSIS — E53.8 B12 NUTRITIONAL DEFICIENCY: ICD-10-CM

## 2024-01-24 DIAGNOSIS — E55.9 VITAMIN D DEFICIENCY: ICD-10-CM

## 2024-01-24 DIAGNOSIS — Z00.00 WELLNESS EXAMINATION: ICD-10-CM

## 2024-01-24 LAB
25(OH)D3+25(OH)D2 SERPL-MCNC: 36 NG/ML (ref 30–96)
ALBUMIN SERPL BCP-MCNC: 4.3 G/DL (ref 3.5–5.2)
ALP SERPL-CCNC: 51 U/L (ref 55–135)
ALT SERPL W/O P-5'-P-CCNC: 20 U/L (ref 10–44)
ANION GAP SERPL CALC-SCNC: 11 MMOL/L (ref 8–16)
AST SERPL-CCNC: 18 U/L (ref 10–40)
BASOPHILS # BLD AUTO: 0.07 K/UL (ref 0–0.2)
BASOPHILS NFR BLD: 1.3 % (ref 0–1.9)
BILIRUB SERPL-MCNC: 0.5 MG/DL (ref 0.1–1)
BUN SERPL-MCNC: 13 MG/DL (ref 6–20)
CALCIUM SERPL-MCNC: 10 MG/DL (ref 8.7–10.5)
CHLORIDE SERPL-SCNC: 107 MMOL/L (ref 95–110)
CHOLEST SERPL-MCNC: 264 MG/DL (ref 120–199)
CHOLEST/HDLC SERPL: 2.9 {RATIO} (ref 2–5)
CO2 SERPL-SCNC: 25 MMOL/L (ref 23–29)
CREAT SERPL-MCNC: 0.9 MG/DL (ref 0.5–1.4)
DIFFERENTIAL METHOD BLD: NORMAL
EOSINOPHIL # BLD AUTO: 0.2 K/UL (ref 0–0.5)
EOSINOPHIL NFR BLD: 3.3 % (ref 0–8)
ERYTHROCYTE [DISTWIDTH] IN BLOOD BY AUTOMATED COUNT: 12.5 % (ref 11.5–14.5)
EST. GFR  (NO RACE VARIABLE): >60 ML/MIN/1.73 M^2
ESTIMATED AVG GLUCOSE: 108 MG/DL (ref 68–131)
GLUCOSE SERPL-MCNC: 89 MG/DL (ref 70–110)
HBA1C MFR BLD: 5.4 % (ref 4–5.6)
HCT VFR BLD AUTO: 44.3 % (ref 37–48.5)
HDLC SERPL-MCNC: 90 MG/DL (ref 40–75)
HDLC SERPL: 34.1 % (ref 20–50)
HGB BLD-MCNC: 14.2 G/DL (ref 12–16)
IMM GRANULOCYTES # BLD AUTO: 0.01 K/UL (ref 0–0.04)
IMM GRANULOCYTES NFR BLD AUTO: 0.2 % (ref 0–0.5)
LDLC SERPL CALC-MCNC: 160.6 MG/DL (ref 63–159)
LYMPHOCYTES # BLD AUTO: 1.4 K/UL (ref 1–4.8)
LYMPHOCYTES NFR BLD: 27.1 % (ref 18–48)
MCH RBC QN AUTO: 30.1 PG (ref 27–31)
MCHC RBC AUTO-ENTMCNC: 32.1 G/DL (ref 32–36)
MCV RBC AUTO: 94 FL (ref 82–98)
MONOCYTES # BLD AUTO: 0.4 K/UL (ref 0.3–1)
MONOCYTES NFR BLD: 7.9 % (ref 4–15)
NEUTROPHILS # BLD AUTO: 3.1 K/UL (ref 1.8–7.7)
NEUTROPHILS NFR BLD: 60.2 % (ref 38–73)
NONHDLC SERPL-MCNC: 174 MG/DL
NRBC BLD-RTO: 0 /100 WBC
PLATELET # BLD AUTO: 246 K/UL (ref 150–450)
PMV BLD AUTO: 11 FL (ref 9.2–12.9)
POTASSIUM SERPL-SCNC: 4.7 MMOL/L (ref 3.5–5.1)
PROT SERPL-MCNC: 7.5 G/DL (ref 6–8.4)
RBC # BLD AUTO: 4.72 M/UL (ref 4–5.4)
SODIUM SERPL-SCNC: 143 MMOL/L (ref 136–145)
TRIGL SERPL-MCNC: 67 MG/DL (ref 30–150)
TSH SERPL DL<=0.005 MIU/L-ACNC: 2.57 UIU/ML (ref 0.4–4)
WBC # BLD AUTO: 5.2 K/UL (ref 3.9–12.7)

## 2024-01-24 PROCEDURE — 82607 VITAMIN B-12: CPT | Performed by: INTERNAL MEDICINE

## 2024-01-24 PROCEDURE — 36415 COLL VENOUS BLD VENIPUNCTURE: CPT | Performed by: INTERNAL MEDICINE

## 2024-01-24 PROCEDURE — 80061 LIPID PANEL: CPT | Performed by: INTERNAL MEDICINE

## 2024-01-24 PROCEDURE — 85025 COMPLETE CBC W/AUTO DIFF WBC: CPT | Performed by: INTERNAL MEDICINE

## 2024-01-24 PROCEDURE — 83036 HEMOGLOBIN GLYCOSYLATED A1C: CPT | Performed by: INTERNAL MEDICINE

## 2024-01-24 PROCEDURE — 82306 VITAMIN D 25 HYDROXY: CPT | Performed by: INTERNAL MEDICINE

## 2024-01-24 PROCEDURE — 84443 ASSAY THYROID STIM HORMONE: CPT | Performed by: INTERNAL MEDICINE

## 2024-01-24 PROCEDURE — 80053 COMPREHEN METABOLIC PANEL: CPT | Performed by: INTERNAL MEDICINE

## 2024-01-25 LAB — VIT B12 SERPL-MCNC: 490 NG/L (ref 180–914)

## 2024-01-26 ENCOUNTER — OFFICE VISIT (OUTPATIENT)
Dept: INTERNAL MEDICINE | Facility: CLINIC | Age: 54
End: 2024-01-26
Payer: COMMERCIAL

## 2024-01-26 VITALS
OXYGEN SATURATION: 99 % | BODY MASS INDEX: 24.12 KG/M2 | HEART RATE: 79 BPM | HEIGHT: 65 IN | TEMPERATURE: 97 F | WEIGHT: 144.75 LBS

## 2024-01-26 DIAGNOSIS — E78.5 DYSLIPIDEMIA: ICD-10-CM

## 2024-01-26 DIAGNOSIS — Z00.00 ANNUAL PHYSICAL EXAM: Primary | ICD-10-CM

## 2024-01-26 DIAGNOSIS — Z80.0 FAMILY HX OF COLON CANCER REQUIRING SCREENING COLONOSCOPY: ICD-10-CM

## 2024-01-26 DIAGNOSIS — M81.0 OSTEOPOROSIS, UNSPECIFIED OSTEOPOROSIS TYPE, UNSPECIFIED PATHOLOGICAL FRACTURE PRESENCE: ICD-10-CM

## 2024-01-26 PROBLEM — M85.80 LOW BONE MASS: Status: RESOLVED | Noted: 2021-10-20 | Resolved: 2024-01-26

## 2024-01-26 PROCEDURE — 99396 PREV VISIT EST AGE 40-64: CPT | Mod: S$GLB,,, | Performed by: INTERNAL MEDICINE

## 2024-01-26 PROCEDURE — 99999 PR PBB SHADOW E&M-EST. PATIENT-LVL III: CPT | Mod: PBBFAC,,, | Performed by: INTERNAL MEDICINE

## 2024-01-26 NOTE — PROGRESS NOTES
Subjective     Patient ID: Brissa Mason is a 53 y.o. female.    Chief Complaint: Annual Exam    HPI  53 y.o. Female here for annual exam.     Vaccines: Influenza (declined); Tetanus (2015); Shingrix (will consider)  Eye exam:will schedule  Mammogram: 9/23  Gyn exam: 2021  Colonoscopy: 2020  DEXA: 2021    Exercise: no  Diet: regular   LMP: irregular     Past Medical History:  05/11/2023: Abnormal uterine bleeding (AUB)  06/22/2015: Acute pain of left shoulder  No date: Allergy  04/20/2020: Anemia  No date: Anxiety disorder  No date: B12 deficiency  04/20/2020: B12 nutritional deficiency  No date: Cataract  No date: Colitis      Comment:  resolved; rectum 2005; neg flex sig 2006  No date: Colitis: 2005 rectal area      Comment:  resolved; rectum 2005; neg flex sig 2006   10/15/2020: Family hx of colon cancer requiring screening colonoscopy  No date: Iron deficiency  09/05/2017: Menstrual migraine without status migrainosus, not   intractable  No date: Osteoporosis  No date: Vitamin D deficiency  Past Surgical History:  10/15/2020: COLONOSCOPY; N/A      Comment:  Procedure: COLONOSCOPY;  Surgeon: Vern Harkins MD;                Location: [address]);  Service: Endoscopy;                 Laterality: N/A;  covid test 10/12-[facility]  12/2/2022: ESOPHAGOGASTRODUODENOSCOPY; N/A      Comment:  Procedure: EGD (ESOPHAGOGASTRODUODENOSCOPY);  Surgeon:                Patricio Silva MD;  Location: [address]);                 Service: Endoscopy;  Laterality: N/A;  per referral/ prep               ins. on portal - ERW12/1 SWP-AWARE OF NEW ARRIVAL                TIME-RT  2017: GANGLION CYST EXCISION  05/2022: shoulder injection; Left  Social History    Socioeconomic History      Marital status:     Tobacco Use      Smoking status: Never      Smokeless tobacco: Never    Substance and Sexual Activity      Alcohol use: Yes        Alcohol/week: 1.0 standard drink of alcohol        Types: 1  Glasses of wine per week        Comment: 1-2 glasses of wine a week       Drug use: No      Sexual activity: Yes        Partners: Male        Birth control/protection: Condom        Comment: , menarche 14, no hx of abnormal     Other Topics      Concerns:        Are you pregnant or think you may be?: No        Breast-feeding: No    Social History Narrative             - Zoroastrian      Parents - Milton Del Angel            , 3 step kids and 4 grands. Works as  for [employer].     Social Determinants of Health  Financial Resource Strain: Low Risk  (1/23/2024)      Overall Financial Resource Strain (CARDIA)          Difficulty of Paying Living Expenses: Not hard at all  Food Insecurity: No Food Insecurity (1/23/2024)      Hunger Vital Sign          Worried About Running Out of Food in the Last Year: Never true          Ran Out of Food in the Last Year: Never true  Transportation Needs: No Transportation Needs (1/23/2024)      PRAPARE - Transportation          Lack of Transportation (Medical): No          Lack of Transportation (Non-Medical): No  Physical Activity: Insufficiently Active (1/23/2024)      Exercise Vital Sign          Days of Exercise per Week: 2 days          Minutes of Exercise per Session: 30 min  Stress: No Stress Concern Present (1/23/2024)      [employer] [place] [employer] - Occupational Stress Questionnaire          Feeling of Stress : Not at all  Social Connections: Unknown (1/23/2024)      Social Connection and Isolation Panel [NHANES]          Frequency of Communication with Friends and Family: More than three times a week          Frequency of Social Gatherings with Friends and Family: Once a week          Active Member of Clubs or Organizations: Yes          Attends Club or Organization Meetings: More than 4 times per year          Marital Status:   Housing Stability: Low Risk  (1/23/2024)      Housing Stability Vital  Sign          Unable to Pay for Housing in the Last Year: No          Number of Places Lived in the Last Year: 1          Unstable Housing in the Last Year: No  Review of patient's allergies indicates:   -- Mobic [meloxicam] -- Rash   -- Sulfa (sulfonamide antibiotics)     --  Other reaction(s): Rash  Brissa Mason had no medications administered during this visit.  Review of Systems   Constitutional:  Negative for activity change, appetite change, chills, diaphoresis, fatigue, fever and unexpected weight change.   HENT:  Negative for nasal congestion, mouth sores, postnasal drip, rhinorrhea, sinus pressure/congestion, sneezing, sore throat, trouble swallowing and voice change.    Eyes:  Negative for pain, discharge and visual disturbance.   Respiratory:  Negative for cough, shortness of breath and wheezing.    Cardiovascular:  Negative for chest pain, palpitations and leg swelling.   Gastrointestinal:  Negative for abdominal pain, blood in stool, constipation, diarrhea, nausea and vomiting.   Endocrine: Negative for cold intolerance and heat intolerance.   Genitourinary:  Negative for difficulty urinating, dysuria, frequency, hematuria and urgency.   Musculoskeletal:  Negative for arthralgias and myalgias.   Integumentary:  Negative for rash and wound.   Allergic/Immunologic: Negative for environmental allergies and food allergies.   Neurological:  Negative for dizziness, tremors, seizures, syncope, weakness, light-headedness and headaches.   Hematological:  Negative for adenopathy. Does not bruise/bleed easily.   Psychiatric/Behavioral:  Negative for confusion and sleep disturbance. The patient is not nervous/anxious.           Objective     Physical Exam  Vitals and nursing note reviewed.   Constitutional:       General: She is not in acute distress.     Appearance: Normal appearance. She is well-developed. She is not diaphoretic.   HENT:      Head: Normocephalic and atraumatic.      Right Ear: External ear  normal.      Left Ear: External ear normal.      Nose: Nose normal.      Mouth/Throat:      Pharynx: No oropharyngeal exudate.   Eyes:      General: No scleral icterus.        Right eye: No discharge.         Left eye: No discharge.      Conjunctiva/sclera: Conjunctivae normal.      Pupils: Pupils are equal, round, and reactive to light.   Neck:      Thyroid: No thyromegaly.      Vascular: No JVD.   Cardiovascular:      Rate and Rhythm: Normal rate and regular rhythm.      Pulses: Normal pulses.      Heart sounds: Normal heart sounds. No murmur heard.  Pulmonary:      Effort: Pulmonary effort is normal. No respiratory distress.      Breath sounds: Normal breath sounds. No wheezing, rhonchi or rales.   Chest:      Chest wall: No tenderness.   Abdominal:      General: Bowel sounds are normal. There is no distension.      Palpations: Abdomen is soft.      Tenderness: There is no abdominal tenderness. There is no guarding or rebound.   Musculoskeletal:      Cervical back: Neck supple.      Right lower leg: No edema.      Left lower leg: No edema.   Lymphadenopathy:      Cervical: No cervical adenopathy.   Skin:     General: Skin is warm and dry.      Coloration: Skin is not pale.      Findings: No rash.   Neurological:      General: No focal deficit present.      Mental Status: She is alert and oriented to person, place, and time.      Gait: Gait normal.   Psychiatric:         Behavior: Behavior normal.         Thought Content: Thought content normal.         Judgment: Judgment normal.            Assessment and Plan     1. Annual physical exam    2. Dyslipidemia  -     Lipid Panel; Future; Expected date: 04/26/2024    3. Osteoporosis, unspecified osteoporosis type, unspecified pathological fracture presence    4. Family hx of colon cancer requiring screening colonoscopy        Blood work reviewed with pt    Dyslipidemia- heart healthy diet recommended   -repeat Lipids in 3 months    Osteoporosis- followed by Endo    FHx  of colon cancer- current with colonoscopy    F/u in 1 yr

## 2024-01-30 ENCOUNTER — PATIENT MESSAGE (OUTPATIENT)
Dept: INTERNAL MEDICINE | Facility: CLINIC | Age: 54
End: 2024-01-30
Payer: COMMERCIAL

## 2024-01-30 ENCOUNTER — PATIENT MESSAGE (OUTPATIENT)
Dept: ENDOCRINOLOGY | Facility: CLINIC | Age: 54
End: 2024-01-30
Payer: COMMERCIAL

## 2024-01-30 DIAGNOSIS — M81.0 OSTEOPOROSIS, UNSPECIFIED OSTEOPOROSIS TYPE, UNSPECIFIED PATHOLOGICAL FRACTURE PRESENCE: Primary | ICD-10-CM

## 2024-01-30 DIAGNOSIS — M81.0 POSTMENOPAUSAL BONE LOSS: ICD-10-CM

## 2024-02-06 ENCOUNTER — HOSPITAL ENCOUNTER (OUTPATIENT)
Dept: RADIOLOGY | Facility: HOSPITAL | Age: 54
Discharge: HOME | End: 2024-02-06
Attending: INTERNAL MEDICINE
Payer: COMMERCIAL

## 2024-02-06 DIAGNOSIS — M81.0 OSTEOPOROSIS, UNSPECIFIED OSTEOPOROSIS TYPE, UNSPECIFIED PATHOLOGICAL FRACTURE PRESENCE: ICD-10-CM

## 2024-02-06 DIAGNOSIS — M81.0 POSTMENOPAUSAL BONE LOSS: ICD-10-CM

## 2024-02-06 PROCEDURE — 77080 DXA BONE DENSITY AXIAL: CPT | Mod: TC

## 2024-02-06 PROCEDURE — 77080 DXA BONE DENSITY AXIAL: CPT | Mod: 26,,, | Performed by: INTERNAL MEDICINE

## 2024-02-09 ENCOUNTER — TELEPHONE (OUTPATIENT)
Dept: INTERNAL MEDICINE | Facility: CLINIC | Age: 54
End: 2024-02-09
Payer: COMMERCIAL

## 2024-02-09 DIAGNOSIS — M81.0 OSTEOPOROSIS, UNSPECIFIED OSTEOPOROSIS TYPE, UNSPECIFIED PATHOLOGICAL FRACTURE PRESENCE: Primary | ICD-10-CM

## 2024-02-09 RX ORDER — ALENDRONATE SODIUM 70 MG/1
70 TABLET ORAL
Qty: 4 TABLET | Refills: 11 | Status: SHIPPED | OUTPATIENT
Start: 2024-02-09 | End: 2025-02-08

## 2024-02-28 ENCOUNTER — PATIENT MESSAGE (OUTPATIENT)
Dept: INTERNAL MEDICINE | Facility: CLINIC | Age: 54
End: 2024-02-28
Payer: COMMERCIAL

## 2024-02-28 DIAGNOSIS — R30.0 DYSURIA: Primary | ICD-10-CM

## 2024-03-06 ENCOUNTER — LAB VISIT (OUTPATIENT)
Dept: LAB | Facility: HOSPITAL | Age: 54
End: 2024-03-06
Attending: INTERNAL MEDICINE
Payer: COMMERCIAL

## 2024-03-06 DIAGNOSIS — R30.0 DYSURIA: ICD-10-CM

## 2024-03-06 LAB
BILIRUB UR QL STRIP: NEGATIVE
CLARITY UR REFRACT.AUTO: CLEAR
COLOR UR AUTO: YELLOW
GLUCOSE UR QL STRIP: NEGATIVE
HGB UR QL STRIP: ABNORMAL
KETONES UR QL STRIP: NEGATIVE
LEUKOCYTE ESTERASE UR QL STRIP: NEGATIVE
NITRITE UR QL STRIP: NEGATIVE
PH UR STRIP: 5 [PH] (ref 5–8)
PROT UR QL STRIP: NEGATIVE
SP GR UR STRIP: 1.02 (ref 1–1.03)
URN SPEC COLLECT METH UR: ABNORMAL

## 2024-03-06 PROCEDURE — 81003 URINALYSIS AUTO W/O SCOPE: CPT | Performed by: INTERNAL MEDICINE

## 2024-03-13 ENCOUNTER — LAB VISIT (OUTPATIENT)
Dept: LAB | Facility: HOSPITAL | Age: 54
End: 2024-03-13
Attending: INTERNAL MEDICINE
Payer: COMMERCIAL

## 2024-03-13 DIAGNOSIS — R30.0 DYSURIA: ICD-10-CM

## 2024-03-13 PROCEDURE — 87086 URINE CULTURE/COLONY COUNT: CPT | Performed by: INTERNAL MEDICINE

## 2024-03-14 LAB
BACTERIA UR CULT: NORMAL
BACTERIA UR CULT: NORMAL

## 2024-04-26 ENCOUNTER — LAB VISIT (OUTPATIENT)
Dept: LAB | Facility: HOSPITAL | Age: 54
End: 2024-04-26
Attending: INTERNAL MEDICINE
Payer: COMMERCIAL

## 2024-04-26 DIAGNOSIS — E78.5 DYSLIPIDEMIA: ICD-10-CM

## 2024-04-26 LAB
CHOLEST SERPL-MCNC: 207 MG/DL (ref 120–199)
CHOLEST/HDLC SERPL: 2.7 {RATIO} (ref 2–5)
HDLC SERPL-MCNC: 77 MG/DL (ref 40–75)
HDLC SERPL: 37.2 % (ref 20–50)
LDLC SERPL CALC-MCNC: 111.4 MG/DL (ref 63–159)
NONHDLC SERPL-MCNC: 130 MG/DL
TRIGL SERPL-MCNC: 93 MG/DL (ref 30–150)

## 2024-04-26 PROCEDURE — 80061 LIPID PANEL: CPT | Performed by: INTERNAL MEDICINE

## 2024-04-26 PROCEDURE — 36415 COLL VENOUS BLD VENIPUNCTURE: CPT | Mod: PO | Performed by: INTERNAL MEDICINE

## 2024-05-02 ENCOUNTER — OFFICE VISIT (OUTPATIENT)
Dept: DERMATOLOGY | Facility: CLINIC | Age: 54
End: 2024-05-02
Payer: COMMERCIAL

## 2024-05-02 DIAGNOSIS — D18.01 ANGIOMA OF SKIN: ICD-10-CM

## 2024-05-02 DIAGNOSIS — D22.9 MULTIPLE BENIGN NEVI: ICD-10-CM

## 2024-05-02 DIAGNOSIS — L81.4 LENTIGO: ICD-10-CM

## 2024-05-02 DIAGNOSIS — L73.8 SEBACEOUS GLAND HYPERPLASIA: ICD-10-CM

## 2024-05-02 DIAGNOSIS — Z12.83 SCREENING EXAM FOR SKIN CANCER: ICD-10-CM

## 2024-05-02 DIAGNOSIS — L82.1 SK (SEBORRHEIC KERATOSIS): Primary | ICD-10-CM

## 2024-05-02 PROCEDURE — 99213 OFFICE O/P EST LOW 20 MIN: CPT | Mod: S$GLB,,, | Performed by: DERMATOLOGY

## 2024-05-02 PROCEDURE — 99999 PR PBB SHADOW E&M-EST. PATIENT-LVL II: CPT | Mod: PBBFAC,,, | Performed by: DERMATOLOGY

## 2024-05-02 NOTE — PROGRESS NOTES
Subjective:      Patient ID:  Brissa Mason is a 54 y.o. female who presents for   Chief Complaint   Patient presents with    Skin Check     TBSE     History of Present Illness: The patient presents for follow up of skin check.    The patient was last seen on: 10/25/2021 for TBSE and severe chap lips when weather gets cool-Uses aclovate oint prn which resolved  No h/o of mm or nmsc.    Other skin complaints: none        Review of Systems   Skin:  Positive for daily sunscreen use (most), activity-related sunscreen use and wears hat (activities). Negative for recent sunburn.   Hematologic/Lymphatic: Does not bruise/bleed easily.       Objective:   Physical Exam   Constitutional: She appears well-developed and well-nourished. No distress.   Neurological: She is alert and oriented to person, place, and time. She is not disoriented.   Psychiatric: She has a normal mood and affect.   Skin:   Areas Examined (abnormalities noted in diagram):   Scalp / Hair Palpated and Inspected  Head / Face Inspection Performed  Neck Inspection Performed  Chest / Axilla Inspection Performed  Abdomen Inspection Performed  Back Inspection Performed  RUE Inspected  LUE Inspection Performed  RLE Inspected  LLE Inspection Performed                 Diagram Legend     Erythematous scaling macule/papule c/w actinic keratosis       Vascular papule c/w angioma      Pigmented verrucoid papule/plaque c/w seborrheic keratosis      Yellow umbilicated papule c/w sebaceous hyperplasia      Irregularly shaped tan macule c/w lentigo     1-2 mm smooth white papules consistent with Milia      Movable subcutaneous cyst with punctum c/w epidermal inclusion cyst      Subcutaneous movable cyst c/w pilar cyst      Firm pink to brown papule c/w dermatofibroma      Pedunculated fleshy papule(s) c/w skin tag(s)      Evenly pigmented macule c/w junctional nevus     Mildly variegated pigmented, slightly irregular-bordered macule c/w mildly atypical nevus       Flesh colored to evenly pigmented papule c/w intradermal nevus       Pink pearly papule/plaque c/w basal cell carcinoma      Erythematous hyperkeratotic cursted plaque c/w SCC      Surgical scar with no sign of skin cancer recurrence      Open and closed comedones      Inflammatory papules and pustules      Verrucoid papule consistent consistent with wart     Erythematous eczematous patches and plaques     Dystrophic onycholytic nail with subungual debris c/w onychomycosis     Umbilicated papule    Erythematous-base heme-crusted tan verrucoid plaque consistent with inflamed seborrheic keratosis     Erythematous Silvery Scaling Plaque c/w Psoriasis     See annotation      Assessment / Plan:        SK (seborrheic keratosis)   These are benign inherited growths without a malignant potential. Reassurance given to patient. No treatment is necessary.       Multiple benign nevi   - minor problem and chronic.   Reassurance given to patient. No treatment necessary.     Lentigo  This is a benign hyperpigmented sun induced lesion. Recommend daily sun protection/avoidance and use of at least SPF 30, broad spectrum sunscreen (OTC drug) will reduce the number of new lesions. Treatment of these benign lesions are considered cosmetic.    Sebaceous gland hyperplasia   - minor problem and chronic.   Reassurance given to patient. No treatment necessary.     Angioma of skin   This is a benign vascular lesion. Reassurance given. No treatment required.       Screening exam for skin cancer  Total body skin examination performed today including at least 12 points as noted in physical examination. No lesions suspicious for malignancy noted.    Recommend daily sun protection/avoidance, use of at least SPF 30, broad spectrum sunscreen (OTC drug), skin self examinations, and routine physician surveillance to optimize early detection  Discussed sun protective clothing             No follow-ups on file.

## 2024-07-07 ENCOUNTER — PATIENT MESSAGE (OUTPATIENT)
Dept: OBSTETRICS AND GYNECOLOGY | Facility: CLINIC | Age: 54
End: 2024-07-07
Payer: COMMERCIAL

## 2024-07-08 ENCOUNTER — TELEPHONE (OUTPATIENT)
Dept: OBSTETRICS AND GYNECOLOGY | Facility: CLINIC | Age: 54
End: 2024-07-08
Payer: COMMERCIAL

## 2024-07-08 DIAGNOSIS — N64.4 BREAST TENDERNESS IN FEMALE: Primary | ICD-10-CM

## 2024-07-10 ENCOUNTER — OFFICE VISIT (OUTPATIENT)
Dept: URGENT CARE | Facility: CLINIC | Age: 54
End: 2024-07-10
Payer: COMMERCIAL

## 2024-07-10 VITALS
TEMPERATURE: 99 F | WEIGHT: 138 LBS | DIASTOLIC BLOOD PRESSURE: 69 MMHG | SYSTOLIC BLOOD PRESSURE: 152 MMHG | HEART RATE: 80 BPM | OXYGEN SATURATION: 99 % | RESPIRATION RATE: 16 BRPM

## 2024-07-10 DIAGNOSIS — J30.9 ALLERGIC RHINITIS, UNSPECIFIED SEASONALITY, UNSPECIFIED TRIGGER: Primary | ICD-10-CM

## 2024-07-10 DIAGNOSIS — J34.9 SINUS PROBLEM: ICD-10-CM

## 2024-07-10 LAB
CTP QC/QA: YES
SARS-COV-2 AG RESP QL IA.RAPID: NEGATIVE

## 2024-07-10 PROCEDURE — 99213 OFFICE O/P EST LOW 20 MIN: CPT | Mod: 25,S$GLB,, | Performed by: FAMILY MEDICINE

## 2024-07-10 PROCEDURE — 96372 THER/PROPH/DIAG INJ SC/IM: CPT | Mod: S$GLB,,, | Performed by: FAMILY MEDICINE

## 2024-07-10 PROCEDURE — 87811 SARS-COV-2 COVID19 W/OPTIC: CPT | Mod: QW,S$GLB,, | Performed by: FAMILY MEDICINE

## 2024-07-10 RX ORDER — GABAPENTIN 100 MG/1
100 CAPSULE ORAL 3 TIMES DAILY
COMMUNITY

## 2024-07-10 RX ORDER — AZITHROMYCIN 250 MG/1
TABLET, FILM COATED ORAL
Qty: 6 TABLET | Refills: 0 | Status: SHIPPED | OUTPATIENT
Start: 2024-07-10 | End: 2024-07-15

## 2024-07-10 RX ORDER — BETAMETHASONE SODIUM PHOSPHATE AND BETAMETHASONE ACETATE 3; 3 MG/ML; MG/ML
6 INJECTION, SUSPENSION INTRA-ARTICULAR; INTRALESIONAL; INTRAMUSCULAR; SOFT TISSUE
Status: COMPLETED | OUTPATIENT
Start: 2024-07-10 | End: 2024-07-10

## 2024-07-10 RX ORDER — FLUTICASONE PROPIONATE 50 MCG
1 SPRAY, SUSPENSION (ML) NASAL DAILY
Qty: 9.9 ML | Refills: 0 | Status: SHIPPED | OUTPATIENT
Start: 2024-07-10

## 2024-07-10 RX ADMIN — BETAMETHASONE SODIUM PHOSPHATE AND BETAMETHASONE ACETATE 6 MG: 3; 3 INJECTION, SUSPENSION INTRA-ARTICULAR; INTRALESIONAL; INTRAMUSCULAR; SOFT TISSUE at 04:07

## 2024-07-10 NOTE — PROGRESS NOTES
Subjective:      Patient ID: Brissa Mason is a 54 y.o. female.    Vitals:  weight is 62.6 kg (138 lb). Her oral temperature is 98.5 °F (36.9 °C). Her blood pressure is 152/69 (abnormal) and her pulse is 80. Her respiration is 16 and oxygen saturation is 99%.     Chief Complaint: Nasal Congestion (Started with sore throat Monday and now sinus....leaving town Sunday for a week and want to knock this out - Entered by patient)    This is a 53 y.o. female who presents today with a chief complaint of nasal congestion, sore throat (now alleviated) and sinus pressure x 3 days. No ear px, ear congestion, nausea, vomiting, diarrhea, abd px, cough, fever or H/A    Home tx: mucinex, adelaide-seltzer cold and flu, nyquil    [facility]: sinus infections    Sinus Problem  This is a new problem. The current episode started in the past 7 days. The problem has been gradually worsening since onset. There has been no fever. Her pain is at a severity of 0/10. She is experiencing no pain. Associated symptoms include congestion, sinus pressure, sneezing and a sore throat. Pertinent negatives include no coughing, ear pain, headaches or neck pain.       HENT:  Positive for congestion, sinus pressure and sore throat. Negative for ear pain.    Neck: Negative for neck pain.   Respiratory:  Negative for cough.    Allergic/Immunologic: Positive for sneezing.   Neurological:  Negative for headaches.      Objective:     Physical Exam   Constitutional: No distress. normal  HENT:   Nose: Mucosal edema, rhinorrhea (purulent), purulent discharge and congestion present. Right sinus exhibits maxillary sinus tenderness. Left sinus exhibits maxillary sinus tenderness.   Mouth/Throat: Mucous membranes are moist. Posterior oropharyngeal erythema present.   Eyes: Pupils are equal, round, and reactive to light. Extraocular movement intact   Neck: Neck supple.   Cardiovascular: Normal rate, regular rhythm, normal heart sounds and normal pulses.   Pulmonary/Chest:  Effort normal and breath sounds normal.   Abdominal: Normal appearance.   Neurological: She is alert.   Nursing note and vitals reviewed.  Results for orders placed or performed in visit on 07/10/24   SARS Coronavirus 2 Antigen, POCT Manual Read   Result Value Ref Range    SARS Coronavirus 2 Antigen Negative Negative     Acceptable Yes       Assessment:     1. Allergic rhinitis, unspecified seasonality, unspecified trigger    2. Sinus problem        Plan:       Allergic rhinitis, unspecified seasonality, unspecified trigger  -     SARS Coronavirus 2 Antigen, POCT Manual Read  -     fluticasone propionate (FLONASE) 50 mcg/actuation nasal spray; 1 spray (50 mcg total) by Each Nostril route once daily.  Dispense: 9.9 mL; Refill: 0  -     betamethasone acetate-betamethasone sodium phosphate injection 6 mg    Sinus problem  -     azithromycin (ZITHROMAX) 250 MG tablet; Take 2 tablets (500 mg) on  Day 1,  followed by 1 tablet (250 mg) once daily on Days 2 through 5.  Dispense: 6 tablet; Refill: 0    To hold antibiotics pending worsening symptoms

## 2024-07-22 ENCOUNTER — PATIENT MESSAGE (OUTPATIENT)
Dept: OBSTETRICS AND GYNECOLOGY | Facility: CLINIC | Age: 54
End: 2024-07-22
Payer: COMMERCIAL

## 2024-07-22 ENCOUNTER — OFFICE VISIT (OUTPATIENT)
Dept: SURGERY | Facility: CLINIC | Age: 54
End: 2024-07-22
Payer: COMMERCIAL

## 2024-07-22 ENCOUNTER — TELEPHONE (OUTPATIENT)
Dept: OBSTETRICS AND GYNECOLOGY | Facility: CLINIC | Age: 54
End: 2024-07-22
Payer: COMMERCIAL

## 2024-07-22 VITALS
HEIGHT: 65 IN | DIASTOLIC BLOOD PRESSURE: 75 MMHG | BODY MASS INDEX: 22.99 KG/M2 | SYSTOLIC BLOOD PRESSURE: 143 MMHG | WEIGHT: 138 LBS | HEART RATE: 73 BPM

## 2024-07-22 DIAGNOSIS — Z12.39 ENCOUNTER FOR SCREENING BREAST EXAMINATION: Primary | ICD-10-CM

## 2024-07-22 DIAGNOSIS — N64.4 BREAST TENDERNESS IN FEMALE: ICD-10-CM

## 2024-07-22 PROCEDURE — 99203 OFFICE O/P NEW LOW 30 MIN: CPT | Mod: S$GLB,,, | Performed by: NURSE PRACTITIONER

## 2024-07-22 PROCEDURE — 99999 PR PBB SHADOW E&M-EST. PATIENT-LVL III: CPT | Mod: PBBFAC,,, | Performed by: NURSE PRACTITIONER

## 2024-07-22 NOTE — PROGRESS NOTES
[facility]  Department of Surgery      REFERRING PROVIDER:   Bran Oden IV, MD  [address] [address]    Chief Complaint: New Patient and Breast Pain      Subjective:      Patient ID: Brissa Mason is a 54 y.o. female who presents with breast tesnderness. Started on the left side about 6 months ago. Denies associated breast changes. Currently in perimenopause. Was about 11 months without a cycle and on Saturday does report started her period. Started Estradiol end of the year. Denies nipple changes or nipple discharge. Last screening mammogram was negative.     Patient does not routinely do self breast exams.  Patient has not noted a change on breast exam.  Patient denies nipple discharge. Patient denies to previous breast biopsy. Patient denies a personal history of breast cancer.    GYN History:  Age of menarche was 14.   Perimenopause   Patient admits to hormonal therapy.   Patient is .     Family History:   Paternal Aunt - Breast Cancer  Maternal Uncle - Colon Cancer     Past Medical History:   Diagnosis Date    Abnormal uterine bleeding (AUB) 2023    Acute pain of left shoulder 2015    Allergy     Anemia 2020    Anxiety disorder     B12 deficiency     B12 nutritional deficiency 2020    Cataract     Colitis     resolved; rectum 2005; neg flex sig 2006    Colitis: 2005 rectal area     resolved; rectum 2005; neg flex sig 2006     Family hx of colon cancer requiring screening colonoscopy 10/15/2020    Iron deficiency     Menstrual migraine without status migrainosus, not intractable 2017    Osteoporosis     Vitamin D deficiency      Past Surgical History:   Procedure Laterality Date    COLONOSCOPY N/A 10/15/2020    Procedure: COLONOSCOPY;  Surgeon: Vern Harkins MD;  Location: [address];  Service: Endoscopy;  Laterality: N/A;  covid test 10/12-[facility]    ESOPHAGOGASTRODUODENOSCOPY N/A 2022    Procedure: EGD  (ESOPHAGOGASTRODUODENOSCOPY);  Surgeon: Patricio Silva MD;  Location: [facility] ([address] [place] [address]);  Service: Endoscopy;  Laterality: N/A;  per referral/ prep ins. on portal - ERW  12/1 SWP-AWARE OF NEW ARRIVAL TIME-RT    GANGLION CYST EXCISION  2017    shoulder injection Left 05/2022     Current Outpatient Medications on File Prior to Visit   Medication Sig Dispense Refill    estradioL (ESTRACE) 0.01 % (0.1 mg/gram) vaginal cream Insert 1 applicator daily for 1 week, then 1/2 applicator for 1 week, then 1/4 applicator 2 times a week on Monday,  and Thursday thereafter. 42.5 g 4    gabapentin (NEURONTIN) 100 MG capsule Take 100 mg by mouth 3 (three) times daily. PRN      alendronate (FOSAMAX) 70 MG tablet Take 1 tablet (70 mg total) by mouth every 7 days. 4 tablet 11    fluticasone propionate (FLONASE) 50 mcg/actuation nasal spray 1 spray (50 mcg total) by Each Nostril route once daily. 9.9 mL 0     No current facility-administered medications on file prior to visit.     Social History     Socioeconomic History    Marital status: Single   Tobacco Use    Smoking status: Never     Passive exposure: Never    Smokeless tobacco: Never   Substance and Sexual Activity    Alcohol use: Yes     Alcohol/week: 1.0 standard drink of alcohol     Types: 1 Glasses of wine per week     Comment: 1-2 glasses of wine a week     Drug use: No    Sexual activity: Yes     Partners: Male     Birth control/protection: Condom     Comment: , menarche 14, no hx of abnormal    Other Topics Concern    Are you pregnant or think you may be? No    Breast-feeding No   Social History Narrative    ** Merged History Encounter **            - Religion  Parents - Mata and Robina Del Angel    , 3 step kids and 4 grands. Works as  for [employer].     Social Determinants of Health     Financial Resource Strain: Low Risk  (1/23/2024)    Overall Financial Resource Strain (CARDIA)     Difficulty of Paying Living  Expenses: Not hard at all   Food Insecurity: No Food Insecurity (1/23/2024)    Hunger Vital Sign     Worried About Running Out of Food in the Last Year: Never true     Ran Out of Food in the Last Year: Never true   Transportation Needs: No Transportation Needs (1/23/2024)    PRAPARE - Transportation     Lack of Transportation (Medical): No     Lack of Transportation (Non-Medical): No   Physical Activity: Insufficiently Active (1/23/2024)    Exercise Vital Sign     Days of Exercise per Week: 2 days     Minutes of Exercise per Session: 30 min   Stress: No Stress Concern Present (1/23/2024)    [place] [employer] - Occupational Stress Questionnaire     Feeling of Stress : Not at all   Housing Stability: Low Risk  (1/23/2024)    Housing Stability Vital Sign     Unable to Pay for Housing in the Last Year: No     Number of Places Lived in the Last Year: 1     Unstable Housing in the Last Year: No     Family History   Problem Relation Name Age of Onset    Colon polyps Mother Robina Del Angel         noncancerous colon tumor; polyps    Hyperlipidemia Mother Robina Del Angel     Osteoporosis Mother Robina Del Angel     Colon cancer Mother Robina Del Angel         pre cacer    Depression Mother Robina Del Angel         One time    Dementia Mother Robina Del Angel         lewy body    Stroke Father ? cerebral amyloid     Gout Father ? cerebral amyloid     Hypertension Father ? cerebral amyloid     Hypertension Sister      Glaucoma Sister      Hyperlipidemia Brother      Hypertension Brother Wiliam Del Angel     Psoriasis Brother Wiliam Del Angel     Cancer Maternal Uncle          colon    Colon cancer Maternal Uncle      Breast cancer Paternal Aunt  60    Breast cancer Paternal Aunt great aunt     Depression Paternal Grandmother Sandra Del Angel         One time    Miscarriages / Stillbirths Neg Hx      Amblyopia Neg Hx      Blindness Neg Hx      Cataracts Neg Hx      Diabetes Neg Hx      Macular degeneration  "Neg Hx      Retinal detachment Neg Hx      Strabismus Neg Hx      Thyroid disease Neg Hx      Melanoma Neg Hx      Ovarian cancer Neg Hx          Review of Systems   Constitutional:  Negative for chills, fatigue, fever and unexpected weight change.   Respiratory:  Negative for cough, shortness of breath and wheezing.    Cardiovascular:  Negative for chest pain and palpitations.   Musculoskeletal:  Negative for back pain.   Skin:  Negative for color change, pallor, rash and wound.   Neurological:  Negative for dizziness and headaches.     Objective:   BP (!) 143/75   Pulse 73   Ht 5' 5" (1.651 m)   Wt 62.6 kg (138 lb)   LMP 08/31/2023   BMI 22.96 kg/m²     Physical Exam   Vitals reviewed.  Constitutional: She is oriented to person, place, and time.   Pulmonary/Chest: Effort normal. No respiratory distress. She has no wheezes. Right breast exhibits no inverted nipple, no mass, no nipple discharge, no skin change and no tenderness. Left breast exhibits tenderness. Left breast exhibits no inverted nipple, no mass, no nipple discharge and no skin change.       Abdominal: Normal appearance.   Musculoskeletal: Normal range of motion. Lymphadenopathy:      Cervical: No cervical adenopathy.     Neurological: She is alert and oriented to person, place, and time.   Skin: Skin is warm and dry. No rash noted. No erythema. No pallor.         Radiology review: Images personally reviewed by me in the clinic.     Assessment:       1. Encounter for screening breast examination    2. Breast tenderness in female        Plan:     We discussed there was nothing concerning on exam imaging today. Reassurance given. Most likely hormonal. Bedside US did not show any cysts or abnormality.     Discussed OTC therapies such as acetaminophen or nonsteroidal anti-inflammatory drugs (NSAIDs). They can both be used to relieve breast pain. Topical NSAIDS such as OTC Diclofenac (Volteran) gel can be used. Other recommendations include use of a " supportive bra. Wearing a soft, supportive bra at night prevents the breasts from pulling down on the chest wall. Some women obtain relief from application of warm compresses or ice packs.     Screening mammogram scheduled 9/2024  Return to clinic in 3 months or as needed for worsening pain not managed with therapies discussed above.     The patient is in agreement with the plan. Questions were encouraged and answered to patient's satisfaction. Brissa will call our office with any questions or concerns.

## 2024-07-22 NOTE — TELEPHONE ENCOUNTER
Patient reports vaginal bleeding, over the weekend. Per Dr. Oden monitor if bleeding occurs again embx recommended. Patient verbalized understanding.   
no discharge, no irritation, no pain, no redness, and no visual changes.

## 2024-08-11 ENCOUNTER — PATIENT MESSAGE (OUTPATIENT)
Dept: ENDOCRINOLOGY | Facility: CLINIC | Age: 54
End: 2024-08-11
Payer: COMMERCIAL

## 2024-08-31 ENCOUNTER — PATIENT MESSAGE (OUTPATIENT)
Dept: OBSTETRICS AND GYNECOLOGY | Facility: CLINIC | Age: 54
End: 2024-08-31
Payer: COMMERCIAL

## 2024-09-05 ENCOUNTER — PROCEDURE VISIT (OUTPATIENT)
Dept: OBSTETRICS AND GYNECOLOGY | Facility: CLINIC | Age: 54
End: 2024-09-05
Payer: COMMERCIAL

## 2024-09-05 VITALS
WEIGHT: 142.19 LBS | SYSTOLIC BLOOD PRESSURE: 142 MMHG | DIASTOLIC BLOOD PRESSURE: 70 MMHG | BODY MASS INDEX: 23.66 KG/M2

## 2024-09-05 DIAGNOSIS — Z32.02 NEGATIVE PREGNANCY TEST: ICD-10-CM

## 2024-09-05 DIAGNOSIS — D25.1 FIBROIDS, INTRAMURAL: ICD-10-CM

## 2024-09-05 DIAGNOSIS — N95.0 PMB (POSTMENOPAUSAL BLEEDING): Primary | ICD-10-CM

## 2024-09-05 LAB
B-HCG UR QL: NEGATIVE
CTP QC/QA: YES

## 2024-09-05 PROCEDURE — 58100 BIOPSY OF UTERUS LINING: CPT | Mod: S$GLB,,, | Performed by: OBSTETRICS & GYNECOLOGY

## 2024-09-05 PROCEDURE — 81025 URINE PREGNANCY TEST: CPT | Mod: S$GLB,,, | Performed by: OBSTETRICS & GYNECOLOGY

## 2024-09-05 NOTE — PROCEDURES
Endometrial biopsy    Date/Time: 9/5/2024 10:00 AM    Performed by: Bran Oden IV, MD  Authorized by: Bran Oden IV, MD    Consent:     Consent given by:  Patient    Patient questions answered: yes      Patient agrees, verbalizes understanding, and wants to proceed: yes      Educational handouts given: no      Instructions and paperwork completed: yes    Indication:     Indications: Post-menopausal bleeding      Chronicity of post-menopausal bleeding:  Recurrent    Progression of post-menopausal bleeding:  Waxing and waning  Pre-procedure:     Pre-procedure timeout performed: yes    Procedure:     Procedure: endometrial biopsy with Pipelle      Cervix cleaned and prepped: yes (Betadine solution)      A paracervical block was performed: no      An intracervical block was performed: no      The cervix was dilated: no      Uterus sounded: yes      Uterus sound depth (cm):  8    Curettes used:  1    Specimen collected: specimen collected and sent to pathology      Specimen collected comment:  Scant tissue noted on 2 separate passes    Patient tolerated procedure well with no complications: yes    Comments:     Procedure comments:  Will await biopsy results to offer formal follow-up recommendations.  Patient instructed to monitor for continued postmenopausal bleeding.  If significant bleeding continues, repeat pelvic sonogram/hysteroscopic evaluation versus minimally invasive hysterectomy.  Patient verbalized understanding of this discussion.    Bran Oden IV, MD

## 2024-09-16 ENCOUNTER — HOSPITAL ENCOUNTER (OUTPATIENT)
Dept: RADIOLOGY | Facility: HOSPITAL | Age: 54
Discharge: HOME | End: 2024-09-16
Attending: OBSTETRICS & GYNECOLOGY
Payer: COMMERCIAL

## 2024-09-16 DIAGNOSIS — Z12.31 BREAST CANCER SCREENING BY MAMMOGRAM: ICD-10-CM

## 2024-09-16 PROCEDURE — 77067 SCR MAMMO BI INCL CAD: CPT | Mod: 26,,, | Performed by: RADIOLOGY

## 2024-09-16 PROCEDURE — 77067 SCR MAMMO BI INCL CAD: CPT | Mod: TC

## 2024-09-16 PROCEDURE — 77063 BREAST TOMOSYNTHESIS BI: CPT | Mod: 26,,, | Performed by: RADIOLOGY

## 2024-09-27 ENCOUNTER — OFFICE VISIT (OUTPATIENT)
Dept: PODIATRY | Facility: CLINIC | Age: 54
End: 2024-09-27
Payer: COMMERCIAL

## 2024-09-27 VITALS
HEART RATE: 62 BPM | SYSTOLIC BLOOD PRESSURE: 128 MMHG | WEIGHT: 142.19 LBS | DIASTOLIC BLOOD PRESSURE: 68 MMHG | HEIGHT: 65 IN | BODY MASS INDEX: 23.69 KG/M2

## 2024-09-27 DIAGNOSIS — M79.671 FOOT PAIN, BILATERAL: ICD-10-CM

## 2024-09-27 DIAGNOSIS — M24.573 EQUINUS CONTRACTURE OF ANKLE: ICD-10-CM

## 2024-09-27 DIAGNOSIS — M79.672 FOOT PAIN, BILATERAL: ICD-10-CM

## 2024-09-27 DIAGNOSIS — M72.2 PLANTAR FASCIITIS: Primary | ICD-10-CM

## 2024-09-27 PROCEDURE — 99999 PR PBB SHADOW E&M-EST. PATIENT-LVL III: CPT | Mod: PBBFAC,,, | Performed by: PODIATRIST

## 2024-09-27 RX ORDER — LIDOCAINE AND PRILOCAINE 25; 25 MG/G; MG/G
CREAM TOPICAL
Qty: 30 G | Refills: 3 | Status: SHIPPED | OUTPATIENT
Start: 2024-09-27

## 2024-09-27 NOTE — PROGRESS NOTES
Subjective:      Patient ID: Brissa Mason is a 54 y.o. female.    Chief Complaint: Foot Pain (Bilateral foot, L worse)    Sharp deep pain in the bottom of both heels.  Chronic condition present with improvement and worsening over the past year or more.  This exacerbations been gradual all onset worsening over the past month or so.  Aggravated with increased weight-bearing particularly after rest.  No prior medical treatment.  No self-treatment.  Denies trauma and surgery both feet    Review of Systems   Constitutional: Negative for chills, diaphoresis, fever, malaise/fatigue and night sweats.   Cardiovascular:  Negative for claudication, cyanosis, leg swelling and syncope.   Skin:  Negative for color change, dry skin, nail changes, rash, suspicious lesions and unusual hair distribution.   Musculoskeletal:  Negative for falls, joint pain, joint swelling, muscle cramps, muscle weakness and stiffness.   Gastrointestinal:  Negative for constipation, diarrhea, nausea and vomiting.   Neurological:  Negative for brief paralysis, disturbances in coordination, focal weakness, numbness, paresthesias, sensory change and tremors.         Objective:      Physical Exam  Constitutional:       General: She is not in acute distress.     Appearance: She is well-developed. She is not diaphoretic.   Cardiovascular:      Pulses:           Popliteal pulses are 2+ on the right side and 2+ on the left side.        Dorsalis pedis pulses are 2+ on the right side and 2+ on the left side.        Posterior tibial pulses are 2+ on the right side and 2+ on the left side.      Comments: Capillary refill 3 seconds all toes/distal feet, all toes/both feet warm to touch.      Negative lymphadenopathy bilateral popliteal fossa and tarsal tunnel.      Negavie lower extremity edema bilateral.    Musculoskeletal:      Right ankle: No swelling, deformity, ecchymosis or lacerations. Normal range of motion. Normal pulse.      Right Achilles  Tendon: Normal. No defects. Adler's test negative.      Comments: Sharp deep pain to palpation inferior heel right and left at medial calcaneal tubercle without ecchymosis, erythema, edema, or cardinal signs infection, and no signs of trauma.    Ankle dorsiflexion decreased at <10 degrees bilateral with moderate increase with knee flexion bilateral.       Lymphadenopathy:      Lower Body: No right inguinal adenopathy. No left inguinal adenopathy.      Comments: Negative lymphadenopathy bilateral popliteal fossa and tarsal tunnel.    Negative lymphangitic streaking bilateral feet/ankles/legs.   Skin:     General: Skin is warm and dry.      Capillary Refill: Capillary refill takes 2 to 3 seconds.      Coloration: Skin is not pale.      Findings: No abrasion, bruising, burn, ecchymosis, erythema, laceration, lesion or rash.      Nails: There is no clubbing.      Comments: Skin is normal age and health appropriate color, turgor, texture, and temperature bilateral lower extremities without ulceration, hyperpigmentation, discoloration, masses nodules or cords palpated.  No ecchymosis, erythema, edema, or cardinal signs of infection bilateral lower extremities.      Neurological:      Mental Status: She is alert and oriented to person, place, and time.      Sensory: No sensory deficit.      Motor: No tremor, atrophy or abnormal muscle tone.      Gait: Gait normal.      Deep Tendon Reflexes:      Reflex Scores:       Patellar reflexes are 2+ on the right side and 2+ on the left side.       Achilles reflexes are 2+ on the right side and 2+ on the left side.     Comments: Negative tinel sign to percussion sural, superficial peroneal, deep peroneal, saphenous, and posterior tibial nerves right and left ankles and feet.     Psychiatric:         Behavior: Behavior is cooperative.           Assessment:       Encounter Diagnoses   Name Primary?    Plantar fasciitis Yes    Foot pain, bilateral          Plan:       Brissa was  seen today for foot pain.    Diagnoses and all orders for this visit:    Plantar fasciitis    Foot pain, bilateral      I counseled the patient on her conditions, their implications and medical management.        Patient will stretch the tendo achilles complex three times daily as demonstrated in the office.  Literature was dispensed illustrating proper stretching technique.    I applied a plantar rest strapping to the patient's right and left foot to offload symptomatic area, support the arch, and relieve pain.    Patient will obtain over the counter arch supports and wear them in shoes whenever possible.  Athletic shoes intended for walking or running are usually best.    Discussed conservative treatment with shoes of adequate dimensions, material, and style to alleviate symptoms and delay or prevent surgical intervention.    The patient was advised that NSAID-type medications have two very important potential side effects: gastrointestinal irritation including hemorrhage and renal injuries. She was asked to take the medication with food and to stop if she experiences any GI upset. I asked her to call for vomiting, abdominal pain or black/bloody stools. The patient expresses understanding of these issues and questions were answered.    Voltaren gel            Follow up in about 1 month (around 10/27/2024).

## 2024-10-31 ENCOUNTER — OFFICE VISIT (OUTPATIENT)
Dept: OBSTETRICS AND GYNECOLOGY | Facility: CLINIC | Age: 54
End: 2024-10-31
Payer: COMMERCIAL

## 2024-10-31 VITALS
SYSTOLIC BLOOD PRESSURE: 136 MMHG | WEIGHT: 142 LBS | HEIGHT: 65 IN | DIASTOLIC BLOOD PRESSURE: 60 MMHG | BODY MASS INDEX: 23.66 KG/M2

## 2024-10-31 DIAGNOSIS — Z12.4 SCREENING FOR MALIGNANT NEOPLASM OF THE CERVIX: ICD-10-CM

## 2024-10-31 DIAGNOSIS — Z12.31 ENCOUNTER FOR SCREENING MAMMOGRAM FOR MALIGNANT NEOPLASM OF BREAST: ICD-10-CM

## 2024-10-31 DIAGNOSIS — N95.2 VAGINAL ATROPHY: ICD-10-CM

## 2024-10-31 DIAGNOSIS — Z01.411 ENCOUNTER FOR GYNECOLOGICAL EXAMINATION WITH ABNORMAL FINDING: Primary | ICD-10-CM

## 2024-10-31 PROCEDURE — 99999 PR PBB SHADOW E&M-EST. PATIENT-LVL III: CPT | Mod: PBBFAC,,, | Performed by: OBSTETRICS & GYNECOLOGY

## 2024-10-31 PROCEDURE — 87624 HPV HI-RISK TYP POOLED RSLT: CPT | Performed by: OBSTETRICS & GYNECOLOGY

## 2024-10-31 PROCEDURE — 88175 CYTOPATH C/V AUTO FLUID REDO: CPT | Performed by: OBSTETRICS & GYNECOLOGY

## 2024-10-31 RX ORDER — ESTRADIOL 0.1 MG/G
1 CREAM VAGINAL
Qty: 42.5 G | Refills: 2 | Status: SHIPPED | OUTPATIENT
Start: 2024-10-31

## 2024-11-01 LAB
CLINICAL INFO: NORMAL
DATE OF PREVIOUS PAP: NORMAL
DATE PREVIOUS BX: NORMAL
LMP START DATE: NORMAL
SPECIMEN SOURCE CVX/VAG CYTO: NORMAL

## 2024-12-10 ENCOUNTER — PATIENT MESSAGE (OUTPATIENT)
Dept: INTERNAL MEDICINE | Facility: CLINIC | Age: 54
End: 2024-12-10
Payer: COMMERCIAL

## 2024-12-10 DIAGNOSIS — E53.8 B12 NUTRITIONAL DEFICIENCY: ICD-10-CM

## 2024-12-10 DIAGNOSIS — E55.9 VITAMIN D DEFICIENCY: ICD-10-CM

## 2024-12-10 DIAGNOSIS — Z00.00 WELLNESS EXAMINATION: ICD-10-CM

## 2024-12-23 ENCOUNTER — LAB VISIT (OUTPATIENT)
Dept: LAB | Facility: HOSPITAL | Age: 54
End: 2024-12-23
Attending: INTERNAL MEDICINE
Payer: COMMERCIAL

## 2024-12-23 DIAGNOSIS — Z00.00 WELLNESS EXAMINATION: ICD-10-CM

## 2024-12-23 DIAGNOSIS — E55.9 VITAMIN D DEFICIENCY: ICD-10-CM

## 2024-12-23 DIAGNOSIS — E53.8 B12 NUTRITIONAL DEFICIENCY: ICD-10-CM

## 2024-12-23 LAB
25(OH)D3+25(OH)D2 SERPL-MCNC: 37 NG/ML (ref 30–96)
ALBUMIN SERPL BCP-MCNC: 4.1 G/DL (ref 3.5–5.2)
ALP SERPL-CCNC: 49 U/L (ref 40–150)
ALT SERPL W/O P-5'-P-CCNC: 11 U/L (ref 10–44)
ANION GAP SERPL CALC-SCNC: 6 MMOL/L (ref 8–16)
AST SERPL-CCNC: 17 U/L (ref 10–40)
BASOPHILS # BLD AUTO: 0.06 K/UL (ref 0–0.2)
BASOPHILS NFR BLD: 1.3 % (ref 0–1.9)
BILIRUB SERPL-MCNC: 0.6 MG/DL (ref 0.1–1)
BUN SERPL-MCNC: 13 MG/DL (ref 6–20)
CALCIUM SERPL-MCNC: 9.1 MG/DL (ref 8.7–10.5)
CHLORIDE SERPL-SCNC: 104 MMOL/L (ref 95–110)
CHOLEST SERPL-MCNC: 215 MG/DL (ref 120–199)
CHOLEST/HDLC SERPL: 2.7 {RATIO} (ref 2–5)
CO2 SERPL-SCNC: 25 MMOL/L (ref 23–29)
CREAT SERPL-MCNC: 0.8 MG/DL (ref 0.5–1.4)
DIFFERENTIAL METHOD BLD: NORMAL
EOSINOPHIL # BLD AUTO: 0.1 K/UL (ref 0–0.5)
EOSINOPHIL NFR BLD: 3 % (ref 0–8)
ERYTHROCYTE [DISTWIDTH] IN BLOOD BY AUTOMATED COUNT: 11.9 % (ref 11.5–14.5)
EST. GFR  (NO RACE VARIABLE): >60 ML/MIN/1.73 M^2
ESTIMATED AVG GLUCOSE: 103 MG/DL (ref 68–131)
GLUCOSE SERPL-MCNC: 85 MG/DL (ref 70–110)
HBA1C MFR BLD: 5.2 % (ref 4–5.6)
HCT VFR BLD AUTO: 40.7 % (ref 37–48.5)
HDLC SERPL-MCNC: 80 MG/DL (ref 40–75)
HDLC SERPL: 37.2 % (ref 20–50)
HGB BLD-MCNC: 13.1 G/DL (ref 12–16)
IMM GRANULOCYTES # BLD AUTO: 0.01 K/UL (ref 0–0.04)
IMM GRANULOCYTES NFR BLD AUTO: 0.2 % (ref 0–0.5)
LDLC SERPL CALC-MCNC: 118.4 MG/DL (ref 63–159)
LYMPHOCYTES # BLD AUTO: 1.5 K/UL (ref 1–4.8)
LYMPHOCYTES NFR BLD: 32 % (ref 18–48)
MCH RBC QN AUTO: 29.7 PG (ref 27–31)
MCHC RBC AUTO-ENTMCNC: 32.2 G/DL (ref 32–36)
MCV RBC AUTO: 92 FL (ref 82–98)
MONOCYTES # BLD AUTO: 0.4 K/UL (ref 0.3–1)
MONOCYTES NFR BLD: 9 % (ref 4–15)
NEUTROPHILS # BLD AUTO: 2.5 K/UL (ref 1.8–7.7)
NEUTROPHILS NFR BLD: 54.5 % (ref 38–73)
NONHDLC SERPL-MCNC: 135 MG/DL
NRBC BLD-RTO: 0 /100 WBC
PLATELET # BLD AUTO: 264 K/UL (ref 150–450)
PMV BLD AUTO: 11 FL (ref 9.2–12.9)
POTASSIUM SERPL-SCNC: 3.9 MMOL/L (ref 3.5–5.1)
PROT SERPL-MCNC: 7.2 G/DL (ref 6–8.4)
RBC # BLD AUTO: 4.41 M/UL (ref 4–5.4)
SODIUM SERPL-SCNC: 135 MMOL/L (ref 136–145)
TRIGL SERPL-MCNC: 83 MG/DL (ref 30–150)
TSH SERPL DL<=0.005 MIU/L-ACNC: 2.45 UIU/ML (ref 0.4–4)
WBC # BLD AUTO: 4.65 K/UL (ref 3.9–12.7)

## 2024-12-23 PROCEDURE — 84443 ASSAY THYROID STIM HORMONE: CPT | Performed by: INTERNAL MEDICINE

## 2024-12-23 PROCEDURE — 83036 HEMOGLOBIN GLYCOSYLATED A1C: CPT | Performed by: INTERNAL MEDICINE

## 2024-12-23 PROCEDURE — 80061 LIPID PANEL: CPT | Performed by: INTERNAL MEDICINE

## 2024-12-23 PROCEDURE — 83921 ORGANIC ACID SINGLE QUANT: CPT | Performed by: INTERNAL MEDICINE

## 2024-12-23 PROCEDURE — 80053 COMPREHEN METABOLIC PANEL: CPT | Performed by: INTERNAL MEDICINE

## 2024-12-23 PROCEDURE — 82607 VITAMIN B-12: CPT | Performed by: INTERNAL MEDICINE

## 2024-12-23 PROCEDURE — 85025 COMPLETE CBC W/AUTO DIFF WBC: CPT | Performed by: INTERNAL MEDICINE

## 2024-12-23 PROCEDURE — 82306 VITAMIN D 25 HYDROXY: CPT | Performed by: INTERNAL MEDICINE

## 2024-12-24 LAB — VIT B12 SERPL-MCNC: 344 NG/L (ref 180–914)

## 2024-12-26 LAB — METHYLMALONATE SERPL-SCNC: 0.18 NMOL/ML

## 2025-01-24 ENCOUNTER — OFFICE VISIT (OUTPATIENT)
Dept: INTERNAL MEDICINE | Facility: CLINIC | Age: 55
End: 2025-01-24
Payer: COMMERCIAL

## 2025-01-24 VITALS
OXYGEN SATURATION: 99 % | BODY MASS INDEX: 24.36 KG/M2 | DIASTOLIC BLOOD PRESSURE: 62 MMHG | WEIGHT: 146.19 LBS | RESPIRATION RATE: 14 BRPM | HEIGHT: 65 IN | HEART RATE: 64 BPM | SYSTOLIC BLOOD PRESSURE: 118 MMHG | TEMPERATURE: 98 F

## 2025-01-24 DIAGNOSIS — E78.5 DYSLIPIDEMIA: ICD-10-CM

## 2025-01-24 DIAGNOSIS — Z80.0 FAMILY HX OF COLON CANCER REQUIRING SCREENING COLONOSCOPY: ICD-10-CM

## 2025-01-24 DIAGNOSIS — Z00.00 ANNUAL PHYSICAL EXAM: Primary | ICD-10-CM

## 2025-01-24 DIAGNOSIS — M81.0 OSTEOPOROSIS, UNSPECIFIED OSTEOPOROSIS TYPE, UNSPECIFIED PATHOLOGICAL FRACTURE PRESENCE: ICD-10-CM

## 2025-01-24 PROCEDURE — 99999 PR PBB SHADOW E&M-EST. PATIENT-LVL III: CPT | Mod: PBBFAC,,, | Performed by: INTERNAL MEDICINE

## 2025-01-24 PROCEDURE — 99396 PREV VISIT EST AGE 40-64: CPT | Mod: S$GLB,,, | Performed by: INTERNAL MEDICINE

## 2025-01-24 NOTE — PROGRESS NOTES
Subjective     Patient ID: Brissa Mason is a 54 y.o. female.    Chief Complaint: Annual Exam    HPI  54 y.o. Female here for annual exam.      Vaccines: Influenza (declined); Tetanus (2015); Shingrix (will consider)  Eye exam:will schedule  Mammogram: 9/23  Gyn exam: 2021  Colonoscopy: 2020  DEXA: 2/24     Exercise: no  Diet: regular   LMP: irregular      Past Medical History:  05/11/2023: Abnormal uterine bleeding (AUB)  06/22/2015: Acute pain of left shoulder  No date: Allergy  04/20/2020: Anemia  No date: Anxiety disorder  No date: B12 deficiency  04/20/2020: B12 nutritional deficiency  No date: Cataract  No date: Colitis      Comment:  resolved; rectum 2005; neg flex sig 2006  No date: Colitis: 2005 rectal area      Comment:  resolved; rectum 2005; neg flex sig 2006   10/15/2020: Family hx of colon cancer requiring screening colonoscopy  No date: Iron deficiency  09/05/2017: Menstrual migraine without status migrainosus, not   intractable  No date: Osteoporosis  No date: Vitamin D deficiency  Past Surgical History:  10/15/2020: COLONOSCOPY; N/A      Comment:  Procedure: COLONOSCOPY;  Surgeon: Vern Harkins MD;                Location: [address]);  Service: Endoscopy;                 Laterality: N/A;  covid test 10/12-[facility]  12/2/2022: ESOPHAGOGASTRODUODENOSCOPY; N/A      Comment:  Procedure: EGD (ESOPHAGOGASTRODUODENOSCOPY);  Surgeon:                Patricio Silva MD;  Location: [address]);                 Service: Endoscopy;  Laterality: N/A;  per referral/ prep               ins. on portal - ERW12/1 SWP-AWARE OF NEW ARRIVAL                TIME-RT  2017: GANGLION CYST EXCISION  05/2022: shoulder injection; Left  Social History    Socioeconomic History      Marital status:     Tobacco Use      Smoking status: Never      Smokeless tobacco: Never    Substance and Sexual Activity      Alcohol use: Yes        Alcohol/week: 1.0 standard drink of alcohol        Types: 1  Glasses of wine per week        Comment: 1-2 glasses of wine a week       Drug use: No      Sexual activity: Yes        Partners: Male        Birth control/protection: Condom        Comment: , menarche 14, no hx of abnormal     Other Topics      Concerns:        Are you pregnant or think you may be?: No        Breast-feeding: No    Social History Narrative             - Religious      Parents - Milton Del Angel            , 3 step kids and 4 grands. Works as  for [employer].     Social Determinants of Health  Financial Resource Strain: Low Risk  (1/23/2024)      Overall Financial Resource Strain (CARDIA)          Difficulty of Paying Living Expenses: Not hard at all  Food Insecurity: No Food Insecurity (1/23/2024)      Hunger Vital Sign          Worried About Running Out of Food in the Last Year: Never true          Ran Out of Food in the Last Year: Never true  Transportation Needs: No Transportation Needs (1/23/2024)      PRAPARE - Transportation          Lack of Transportation (Medical): No          Lack of Transportation (Non-Medical): No  Physical Activity: Insufficiently Active (1/23/2024)      Exercise Vital Sign          Days of Exercise per Week: 2 days          Minutes of Exercise per Session: 30 min  Stress: No Stress Concern Present (1/23/2024)      [employer] [employer] - Occupational Stress Questionnaire          Feeling of Stress : Not at all  Social Connections: Unknown (1/23/2024)      Social Connection and Isolation Panel [NHANES]          Frequency of Communication with Friends and Family: More than three times a week          Frequency of Social Gatherings with Friends and Family: Once a week          Active Member of Clubs or Organizations: Yes          Attends Club or Organization Meetings: More than 4 times per year          Marital Status:   Housing Stability: Low Risk  (1/23/2024)      Housing Stability Vital  Sign          Unable to Pay for Housing in the Last Year: No          Number of Places Lived in the Last Year: 1          Unstable Housing in the Last Year: No  Review of patient's allergies indicates:   -- Mobic [meloxicam] -- Rash   -- Sulfa (sulfonamide antibiotics)     --  Other reaction(s): Rash  Review of Systems   Constitutional:  Negative for activity change, appetite change, chills, diaphoresis, fatigue, fever and unexpected weight change.   HENT:  Negative for nasal congestion, hearing loss, mouth sores, postnasal drip, rhinorrhea, sinus pressure/congestion, sneezing, sore throat, trouble swallowing and voice change.    Eyes:  Negative for pain, discharge and visual disturbance.   Respiratory:  Negative for cough, chest tightness, shortness of breath and wheezing.    Cardiovascular:  Negative for chest pain, palpitations and leg swelling.   Gastrointestinal:  Negative for abdominal pain, blood in stool, constipation, diarrhea, nausea and vomiting.   Endocrine: Negative for cold intolerance, heat intolerance, polydipsia and polyuria.   Genitourinary:  Negative for difficulty urinating, dysuria, frequency, hematuria, menstrual problem and urgency.   Musculoskeletal:  Positive for arthralgias and neck pain. Negative for joint swelling and myalgias.   Integumentary:  Negative for rash and wound.   Allergic/Immunologic: Negative for environmental allergies and food allergies.   Neurological:  Negative for dizziness, tremors, seizures, syncope, weakness, light-headedness and headaches.   Hematological:  Negative for adenopathy. Does not bruise/bleed easily.   Psychiatric/Behavioral:  Negative for confusion, dysphoric mood and sleep disturbance. The patient is not nervous/anxious.           Objective     Physical Exam  Vitals and nursing note reviewed.   Constitutional:       General: She is not in acute distress.     Appearance: She is well-developed.   HENT:      Head: Normocephalic and atraumatic.       Mouth/Throat:      Pharynx: No oropharyngeal exudate.   Eyes:      General: No scleral icterus.        Right eye: No discharge.         Left eye: No discharge.      Conjunctiva/sclera: Conjunctivae normal.      Pupils: Pupils are equal, round, and reactive to light.   Neck:      Thyroid: No thyromegaly.      Vascular: No JVD.   Cardiovascular:      Rate and Rhythm: Normal rate and regular rhythm.      Heart sounds: Normal heart sounds. No murmur heard.  Pulmonary:      Effort: Pulmonary effort is normal.      Breath sounds: Normal breath sounds. No wheezing or rales.   Chest:      Chest wall: No tenderness.   Abdominal:      General: There is no distension.      Palpations: Abdomen is soft.      Tenderness: There is no abdominal tenderness. There is no guarding.   Musculoskeletal:      Cervical back: Neck supple.   Lymphadenopathy:      Cervical: No cervical adenopathy.   Skin:     General: Skin is warm and dry.   Neurological:      Mental Status: She is alert and oriented to person, place, and time.   Psychiatric:         Judgment: Judgment normal.            Assessment and Plan     1. Annual physical exam    2. Dyslipidemia    3. Family hx of colon cancer requiring screening colonoscopy    4. Osteoporosis, unspecified osteoporosis type, unspecified pathological fracture presence        Blood work reviewed with pt     Dyslipidemia- stable on heart healthy diet      Osteoporosis- stable per repeat DEXA   -not currently on tx     FHx of colon cancer- current with colonoscopy     F/u in 1 yr

## 2025-01-27 ENCOUNTER — TELEPHONE (OUTPATIENT)
Dept: INTERNAL MEDICINE | Facility: CLINIC | Age: 55
End: 2025-01-27
Payer: COMMERCIAL

## 2025-01-27 DIAGNOSIS — Z00.00 ANNUAL PHYSICAL EXAM: Primary | ICD-10-CM

## 2025-02-06 ENCOUNTER — PATIENT MESSAGE (OUTPATIENT)
Dept: INTERNAL MEDICINE | Facility: CLINIC | Age: 55
End: 2025-02-06
Payer: COMMERCIAL

## 2025-02-06 DIAGNOSIS — Z12.11 COLON CANCER SCREENING: Primary | ICD-10-CM

## 2025-02-06 NOTE — TELEPHONE ENCOUNTER
Pt is requesting referral for colonoscopy to be ordered early so she can schedule her appt for first appt in the morning.    Referral pended

## 2025-02-20 ENCOUNTER — TELEPHONE (OUTPATIENT)
Dept: INTERNAL MEDICINE | Facility: CLINIC | Age: 55
End: 2025-02-20
Payer: COMMERCIAL

## 2025-02-20 NOTE — TELEPHONE ENCOUNTER
----- Message from Sophia sent at 2/20/2025 10:06 AM CST -----  Contact: [phone]  [email or web address] Patient is calling for Medical Advice regarding:pt is calling in regards to colon test and needs someone to call as soon as possible Patient wants a call back or [facility]:call back Comments:Please advise patient replies from provider may take up to 48 hours.

## 2025-03-15 ENCOUNTER — PATIENT MESSAGE (OUTPATIENT)
Dept: OBSTETRICS AND GYNECOLOGY | Facility: CLINIC | Age: 55
End: 2025-03-15
Payer: COMMERCIAL

## 2025-03-15 DIAGNOSIS — R14.0 ABDOMINAL BLOATING: Primary | ICD-10-CM

## 2025-03-17 ENCOUNTER — TELEPHONE (OUTPATIENT)
Dept: OBSTETRICS AND GYNECOLOGY | Facility: CLINIC | Age: 55
End: 2025-03-17
Payer: COMMERCIAL

## 2025-03-21 ENCOUNTER — HOSPITAL ENCOUNTER (OUTPATIENT)
Dept: RADIOLOGY | Facility: OTHER | Age: 55
Discharge: HOME | End: 2025-03-21
Attending: OBSTETRICS & GYNECOLOGY
Payer: COMMERCIAL

## 2025-03-21 DIAGNOSIS — R14.0 ABDOMINAL BLOATING: ICD-10-CM

## 2025-03-21 PROCEDURE — 76856 US EXAM PELVIC COMPLETE: CPT | Mod: 26,,, | Performed by: RADIOLOGY

## 2025-03-21 PROCEDURE — 76856 US EXAM PELVIC COMPLETE: CPT | Mod: TC

## 2025-03-21 PROCEDURE — 76830 TRANSVAGINAL US NON-OB: CPT | Mod: 26,,, | Performed by: RADIOLOGY

## 2025-03-27 ENCOUNTER — TELEPHONE (OUTPATIENT)
Dept: OBSTETRICS AND GYNECOLOGY | Facility: CLINIC | Age: 55
End: 2025-03-27
Payer: COMMERCIAL

## 2025-03-27 NOTE — TELEPHONE ENCOUNTER
Patient notified, Dr. Oden reviewed ultrasound, recommending Hysteroscopy D&C V's DL, patient will call back with desires.

## 2025-03-28 ENCOUNTER — TELEPHONE (OUTPATIENT)
Dept: OBSTETRICS AND GYNECOLOGY | Facility: CLINIC | Age: 55
End: 2025-03-28
Payer: COMMERCIAL

## 2025-03-28 NOTE — TELEPHONE ENCOUNTER
----- Message from Kelly sent at 3/28/2025  1:42 PM CDT -----  Regarding: missed call   Who Called: Brissa Who Left Message for Patient: Britney Does the patient know what this is regarding?Yes Best Call Back Number: [phone] Additional Information:

## 2025-04-03 ENCOUNTER — TELEPHONE (OUTPATIENT)
Dept: OBSTETRICS AND GYNECOLOGY | Facility: CLINIC | Age: 55
End: 2025-04-03

## 2025-04-03 ENCOUNTER — OFFICE VISIT (OUTPATIENT)
Dept: OBSTETRICS AND GYNECOLOGY | Facility: CLINIC | Age: 55
End: 2025-04-03
Payer: COMMERCIAL

## 2025-04-03 DIAGNOSIS — R93.89 THICKENED ENDOMETRIUM: Primary | ICD-10-CM

## 2025-04-03 DIAGNOSIS — R93.89 ABNORMAL ULTRASOUND OF PELVIS: Primary | ICD-10-CM

## 2025-04-03 DIAGNOSIS — D25.1 FIBROIDS, INTRAMURAL: ICD-10-CM

## 2025-04-03 NOTE — PROGRESS NOTES
The patient location is:  Home/low exam  The chief complaint leading to consultation is:  Thickened endometrium    Visit type: audiovisual    Face to Face time with patient: 14  22 minutes of total time spent on the encounter, which includes face to face time and non-face to face time preparing to see the patient (eg, review of tests), Obtaining and/or reviewing separately obtained history, Documenting clinical information in the electronic or other health record, Independently interpreting results (not separately reported) and communicating results to the patient/family/caregiver, or Care coordination (not separately reported).         Each patient to whom he or she provides medical services by telemedicine is:  (1) informed of the relationship between the physician and patient and the respective role of any other health care provider with respect to management of the patient; and (2) notified that he or she may decline to receive medical services by telemedicine and may withdraw from such care at any time.    Notes:    Brissa Mason is a 55 y.o.   patient who presents today after recent pelvic sonogram revealing fluid and cervical canal with 5 mm endometrial thickness.  Patient does have history of abnormal uterine bleeding/postmenopausal bleeding with proliferative endometrium/benign endometrial biopsy in 2024.  Patient felt bloating swelling and had discharge (non-bloody) prior to her pelvic sonogram on 2025.  Patient is here to discuss those findings and recommended follow-up.      No LMP recorded. Patient is perimenopausal.    Past Medical History:   Diagnosis Date    Abnormal uterine bleeding (AUB) 2023    Acute pain of left shoulder 2015    Allergy     Anemia 2020    Anxiety disorder     B12 deficiency     B12 nutritional deficiency 2020    Cataract     Colitis     resolved; rectum ; neg flex sig     Colitis: 2005 rectal area     resolved;  rectum 2005; neg flex sig 2006     Family hx of colon cancer requiring screening colonoscopy 10/15/2020    Iron deficiency     Menstrual migraine without status migrainosus, not intractable 09/05/2017    Osteoporosis     Vitamin D deficiency        Past Surgical History:   Procedure Laterality Date    COLONOSCOPY N/A 10/15/2020    Procedure: COLONOSCOPY;  Surgeon: Vern Harkins MD;  [facility]  Service: Endoscopy;  Laterality: N/A;  covid test 10/12-[facility]    ESOPHAGOGASTRODUODENOSCOPY N/A 12/2/2022    Procedure: EGD (ESOPHAGOGASTRODUODENOSCOPY);  Surgeon: Patricio Silva MD;  [facility]  Service: Endoscopy;  Laterality: N/A;  per referral/ prep ins. on portal - ERW  12/1 SWP-AWARE OF NEW ARRIVAL TIME-RT    GANGLION CYST EXCISION  2017    shoulder injection Left 05/2022         ROS:  GENERAL: Feeling well overall.   SKIN: Denies rash or lesions.   HEAD: Denies head injury or headache.   NODES: Denies enlarged lymph nodes.   CHEST: Denies chest pain or shortness of breath.   CARDIOVASCULAR: Denies palpitations or left sided chest pain.   ABDOMEN: No abdominal pain, nausea, vomiting or rectal bleeding.   URINARY: No dysuria, hematuria, or burning on urination.  REPRODUCTIVE: See HPI.   BREASTS: Denies pain, lumps, or nipple discharge.   HEMATOLOGIC: No easy bruisability or excessive bleeding.   MUSCULOSKELETAL: Denies joint pain or swelling.   NEUROLOGIC: Denies syncope or weakness.   PSYCHIATRIC: Denies depression.    PE:   Not performed/telemedicine    Diagnosis:  1. Thickened endometrium    2. Fibroids, intramural        PLAN:  Patient was counseled today on pelvic sonogram findings.    Patient with September 2024 benign endometrial biopsy.  Patient with history of postmenopausal bleeding/spotting (last in October 2024).  Patient with 5 mm endometrial stripe on most recent pelvic sonogram.  Patient also with benign-appearing simple cyst of the left ovary and 1-2 cm  intramural fibroid.    Patient counseled on conservative management/follow up with repeat pelvic sonogram in 2-3 months to evaluate lining and uterus/adnexa.    Patient also offered hysteroscopic evaluation/D&C.  Patient desires to proceed with hysteroscopy D&C.    Patient will be contacted by office to offer scheduling options.    Bran Oden IV, MD

## 2025-04-10 ENCOUNTER — CLINICAL SUPPORT (OUTPATIENT)
Dept: ENDOSCOPY | Facility: HOSPITAL | Age: 55
End: 2025-04-10
Attending: INTERNAL MEDICINE
Payer: COMMERCIAL

## 2025-04-10 ENCOUNTER — TELEPHONE (OUTPATIENT)
Dept: ENDOSCOPY | Facility: HOSPITAL | Age: 55
End: 2025-04-10

## 2025-04-10 DIAGNOSIS — Z12.11 COLON CANCER SCREENING: ICD-10-CM

## 2025-04-10 NOTE — TELEPHONE ENCOUNTER
Patient stated she would like to schedule her colonoscopy after she heels from her surgery done on 5/12 she's getting hysteroscopy done

## 2025-05-05 ENCOUNTER — ANESTHESIA EVENT (OUTPATIENT)
Dept: SURGERY | Facility: OTHER | Age: 55
End: 2025-05-05
Payer: COMMERCIAL

## 2025-05-05 NOTE — ANESTHESIA PREPROCEDURE EVALUATION
05/05/2025  Brissa Mason is a 55 y.o., female.      Pre-op Assessment    I have reviewed the Patient Summary Reports.     I have reviewed the Nursing Notes. I have reviewed the NPO Status.   I have reviewed the Medications.     Review of Systems  Anesthesia Hx:             Denies Family Hx of Anesthesia complications.    Denies Personal Hx of Anesthesia complications.                    Social:  Non-Smoker       Hematology/Oncology:    Oncology Normal    -- Anemia (NEW):               Hematology Comments: B12 deficiency                     EENT/Dental:  EENT/Dental Normal           Cardiovascular:                hyperlipidemia                               Pulmonary:  Pulmonary Normal                       Renal/:  Renal/ Normal                 Hepatic/GI:  Hepatic/GI Normal                    Musculoskeletal:  Musculoskeletal Normal                Neurological:      Headaches                                 Endocrine:  Endocrine Normal            Dermatological:  Skin Normal    Psych:   anxiety                 Physical Exam  General: Well nourished, Cooperative, Alert and Oriented    Airway:  Mallampati: I   Mouth Opening: Normal  TM Distance: Normal  Tongue: Normal  Neck ROM: Normal ROM    Dental:  Caps / Implants  New upper right implant      Anesthesia Plan  Type of Anesthesia, risks & benefits discussed:    Anesthesia Type: Gen Supraglottic Airway  Intra-op Monitoring Plan: Standard ASA Monitors  Post Op Pain Control Plan: multimodal analgesia  Induction:  IV  Informed Consent: Informed consent signed with the Patient and all parties understand the risks and agree with anesthesia plan.  All questions answered.   ASA Score: 2  Anesthesia Plan Notes: CBC, BMP    Ready For Surgery From Anesthesia Perspective.     .

## 2025-05-07 ENCOUNTER — HOSPITAL ENCOUNTER (OUTPATIENT)
Dept: PREADMISSION TESTING | Facility: OTHER | Age: 55
Discharge: HOME | End: 2025-05-07
Attending: OBSTETRICS & GYNECOLOGY
Payer: COMMERCIAL

## 2025-05-07 ENCOUNTER — OFFICE VISIT (OUTPATIENT)
Dept: OBSTETRICS AND GYNECOLOGY | Facility: CLINIC | Age: 55
End: 2025-05-07
Payer: COMMERCIAL

## 2025-05-07 VITALS
RESPIRATION RATE: 16 BRPM | DIASTOLIC BLOOD PRESSURE: 70 MMHG | HEIGHT: 65 IN | WEIGHT: 142 LBS | TEMPERATURE: 98 F | HEART RATE: 68 BPM | OXYGEN SATURATION: 98 % | SYSTOLIC BLOOD PRESSURE: 155 MMHG | BODY MASS INDEX: 23.66 KG/M2

## 2025-05-07 VITALS
SYSTOLIC BLOOD PRESSURE: 116 MMHG | BODY MASS INDEX: 23.77 KG/M2 | DIASTOLIC BLOOD PRESSURE: 68 MMHG | WEIGHT: 142.88 LBS

## 2025-05-07 DIAGNOSIS — D25.1 FIBROIDS, INTRAMURAL: ICD-10-CM

## 2025-05-07 DIAGNOSIS — Z01.818 PREOP TESTING: Primary | ICD-10-CM

## 2025-05-07 DIAGNOSIS — R93.89 THICKENED ENDOMETRIUM: Primary | ICD-10-CM

## 2025-05-07 DIAGNOSIS — Z87.42 HISTORY OF POSTMENOPAUSAL BLEEDING: ICD-10-CM

## 2025-05-07 DIAGNOSIS — Z32.02 NEGATIVE PREGNANCY TEST: ICD-10-CM

## 2025-05-07 LAB
ABSOLUTE EOSINOPHIL (OHS): 0.29 K/UL
ABSOLUTE MONOCYTE (OHS): 0.53 K/UL (ref 0.3–1)
ABSOLUTE NEUTROPHIL COUNT (OHS): 5.3 K/UL (ref 1.8–7.7)
ANION GAP (OHS): 10 MMOL/L (ref 8–16)
B-HCG UR QL: NEGATIVE
BASOPHILS # BLD AUTO: 0.04 K/UL
BASOPHILS NFR BLD AUTO: 0.5 %
BUN SERPL-MCNC: 14 MG/DL (ref 6–20)
CALCIUM SERPL-MCNC: 9.4 MG/DL (ref 8.7–10.5)
CHLORIDE SERPL-SCNC: 106 MMOL/L (ref 95–110)
CO2 SERPL-SCNC: 23 MMOL/L (ref 23–29)
CREAT SERPL-MCNC: 0.8 MG/DL (ref 0.5–1.4)
CTP QC/QA: YES
ERYTHROCYTE [DISTWIDTH] IN BLOOD BY AUTOMATED COUNT: 13.8 % (ref 11.5–14.5)
GFR SERPLBLD CREATININE-BSD FMLA CKD-EPI: >60 ML/MIN/1.73/M2
GLUCOSE SERPL-MCNC: 97 MG/DL (ref 70–110)
HCT VFR BLD AUTO: 38.8 % (ref 37–48.5)
HGB BLD-MCNC: 13 GM/DL (ref 12–16)
IMM GRANULOCYTES # BLD AUTO: 0.02 K/UL (ref 0–0.04)
IMM GRANULOCYTES NFR BLD AUTO: 0.3 % (ref 0–0.5)
LYMPHOCYTES # BLD AUTO: 1.72 K/UL (ref 1–4.8)
MCH RBC QN AUTO: 29.3 PG (ref 27–31)
MCHC RBC AUTO-ENTMCNC: 33.5 G/DL (ref 32–36)
MCV RBC AUTO: 88 FL (ref 82–98)
NUCLEATED RBC (/100WBC) (OHS): 0 /100 WBC
PLATELET # BLD AUTO: 273 K/UL (ref 150–450)
PMV BLD AUTO: 10.5 FL (ref 9.2–12.9)
POTASSIUM SERPL-SCNC: 4.5 MMOL/L (ref 3.5–5.1)
RBC # BLD AUTO: 4.43 M/UL (ref 4–5.4)
RELATIVE EOSINOPHIL (OHS): 3.7 %
RELATIVE LYMPHOCYTE (OHS): 21.8 % (ref 18–48)
RELATIVE MONOCYTE (OHS): 6.7 % (ref 4–15)
RELATIVE NEUTROPHIL (OHS): 67 % (ref 38–73)
SODIUM SERPL-SCNC: 139 MMOL/L (ref 136–145)
WBC # BLD AUTO: 7.9 K/UL (ref 3.9–12.7)

## 2025-05-07 PROCEDURE — 99999 PR PBB SHADOW E&M-EST. PATIENT-LVL III: CPT | Mod: PBBFAC,,, | Performed by: OBSTETRICS & GYNECOLOGY

## 2025-05-07 PROCEDURE — 99213 OFFICE O/P EST LOW 20 MIN: CPT | Mod: S$GLB,,, | Performed by: OBSTETRICS & GYNECOLOGY

## 2025-05-07 PROCEDURE — 85025 COMPLETE CBC W/AUTO DIFF WBC: CPT

## 2025-05-07 PROCEDURE — 81025 URINE PREGNANCY TEST: CPT | Mod: S$GLB,,, | Performed by: OBSTETRICS & GYNECOLOGY

## 2025-05-07 PROCEDURE — 82947 ASSAY GLUCOSE BLOOD QUANT: CPT

## 2025-05-07 RX ORDER — ACETAMINOPHEN 500 MG
1000 TABLET ORAL
OUTPATIENT
Start: 2025-05-12

## 2025-05-07 RX ORDER — FAMOTIDINE 20 MG/1
20 TABLET, FILM COATED ORAL
OUTPATIENT
Start: 2025-05-12

## 2025-05-07 RX ORDER — SODIUM CHLORIDE, SODIUM LACTATE, POTASSIUM CHLORIDE, CALCIUM CHLORIDE 600; 310; 30; 20 MG/100ML; MG/100ML; MG/100ML; MG/100ML
INJECTION, SOLUTION INTRAVENOUS CONTINUOUS
OUTPATIENT
Start: 2025-05-07

## 2025-05-07 RX ORDER — LIDOCAINE HYDROCHLORIDE 10 MG/ML
0.5 INJECTION, SOLUTION EPIDURAL; INFILTRATION; INTRACAUDAL; PERINEURAL ONCE
OUTPATIENT
Start: 2025-05-07 | End: 2025-05-07

## 2025-05-07 RX ORDER — SODIUM CHLORIDE 9 MG/ML
INJECTION, SOLUTION INTRAVENOUS CONTINUOUS
OUTPATIENT
Start: 2025-05-12

## 2025-05-07 NOTE — DISCHARGE INSTRUCTIONS
Information to Prepare you for your Surgery    PRE-ADMIT TESTING   [address]  [place] [facility]  [address]   [phone]  - ZAYDA Cervantes    Your surgery has been scheduled at [facility]. We are pleased to have the opportunity to serve you. For Further Information please call [phone].    On the day of surgery please report to Registration on the 1st floor of the [facility].    CONTACT YOUR PHYSICIAN'S OFFICE THE DAY PRIOR TO YOUR SURGERY TO OBTAIN YOUR ARRIVAL TIME.     The evening before surgery do not eat anything after 9 p.m. ( this includes hard candy, chewing gum and mints).  You may only have GATORADE, POWERADE AND WATER  from 9 p.m. until you leave your home.     DRINK AT LEAST 12 OUNCES THE MORNING OF SURGERY    DO NOT DRINK ANY LIQUIDS ON THE WAY TO THE HOSPITAL.      Why does your anesthesiologist allow you to drink Gatorade/Powerade before surgery?    Gatorade/Powerade helps to increase your comfort before surgery and to decrease your nausea after surgery.  The carbohydrates in the Gatorade/Powerade help reduce your body's stress response to surgery.  If you are diabetic, drink only water prior to surgery.       Outpatient Surgery- May allow 2 adults (18 and older)/ Support Persons (1 being the designated ) for all surgical/procedural patients. A breastfeeding mother will be allowed her infant and 2 adult Support Persons. No one under the age of 18 will be allowed in the building.          Angiogram Patients: Take medications as instructed by your physician, including aspirin.     Surgery Patients:  If you take ASPIRIN - Your PHYSICIAN/SURGEON will need to inform you IF/OR when you need to stop taking aspirin prior to your surgery.     Starting the week prior to surgery, do not take any medications containing IBUPROFEN or NSAIDS (Advil, Aleve, BC, Celebrex, Goody's, Ketorolac, Meloxicam, Mobic, Motrin, Naproxen, Toradol, etc).  If you are  not sure if you should take a medicine please call your surgeon's office.  You may take Tylenol.    Do Not Wear any make-up (especially eye make-up) to surgery. Please remove any false eyelashes or eyelash extensions. If you arrive the day of surgery with makeup/eyelashes on you will be required to remove prior to surgery. (There is a risk of corneal abrasions if eye makeup/eyelash extensions are not removed)    Leave all valuables at home.   Do Not wear any jewelry or watches, including any metal in body piercings. Jewelry must be removed prior to coming to the hospital.  There is a possibility that rings that are unable to be removed may be cut off if they are on the surgical extremity.    Please remove all hair extensions, wigs, clips and any other metal accessories/ ornaments from your hair.  These items may pose a flammable/fire risk in Surgery and must be removed.    Do not shave your surgical area at least 5 days prior to your surgery. The surgical prep will be performed at the hospital according to Infection Control regulations.    Contact Lens must be removed before surgery. Either do not wear the contact lens or bring a case and solution for storage.  Please bring a container for eyeglasses or dentures as required.  Bring any paperwork your physician has provided, such as consent forms,  history and physicals, doctor's orders, etc.   Bring comfortable clothes that are loose fitting to wear upon discharge. Take into consideration the type of surgery being performed.  Maintain your diet as advised per your physician the day prior to surgery.    Adequate rest the night before surgery is advised.   Park in the Parking lot behind the hospital or in the [facility] across the street from the parking lot. Parking is complimentary.  If you will be discharged the same day as your procedure, please arrange for a responsible adult to drive you home or to accompany you if traveling by taxi.   YOU WILL NOT  BE PERMITTED TO DRIVE OR TO LEAVE THE HOSPITAL ALONE AFTER SURGERY.   If you are being discharged the same day, it is strongly recommended that you arrange for someone to remain with you for the first 24 hrs following your surgery.    The Surgeon will speak to your family/visitor after your surgery regarding the outcome of your surgery and post op care.  The Surgeon may speak to you after your surgery, but there is a possibility you may not remember the details.  Please check with your family members regarding the conversation with the Surgeon.         Bathing Instructions with Hibiclens:    Shower the evening before and morning of your procedure with Chlorhexidine (Hibiclens)  do not use Chlorhexidine on your face or genitals. Do not get in your eyes.  Wash your face with water and your regular face wash/soap  Use your regular shampoo  Apply Chlorhexidine (Hibiclens) directly on your skin or on a wet washcloth and wash gently. When showering: Move away from the shower stream when applying Chlorhexidine (Hibiclens) to avoid rinsing off too soon.  Rinse thoroughly with warm water  Do not dilute Chlorhexidine (Hibiclens)   Dry off as usual, do not use any deodorant, powder, body lotions, perfume, after shave or cologne.     We strongly recommend whoever is bringing you home be present for discharge instructions.  This will ensure a thorough understanding for your post op home care.    If the patient has fever, cough, or signs/symptoms of Flu or Covid please do not come in for your surgery.  First, contact the pre op department at [phone] (unit opens at 5AM) and then contact your surgeon and your primary care physician for further instructions.      If applicable, please bring any blood pressure and/or diabetes medications with you the day of surgery.  If on home oxygen, even at night, please bring your portable oxygen tank with you the day of surgery in case it is needed for your discharge.      ThanksHelen,  RN

## 2025-05-07 NOTE — H&P (VIEW-ONLY)
CC:  Thickened endometrium with history of postmenopausal bleeding    HPI : Brissa Mason is a 55 y.o.   patient who presents for evaluation and discussion of treatment options for mildly thickened endometrium noted in 2025 pelvic sonogram.  Patient also noted with fluid in the cervical/uterine canal.  Patient with history of persistent postmenopausal bleeding.  No postmenopausal bleeding post sonogram reported.  Prior to sonogram patient reported significant uterine cramping with blood-tinged discharge.  Patient had endometrial biopsy in 2024 revealing proliferative endometrium with no evidence of malignancy.  No other gynecologic complaints today.    Patient is here to discuss hysteroscopic evaluation/D&C for more definitive diagnostic evaluation.    Chart reviewed      Past Medical History:   Diagnosis Date    Abnormal uterine bleeding (AUB) 2023    Acute pain of left shoulder 2015    Allergy     Anemia 2020    Anxiety disorder     B12 deficiency     B12 nutritional deficiency 2020    Cataract     Colitis     resolved; rectum 2005; neg flex sig 2006    Colitis: 2005 rectal area     resolved; rectum 2005; neg flex sig 2006     Family hx of colon cancer requiring screening colonoscopy 10/15/2020    Iron deficiency     Menstrual migraine without status migrainosus, not intractable 2017    Osteoporosis     Vitamin D deficiency      Past Surgical History:   Procedure Laterality Date    COLONOSCOPY N/A 10/15/2020    Procedure: COLONOSCOPY;  Surgeon: Vern Harkins MD;  Location: [facility] ([address] [place] [address]);  Service: Endoscopy;  Laterality: N/A;  covid test 10/12-[facility]    ESOPHAGOGASTRODUODENOSCOPY N/A 2022    Procedure: EGD (ESOPHAGOGASTRODUODENOSCOPY);  Surgeon: Patricio Silva MD;  Location: [address]);  Service: Endoscopy;  Laterality: N/A;  per referral/ prep ins. on portal - ERW   SWP-AWARE OF NEW ARRIVAL TIME-RT    GANGLION CYST  EXCISION  2017    shoulder injection Left 2022     Family History   Problem Relation Name Age of Onset    Colon polyps Mother Robina Del Angel         noncancerous colon tumor; polyps    Hyperlipidemia Mother Robina Del Angel     Osteoporosis Mother Robina Del Angel     Colon cancer Mother Robina Del Angel         pre cacer    Depression Mother Robina Del Angel         One time    Dementia Mother Robina Del Angel         lewy body    Stroke Father ? cerebral amyloid     Gout Father ? cerebral amyloid     Hypertension Father ? cerebral amyloid     Hypertension Sister      Glaucoma Sister      Hyperlipidemia Brother      Hypertension Brother Wiliam Del Angel     Psoriasis Brother Wiliam Del Angel     Cancer Maternal Uncle          colon    Colon cancer Maternal Uncle      Breast cancer Paternal Aunt  60    Breast cancer Paternal Aunt great aunt     Depression Paternal Grandmother Sandra Del Angel         One time    Miscarriages / Stillbirths Neg Hx      Amblyopia Neg Hx      Blindness Neg Hx      Cataracts Neg Hx      Diabetes Neg Hx      Macular degeneration Neg Hx      Retinal detachment Neg Hx      Strabismus Neg Hx      Thyroid disease Neg Hx      Melanoma Neg Hx      Ovarian cancer Neg Hx       Social History[1]  OB History    Para Term  AB Living   0 0 0 0 0 0   SAB IAB Ectopic Multiple Live Births   0 0 0 0 0       /68 (Patient Position: Sitting)   Wt 64.8 kg (142 lb 13.7 oz)   LMP 10/06/2024 (Exact Date) Comment: only spotting, no cycle since.  BMI 23.77 kg/m²     ROS:  GENERAL: Feeling well overall.   SKIN: Denies rash or lesions.   HEAD: Denies head injury or headache.   NODES: Denies enlarged lymph nodes.   CHEST: Denies chest pain or shortness of breath.   CARDIOVASCULAR: Denies palpitations or left sided chest pain.   ABDOMEN: No abdominal pain, constipation, diarrhea, nausea, vomiting or rectal bleeding.   URINARY: No frequency, dysuria, hematuria, or burning on  urination.  REPRODUCTIVE: See HPI.   BREASTS: Denies pain, lumps, or nipple discharge.   HEMATOLOGIC: No easy bruisability.  MUSCULOSKELETAL: Denies joint pain or swelling.   NEUROLOGIC: Denies syncope or weakness.   PSYCHIATRIC: Denies depression, anxiety or mood swings.      PHYSICAL EXAM:  APPEARANCE: Well nourished, well developed, in no acute distress.  AFFECT: WNL, alert and oriented x 3  SKIN: No acne or hirsutism  NECK: Neck symmetric without masses or thyromegaly  NODES: No inguinal, cervical, axillary, or femoral lymph node enlargement  CHEST: Good respiratory effect  ABDOMEN: Soft.  No tenderness or masses.  No hepatosplenomegaly.  No hernias.  PELVIC: Normal external genitalia without lesions.  Normal hair distribution.  Adequate perineal body, normal urethral meatus.  Vagina mildly atrophic without lesions or discharge.  Cervix pink, without lesions, discharge or tenderness.  No significant cystocele or rectocele.  Bimanual exam shows uterus to be 10 weeks size -  mobile and nontender.  Adnexa without masses or tenderness.    EXTREMITIES: No edema.    ASSESSMENT:  Thickened endometrial  Intramural fibroids  History of postmenopausal bleeding    I have discussed the risks, benefits, indications, and alternatives of the procedure in detail.  The patient verbalizes her understanding.  All questions answered.  Consents signed.  The patient agrees to proceed to surgery scheduling.    Plan:  Hysteroscopy/D&C/intrauterine evaluation with possible resection of polyp versus fibroid    Bran Oden IV, MD                   [1]   Social History  Tobacco Use    Smoking status: Never     Passive exposure: Never    Smokeless tobacco: Never   Substance Use Topics    Alcohol use: Yes     Alcohol/week: 1.0 standard drink of alcohol     Types: 1 Glasses of wine per week     Comment: 1-2 glasses of wine a week     Drug use: No

## 2025-05-07 NOTE — PROGRESS NOTES
CC:  Thickened endometrium with history of postmenopausal bleeding    HPI : Brissa Mason is a 55 y.o.   patient who presents for evaluation and discussion of treatment options for mildly thickened endometrium noted in 2025 pelvic sonogram.  Patient also noted with fluid in the cervical/uterine canal.  Patient with history of persistent postmenopausal bleeding.  No postmenopausal bleeding post sonogram reported.  Prior to sonogram patient reported significant uterine cramping with blood-tinged discharge.  Patient had endometrial biopsy in 2024 revealing proliferative endometrium with no evidence of malignancy.  No other gynecologic complaints today.    Patient is here to discuss hysteroscopic evaluation/D&C for more definitive diagnostic evaluation.    Chart reviewed      Past Medical History:   Diagnosis Date    Abnormal uterine bleeding (AUB) 2023    Acute pain of left shoulder 2015    Allergy     Anemia 2020    Anxiety disorder     B12 deficiency     B12 nutritional deficiency 2020    Cataract     Colitis     resolved; rectum 2005; neg flex sig 2006    Colitis: 2005 rectal area     resolved; rectum 2005; neg flex sig 2006     Family hx of colon cancer requiring screening colonoscopy 10/15/2020    Iron deficiency     Menstrual migraine without status migrainosus, not intractable 2017    Osteoporosis     Vitamin D deficiency      Past Surgical History:   Procedure Laterality Date    COLONOSCOPY N/A 10/15/2020    Procedure: COLONOSCOPY;  Surgeon: Vern Harkins MD;  Location: [facility] ([address] [place] [address]);  Service: Endoscopy;  Laterality: N/A;  [facility]id test 10/12-[facility]    ESOPHAGOGASTRODUODENOSCOPY N/A 2022    Procedure: EGD (ESOPHAGOGASTRODUODENOSCOPY);  Surgeon: Patricio Silva MD;  Location: [address]);  Service: Endoscopy;  Laterality: N/A;  per referral/ prep ins. on portal - ERW   SWP-AWARE OF NEW ARRIVAL TIME-RT    GANGLION CYST  EXCISION  2017    shoulder injection Left 2022     Family History   Problem Relation Name Age of Onset    Colon polyps Mother Robina Del Angel         noncancerous colon tumor; polyps    Hyperlipidemia Mother Robina Del Angel     Osteoporosis Mother Robina Del Angel     Colon cancer Mother Robina Del Angel         pre cacer    Depression Mother Robina Del Angel         One time    Dementia Mother Robina Del Angel         lewy body    Stroke Father ? cerebral amyloid     Gout Father ? cerebral amyloid     Hypertension Father ? cerebral amyloid     Hypertension Sister      Glaucoma Sister      Hyperlipidemia Brother      Hypertension Brother Wiliam Del Angel     Psoriasis Brother Wiliam Del Angel     Cancer Maternal Uncle          colon    Colon cancer Maternal Uncle      Breast cancer Paternal Aunt  60    Breast cancer Paternal Aunt great aunt     Depression Paternal Grandmother Sandra Del Angel         One time    Miscarriages / Stillbirths Neg Hx      Amblyopia Neg Hx      Blindness Neg Hx      Cataracts Neg Hx      Diabetes Neg Hx      Macular degeneration Neg Hx      Retinal detachment Neg Hx      Strabismus Neg Hx      Thyroid disease Neg Hx      Melanoma Neg Hx      Ovarian cancer Neg Hx       Social History[1]  OB History    Para Term  AB Living   0 0 0 0 0 0   SAB IAB Ectopic Multiple Live Births   0 0 0 0 0       /68 (Patient Position: Sitting)   Wt 64.8 kg (142 lb 13.7 oz)   LMP 10/06/2024 (Exact Date) Comment: only spotting, no cycle since.  BMI 23.77 kg/m²     ROS:  GENERAL: Feeling well overall.   SKIN: Denies rash or lesions.   HEAD: Denies head injury or headache.   NODES: Denies enlarged lymph nodes.   CHEST: Denies chest pain or shortness of breath.   CARDIOVASCULAR: Denies palpitations or left sided chest pain.   ABDOMEN: No abdominal pain, constipation, diarrhea, nausea, vomiting or rectal bleeding.   URINARY: No frequency, dysuria, hematuria, or burning on  urination.  REPRODUCTIVE: See HPI.   BREASTS: Denies pain, lumps, or nipple discharge.   HEMATOLOGIC: No easy bruisability.  MUSCULOSKELETAL: Denies joint pain or swelling.   NEUROLOGIC: Denies syncope or weakness.   PSYCHIATRIC: Denies depression, anxiety or mood swings.      PHYSICAL EXAM:  APPEARANCE: Well nourished, well developed, in no acute distress.  AFFECT: WNL, alert and oriented x 3  SKIN: No acne or hirsutism  NECK: Neck symmetric without masses or thyromegaly  NODES: No inguinal, cervical, axillary, or femoral lymph node enlargement  CHEST: Good respiratory effect  ABDOMEN: Soft.  No tenderness or masses.  No hepatosplenomegaly.  No hernias.  PELVIC: Normal external genitalia without lesions.  Normal hair distribution.  Adequate perineal body, normal urethral meatus.  Vagina mildly atrophic without lesions or discharge.  Cervix pink, without lesions, discharge or tenderness.  No significant cystocele or rectocele.  Bimanual exam shows uterus to be 10 weeks size -  mobile and nontender.  Adnexa without masses or tenderness.    EXTREMITIES: No edema.    ASSESSMENT:  Thickened endometrial  Intramural fibroids  History of postmenopausal bleeding    I have discussed the risks, benefits, indications, and alternatives of the procedure in detail.  The patient verbalizes her understanding.  All questions answered.  Consents signed.  The patient agrees to proceed to surgery scheduling.    Plan:  Hysteroscopy/D&C/intrauterine evaluation with possible resection of polyp versus fibroid    Bran Oden IV, MD                   [1]   Social History  Tobacco Use    Smoking status: Never     Passive exposure: Never    Smokeless tobacco: Never   Substance Use Topics    Alcohol use: Yes     Alcohol/week: 1.0 standard drink of alcohol     Types: 1 Glasses of wine per week     Comment: 1-2 glasses of wine a week     Drug use: No

## 2025-05-12 ENCOUNTER — HOSPITAL ENCOUNTER (OUTPATIENT)
Facility: OTHER | Age: 55
Discharge: HOME | End: 2025-05-12
Attending: OBSTETRICS & GYNECOLOGY | Admitting: OBSTETRICS & GYNECOLOGY
Payer: COMMERCIAL

## 2025-05-12 ENCOUNTER — ANESTHESIA (OUTPATIENT)
Dept: SURGERY | Facility: OTHER | Age: 55
End: 2025-05-12
Payer: COMMERCIAL

## 2025-05-12 DIAGNOSIS — Z87.42 HISTORY OF POSTMENOPAUSAL BLEEDING: ICD-10-CM

## 2025-05-12 DIAGNOSIS — Z98.890 STATUS POST HYSTEROSCOPY: Primary | ICD-10-CM

## 2025-05-12 DIAGNOSIS — R93.89 ABNORMAL ULTRASOUND OF PELVIS: ICD-10-CM

## 2025-05-12 DIAGNOSIS — D25.1 FIBROIDS, INTRAMURAL: ICD-10-CM

## 2025-05-12 DIAGNOSIS — R93.89 THICKENED ENDOMETRIUM: ICD-10-CM

## 2025-05-12 LAB
B-HCG UR QL: NEGATIVE
CTP QC/QA: YES

## 2025-05-12 PROCEDURE — D9220A PRA ANESTHESIA: Mod: ANES,,, | Performed by: ANESTHESIOLOGY

## 2025-05-12 PROCEDURE — 58558 HYSTEROSCOPY BIOPSY: CPT | Mod: ,,, | Performed by: OBSTETRICS & GYNECOLOGY

## 2025-05-12 PROCEDURE — 88305 TISSUE EXAM BY PATHOLOGIST: CPT | Mod: TC | Performed by: OBSTETRICS & GYNECOLOGY

## 2025-05-12 PROCEDURE — D9220A PRA ANESTHESIA: Mod: CRNA,,, | Performed by: STUDENT IN AN ORGANIZED HEALTH CARE EDUCATION/TRAINING PROGRAM

## 2025-05-12 PROCEDURE — 71000016 HC POSTOP RECOV ADDL HR: Performed by: OBSTETRICS & GYNECOLOGY

## 2025-05-12 PROCEDURE — 63600175 PHARM REV CODE 636 W HCPCS: Mod: JZ,TB | Performed by: STUDENT IN AN ORGANIZED HEALTH CARE EDUCATION/TRAINING PROGRAM

## 2025-05-12 PROCEDURE — 25000003 PHARM REV CODE 250: Performed by: OBSTETRICS & GYNECOLOGY

## 2025-05-12 PROCEDURE — 36000706: Performed by: OBSTETRICS & GYNECOLOGY

## 2025-05-12 PROCEDURE — 36000707: Performed by: OBSTETRICS & GYNECOLOGY

## 2025-05-12 PROCEDURE — 71000015 HC POSTOP RECOV 1ST HR: Performed by: OBSTETRICS & GYNECOLOGY

## 2025-05-12 PROCEDURE — 27201423 OPTIME MED/SURG SUP & DEVICES STERILE SUPPLY: Performed by: OBSTETRICS & GYNECOLOGY

## 2025-05-12 PROCEDURE — 71000033 HC RECOVERY, INTIAL HOUR: Performed by: OBSTETRICS & GYNECOLOGY

## 2025-05-12 PROCEDURE — 37000009 HC ANESTHESIA EA ADD 15 MINS: Performed by: OBSTETRICS & GYNECOLOGY

## 2025-05-12 PROCEDURE — 81025 URINE PREGNANCY TEST: CPT

## 2025-05-12 PROCEDURE — 37000008 HC ANESTHESIA 1ST 15 MINUTES: Performed by: OBSTETRICS & GYNECOLOGY

## 2025-05-12 PROCEDURE — 88305 TISSUE EXAM BY PATHOLOGIST: CPT | Mod: 26,,, | Performed by: PATHOLOGY

## 2025-05-12 PROCEDURE — 25000003 PHARM REV CODE 250: Performed by: ANESTHESIOLOGY

## 2025-05-12 RX ORDER — ONDANSETRON HYDROCHLORIDE 2 MG/ML
4 INJECTION, SOLUTION INTRAVENOUS DAILY PRN
Status: DISCONTINUED | OUTPATIENT
Start: 2025-05-12 | End: 2025-05-12 | Stop reason: HOSPADM

## 2025-05-12 RX ORDER — FAMOTIDINE 20 MG/1
20 TABLET, FILM COATED ORAL
Status: COMPLETED | OUTPATIENT
Start: 2025-05-12 | End: 2025-05-12

## 2025-05-12 RX ORDER — LIDOCAINE HYDROCHLORIDE 20 MG/ML
INJECTION INTRAVENOUS
Status: DISCONTINUED | OUTPATIENT
Start: 2025-05-12 | End: 2025-05-12

## 2025-05-12 RX ORDER — DOCUSATE SODIUM 100 MG/1
100 CAPSULE, LIQUID FILLED ORAL 2 TIMES DAILY
Qty: 60 CAPSULE | Refills: 0 | Status: SHIPPED | OUTPATIENT
Start: 2025-05-12

## 2025-05-12 RX ORDER — SODIUM CHLORIDE 9 MG/ML
INJECTION, SOLUTION INTRAVENOUS CONTINUOUS
Status: DISCONTINUED | OUTPATIENT
Start: 2025-05-12 | End: 2025-05-12 | Stop reason: HOSPADM

## 2025-05-12 RX ORDER — SODIUM CHLORIDE, SODIUM LACTATE, POTASSIUM CHLORIDE, CALCIUM CHLORIDE 600; 310; 30; 20 MG/100ML; MG/100ML; MG/100ML; MG/100ML
INJECTION, SOLUTION INTRAVENOUS CONTINUOUS
Status: DISCONTINUED | OUTPATIENT
Start: 2025-05-12 | End: 2025-05-12 | Stop reason: HOSPADM

## 2025-05-12 RX ORDER — HYDROMORPHONE HYDROCHLORIDE 2 MG/ML
0.4 INJECTION, SOLUTION INTRAMUSCULAR; INTRAVENOUS; SUBCUTANEOUS EVERY 5 MIN PRN
Status: DISCONTINUED | OUTPATIENT
Start: 2025-05-12 | End: 2025-05-12 | Stop reason: HOSPADM

## 2025-05-12 RX ORDER — GLUCAGON 1 MG
1 KIT INJECTION
Status: DISCONTINUED | OUTPATIENT
Start: 2025-05-12 | End: 2025-05-12 | Stop reason: HOSPADM

## 2025-05-12 RX ORDER — ONDANSETRON HYDROCHLORIDE 2 MG/ML
INJECTION, SOLUTION INTRAMUSCULAR; INTRAVENOUS
Status: DISCONTINUED | OUTPATIENT
Start: 2025-05-12 | End: 2025-05-12

## 2025-05-12 RX ORDER — MEPERIDINE HYDROCHLORIDE 25 MG/ML
12.5 INJECTION INTRAMUSCULAR; INTRAVENOUS; SUBCUTANEOUS ONCE AS NEEDED
Status: DISCONTINUED | OUTPATIENT
Start: 2025-05-12 | End: 2025-05-12 | Stop reason: HOSPADM

## 2025-05-12 RX ORDER — ACETAMINOPHEN 500 MG
1000 TABLET ORAL EVERY 6 HOURS PRN
Qty: 60 TABLET | COMMUNITY
Start: 2025-05-12 | End: 2026-05-12

## 2025-05-12 RX ORDER — PROPOFOL 10 MG/ML
VIAL (ML) INTRAVENOUS
Status: DISCONTINUED | OUTPATIENT
Start: 2025-05-12 | End: 2025-05-12

## 2025-05-12 RX ORDER — FENTANYL CITRATE 50 UG/ML
INJECTION, SOLUTION INTRAMUSCULAR; INTRAVENOUS
Status: DISCONTINUED | OUTPATIENT
Start: 2025-05-12 | End: 2025-05-12

## 2025-05-12 RX ORDER — KETOROLAC TROMETHAMINE 30 MG/ML
INJECTION, SOLUTION INTRAMUSCULAR; INTRAVENOUS
Status: DISCONTINUED | OUTPATIENT
Start: 2025-05-12 | End: 2025-05-12

## 2025-05-12 RX ORDER — IBUPROFEN 600 MG/1
600 TABLET, FILM COATED ORAL EVERY 6 HOURS
Qty: 60 TABLET | Refills: 0 | Status: SHIPPED | OUTPATIENT
Start: 2025-05-12

## 2025-05-12 RX ORDER — OXYCODONE HYDROCHLORIDE 5 MG/1
5 TABLET ORAL
Status: DISCONTINUED | OUTPATIENT
Start: 2025-05-12 | End: 2025-05-12 | Stop reason: HOSPADM

## 2025-05-12 RX ORDER — ACETAMINOPHEN 500 MG
1000 TABLET ORAL
Status: COMPLETED | OUTPATIENT
Start: 2025-05-12 | End: 2025-05-12

## 2025-05-12 RX ORDER — PHENYLEPHRINE HYDROCHLORIDE 10 MG/ML
INJECTION INTRAVENOUS
Status: DISCONTINUED | OUTPATIENT
Start: 2025-05-12 | End: 2025-05-12

## 2025-05-12 RX ORDER — SODIUM CHLORIDE 0.9 % (FLUSH) 0.9 %
3 SYRINGE (ML) INJECTION
Status: DISCONTINUED | OUTPATIENT
Start: 2025-05-12 | End: 2025-05-12 | Stop reason: HOSPADM

## 2025-05-12 RX ORDER — DEXAMETHASONE SODIUM PHOSPHATE 4 MG/ML
INJECTION, SOLUTION INTRA-ARTICULAR; INTRALESIONAL; INTRAMUSCULAR; INTRAVENOUS; SOFT TISSUE
Status: DISCONTINUED | OUTPATIENT
Start: 2025-05-12 | End: 2025-05-12

## 2025-05-12 RX ORDER — LIDOCAINE HYDROCHLORIDE 10 MG/ML
0.5 INJECTION, SOLUTION EPIDURAL; INFILTRATION; INTRACAUDAL; PERINEURAL ONCE
Status: DISCONTINUED | OUTPATIENT
Start: 2025-05-12 | End: 2025-05-12 | Stop reason: HOSPADM

## 2025-05-12 RX ADMIN — PHENYLEPHRINE HYDROCHLORIDE 100 MCG: 10 INJECTION INTRAVENOUS at 10:05

## 2025-05-12 RX ADMIN — FAMOTIDINE 20 MG: 20 TABLET, FILM COATED ORAL at 08:05

## 2025-05-12 RX ADMIN — LIDOCAINE HYDROCHLORIDE 75 MG: 20 INJECTION, SOLUTION INTRAVENOUS at 10:05

## 2025-05-12 RX ADMIN — ACETAMINOPHEN 1000 MG: 500 TABLET ORAL at 08:05

## 2025-05-12 RX ADMIN — DEXAMETHASONE SODIUM PHOSPHATE 8 MG: 4 INJECTION, SOLUTION INTRAMUSCULAR; INTRAVENOUS at 10:05

## 2025-05-12 RX ADMIN — KETOROLAC TROMETHAMINE 30 MG: 30 INJECTION, SOLUTION INTRAMUSCULAR; INTRAVENOUS at 10:05

## 2025-05-12 RX ADMIN — ONDANSETRON 4 MG: 2 INJECTION INTRAMUSCULAR; INTRAVENOUS at 10:05

## 2025-05-12 RX ADMIN — SODIUM CHLORIDE: 0.9 INJECTION, SOLUTION INTRAVENOUS at 09:05

## 2025-05-12 RX ADMIN — PROPOFOL 200 MG: 10 INJECTION, EMULSION INTRAVENOUS at 10:05

## 2025-05-12 RX ADMIN — GLYCOPYRROLATE 0.2 MG: 0.2 INJECTION, SOLUTION INTRAMUSCULAR; INTRAVITREAL at 10:05

## 2025-05-12 RX ADMIN — FENTANYL CITRATE 100 MCG: 50 INJECTION, SOLUTION INTRAMUSCULAR; INTRAVENOUS at 10:05

## 2025-05-12 NOTE — OP NOTE
Operative Report      Date of Surgery: 05/12/2025     Surgeon: Bran Oden IV, MD     Assistant: Tyshawn Bradley MD, PGY2     Pre-Op Diagnosis:   1. PMB    Post-op Diagnosis:  same    Procedure:  1. Hysteroscopy  2. Dilation and Curettage     Anesthesia: General     Findings:   - Normal appearing external genitalia  - Uterus sounded to 8 cm, dilated without difficulty to easily accommodate a 5.5 mm hysteroscope  - Hysteroscopically, bilateral tubal ostia visualized, normal uterine contour, atrophic appearing endometrium. No polyps or fibroids visualized.   - Scrapings sent to pathology     EBL: Minimal, <5cc     IVF: See anesthesia report     Urine Output: 50 cc via straight in-and-out catheterization prior to start of procedure    Fluid Deficit: 85 cc      Specimen:   1. Endometrial curettage    Procedure in Detail:  After proper consents were explained and obtained, the patient was taken to the operating room where anesthesia was obtained without difficulty. She was then placed in dorsal lithotomy position in yellowfin stirrups. The patient was then prepped and draped in normal sterile fashion. A time out was called confirming the patient's name, date of birth, procedure to be performed, allergies, and fire risk.  Right angles were used to visualize the cervix, which was grasped at the anterior lip with the single tooth tenaculum. The uterus sounded to 8 cm. The cervix was serially dilated with Rancho dilators to #18 to accommodate a 5.5mm hysteroscope. Hysteroscopic findings included as above. A Mac biopsy curette curette was applied to each surface of the uterine cavity until they felt uniformly gritty in texture in all four quadrants. The curette was removed and the scrapings were collected and sent to pathology. The tenaculum was removed from the cervix and pressure was held at the puncture sites with a sponge stick until good hemostasis was noted. The patient tolerated the procedure well. All counts were  correct x2. The patient was taken to recovery area in stable condition.    Operative pictures:      Tyshawn Bradley MD PGY2  Obstetrics and Gynecology    I was present performed this entire procedure  Bran Oden IV, MD

## 2025-05-12 NOTE — INTERVAL H&P NOTE
Brissa Mason is 55 y.o.  presenting for scheduled Hysteroscopy D&C for the evaluation of PMB.    Temp:  [97.7 °F (36.5 °C)] 97.7 °F (36.5 °C)  Pulse:  [68] 68  Resp:  [16] 16  SpO2:  [98 %] 98 %  BP: (142)/(73) 142/73    General: NAD, alert, oriented, cooperative  HEENT: NCAT, EOM grossly intact  Lungs: Normal WOB  Heart: regular rate  Abdomen: soft, nondistended, nontender, no rebound or guarding    Consents in chart. Pre-operative heparin not indicated. All questions answered and concerns addressed. To OR for planned procedure.    The patient has been examined and the H&P has been reviewed:    I concur with the findings and no changes have occurred since H&P was written.    Surgery risks, benefits and alternative options discussed and understood by patient/family.      Denisse Lovelace MD  Obstetrics and Gynecology - PGY2

## 2025-05-12 NOTE — DISCHARGE INSTRUCTIONS
Home Care Instruction D&C Hysteroscopy             ACTIVITY LEVEL:  If you received sedation and/or an anesthetic, you may feel sleepy for several hours. Rest until you feel more  awake. Gradually resume your normal activities.    DIET:  At home,     resume a regular diet.    BATHING:  You may shower  as desired in one day.  You should avoid tub baths, hot tubs and swimming pools until seen by your physician for a post-op follow up.    CARE:  You can expect watery or bloody vaginal discharge for several days. Do not use tampons, please only use pads. Sexual activity is restricted as advised by your doctor.    MEDICATIONS:  You will receive instructions for any pain prescriptions. Pain medication should be taken only if needed and as directed.   ADDITIONAL INFORMATION:  __________________________________________________________________________________________  WHEN TO CALL THE DOCTOR:   For any heavy vaginal bleeding   Fever over 101°F (38.4°C)   Any lower abdominal pain not relieved by the pain mediation  RETURN APPOINTMENT:  __________________________________________________________________________________________  FOR EMERGENCIES:  If any unusual problems or difficulties occur, contact Dr. Oden or the resident at [phone] (page ) or the Clinic office, [phone].

## 2025-05-12 NOTE — BRIEF OP NOTE
[facility] - Surgery ([place])  Brief Operative Note    SUMMARY     Surgery Date: 5/12/2025     Surgeons and Role:     * Bran Oden IV, MD - Primary     * Tyshawn Bradley MD - Resident - Assisting        Pre-op Diagnosis:  Abnormal ultrasound of pelvis [R93.89]    Post-op Diagnosis:  Post-Op Diagnosis Codes:     * Abnormal ultrasound of pelvis [R93.89]    Procedure(s) (LRB):  HYSTEROSCOPY, WITH DILATION AND CURETTAGE OF UTERUS (N/A)    Anesthesia: General    Implants:  * No implants in log *    Operative Findings: Hysteroscopy, dilation and curettage. See op note.     Estimated Blood Loss: Minimal    Estimated Blood Loss has been documented.         Specimens:   Specimen (24h ago, onward)      None          ID Type Source Tests Collected by Time Destination   1 : 1. ENDOMETRIAL SCRAPINGS (perm) Tissue Endometrium SPECIMEN TO PATHOLOGY Bran Oden IV, MD 5/12/2025 1056        [id number]

## 2025-05-12 NOTE — PLAN OF CARE
Brissa Mason has met all discharge criteria from Phase II. Vital Signs are stable, ambulating  without difficulty. Discharge instructions given, patient verbalized understanding. Discharged from facility via wheelchair in stable condition.

## 2025-05-12 NOTE — DISCHARGE SUMMARY
Discharge Summary  Gynecology      Admit Date: 5/12/2025    Discharge Date and Time: 5/12/2025     Attending Physician: Bran Oden IV, MD    Principal Diagnoses: Status post hysteroscopy    Active Hospital Problems    Diagnosis  POA    *Status post hysteroscopy, dilation and curettage [Z98.890]  Not Applicable      Resolved Hospital Problems   No resolved problems to display.       Procedures: Procedure(s) (LRB):  HYSTEROSCOPY, WITH DILATION AND CURETTAGE OF UTERUS (N/A)    Discharged Condition: good    Hospital Course: Patient presented for scheduled procedure. Patient was passed back to OR for hysteroscopy, dilation and curettage. Please see OP note for further details. Tolerated procedure well and patient was taken to recovery in a stable condition. Prior to discharge patient was able to void, ambulate, tolerate PO and pain was well controlled with PO meds. Patient was given routine post-op instructions for which patient voiced understanding. Patient was subsequently discharged home.     Consults: None    Significant Diagnostic Studies:  Recent Labs   Lab 05/07/25  1055   WBC 7.90   HGB 13.0   HCT 38.8   MCV 88           Treatments:   1. Surgery as above    Disposition: Home or Self Care    Patient Instructions:   Current Discharge Medication List        START taking these medications    Details   acetaminophen (TYLENOL EXTRA STRENGTH) 500 MG tablet Take 2 tablets (1,000 mg total) by mouth every 6 (six) hours as needed for Pain (Alternate every 3 hours with Ibuprofen (until no longer in pain)).  Qty: 60 tablet      docusate sodium (COLACE) 100 MG capsule Take 1 capsule (100 mg total) by mouth 2 (two) times daily.  Qty: 60 capsule, Refills: 0      ibuprofen (ADVIL,MOTRIN) 600 MG tablet Take 1 tablet (600 mg total) by mouth every 6 (six) hours. Alternate every 3 hours with tylenol.  Qty: 60 tablet, Refills: 0           CONTINUE these medications which have NOT CHANGED    Details   estradioL (ESTRACE)  0.01 % (0.1 mg/gram) vaginal cream Place 1 g vaginally twice a week.  Qty: 42.5 g, Refills: 2    Associated Diagnoses: Vaginal atrophy             Discharge Procedure Orders   Diet Adult Regular     Pelvic Rest   Order Comments: Pelvic rest until follow up visit. Nothing in vagina -no sex, tampons, douching, etc.     No dressing needed     Notify your health care provider if you experience any of the following:  temperature >100.4     Notify your health care provider if you experience any of the following:  persistent nausea and vomiting or diarrhea     Notify your health care provider if you experience any of the following:  severe uncontrolled pain     Notify your health care provider if you experience any of the following:  redness, tenderness, or signs of infection (pain, swelling, redness, odor or green/yellow discharge around incision site)     Notify your health care provider if you experience any of the following:  difficulty breathing or increased cough     Notify your health care provider if you experience any of the following:  severe persistent headache     Notify your health care provider if you experience any of the following:  worsening rash     Notify your health care provider if you experience any of the following:  persistent dizziness, light-headedness, or visual disturbances     Notify your health care provider if you experience any of the following:  increased confusion or weakness     Notify your health care provider if you experience any of the following:   Order Comments: Vaginal bleeding greater than 2 pads per 1 hour for 2 consecutive hours     Activity as tolerated        Follow-up Information       Bran Oden IV, MD Follow up in 6 week(s).    Specialties: Obstetrics, Obstetrics and Gynecology  Why: Postoperative Follow-Up  Contact information:  [address] [id number] [phone]                             Tyshawn Bradley MD PGY2  Obstetrics and  Gynecology

## 2025-05-12 NOTE — ANESTHESIA POSTPROCEDURE EVALUATION
Anesthesia Post Evaluation    Patient: Brissa Mason    Procedure(s) Performed: Procedure(s) (LRB):  HYSTEROSCOPY, WITH DILATION AND CURETTAGE OF UTERUS (N/A)    Final Anesthesia Type: general      Patient location during evaluation: PACU  Patient participation: Yes- Able to Participate  Level of consciousness: awake and alert  Post-procedure vital signs: reviewed and stable  Pain management: adequate  Airway patency: patent    PONV status at discharge: No PONV  Anesthetic complications: no      Cardiovascular status: blood pressure returned to baseline  Respiratory status: unassisted, spontaneous ventilation and room air  Hydration status: euvolemic  Follow-up not needed.          Vitals Value Taken Time   /76 05/12/25 11:47   Temp 36.4 °C (97.5 °F) 05/12/25 11:07   Pulse 77 05/12/25 11:51   Resp 18 05/12/25 11:45   SpO2 98 % 05/12/25 11:51   Vitals shown include unfiled device data.      Event Time   Out of Recovery 11:53:00         Pain/Naman Score: Pain Rating Prior to Med Admin: 0 (5/12/2025  8:05 AM)  Naman Score: 9 (5/12/2025 11:37 AM)

## 2025-05-12 NOTE — TRANSFER OF CARE
Anesthesia Transfer of Care Note    Patient: Brissa Mason    Procedure(s) Performed: Procedure(s) (LRB):  HYSTEROSCOPY, WITH DILATION AND CURETTAGE OF UTERUS (N/A)    Patient location: PACU    Anesthesia Type: general    Transport from OR: Transported from OR on 6-10 L/min O2 by face mask with adequate spontaneous ventilation    Post pain: adequate analgesia    Post assessment: tolerated procedure well and no apparent anesthetic complications    Post vital signs: stable    Level of consciousness: awake, alert and oriented    Nausea/Vomiting: no nausea/vomiting    Complications: none    Transfer of care protocol was followed      Last vitals: Visit Vitals  BP (!) 170/91 (BP Location: Right arm, Patient Position: Lying)   Pulse 95   Temp 36.4 °C (97.5 °F) (Temporal)   Resp 16   LMP 10/06/2024 (Exact Date)   SpO2 100%   Breastfeeding No

## 2025-05-12 NOTE — ANESTHESIA PROCEDURE NOTES
Intubation    Date/Time: 5/12/2025 10:38 AM    Performed by: Nataliia Voss CRNA  Authorized by: Ulices Kline MD    Intubation:     Induction:  Intravenous    Intubated:  Postinduction    Mask Ventilation:  Easy mask    Attempts:  1    Attempted By:  CRNA    Difficult Airway Encountered?: No      Complications:  None    Airway Device:  Supraglottic airway/LMA    Airway Device Size:  4.0    Style/Cuff Inflation:  Cuffed (inflated to minimal occlusive pressure)    Secured at:  The lips    Placement Verified By:  Capnometry    Complicating Factors:  None    Findings Post-Intubation:  BS equal bilateral and atraumatic/condition of teeth unchanged

## 2025-05-13 VITALS
TEMPERATURE: 98 F | DIASTOLIC BLOOD PRESSURE: 78 MMHG | RESPIRATION RATE: 18 BRPM | OXYGEN SATURATION: 100 % | HEART RATE: 76 BPM | SYSTOLIC BLOOD PRESSURE: 130 MMHG

## 2025-05-13 LAB
ESTROGEN SERPL-MCNC: NORMAL PG/ML
INSULIN SERPL-ACNC: NORMAL U[IU]/ML
LAB AP CLINICAL INFORMATION: NORMAL
LAB AP DIAGNOSIS CATEGORY: NORMAL
LAB AP GROSS DESCRIPTION: NORMAL
LAB AP PERFORMING LOCATION(S): NORMAL
LAB AP REPORT FOOTNOTES: NORMAL

## 2025-05-19 ENCOUNTER — TELEPHONE (OUTPATIENT)
Dept: OBSTETRICS AND GYNECOLOGY | Facility: CLINIC | Age: 55
End: 2025-05-19
Payer: COMMERCIAL

## 2025-05-19 ENCOUNTER — PATIENT MESSAGE (OUTPATIENT)
Dept: OBSTETRICS AND GYNECOLOGY | Facility: CLINIC | Age: 55
End: 2025-05-19
Payer: COMMERCIAL

## 2025-05-20 ENCOUNTER — TELEPHONE (OUTPATIENT)
Dept: OBSTETRICS AND GYNECOLOGY | Facility: CLINIC | Age: 55
End: 2025-05-20
Payer: COMMERCIAL

## 2025-05-27 ENCOUNTER — RESULTS FOLLOW-UP (OUTPATIENT)
Dept: OBSTETRICS AND GYNECOLOGY | Facility: HOSPITAL | Age: 55
End: 2025-05-27

## 2025-06-30 ENCOUNTER — CLINICAL SUPPORT (OUTPATIENT)
Dept: ENDOSCOPY | Facility: HOSPITAL | Age: 55
End: 2025-06-30
Attending: INTERNAL MEDICINE
Payer: COMMERCIAL

## 2025-06-30 VITALS — WEIGHT: 138 LBS | BODY MASS INDEX: 22.96 KG/M2

## 2025-06-30 DIAGNOSIS — Z12.11 COLON CANCER SCREENING: ICD-10-CM

## 2025-06-30 NOTE — PLAN OF CARE
Patient is scheduled for a Colonoscopy on 08/07/25 with Dr. SHANEL Narvaez  Referral for procedure from PAT appointment

## 2025-07-08 ENCOUNTER — PATIENT MESSAGE (OUTPATIENT)
Dept: ENDOSCOPY | Facility: HOSPITAL | Age: 55
End: 2025-07-08
Payer: COMMERCIAL

## 2025-07-08 DIAGNOSIS — Z12.11 COLON CANCER SCREENING: Primary | ICD-10-CM

## 2025-07-08 RX ORDER — POLYETHYLENE GLYCOL 3350, SODIUM SULFATE ANHYDROUS, SODIUM BICARBONATE, SODIUM CHLORIDE, POTASSIUM CHLORIDE 236; 22.74; 6.74; 5.86; 2.97 G/4L; G/4L; G/4L; G/4L; G/4L
4 POWDER, FOR SOLUTION ORAL ONCE
Qty: 4000 ML | Refills: 0 | Status: SHIPPED | OUTPATIENT
Start: 2025-07-08 | End: 2025-07-08

## 2025-07-29 ENCOUNTER — OFFICE VISIT (OUTPATIENT)
Dept: PODIATRY | Facility: CLINIC | Age: 55
End: 2025-07-29
Payer: COMMERCIAL

## 2025-07-29 ENCOUNTER — OFFICE VISIT (OUTPATIENT)
Dept: DERMATOLOGY | Facility: CLINIC | Age: 55
End: 2025-07-29
Payer: COMMERCIAL

## 2025-07-29 VITALS — WEIGHT: 142 LBS | BODY MASS INDEX: 23.63 KG/M2

## 2025-07-29 DIAGNOSIS — M77.42 METATARSALGIA OF LEFT FOOT: Primary | ICD-10-CM

## 2025-07-29 DIAGNOSIS — M25.579 ANKLE PAIN, UNSPECIFIED CHRONICITY, UNSPECIFIED LATERALITY: Primary | ICD-10-CM

## 2025-07-29 DIAGNOSIS — L82.1 SK (SEBORRHEIC KERATOSIS): Primary | ICD-10-CM

## 2025-07-29 DIAGNOSIS — L73.8 SEBACEOUS GLAND HYPERPLASIA: ICD-10-CM

## 2025-07-29 DIAGNOSIS — L81.4 LENTIGO: ICD-10-CM

## 2025-07-29 DIAGNOSIS — M21.6X9 CAVUS FOOT, ACQUIRED: ICD-10-CM

## 2025-07-29 DIAGNOSIS — D18.01 ANGIOMA OF SKIN: ICD-10-CM

## 2025-07-29 DIAGNOSIS — D22.9 MULTIPLE BENIGN NEVI: ICD-10-CM

## 2025-07-29 DIAGNOSIS — Z12.83 SCREENING EXAM FOR SKIN CANCER: ICD-10-CM

## 2025-07-29 PROCEDURE — 99999 PR PBB SHADOW E&M-EST. PATIENT-LVL II: CPT | Mod: PBBFAC,,, | Performed by: DERMATOLOGY

## 2025-07-29 PROCEDURE — 99999 PR PBB SHADOW E&M-EST. PATIENT-LVL III: CPT | Mod: PBBFAC,,, | Performed by: PODIATRIST

## 2025-07-29 PROCEDURE — 99213 OFFICE O/P EST LOW 20 MIN: CPT | Mod: S$GLB,,, | Performed by: DERMATOLOGY

## 2025-07-29 RX ORDER — METHYLPREDNISOLONE 4 MG/1
TABLET ORAL
Qty: 21 EACH | Refills: 0 | Status: SHIPPED | OUTPATIENT
Start: 2025-07-29

## 2025-07-29 NOTE — PROGRESS NOTES
[facility] - PODIATRY  [address]  [address]  [place] [address]  Dept: [phone]  Dept Fax: [phone]    Joey Conroy Jr., MARIA DEL CARMEN     Assessment:   MDM    Coding  1. Metatarsalgia of left foot  methylPREDNISolone (MEDROL DOSEPACK) 4 mg tablet      2. Cavus foot, acquired            Plan:     Procedures    Brissa was seen today for foot pain.    Diagnoses and all orders for this visit:    Metatarsalgia of left foot  -     methylPREDNISolone (MEDROL DOSEPACK) 4 mg tablet; use as directed    Cavus foot, acquired        -pt seen, evaluated, and managed  -dx discussed in detail. All questions/concerns addressed  -all tx options discussed. All alternatives, risks, benefits of all txs discussed  -the patient was educated about the diagnosis  -We discussed conservative care options possible including but not limited to shoe wear and/or padding, bracing/strapping, at home ROM, formal PT, medical therapy, injection therapy  - The utilization of NSAIDs can be considered but their benefit has to be tempered against the risk of GI/ concerns  - A steroid injection can be undertaken.  We did discuss the potential mechanism of action of this shot.  Understanding that multiple injections at the same anatomic site do have deleterious effects on the soft tissue.  Generic risks include: steroid flare (advised to ice if necessary), skin hypo-pgimentation (which can be permanent and unsightly), elevation of blood sugar, subcutaneous atrophy (can be permanent) and infection.   -XR/imaging reviewed by me: agree with read  -labs reviewed by me: ok for medrol dose pack  -implemented icing/stretching regimen  -offloading pads dispensed    -rxs dispensed: medrol dose pack  -referrals: none  -WB: wbat      Follow up in about 4 weeks (around 8/26/2025).    Subjective:      Patient ID: Brissa Mason is a 55 y.o. female.    Chief Complaint:   Chief Complaint   Patient presents with    Foot Pain     Left         CC - foot pain: patient presents to the podiatry clinic  with complaint of  left foot pain. Onset of the symptoms was several weeks ago. Precipitating event: unk. Current symptoms include: ability to bear weight, but with some pain, fede the ball of the foot, swelling and worsening symptoms after a period of activity, burning/numbness/tingling of toes. Aggravating factors: walking and certain shoegear. Symptoms have gradually worsened. Patient has had no prior foot problems. Evaluation to date: none. Treatment to date: avoidance of offending activity. Patients rates pain 7/10 on pain scale.      HPI    Last Podiatry Enc: Visit date not found  Last Enc w/ Me: Visit date not found    Outside reports reviewed: historical medical records.  Family hx: as below  Past Medical History:   Diagnosis Date    Abnormal uterine bleeding (AUB) 05/11/2023    Acute pain of left shoulder 06/22/2015    Allergy     Anemia 04/20/2020    Anxiety disorder     B12 deficiency     B12 nutritional deficiency 04/20/2020    Cataract     Colitis     resolved; rectum 2005; neg flex sig 2006    Colitis: 2005 rectal area     resolved; rectum 2005; neg flex sig 2006     Family hx of colon cancer requiring screening colonoscopy 10/15/2020    Iron deficiency     Menstrual migraine without status migrainosus, not intractable 09/05/2017    Osteoporosis     Status post hysteroscopy, dilation and curettage 5/12/2025    Vitamin D deficiency      Past Surgical History:   Procedure Laterality Date    COLONOSCOPY N/A 10/15/2020    Procedure: COLONOSCOPY;  Surgeon: Vern Harkins MD;  Location: [address]);  Service: Endoscopy;  Laterality: N/A;  covid test 10/12-[facility]    dental implant      ESOPHAGOGASTRODUODENOSCOPY N/A 12/02/2022    Procedure: EGD (ESOPHAGOGASTRODUODENOSCOPY);  Surgeon: Patricio Silva MD;  Location: [address]);  Service: Endoscopy;  Laterality: N/A;  per referral/ prep ins. on portal - ERW  12/1 SWP-AWARE  OF NEW ARRIVAL TIME-RT    GANGLION CYST EXCISION  2017    HYSTEROSCOPY WITH DILATION AND CURETTAGE OF UTERUS N/A 5/12/2025    Procedure: HYSTEROSCOPY, WITH DILATION AND CURETTAGE OF UTERUS;  Surgeon: Bran Oden IV, MD;  Location: [facility];  Service: OB/GYN;  Laterality: N/A;    shoulder injection Left 05/2022     Family History   Problem Relation Name Age of Onset    Colon polyps Mother Robina Del Angel         noncancerous colon tumor; polyps    Hyperlipidemia Mother Robina Del Angel     Osteoporosis Mother Robina Del Angel     Colon cancer Mother Robina Del Angel         pre cacer    Depression Mother Robina Del Angel         One time    Dementia Mother Robina Del Angel         lewy body    Stroke Father ? cerebral amyloid     Gout Father ? cerebral amyloid     Hypertension Father ? cerebral amyloid     Hypertension Sister      Glaucoma Sister      Hyperlipidemia Brother      Hypertension Brother Wiliam Del Angel     Psoriasis Brother Wiliam Del Angel     Cancer Maternal Uncle          colon    Colon cancer Maternal Uncle      Breast cancer Paternal Aunt  60    Breast cancer Paternal Aunt great aunt     Depression Paternal Grandmother Sandra Del Angel         One time    Miscarriages / Stillbirths Neg Hx      Amblyopia Neg Hx      Blindness Neg Hx      Cataracts Neg Hx      Diabetes Neg Hx      Macular degeneration Neg Hx      Retinal detachment Neg Hx      Strabismus Neg Hx      Thyroid disease Neg Hx      Melanoma Neg Hx      Ovarian cancer Neg Hx       Current Medications[1]  Review of patient's allergies indicates:   Allergen Reactions    Mobic [meloxicam] Rash    Sulfa (sulfonamide antibiotics)      Other reaction(s): Rash    Sulfa (sulfonamide antibiotics) Hives    Meloxicam Rash     Social History[2]    ROS    REVIEW OF SYSTEMS: Negative as documented below as well as positive findings in bold.       Constitutional  Respiratory  Gastrointestinal  Skin   - Fever - Cough - Heartburn - Rash   - Chills - Spit  blood - Nausea - Itching   - Weight Loss - Shortness of breath - Vomiting - Nail pain   - Malaise/Fatigue - Wheezing - Abdominal Pain  Wound/Ulcer   - Weight Gain   - Blood in Stool  Poor wound healing       - Diarrhea          Cardiovascular  Genitourinary  Neurological  HEENT   - Chest Pain - Dysuria - Burning Sensation of feet - Headache   - Palpitations - Hematuria - Tingling / Paresthesia - Congestion   - Pain at night in legs - Flank Pain - Dizziness - Sore Throat   - Cramping   - Tremor - Blurred Vision   - Leg Swelling   - Sensory Change - Double Vision   - Dizzy when standing   - Speech Change - Eye Redness       - Focal Weakness - Dry Eyes       - Loss of Consciousness          Endocrine  Musculoskeletal  Psychiatric   - Cold intolerance - Muscle Pain - Depression   - Heat intolerance - Neck Pain - Insomnia   - Anemia - Joint Pain - Memory Loss   -  Easy bruising, bleeding - Heel pain - Anxiety      Toe Pain        Leg/Ankle/Foot Pain         Objective:     Wt 64.4 kg (141 lb 15.6 oz)   BMI 23.63 kg/m²   Vitals:    07/29/25 1329   Weight: 64.4 kg (141 lb 15.6 oz)   PainSc:   4   PainLoc: Foot       Physical Exam    General Appearance:   Patient appears well developed, well nourished  Patient appears stated age    Psychiatric:   Patient is oriented to time, place, and person.  Patient has appropriate mood and affect    Neck:  Trachea Midline  No visible masses    Respiratory/Ears:  No distress or labored breathing.  Able to differentiate between normal talking voice and whisper.  Able to follow commands    Eyes:  Visual Acuity intact  Lids and conjunctivae normal. No discoloration noted.    Foot Exam  Physical Exam  Ortho Exam  Ortho/SPM Exam  Foot/Ankle Musculoskeletal Exam    L LE exam con't:  V:  DP 2/4, PT 2/4   CRT< 3s to all digits tested   Tibial and popliteal lymph nodes are w/o abnormality   Edema: absent, varicosities: absent    N:  Patient displays normal ankle reflexes   SILT in  SP/DP/T/Kye/Saph distributions    Ortho: +Motor EHL/FHL/TA/GA   Cavus and equinus deformity present  There is moderate pain with palpation of 3rd IS  There is is moderate pain at 3rd IS with lateral compression of metatarsal heads  Compartments soft/compressible. No pain on passive stretch of big toe. No calf  Pain.    Derm:  skin intact, skin warm and dry, skin without ulcers or lesions, skin without induration, nails normal, no erythema and no ecchymosis        Imaging / Labs:      No results found.      Note: This was dictated using a computer transcription program. Although proofread, it may contain computer transcription errors and phonetic errors. Other human proofreading errors may also exist. Corrections may be performed at a later time. Please contact us for any clarification if needed.    Joey Conroy DPM  [facility]         [1]   Current Outpatient Medications   Medication Sig Dispense Refill    acetaminophen (TYLENOL EXTRA STRENGTH) 500 MG tablet Take 2 tablets (1,000 mg total) by mouth every 6 (six) hours as needed for Pain (Alternate every 3 hours with Ibuprofen (until no longer in pain)). 60 tablet     estradioL (ESTRACE) 0.01 % (0.1 mg/gram) vaginal cream Place 1 g vaginally twice a week. 42.5 g 2    ibuprofen (ADVIL,MOTRIN) 600 MG tablet Take 1 tablet (600 mg total) by mouth every 6 (six) hours. Alternate every 3 hours with tylenol. 60 tablet 0    methylPREDNISolone (MEDROL DOSEPACK) 4 mg tablet use as directed 21 each 0     No current facility-administered medications for this visit.   [2]   Social History  Socioeconomic History    Marital status:    Tobacco Use    Smoking status: Never     Passive exposure: Never    Smokeless tobacco: Never   Substance and Sexual Activity    Alcohol use: Yes     Alcohol/week: 1.0 standard drink of alcohol     Types: 1 Glasses of wine per week     Comment: 1-2 glasses of wine a week     Drug use: No    Sexual activity: Yes      Partners: Male     Birth control/protection: Condom     Comment: , menarche 14, no hx of abnormal    Other Topics Concern    Are you pregnant or think you may be? No    Breast-feeding No   Social History Narrative    ** Merged History Encounter **            - Jewish  Parents - Milton Del Angel    , 3 step kids and 4 grands. Works as  for [employer].     Social Drivers of Health     Financial Resource Strain: Low Risk  (7/28/2025)    Overall Financial Resource Strain (CARDIA)     Difficulty of Paying Living Expenses: Not hard at all   Food Insecurity: No Food Insecurity (7/28/2025)    Hunger Vital Sign     Worried About Running Out of Food in the Last Year: Never true     Ran Out of Food in the Last Year: Never true   Transportation Needs: No Transportation Needs (7/28/2025)    PRAPARE - Transportation     Lack of Transportation (Medical): No     Lack of Transportation (Non-Medical): No   Physical Activity: Sufficiently Active (7/28/2025)    Exercise Vital Sign     Days of Exercise per Week: 4 days     Minutes of Exercise per Session: 60 min   Stress: No Stress Concern Present (7/28/2025)    [employer] - Occupational Stress Questionnaire     Feeling of Stress : Not at all   Housing Stability: Low Risk  (7/28/2025)    Housing Stability Vital Sign     Unable to Pay for Housing in the Last Year: No     Number of Times Moved in the Last Year: 0     Homeless in the Last Year: No

## 2025-07-29 NOTE — PROGRESS NOTES
Subjective:      Patient ID:  Brissa Mason is a 55 y.o. female who presents for   Chief Complaint   Patient presents with    Skin Check     TBSE     History of Present Illness: The patient presents for follow up of skin check.    The patient was last seen on: 5/2/2024 for TBSE.  No h/o of mm or nmsc.    Other skin complaints: none      Review of Systems   Skin:  Positive for daily sunscreen use (most), activity-related sunscreen use, recent sunburn (Shoulders a few days ago in the [facility]) and wears hat (activities).   Hematologic/Lymphatic: Does not bruise/bleed easily.       Objective:   Physical Exam   Constitutional: She appears well-developed and well-nourished. No distress.   Neurological: She is alert and oriented to person, place, and time. She is not disoriented.   Psychiatric: She has a normal mood and affect.   Skin:   Areas Examined (abnormalities noted in diagram):   Scalp / Hair Palpated and Inspected  Head / Face Inspection Performed  Neck Inspection Performed  Chest / Axilla Inspection Performed  Abdomen Inspection Performed  Back Inspection Performed  RUE Inspected  LUE Inspection Performed  RLE Inspected  LLE Inspection Performed                 Diagram Legend     Erythematous scaling macule/papule c/w actinic keratosis       Vascular papule c/w angioma      Pigmented verrucoid papule/plaque c/w seborrheic keratosis      Yellow umbilicated papule c/w sebaceous hyperplasia      Irregularly shaped tan macule c/w lentigo     1-2 mm smooth white papules consistent with Milia      Movable subcutaneous cyst with punctum c/w epidermal inclusion cyst      Subcutaneous movable cyst c/w pilar cyst      Firm pink to brown papule c/w dermatofibroma      Pedunculated fleshy papule(s) c/w skin tag(s)      Evenly pigmented macule c/w junctional nevus     Mildly variegated pigmented, slightly irregular-bordered macule c/w mildly atypical nevus      Flesh colored to evenly pigmented papule c/w  intradermal nevus       Pink pearly papule/plaque c/w basal cell carcinoma      Erythematous hyperkeratotic cursted plaque c/w SCC      Surgical scar with no sign of skin cancer recurrence      Open and closed comedones      Inflammatory papules and pustules      Verrucoid papule consistent consistent with wart     Erythematous eczematous patches and plaques     Dystrophic onycholytic nail with subungual debris c/w onychomycosis     Umbilicated papule    Erythematous-base heme-crusted tan verrucoid plaque consistent with inflamed seborrheic keratosis     Erythematous Silvery Scaling Plaque c/w Psoriasis     See annotation      Assessment / Plan:        SK (seborrheic keratosis)   - minor problem and chronic.   Reassurance given to patient. No treatment necessary.     Multiple benign nevi   - minor problem and chronic.   Reassurance given to patient. No treatment necessary.     Lentigo   - minor problem and chronic.   Reassurance given to patient. No treatment necessary.     Sebaceous gland hyperplasia   - minor problem and chronic.   Reassurance given to patient. No treatment necessary.     Angioma of skin   - minor problem and chronic.   Reassurance given to patient. No treatment necessary.     Screening exam for skin cancer  Total body skin examination performed today including at least 12 points as noted in physical examination. No lesions suspicious for malignancy noted.    Recommend daily sun protection/avoidance, use of at least SPF 30, broad spectrum sunscreen (OTC drug), skin self examinations, and routine physician surveillance to optimize early detection             No follow-ups on file.

## 2025-07-29 NOTE — PATIENT INSTRUCTIONS
What Are Neuromas of the Foot?  The ball of your foot is the bottom part just behind your toes. Bands of tissue (ligaments) connect the bones in the ball of your foot. Nerves run between the bones and underneath the ligaments. When a nerve becomes pinched, this causes it to swell and become painful due to the thickening of the tissue that surrounds the nerve. The painful, swollen nerve is called a neuroma (also called Holman's neuroma).     A neuroma most often occurs at the base of either the third and fourth toes or the second and third toes.   What causes a neuroma?  Wearing tight or high-heeled shoes can cause a neuroma. Shoes that are too narrow or too pointed squeeze the bones in the ball of the foot. Shoes with high heels put extra pressure on the ends of the bones. When the bones are squeezed together, they pinch the nerve that runs between them.  Symptoms  The most common symptom of a neuroma is pain in the ball of the foot between two toes. The pain may be dull or sharp. It may feel as if you have a stone in your shoe. You may also have tingling or numbness in one or both of the toes. Symptoms may occur after you have been walking or standing for a while. Taking off your shoes and rubbing the ball of your foot may decrease or relieve the pain.  Preventing future problems  To prevent a future neuroma, buy shoes with plenty of room across the ball of the foot and in the toes. This keeps the bones from being squeezed together. Wearing low-heeled shoes (less than 2 inches) also puts less pressure on the bones and nerves in the ball of the foot.   Date Last Reviewed: 9/10/2015  © 0402-5876 [employer] [address] [place] [address], [place] [address]. All rights reserved. This information is not intended as a substitute for professional medical care. Always follow your healthcare professional's instructions.    Holman's Neuroma (Intermetatarsal Neuroma)        What Is a Neuroma?    A neuroma is a  thickening of nerve tissue that may develop in various parts of the body. The most common neuroma in the foot is a Holmans neuroma, which occurs between the third and fourth toes. It is sometimes referred to as an intermetatarsal neuroma. Intermetatarsal describes its location in the ball of the foot between the metatarsal bones. Neuromas may also occur in other locations in the foot.    Holman's NeuromaThe thickening of the nerve that defines a neuroma is the result of compression and irritation of the nerve. This compression creates enlargement of the nerve, eventually leading to permanent nerve damage.    Causes  Anything that causes compression or irritation of the nerve can lead to the development of a neuroma. One of the most common offenders is wearing shoes that have a tapered toe box or high-heeled shoes that cause the toes to be forced into the toe box. People with certain foot deformities--bunions, hammertoes, flatfeet or more flexible feet--are at higher risk for developing a neuroma. Other potential causes are activities that involve repetitive irritation to the ball of the foot, such as running or court sports. An injury or other type of trauma to the area may also lead to a neuroma.    Symptoms  If you have a Holmans neuroma, you may have one or more of these symptoms where the nerve damage is occurring:    Tingling, burning or numbness  Pain  A feeling that something is inside the ball of the foot  A feeling that there is something in the shoe or a sock is bunched up     The progression of a Holmans neuroma often follows this pattern:    The symptoms begin gradually. At first, they occur only occasionally when wearing narrow-toed shoes or performing certain aggravating activities.  The symptoms may go away temporarily by removing the shoe, massaging the foot or avoiding aggravating shoes or activities.  Over time, the symptoms progressively worsen and may persist for several days or weeks.  The  symptoms become more intense as the neuroma enlarges and the temporary changes in the nerve become permanent.     Diagnosis  To arrive at a diagnosis, the foot and ankle surgeon will obtain a thorough history of your symptoms and examine your foot. During the physical examination, the doctor attempts to reproduce your symptoms by manipulating your foot. Other tests or imaging studies may be performed.    The best time to see your foot and ankle surgeon is early in the development of symptoms. Early diagnosis of a Holmans neuroma greatly lessens the need for more invasive treatments and may help you avoid surgery.    Nonsurgical Treatment  In developing a treatment plan, your foot and ankle surgeon will first determine how long you have had the neuroma and will evaluate its stage of development. Treatment approaches vary according to the severity of the problem.    For mild to moderate neuromas, treatment options may include:    -Padding. Padding techniques provide support for the metatarsal arch, thereby lessening the pressure on the nerve and decreasing the compression when walking.  -Icing. Placing an icepack on the affected area helps reduce swelling.  -Orthotic devices. Custom orthotic devices provided by your foot and ankle surgeon provide the support needed to reduce pressure and compression on the nerve.  -Activity modifications. Activities that put repetitive pressure on the neuroma should be avoided until the condition improves.  -Shoe modifications. Wear shoes with a wide toe box and avoid narrow-toed shoes or shoes with high heels.  -Medications. Oral nonsteroidal anti-inflammatory drugs (NSAIDs), such as ibuprofen, may be recommended to reduce pain and inflammation.  -Injection therapy. Treatment may include injections of cortisone, local anesthetics or other agents.     When Is Surgery Needed?  Surgery may be considered in patients who have not responded adequately to nonsurgical treatments. Your foot  and ankle surgeon will determine the approach that is best for your condition. The length of the recovery period will vary depending on the procedure performed.    Regardless of whether you have undergone surgical or nonsurgical treatment, your surgeon will recommend long-term measures to help keep your symptoms from returning. These include appropriate footwear and modification of activities to reduce the repetitive pressure on the foot.        Recommended OTC orthotics:  -powerstep  -superfeet    Recommended shoegear:  -new balance  -ascics  -ancao  -mendez        Equinus          What Is Equinus?    Equinus is a condition in which the upward bending motion of the ankle joint is limited. Someone with equinus lacks the flexibility to bring the top of the foot toward the front of the leg. Equinus can occur in one or both feet. When it involves both feet, the limitation of motion is sometimes worse in one foot than in the other.    People with equinus develop ways to compensate for their limited ankle motion, and this often leads to other foot, leg or back problems. The most common methods of compensation are flattening of the arch or picking up the heel early when walking, placing increased pressure on the ball of the foot. Other patients compensate by toe walking, while a smaller number take steps by bending abnormally at the hip or knee.    Causes  There are several possible causes for the limited range of ankle motion. Often, it is due to tightness in the Achilles tendon or calf muscles (the soleus muscle and/or gastrocnemius muscle). In some patients, this tightness is congenital (present at birth), and sometimes it is an inherited trait. Other patients acquire the tightness from being in a cast, being on crutches or frequently wearing high-heeled shoes. In addition, diabetes can affect the fibers of the Achilles tendon and cause tightness. Sometimes equinus is related to a bone blocking the ankle motion. For  example, a fragment of a broken bone following an ankle injury, or bone block, can get in the way and restrict motion. Equinus may also result from one leg being shorter than the other. Less often, equinus is caused by spasms in the calf muscle. These spasms may be signs of an underlying neurologic disorder.      Foot Problems Related to Equinus  Depending on how a patient compensates for the inability to bend properly at the ankle, a variety of foot conditions can develop, including:    Plantar fasciitis (arch/heel pain)  Calf cramping  Tendonitis (inflammation in the Achilles tendon)  Metatarsalgia (pain and/or callusing on the ball of the foot)  Flatfoot  Arthritis of the midfoot (middle area of the foot)  Pressure sores on the ball of the foot or the arch  Bunions and hammertoes  Ankle pain  Shin splints     Diagnosis  Most patients with equinus are unaware they have this condition when they first visit the doctor. Instead, they come to the doctor seeking relief for foot problems associated with equinus.    To diagnose equinus, the foot and ankle surgeon will evaluate the ankle's range of motion when the knee is flexed (bent) as well as extended (straightened). This enables the surgeon to identify whether the tendon or muscle is tight and to assess whether bone is interfering with ankle motion. X-rays may also be ordered. In some cases, the foot and ankle surgeon may refer the patient for neurologic evaluation.    Nonsurgical Treatment  Treatment includes strategies aimed at relieving the symptoms and conditions associated with equinus. In addition, the patient is treated for the equinus itself through one or more of the following options:    Night splint. The foot may be placed in a splint at night to keep it in a position that helps reduce tightness of the calf muscle.  Heel lifts. Placing heel lifts inside the shoes or wearing shoes with a moderate heel takes stress off the Achilles tendon when walking and  may reduce symptoms.  Arch supports or orthotic devices. Custom orthotic devices that fit into the shoe are often prescribed to keep weight distributed properly and to help control muscle/tendon imbalance.  Physical therapy. To help remedy muscle tightness, exercises that stretch the calf muscle(s) are recommended.     When Is Surgery Needed?  In some cases, surgery may be needed to correct the cause of equinus if it is related to a tight tendon or a bone blocking the ankle motion. The foot and ankle surgeon will determine the type of procedure that is best suited to the individual patient.      Cavus Foot (High-Arched Foot)          What Is Cavus Foot?      Normal foot and Cavus foot  Cavus foot is a condition in which the foot has a very high arch. Because of this high arch, an excessive amount of weight is placed on the ball and heel of the foot when walking or standing. Cavus foot can lead to a variety of signs and symptoms, such as pain and instability. It can develop at any age and can occur in one or both feet.    Causes  Cavus foot is often caused by a neurologic disorder or other medical condition, such as cerebral palsy, Charcot-Sandi-Tooth disease, spina bifida, polio, muscular dystrophy or stroke. In other cases of cavus foot, the high arch may represent an inherited structural abnormality. An accurate diagnosis is important because the underlying cause of cavus foot largely determines its future course. If the high arch is due to a neurologic disorder or other medical condition, it is likely to progressively worsen. On the other hand, cases of cavus foot that do not result from neurologic disorders usually do not change in appearance.    Symptoms  The arch of a cavus foot will appear high even when standing. In addition, one or more of the following symptoms may be present:    -Hammertoes (bent toes) or claw toes (toes clenched like a fist)  -Calluses on the ball, side or heel of the foot  -Pain when  standing or walking, especially in the heel, arch, or ball of foot  -An unstable foot due to the heel tilting inward, which can lead to ankle sprains    -Some people with cavus foot may also experience foot drop, a weakness of the muscles in the foot and ankle that results in dragging the foot when taking a step. Foot drop is usually a sign of an underlying neurologic condition.     Diagnosis  Normal foot and Cavus foot  Diagnosis of cavus foot includes a review of the patients family history. The foot and ankle surgeon examines the foot, looking for a high arch and possible calluses, hammertoes and claw toes. The foot is tested for muscle strength, and the patients walking pattern and coordination are observed. If a neurologic condition appears to be present, the entire limb may be examined. The surgeon may also study the pattern of wear on the patient's shoes.    X-rays are sometimes ordered to further assess the condition. In addition, the surgeon may refer the patient to a neurologist for a complete neurologic evaluation.        Nonsurgical Treatment  Nonsurgical treatment of cavus foot may include one or more of the following options:    -Orthotic devices. Custom orthotic devices that fit into the shoe can be beneficial because they provide stability and cushioning to the foot.  -Shoe modifications. High-topped shoes support the ankle, and shoes with heels a little wider on the bottom add stability.  -Bracing. The surgeon may recommend a brace to help keep the foot and ankle stable. Bracing is also useful in managing foot drop.  -Formal physical therapy  -Treatment of some symptoms with medications or injection therapy     When Is Surgery Needed?  If nonsurgical treatment fails to adequately relieve pain and improve stability, surgery may be needed to decrease pain, increase stability and compensate for weakness in the foot. The surgeon will choose the best surgical procedure or combination of procedures  based on the patients individual case. In some cases where an underlying neurologic problem exists, surgery may be needed again in the future due to the progression of the disorder.

## 2025-07-31 ENCOUNTER — PATIENT MESSAGE (OUTPATIENT)
Dept: ADMINISTRATIVE | Facility: HOSPITAL | Age: 55
End: 2025-07-31
Payer: COMMERCIAL

## 2025-07-31 ENCOUNTER — PATIENT OUTREACH (OUTPATIENT)
Dept: ADMINISTRATIVE | Facility: HOSPITAL | Age: 55
End: 2025-07-31
Payer: COMMERCIAL

## 2025-07-31 DIAGNOSIS — Z12.11 SCREENING FOR COLORECTAL CANCER: Primary | ICD-10-CM

## 2025-07-31 DIAGNOSIS — Z12.12 SCREENING FOR COLORECTAL CANCER: Primary | ICD-10-CM

## 2025-08-05 ENCOUNTER — PATIENT OUTREACH (OUTPATIENT)
Dept: ADMINISTRATIVE | Facility: HOSPITAL | Age: 55
End: 2025-08-05
Payer: COMMERCIAL

## 2025-08-07 ENCOUNTER — ANESTHESIA (OUTPATIENT)
Dept: ENDOSCOPY | Facility: HOSPITAL | Age: 55
End: 2025-08-07
Payer: COMMERCIAL

## 2025-08-07 ENCOUNTER — HOSPITAL ENCOUNTER (OUTPATIENT)
Facility: HOSPITAL | Age: 55
Discharge: HOME | End: 2025-08-07
Attending: COLON & RECTAL SURGERY | Admitting: COLON & RECTAL SURGERY
Payer: COMMERCIAL

## 2025-08-07 ENCOUNTER — ANESTHESIA EVENT (OUTPATIENT)
Dept: ENDOSCOPY | Facility: HOSPITAL | Age: 55
End: 2025-08-07
Payer: COMMERCIAL

## 2025-08-07 VITALS
HEIGHT: 65 IN | WEIGHT: 138 LBS | HEART RATE: 67 BPM | SYSTOLIC BLOOD PRESSURE: 147 MMHG | DIASTOLIC BLOOD PRESSURE: 67 MMHG | RESPIRATION RATE: 16 BRPM | OXYGEN SATURATION: 99 % | BODY MASS INDEX: 22.99 KG/M2 | TEMPERATURE: 98 F

## 2025-08-07 DIAGNOSIS — Z86.0100 HISTORY OF COLON POLYPS: Primary | ICD-10-CM

## 2025-08-07 PROCEDURE — G0105 COLORECTAL SCRN; HI RISK IND: HCPCS | Mod: ,,, | Performed by: COLON & RECTAL SURGERY

## 2025-08-07 PROCEDURE — 25000003 PHARM REV CODE 250: Performed by: NURSE ANESTHETIST, CERTIFIED REGISTERED

## 2025-08-07 PROCEDURE — 37000009 HC ANESTHESIA EA ADD 15 MINS: Performed by: COLON & RECTAL SURGERY

## 2025-08-07 PROCEDURE — 25000003 PHARM REV CODE 250: Performed by: COLON & RECTAL SURGERY

## 2025-08-07 PROCEDURE — 63600175 PHARM REV CODE 636 W HCPCS: Performed by: NURSE ANESTHETIST, CERTIFIED REGISTERED

## 2025-08-07 PROCEDURE — 37000008 HC ANESTHESIA 1ST 15 MINUTES: Performed by: COLON & RECTAL SURGERY

## 2025-08-07 PROCEDURE — G0105 COLORECTAL SCRN; HI RISK IND: HCPCS | Performed by: COLON & RECTAL SURGERY

## 2025-08-07 RX ORDER — PROPOFOL 10 MG/ML
VIAL (ML) INTRAVENOUS
Status: DISCONTINUED | OUTPATIENT
Start: 2025-08-07 | End: 2025-08-07

## 2025-08-07 RX ORDER — SODIUM CHLORIDE 9 MG/ML
INJECTION, SOLUTION INTRAVENOUS CONTINUOUS
Status: DISCONTINUED | OUTPATIENT
Start: 2025-08-07 | End: 2025-08-07 | Stop reason: HOSPADM

## 2025-08-07 RX ORDER — PROPOFOL 10 MG/ML
VIAL (ML) INTRAVENOUS CONTINUOUS PRN
Status: DISCONTINUED | OUTPATIENT
Start: 2025-08-07 | End: 2025-08-07

## 2025-08-07 RX ORDER — LIDOCAINE HYDROCHLORIDE 20 MG/ML
INJECTION INTRAVENOUS
Status: DISCONTINUED | OUTPATIENT
Start: 2025-08-07 | End: 2025-08-07

## 2025-08-07 RX ADMIN — LIDOCAINE HYDROCHLORIDE 50 MG: 20 INJECTION INTRAVENOUS at 10:08

## 2025-08-07 RX ADMIN — PROPOFOL 20 MG: 10 INJECTION, EMULSION INTRAVENOUS at 10:08

## 2025-08-07 RX ADMIN — SODIUM CHLORIDE: 9 INJECTION, SOLUTION INTRAVENOUS at 10:08

## 2025-08-07 RX ADMIN — PROPOFOL 150 MCG/KG/MIN: 10 INJECTION, EMULSION INTRAVENOUS at 10:08

## 2025-08-07 RX ADMIN — PROPOFOL 30 MG: 10 INJECTION, EMULSION INTRAVENOUS at 10:08

## 2025-08-07 RX ADMIN — PROPOFOL 80 MG: 10 INJECTION, EMULSION INTRAVENOUS at 10:08

## 2025-08-07 NOTE — TRANSFER OF CARE
"Anesthesia Transfer of Care Note    Patient: Brissa Mason    Procedure(s) Performed: Procedure(s) (LRB):  COLONOSCOPY, SCREENING, HIGH RISK PATIENT (N/A)    Patient location: GI    Anesthesia Type: general    Transport from OR: Transported from OR on 6-10 L/min O2 by face mask with adequate spontaneous ventilation    Post pain: adequate analgesia    Post assessment: no apparent anesthetic complications and tolerated procedure well    Post vital signs: stable    Level of consciousness: sedated and responds to stimulation    Nausea/Vomiting: no nausea/vomiting    Complications: none    Transfer of care protocol was followed      Last vitals: Visit Vitals  BP (!) 125/58 (BP Location: Left arm, Patient Position: Lying)   Pulse 74   Temp 36.4 °C (97.5 °F) (Tympanic)   Resp 16   Ht 5' 5" (1.651 m)   Wt 62.6 kg (138 lb 0.1 oz)   LMP 10/06/2024 (Exact Date)   SpO2 100%   Breastfeeding No   BMI 22.97 kg/m²     "

## 2025-08-07 NOTE — ANESTHESIA PREPROCEDURE EVALUATION
08/07/2025  Brissa Mason is a 55 y.o., female.    Past Medical History:   Diagnosis Date    Abnormal uterine bleeding (AUB) 05/11/2023    Acute pain of left shoulder 06/22/2015    Allergy     Anemia 04/20/2020    Anxiety disorder     B12 deficiency     B12 nutritional deficiency 04/20/2020    Cataract     Colitis     resolved; rectum 2005; neg flex sig 2006    Colitis: 2005 rectal area     resolved; rectum 2005; neg flex sig 2006     Family hx of colon cancer requiring screening colonoscopy 10/15/2020    Iron deficiency     Menstrual migraine without status migrainosus, not intractable 09/05/2017    Osteoporosis     Status post hysteroscopy, dilation and curettage 5/12/2025    Vitamin D deficiency      Past Surgical History:   Procedure Laterality Date    COLONOSCOPY N/A 10/15/2020    Procedure: COLONOSCOPY;  Surgeon: Vern Harkins MD;  [facility]  Service: Endoscopy;  Laterality: N/A;  covid test 10/12-[facility]    dental implant      ESOPHAGOGASTRODUODENOSCOPY N/A 12/02/2022    Procedure: EGD (ESOPHAGOGASTRODUODENOSCOPY);  Surgeon: Patricio Silva MD;  Location: [address]);  Service: Endoscopy;  Laterality: N/A;  per referral/ prep ins. on portal - ERW  12/1 SWP-AWARE OF NEW ARRIVAL TIME-RT    GANGLION CYST EXCISION  2017    HYSTEROSCOPY WITH DILATION AND CURETTAGE OF UTERUS N/A 5/12/2025    Procedure: HYSTEROSCOPY, WITH DILATION AND CURETTAGE OF UTERUS;  Surgeon: Bran Oden IV, MD;  Location[facility];  Service: OB/GYN;  Laterality: N/A;    shoulder injection Left 05/2022       Pre-op Assessment    I have reviewed the Patient Summary Reports.     I have reviewed the Nursing Notes. I have reviewed the NPO Status.   I have reviewed the Medications.     Review of Systems  Anesthesia Hx:  No problems with previous Anesthesia             Denies Family Hx of  Anesthesia complications.    Denies Personal Hx of Anesthesia complications.                    Neurological:      Headaches                                 Psych:  Psychiatric History                  Physical Exam  General: Well nourished    Airway:  Mallampati: II / II  Mouth Opening: Normal  TM Distance: Normal  Tongue: Normal  Neck ROM: Normal ROM    Dental:  Intact        Anesthesia Plan  Type of Anesthesia, risks & benefits discussed:    Anesthesia Type: Gen Natural Airway  Intra-op Monitoring Plan: Standard ASA Monitors  Post Op Pain Control Plan: multimodal analgesia  Induction:  IV  Informed Consent: Informed consent signed with the Patient and all parties understand the risks and agree with anesthesia plan.  All questions answered. Patient consented to blood products? No  ASA Score: 2  Day of Surgery Review of History & Physical: H&P Update referred to the surgeon/provider.    Ready For Surgery From Anesthesia Perspective.     .

## 2025-08-07 NOTE — ANESTHESIA POSTPROCEDURE EVALUATION
Anesthesia Post Evaluation    Patient: Brissa Mason    Procedure(s) Performed: Procedure(s) (LRB):  COLONOSCOPY, SCREENING, HIGH RISK PATIENT (N/A)    Final Anesthesia Type: general      Patient location during evaluation: PACU  Patient participation: Yes- Able to Participate  Level of consciousness: awake and alert and oriented  Post-procedure vital signs: reviewed and stable  Pain management: adequate  Airway patency: patent    PONV status at discharge: No PONV  Anesthetic complications: no      Cardiovascular status: hemodynamically stable  Respiratory status: unassisted, spontaneous ventilation and room air  Hydration status: euvolemic  Follow-up not needed.              Vitals Value Taken Time   /67 08/07/25 11:49   Temp 36.4 °C (97.5 °F) 08/07/25 11:15   Pulse 67 08/07/25 11:49   Resp 16 08/07/25 11:49   SpO2 99 % 08/07/25 11:49         Event Time   Out of Recovery 11:51:00         Pain/Naman Score: Naman Score: 10 (8/7/2025 11:49 AM)

## (undated) DEVICE — PACK BASIC

## (undated) DEVICE — GAUZE SPONGE 4X4 12PLY

## (undated) DEVICE — SYR 10CC LUER LOCK

## (undated) DEVICE — APPLICATOR CHLORAPREP ORN 26ML

## (undated) DEVICE — SEE MEDLINE ITEM 152622

## (undated) DEVICE — Device

## (undated) DEVICE — SEE MEDLINE ITEM 157173

## (undated) DEVICE — SEE MEDLINE ITEM 152522

## (undated) DEVICE — SEE MEDLINE ITEM 157117

## (undated) DEVICE — SOL IRR SOD CHL .9% POUR

## (undated) DEVICE — SPLINT WRIST FOAM 8IN MD/LFT

## (undated) DEVICE — GLOVE SURG BIOGEL LATEX SZ 7.5

## (undated) DEVICE — SOL POVIDONE SCRUB IODINE 4 OZ

## (undated) DEVICE — SEE MEDLINE ITEM 157116

## (undated) DEVICE — BANDAGE ELASTIC 2X5 VELCRO ST

## (undated) DEVICE — SEE MEDLINE ITEM 157131

## (undated) DEVICE — STOCKINET 4INX48

## (undated) DEVICE — SOL POVIDONE PREP IODINE 4 OZ

## (undated) DEVICE — SET BASIN 48X48IN 6000ML RING

## (undated) DEVICE — DRESSING XEROFORM FOIL PK 1X8

## (undated) DEVICE — PACK FLUENT DISPOSABLE

## (undated) DEVICE — ELECTRODE REM PLYHSV RETURN 9

## (undated) DEVICE — SEE MEDLINE ITEM 146268

## (undated) DEVICE — BLADE SCALP OPHTL BEVEL STR

## (undated) DEVICE — SEAL LENS SCOPE MYOSURE

## (undated) DEVICE — BANDAGE SOFFORM STER 2IN

## (undated) DEVICE — ADHESIVE DERMABOND ADVANCED

## (undated) DEVICE — SUT ETHILON 5-0 PS-2 18IN

## (undated) DEVICE — COVER OVERHEAD SURG LT BLUE

## (undated) DEVICE — SOL NACL .9P 500ML

## (undated) DEVICE — SOL NACL IRR 3000ML

## (undated) DEVICE — SEE MEDLINE ITEM 156955

## (undated) DEVICE — PAD CAST SPECIALIST STRL 3

## (undated) DEVICE — PAD PREP 50/CA

## (undated) DEVICE — GLOVE SENSICARE PI MICRO 7.5